# Patient Record
Sex: MALE | Race: WHITE | NOT HISPANIC OR LATINO
[De-identification: names, ages, dates, MRNs, and addresses within clinical notes are randomized per-mention and may not be internally consistent; named-entity substitution may affect disease eponyms.]

---

## 2017-01-26 ENCOUNTER — APPOINTMENT (OUTPATIENT)
Dept: INTERNAL MEDICINE | Facility: CLINIC | Age: 77
End: 2017-01-26

## 2017-03-01 ENCOUNTER — RX RENEWAL (OUTPATIENT)
Age: 77
End: 2017-03-01

## 2017-05-22 ENCOUNTER — RX RENEWAL (OUTPATIENT)
Age: 77
End: 2017-05-22

## 2017-07-17 ENCOUNTER — NON-APPOINTMENT (OUTPATIENT)
Age: 77
End: 2017-07-17

## 2017-07-17 ENCOUNTER — APPOINTMENT (OUTPATIENT)
Dept: INTERNAL MEDICINE | Facility: CLINIC | Age: 77
End: 2017-07-17

## 2017-07-17 VITALS
DIASTOLIC BLOOD PRESSURE: 70 MMHG | HEART RATE: 102 BPM | HEIGHT: 69 IN | SYSTOLIC BLOOD PRESSURE: 179 MMHG | BODY MASS INDEX: 29.92 KG/M2 | WEIGHT: 202 LBS

## 2017-07-17 DIAGNOSIS — Z87.09 PERSONAL HISTORY OF OTHER DISEASES OF THE RESPIRATORY SYSTEM: ICD-10-CM

## 2017-07-17 LAB
GLUCOSE BLDC GLUCOMTR-MCNC: 125
HBA1C MFR BLD HPLC: 6

## 2017-07-18 LAB
ALBUMIN SERPL ELPH-MCNC: 4.3 G/DL
ALP BLD-CCNC: 84 U/L
ALT SERPL-CCNC: 14 U/L
ANION GAP SERPL CALC-SCNC: 15 MMOL/L
APPEARANCE: CLEAR
AST SERPL-CCNC: 16 U/L
BASOPHILS # BLD AUTO: 0.02 K/UL
BASOPHILS NFR BLD AUTO: 0.3 %
BILIRUB SERPL-MCNC: 0.6 MG/DL
BILIRUBIN URINE: NEGATIVE
BLOOD URINE: NEGATIVE
BUN SERPL-MCNC: 23 MG/DL
CALCIUM SERPL-MCNC: 9.5 MG/DL
CHLORIDE SERPL-SCNC: 104 MMOL/L
CHOLEST SERPL-MCNC: 169 MG/DL
CHOLEST/HDLC SERPL: 3.1 RATIO
CO2 SERPL-SCNC: 23 MMOL/L
COLOR: YELLOW
CREAT SERPL-MCNC: 0.83 MG/DL
CREAT SPEC-SCNC: 105 MG/DL
DIGOXIN SERPL-MCNC: 0.7 NG/ML
EOSINOPHIL # BLD AUTO: 0.02 K/UL
EOSINOPHIL NFR BLD AUTO: 0.3 %
GLUCOSE QUALITATIVE U: NORMAL MG/DL
GLUCOSE SERPL-MCNC: 127 MG/DL
HCT VFR BLD CALC: 40.1 %
HDLC SERPL-MCNC: 54 MG/DL
HGB BLD-MCNC: 12.2 G/DL
IMM GRANULOCYTES NFR BLD AUTO: 0.2 %
KETONES URINE: NEGATIVE
LDLC SERPL CALC-MCNC: 103 MG/DL
LEUKOCYTE ESTERASE URINE: NEGATIVE
LYMPHOCYTES # BLD AUTO: 0.86 K/UL
LYMPHOCYTES NFR BLD AUTO: 13.7 %
MAN DIFF?: NORMAL
MCHC RBC-ENTMCNC: 26.3 PG
MCHC RBC-ENTMCNC: 30.4 GM/DL
MCV RBC AUTO: 86.6 FL
MICROALBUMIN 24H UR DL<=1MG/L-MCNC: 3.5 MG/DL
MICROALBUMIN/CREAT 24H UR-RTO: 33 MG/G
MONOCYTES # BLD AUTO: 0.37 K/UL
MONOCYTES NFR BLD AUTO: 5.9 %
NEUTROPHILS # BLD AUTO: 5 K/UL
NEUTROPHILS NFR BLD AUTO: 79.6 %
NITRITE URINE: NEGATIVE
PH URINE: 5
PLATELET # BLD AUTO: 153 K/UL
POTASSIUM SERPL-SCNC: 4.3 MMOL/L
PROT SERPL-MCNC: 7.1 G/DL
PROTEIN URINE: NEGATIVE MG/DL
RBC # BLD: 4.63 M/UL
RBC # FLD: 15.6 %
SAVE SPECIMEN: NORMAL
SODIUM SERPL-SCNC: 142 MMOL/L
SPECIFIC GRAVITY URINE: 1.03
TRIGL SERPL-MCNC: 58 MG/DL
UROBILINOGEN URINE: NORMAL MG/DL
WBC # FLD AUTO: 6.28 K/UL

## 2017-08-21 ENCOUNTER — RX RENEWAL (OUTPATIENT)
Age: 77
End: 2017-08-21

## 2017-11-07 ENCOUNTER — RX RENEWAL (OUTPATIENT)
Age: 77
End: 2017-11-07

## 2017-11-09 ENCOUNTER — NON-APPOINTMENT (OUTPATIENT)
Age: 77
End: 2017-11-09

## 2017-11-09 ENCOUNTER — APPOINTMENT (OUTPATIENT)
Dept: INTERNAL MEDICINE | Facility: CLINIC | Age: 77
End: 2017-11-09
Payer: MEDICARE

## 2017-11-09 VITALS
BODY MASS INDEX: 30.51 KG/M2 | DIASTOLIC BLOOD PRESSURE: 70 MMHG | HEIGHT: 69 IN | WEIGHT: 206 LBS | SYSTOLIC BLOOD PRESSURE: 136 MMHG

## 2017-11-09 VITALS — HEART RATE: 86 BPM

## 2017-11-09 LAB
DATE COLLECTED: NORMAL
GLUCOSE BLDC GLUCOMTR-MCNC: 118
HBA1C MFR BLD HPLC: 6.2
HEMOCCULT SP1 STL QL: NEGATIVE
QUALITY CONTROL: YES

## 2017-11-09 PROCEDURE — 81003 URINALYSIS AUTO W/O SCOPE: CPT | Mod: QW

## 2017-11-09 PROCEDURE — 82962 GLUCOSE BLOOD TEST: CPT

## 2017-11-09 PROCEDURE — 36415 COLL VENOUS BLD VENIPUNCTURE: CPT

## 2017-11-09 PROCEDURE — 93000 ELECTROCARDIOGRAM COMPLETE: CPT

## 2017-11-09 PROCEDURE — 83036 HEMOGLOBIN GLYCOSYLATED A1C: CPT | Mod: QW

## 2017-11-09 PROCEDURE — G0439: CPT

## 2017-11-13 LAB
25(OH)D3 SERPL-MCNC: 33.1 NG/ML
ALBUMIN SERPL ELPH-MCNC: 4.1 G/DL
ALP BLD-CCNC: 87 U/L
ALT SERPL-CCNC: 11 U/L
ANION GAP SERPL CALC-SCNC: 16 MMOL/L
AST SERPL-CCNC: 20 U/L
BASOPHILS # BLD AUTO: 0.02 K/UL
BASOPHILS NFR BLD AUTO: 0.3 %
BILIRUB SERPL-MCNC: 0.5 MG/DL
BILIRUB UR QL STRIP: NORMAL
BUN SERPL-MCNC: 12 MG/DL
CALCIUM SERPL-MCNC: 9.5 MG/DL
CHLORIDE SERPL-SCNC: 103 MMOL/L
CHOLEST SERPL-MCNC: 189 MG/DL
CHOLEST/HDLC SERPL: 4.2 RATIO
CLARITY UR: NORMAL
CO2 SERPL-SCNC: 27 MMOL/L
COLLECTION METHOD: NORMAL
CREAT SERPL-MCNC: 0.57 MG/DL
DIGOXIN SERPL-MCNC: 0.7 NG/ML
EOSINOPHIL # BLD AUTO: 0.03 K/UL
EOSINOPHIL NFR BLD AUTO: 0.5 %
GLUCOSE SERPL-MCNC: 122 MG/DL
GLUCOSE UR-MCNC: NORMAL
HCG UR QL: 0.2 EU/DL
HCT VFR BLD CALC: 41.9 %
HDLC SERPL-MCNC: 45 MG/DL
HGB BLD-MCNC: 12.8 G/DL
HGB UR QL STRIP.AUTO: NORMAL
IMM GRANULOCYTES NFR BLD AUTO: 0.3 %
KETONES UR-MCNC: NORMAL
LDLC SERPL CALC-MCNC: 127 MG/DL
LEUKOCYTE ESTERASE UR QL STRIP: NORMAL
LYMPHOCYTES # BLD AUTO: 0.76 K/UL
LYMPHOCYTES NFR BLD AUTO: 11.7 %
MAN DIFF?: NORMAL
MCHC RBC-ENTMCNC: 27.2 PG
MCHC RBC-ENTMCNC: 30.5 GM/DL
MCV RBC AUTO: 89 FL
MONOCYTES # BLD AUTO: 0.42 K/UL
MONOCYTES NFR BLD AUTO: 6.5 %
NEUTROPHILS # BLD AUTO: 5.24 K/UL
NEUTROPHILS NFR BLD AUTO: 80.7 %
NITRITE UR QL STRIP: NORMAL
PH UR STRIP: 6
PLATELET # BLD AUTO: 155 K/UL
POTASSIUM SERPL-SCNC: 4.3 MMOL/L
PROT SERPL-MCNC: 7.7 G/DL
PROT UR STRIP-MCNC: NORMAL
RBC # BLD: 4.71 M/UL
RBC # FLD: 14.8 %
SODIUM SERPL-SCNC: 146 MMOL/L
SP GR UR STRIP: 1.01
T4 SERPL-MCNC: 6.8 UG/DL
TRIGL SERPL-MCNC: 87 MG/DL
TSH SERPL-ACNC: 0.86 UIU/ML
URATE SERPL-MCNC: 5.9 MG/DL
WBC # FLD AUTO: 6.49 K/UL

## 2018-01-26 ENCOUNTER — RX RENEWAL (OUTPATIENT)
Age: 78
End: 2018-01-26

## 2018-04-18 ENCOUNTER — APPOINTMENT (OUTPATIENT)
Dept: INTERNAL MEDICINE | Facility: CLINIC | Age: 78
End: 2018-04-18
Payer: MEDICARE

## 2018-04-18 ENCOUNTER — LABORATORY RESULT (OUTPATIENT)
Age: 78
End: 2018-04-18

## 2018-04-18 VITALS
HEART RATE: 86 BPM | HEIGHT: 69 IN | BODY MASS INDEX: 31.4 KG/M2 | WEIGHT: 212 LBS | DIASTOLIC BLOOD PRESSURE: 72 MMHG | SYSTOLIC BLOOD PRESSURE: 166 MMHG

## 2018-04-18 LAB
GLUCOSE BLDC GLUCOMTR-MCNC: 109
HBA1C MFR BLD HPLC: 6.3

## 2018-04-18 PROCEDURE — 99214 OFFICE O/P EST MOD 30 MIN: CPT | Mod: 25

## 2018-04-18 PROCEDURE — 36415 COLL VENOUS BLD VENIPUNCTURE: CPT

## 2018-04-18 PROCEDURE — 83036 HEMOGLOBIN GLYCOSYLATED A1C: CPT | Mod: QW

## 2018-04-19 LAB
ALBUMIN SERPL ELPH-MCNC: 4 G/DL
ALP BLD-CCNC: 111 U/L
ALT SERPL-CCNC: 13 U/L
ANION GAP SERPL CALC-SCNC: 13 MMOL/L
AST SERPL-CCNC: 20 U/L
BASOPHILS # BLD AUTO: 0.01 K/UL
BASOPHILS NFR BLD AUTO: 0.2 %
BILIRUB SERPL-MCNC: 0.7 MG/DL
BUN SERPL-MCNC: 15 MG/DL
CALCIUM SERPL-MCNC: 9.1 MG/DL
CHLORIDE SERPL-SCNC: 103 MMOL/L
CHOLEST SERPL-MCNC: 159 MG/DL
CHOLEST/HDLC SERPL: 3.6 RATIO
CO2 SERPL-SCNC: 24 MMOL/L
CREAT SERPL-MCNC: 0.83 MG/DL
CREAT SPEC-SCNC: 49 MG/DL
DIGOXIN SERPL-MCNC: 0.6 NG/ML
EOSINOPHIL # BLD AUTO: 0.04 K/UL
EOSINOPHIL NFR BLD AUTO: 0.7 %
GLUCOSE SERPL-MCNC: 103 MG/DL
HCT VFR BLD CALC: 41.7 %
HDLC SERPL-MCNC: 44 MG/DL
HGB BLD-MCNC: 12.2 G/DL
IMM GRANULOCYTES NFR BLD AUTO: 0.2 %
LDLC SERPL CALC-MCNC: 102 MG/DL
LYMPHOCYTES # BLD AUTO: 0.95 K/UL
LYMPHOCYTES NFR BLD AUTO: 16.8 %
MAN DIFF?: NORMAL
MCHC RBC-ENTMCNC: 26.2 PG
MCHC RBC-ENTMCNC: 29.3 GM/DL
MCV RBC AUTO: 89.5 FL
MICROALBUMIN 24H UR DL<=1MG/L-MCNC: 2.7 MG/DL
MICROALBUMIN/CREAT 24H UR-RTO: 55 MG/G
MONOCYTES # BLD AUTO: 0.48 K/UL
MONOCYTES NFR BLD AUTO: 8.5 %
NEUTROPHILS # BLD AUTO: 4.17 K/UL
NEUTROPHILS NFR BLD AUTO: 73.6 %
PLATELET # BLD AUTO: 155 K/UL
POTASSIUM SERPL-SCNC: 4.4 MMOL/L
PROT SERPL-MCNC: 7.4 G/DL
RBC # BLD: 4.66 M/UL
RBC # FLD: 15.7 %
SODIUM SERPL-SCNC: 140 MMOL/L
TRIGL SERPL-MCNC: 64 MG/DL
WBC # FLD AUTO: 5.66 K/UL

## 2018-07-10 ENCOUNTER — RX RENEWAL (OUTPATIENT)
Age: 78
End: 2018-07-10

## 2018-07-16 ENCOUNTER — RX RENEWAL (OUTPATIENT)
Age: 78
End: 2018-07-16

## 2018-09-04 ENCOUNTER — APPOINTMENT (OUTPATIENT)
Dept: OTOLARYNGOLOGY | Facility: CLINIC | Age: 78
End: 2018-09-04

## 2018-09-12 ENCOUNTER — LABORATORY RESULT (OUTPATIENT)
Age: 78
End: 2018-09-12

## 2018-09-12 ENCOUNTER — APPOINTMENT (OUTPATIENT)
Dept: INTERNAL MEDICINE | Facility: CLINIC | Age: 78
End: 2018-09-12
Payer: MEDICARE

## 2018-09-12 VITALS — WEIGHT: 198 LBS | BODY MASS INDEX: 29.33 KG/M2 | HEIGHT: 69 IN

## 2018-09-12 VITALS — SYSTOLIC BLOOD PRESSURE: 128 MMHG | HEART RATE: 86 BPM | DIASTOLIC BLOOD PRESSURE: 70 MMHG

## 2018-09-12 LAB
GLUCOSE BLDC GLUCOMTR-MCNC: 113
HBA1C MFR BLD HPLC: 5.9

## 2018-09-12 PROCEDURE — 99214 OFFICE O/P EST MOD 30 MIN: CPT | Mod: 25

## 2018-09-12 PROCEDURE — 83036 HEMOGLOBIN GLYCOSYLATED A1C: CPT | Mod: QW

## 2018-09-12 PROCEDURE — 36415 COLL VENOUS BLD VENIPUNCTURE: CPT

## 2018-09-12 NOTE — HISTORY OF PRESENT ILLNESS
[de-identified] : 78 year old male here today for a routine follow up of his chronic medical problems. He states he Has been losing weight.  He is in his yard a lot. His appetite is good. \par \par A fib: on Dig, metoprolol\par \par DM: doesn’t check sugar\par \par Myesthesia Gravis: same, on CellCept\par Dr Rincon\par \par

## 2018-09-12 NOTE — ASSESSMENT
[FreeTextEntry1] : DM: a1c 5.9--very good\par continue diet \par \par htn: bp stable\par \par Myesthenia: continue fu with Neuro\par \par Routine labs, dig level\par \par refusing flu shot\par \par fu in 4months

## 2018-09-13 LAB
ALBUMIN SERPL ELPH-MCNC: 4.5 G/DL
ALP BLD-CCNC: 112 U/L
ALT SERPL-CCNC: 15 U/L
ANION GAP SERPL CALC-SCNC: 13 MMOL/L
AST SERPL-CCNC: 21 U/L
BASOPHILS # BLD AUTO: 0.01 K/UL
BASOPHILS NFR BLD AUTO: 0.2 %
BILIRUB SERPL-MCNC: 0.7 MG/DL
BUN SERPL-MCNC: 22 MG/DL
CALCIUM SERPL-MCNC: 9.8 MG/DL
CHLORIDE SERPL-SCNC: 102 MMOL/L
CHOLEST SERPL-MCNC: 166 MG/DL
CHOLEST/HDLC SERPL: 3.9 RATIO
CO2 SERPL-SCNC: 28 MMOL/L
CREAT SERPL-MCNC: 0.8 MG/DL
DIGOXIN SERPL-MCNC: 0.9 NG/ML
EOSINOPHIL # BLD AUTO: 0.02 K/UL
EOSINOPHIL NFR BLD AUTO: 0.3 %
HCT VFR BLD CALC: 42.3 %
HDLC SERPL-MCNC: 43 MG/DL
HGB BLD-MCNC: 12.8 G/DL
IMM GRANULOCYTES NFR BLD AUTO: 0.2 %
LDLC SERPL CALC-MCNC: 108 MG/DL
LYMPHOCYTES # BLD AUTO: 0.84 K/UL
LYMPHOCYTES NFR BLD AUTO: 13.8 %
MAN DIFF?: NORMAL
MCHC RBC-ENTMCNC: 27 PG
MCHC RBC-ENTMCNC: 30.3 GM/DL
MCV RBC AUTO: 89.2 FL
MONOCYTES # BLD AUTO: 0.39 K/UL
MONOCYTES NFR BLD AUTO: 6.4 %
NEUTROPHILS # BLD AUTO: 4.8 K/UL
NEUTROPHILS NFR BLD AUTO: 79.1 %
PLATELET # BLD AUTO: 153 K/UL
POTASSIUM SERPL-SCNC: 4.7 MMOL/L
PROT SERPL-MCNC: 7.2 G/DL
RBC # BLD: 4.74 M/UL
RBC # FLD: 15.4 %
SODIUM SERPL-SCNC: 143 MMOL/L
TRIGL SERPL-MCNC: 75 MG/DL
WBC # FLD AUTO: 6.07 K/UL

## 2018-10-15 ENCOUNTER — RX RENEWAL (OUTPATIENT)
Age: 78
End: 2018-10-15

## 2019-01-16 ENCOUNTER — NON-APPOINTMENT (OUTPATIENT)
Age: 79
End: 2019-01-16

## 2019-01-16 ENCOUNTER — APPOINTMENT (OUTPATIENT)
Dept: INTERNAL MEDICINE | Facility: CLINIC | Age: 79
End: 2019-01-16
Payer: MEDICARE

## 2019-01-16 VITALS
HEIGHT: 69 IN | HEART RATE: 80 BPM | BODY MASS INDEX: 29.47 KG/M2 | DIASTOLIC BLOOD PRESSURE: 77 MMHG | WEIGHT: 199 LBS | SYSTOLIC BLOOD PRESSURE: 171 MMHG

## 2019-01-16 LAB
BILIRUB UR QL STRIP: NORMAL
CLARITY UR: CLEAR
COLLECTION METHOD: NORMAL
GLUCOSE UR-MCNC: NORMAL
HCG UR QL: 0.2 EU/DL
HGB UR QL STRIP.AUTO: NORMAL
KETONES UR-MCNC: NORMAL
LEUKOCYTE ESTERASE UR QL STRIP: NORMAL
NITRITE UR QL STRIP: NORMAL
PH UR STRIP: 6.5
PROT UR STRIP-MCNC: NORMAL
SP GR UR STRIP: 1.01

## 2019-01-16 PROCEDURE — 81003 URINALYSIS AUTO W/O SCOPE: CPT | Mod: QW

## 2019-01-16 PROCEDURE — 93000 ELECTROCARDIOGRAM COMPLETE: CPT | Mod: 59

## 2019-01-16 PROCEDURE — 83036 HEMOGLOBIN GLYCOSYLATED A1C: CPT | Mod: QW

## 2019-01-16 PROCEDURE — 36415 COLL VENOUS BLD VENIPUNCTURE: CPT

## 2019-01-16 PROCEDURE — G0439: CPT | Mod: 25

## 2019-01-16 NOTE — HEALTH RISK ASSESSMENT
[Good] : ~his/her~  mood as  good [] : No [No falls in past year] : Patient reported no falls in the past year [0] : 2) Feeling down, depressed, or hopeless: Not at all (0) [[id number]] : 0 [Patient declined colonoscopy] : Patient declined colonoscopy [Discussed at today's visit] : Advance Directives Discussed at today's visit

## 2019-01-16 NOTE — ASSESSMENT
[FreeTextEntry1] : Refuses flu shots\par never had colonoscopy--does not want FIT DNA\par \par Disucssed chronic issues, EKG stable\par bp stable\par \par routine labs ordered \par \par

## 2019-01-16 NOTE — HISTORY OF PRESENT ILLNESS
[de-identified] : 79 year old male here today for his annual wellness exam.\par He feels well today. \par \par Myasthenia: sees Dr Rincon \par \par Has not ever had a colonoscopy, does not want flu shot or any other injections. \par

## 2019-01-16 NOTE — PHYSICAL EXAM

## 2019-01-18 ENCOUNTER — OTHER (OUTPATIENT)
Age: 79
End: 2019-01-18

## 2019-01-18 LAB
25(OH)D3 SERPL-MCNC: 34.8 NG/ML
ALBUMIN SERPL ELPH-MCNC: 4.5 G/DL
ALP BLD-CCNC: 127 U/L
ALT SERPL-CCNC: 27 U/L
ANION GAP SERPL CALC-SCNC: 14 MMOL/L
AST SERPL-CCNC: 22 U/L
BASOPHILS # BLD AUTO: 0.02 K/UL
BASOPHILS NFR BLD AUTO: 0.3 %
BILIRUB SERPL-MCNC: 0.7 MG/DL
BUN SERPL-MCNC: 17 MG/DL
CALCIUM SERPL-MCNC: 9.7 MG/DL
CHLORIDE SERPL-SCNC: 101 MMOL/L
CHOLEST SERPL-MCNC: 177 MG/DL
CHOLEST/HDLC SERPL: 3.4 RATIO
CO2 SERPL-SCNC: 25 MMOL/L
CREAT SERPL-MCNC: 0.89 MG/DL
DIGOXIN SERPL-MCNC: 0.8 NG/ML
EOSINOPHIL # BLD AUTO: 0 K/UL
EOSINOPHIL NFR BLD AUTO: 0 %
GLUCOSE BLDC GLUCOMTR-MCNC: 142
GLUCOSE SERPL-MCNC: 146 MG/DL
HBA1C MFR BLD HPLC: 6.2
HCT VFR BLD CALC: 43.3 %
HDLC SERPL-MCNC: 52 MG/DL
HGB BLD-MCNC: 13.1 G/DL
IMM GRANULOCYTES NFR BLD AUTO: 0.3 %
LDLC SERPL CALC-MCNC: 111 MG/DL
LYMPHOCYTES # BLD AUTO: 0.63 K/UL
LYMPHOCYTES NFR BLD AUTO: 9.1 %
MAN DIFF?: NORMAL
MCHC RBC-ENTMCNC: 26.6 PG
MCHC RBC-ENTMCNC: 30.3 GM/DL
MCV RBC AUTO: 87.8 FL
MONOCYTES # BLD AUTO: 0.41 K/UL
MONOCYTES NFR BLD AUTO: 5.9 %
NEUTROPHILS # BLD AUTO: 5.82 K/UL
NEUTROPHILS NFR BLD AUTO: 84.4 %
PLATELET # BLD AUTO: 141 K/UL
POTASSIUM SERPL-SCNC: 4.3 MMOL/L
PROT SERPL-MCNC: 7.4 G/DL
PSA SERPL-MCNC: 1.35 NG/ML
RBC # BLD: 4.93 M/UL
RBC # FLD: 15.1 %
SAVE SPECIMEN: NORMAL
SODIUM SERPL-SCNC: 140 MMOL/L
T4 SERPL-MCNC: 8 UG/DL
TRIGL SERPL-MCNC: 71 MG/DL
TSH SERPL-ACNC: 0.94 UIU/ML
URATE SERPL-MCNC: 5.5 MG/DL
WBC # FLD AUTO: 6.9 K/UL

## 2019-03-13 ENCOUNTER — APPOINTMENT (OUTPATIENT)
Dept: INTERNAL MEDICINE | Facility: CLINIC | Age: 79
End: 2019-03-13
Payer: MEDICARE

## 2019-03-13 VITALS — BODY MASS INDEX: 29.92 KG/M2 | WEIGHT: 202 LBS | HEIGHT: 69 IN

## 2019-03-13 PROCEDURE — 99214 OFFICE O/P EST MOD 30 MIN: CPT | Mod: 25

## 2019-03-13 PROCEDURE — 36415 COLL VENOUS BLD VENIPUNCTURE: CPT

## 2019-03-13 PROCEDURE — 83036 HEMOGLOBIN GLYCOSYLATED A1C: CPT | Mod: QW

## 2019-03-14 LAB
ALBUMIN SERPL ELPH-MCNC: 4.4 G/DL
ALP BLD-CCNC: 124 U/L
ALT SERPL-CCNC: 20 U/L
ANION GAP SERPL CALC-SCNC: 13 MMOL/L
AST SERPL-CCNC: 23 U/L
BASOPHILS # BLD AUTO: 0.02 K/UL
BASOPHILS NFR BLD AUTO: 0.4 %
BILIRUB SERPL-MCNC: 0.9 MG/DL
BUN SERPL-MCNC: 22 MG/DL
CALCIUM SERPL-MCNC: 9.4 MG/DL
CHLORIDE SERPL-SCNC: 103 MMOL/L
CHOLEST SERPL-MCNC: 167 MG/DL
CHOLEST/HDLC SERPL: 3.6 RATIO
CO2 SERPL-SCNC: 25 MMOL/L
CREAT SERPL-MCNC: 0.83 MG/DL
DIGOXIN SERPL-MCNC: 0.9 NG/ML
EOSINOPHIL # BLD AUTO: 0 K/UL
EOSINOPHIL NFR BLD AUTO: 0 %
GLUCOSE BLDC GLUCOMTR-MCNC: 136
GLUCOSE SERPL-MCNC: 139 MG/DL
HBA1C MFR BLD HPLC: 6
HCT VFR BLD CALC: 42.8 %
HDLC SERPL-MCNC: 47 MG/DL
HGB BLD-MCNC: 12.7 G/DL
IMM GRANULOCYTES NFR BLD AUTO: 0.2 %
LDLC SERPL CALC-MCNC: 109 MG/DL
LYMPHOCYTES # BLD AUTO: 0.63 K/UL
LYMPHOCYTES NFR BLD AUTO: 11.2 %
MAN DIFF?: NORMAL
MCHC RBC-ENTMCNC: 26.7 PG
MCHC RBC-ENTMCNC: 29.7 GM/DL
MCV RBC AUTO: 90.1 FL
MONOCYTES # BLD AUTO: 0.26 K/UL
MONOCYTES NFR BLD AUTO: 4.6 %
NEUTROPHILS # BLD AUTO: 4.72 K/UL
NEUTROPHILS NFR BLD AUTO: 83.6 %
PLATELET # BLD AUTO: 147 K/UL
POTASSIUM SERPL-SCNC: 4.6 MMOL/L
PROT SERPL-MCNC: 7.2 G/DL
RBC # BLD: 4.75 M/UL
RBC # FLD: 14.4 %
SAVE SPECIMEN: NORMAL
SODIUM SERPL-SCNC: 141 MMOL/L
TRIGL SERPL-MCNC: 57 MG/DL
WBC # FLD AUTO: 5.64 K/UL

## 2019-03-14 NOTE — ASSESSMENT
[FreeTextEntry1] : MG: fu with Neuro\par \par Dm: a1c improved, continue watching diet \par \par Afib: continue meds, dig level\par \par routine labs\par \par fu in 3 months

## 2019-03-14 NOTE — HISTORY OF PRESENT ILLNESS
[de-identified] : 79 year old male here today for a routine follow up of his chronic medical problems. \par \par MG: seeing Dr Rincon\par 7.5mg of prednisone\par CellCept \par \par DM: on Metformin\par \par Afib: on dig\par no CP, no SOB \par No palpitations\par \par Overall feeling well

## 2019-06-17 ENCOUNTER — RX RENEWAL (OUTPATIENT)
Age: 79
End: 2019-06-17

## 2019-09-11 ENCOUNTER — APPOINTMENT (OUTPATIENT)
Dept: INTERNAL MEDICINE | Facility: CLINIC | Age: 79
End: 2019-09-11
Payer: MEDICARE

## 2019-09-11 VITALS — HEIGHT: 69 IN | BODY MASS INDEX: 29.92 KG/M2 | WEIGHT: 202 LBS

## 2019-09-11 VITALS — HEART RATE: 70 BPM | DIASTOLIC BLOOD PRESSURE: 78 MMHG | SYSTOLIC BLOOD PRESSURE: 142 MMHG

## 2019-09-11 LAB
GLUCOSE BLDC GLUCOMTR-MCNC: 144
HBA1C MFR BLD HPLC: 6.3

## 2019-09-11 PROCEDURE — 99214 OFFICE O/P EST MOD 30 MIN: CPT | Mod: 25

## 2019-09-11 PROCEDURE — 83036 HEMOGLOBIN GLYCOSYLATED A1C: CPT | Mod: QW

## 2019-09-11 PROCEDURE — 36415 COLL VENOUS BLD VENIPUNCTURE: CPT

## 2019-09-11 PROCEDURE — 82962 GLUCOSE BLOOD TEST: CPT

## 2019-09-11 NOTE — ASSESSMENT
[FreeTextEntry1] : myastenia: continue FU with Neuro \par \par DM: Discussed need for monitoring diet and watching carbohydrates and sugars. \par Limit breads, pasta, rice. Keep a food journal. \par \par \par A fib: dig level\par recommend fu with caridology for baseline echo, pt refusing\par \par refusing all injections as well

## 2019-09-11 NOTE — HISTORY OF PRESENT ILLNESS
[de-identified] : Mr. ALVARO TRAORE is a 79 year old male here today for a follow up of their chronic medical conditions.\par \par Myasthenia: saw Neurology recently \par he was seeing double\par now on prednisone 10 mg \par \par DM: Eats a lot of carbs\par on metformin \par \par Afib: on DIgoxin \par \par \par

## 2019-09-12 LAB
ALBUMIN SERPL ELPH-MCNC: 4.4 G/DL
ALP BLD-CCNC: 94 U/L
ALT SERPL-CCNC: 15 U/L
ANION GAP SERPL CALC-SCNC: 12 MMOL/L
AST SERPL-CCNC: 17 U/L
BASOPHILS # BLD AUTO: 0.04 K/UL
BASOPHILS NFR BLD AUTO: 0.6 %
BILIRUB SERPL-MCNC: 0.8 MG/DL
BUN SERPL-MCNC: 24 MG/DL
CALCIUM SERPL-MCNC: 9.3 MG/DL
CHLORIDE SERPL-SCNC: 103 MMOL/L
CO2 SERPL-SCNC: 27 MMOL/L
CREAT SERPL-MCNC: 0.83 MG/DL
EOSINOPHIL # BLD AUTO: 0.01 K/UL
EOSINOPHIL NFR BLD AUTO: 0.1 %
GLUCOSE SERPL-MCNC: 143 MG/DL
HCT VFR BLD CALC: 42 %
HGB BLD-MCNC: 12.4 G/DL
IMM GRANULOCYTES NFR BLD AUTO: 0.7 %
LYMPHOCYTES # BLD AUTO: 0.69 K/UL
LYMPHOCYTES NFR BLD AUTO: 10.3 %
MAN DIFF?: NORMAL
MCHC RBC-ENTMCNC: 26.6 PG
MCHC RBC-ENTMCNC: 29.5 GM/DL
MCV RBC AUTO: 90.1 FL
MONOCYTES # BLD AUTO: 0.38 K/UL
MONOCYTES NFR BLD AUTO: 5.7 %
NEUTROPHILS # BLD AUTO: 5.53 K/UL
NEUTROPHILS NFR BLD AUTO: 82.6 %
PLATELET # BLD AUTO: 145 K/UL
POTASSIUM SERPL-SCNC: 4.7 MMOL/L
PROT SERPL-MCNC: 6.7 G/DL
RBC # BLD: 4.66 M/UL
RBC # FLD: 14.9 %
SAVE SPECIMEN: NORMAL
SODIUM SERPL-SCNC: 142 MMOL/L
WBC # FLD AUTO: 6.7 K/UL

## 2019-09-20 ENCOUNTER — APPOINTMENT (OUTPATIENT)
Dept: INTERNAL MEDICINE | Facility: CLINIC | Age: 79
End: 2019-09-20
Payer: MEDICARE

## 2019-09-20 PROCEDURE — 36415 COLL VENOUS BLD VENIPUNCTURE: CPT

## 2019-09-23 LAB
DIGOXIN SERPL-MCNC: 1.1 NG/ML
SAVE SPECIMEN: NORMAL

## 2019-10-20 ENCOUNTER — EMERGENCY (EMERGENCY)
Facility: HOSPITAL | Age: 79
LOS: 1 days | Discharge: HOME | End: 2019-10-20
Attending: EMERGENCY MEDICINE | Admitting: EMERGENCY MEDICINE
Payer: MEDICARE

## 2019-10-20 VITALS
TEMPERATURE: 98 F | RESPIRATION RATE: 98 BRPM | HEART RATE: 72 BPM | DIASTOLIC BLOOD PRESSURE: 55 MMHG | SYSTOLIC BLOOD PRESSURE: 153 MMHG

## 2019-10-20 VITALS — OXYGEN SATURATION: 99 %

## 2019-10-20 PROCEDURE — 99283 EMERGENCY DEPT VISIT LOW MDM: CPT | Mod: GC

## 2019-10-20 NOTE — ED PROVIDER NOTE - PATIENT PORTAL LINK FT
You can access the FollowMyHealth Patient Portal offered by [facility] by registering at the following website: [email or web address]. By joining [employer]’s FollowMyHealth portal, you will also be able to view your health information using other applications (apps) compatible with our system.

## 2019-10-20 NOTE — ED PROVIDER NOTE - ATTENDING CONTRIBUTION TO CARE
Jaziel: 80 yo male with a h/o Myasthenia Gravis, Afib not on AC, HTN, HLD, presents to the ED for epistaxis now resolved. PT endorses a h/o epistaxis in the past but not recently. This morning while eating breakfast he began to spontaneously bleed. No associated chest pain, SOB, dizziness or weakness. Bleeding now resolved. Pt has outpatient f/u with ENT. Exam: GENERAL: well appearing, NAD, HEENT: MMM, pink conjunctiva, CARDIO: +S1/S2, no murmurs, rubs or gallops, LUNGS: CTA B/L, no wheezing, rales or rhonchi, NEURO: AxOx3,SKIN: no rashes or lesions, well perfused A/P- 80 yo male s/p episode of epistaxis now resolved. pt observed, no evidence of rebleeding. will d.c home to f/u with ENT. Pt given return precautions.

## 2019-10-20 NOTE — ED PROVIDER NOTE - OBJECTIVE STATEMENT
78 yo M hx Myasthenia Gravis, Afib not on AC, HTN, HLD, presenting with nosebleed while sitting down eating breakfast this morning. History of similar episodes in past but patient states that bleeding was much more pronounced today. Patient packed nose before leaving home. Denies current blood running down back of throat. No trauma.

## 2019-10-20 NOTE — ED ADULT TRIAGE NOTE - CHIEF COMPLAINT QUOTE
pt family states pt ate breakfast and then developed bloody nose// family packed 1 nastril.  slight bleeding noted  at time of triage  / pt   not on  blood thinners/ no asa pt has hx of bloody noses /no c/o of cp or sob or headache

## 2019-10-20 NOTE — ED PROVIDER NOTE - NSFOLLOWUPCLINICS_GEN_ALL_ED_FT
[facility] - ENT  Otolaryngology (ENT)  [address] [place] [address]  Phone: [phone]  Fax:   Follow Up Time:

## 2019-10-20 NOTE — ED PROVIDER NOTE - PHYSICAL EXAMINATION
VITALS: reviewed  GEN: NAD  HEAD/EYES: NCAT, EOMI, anicteric sclerae  ENT: large blood clot removed from L nostril, no active bleeding, mucus membranes moist, oropharynx WNL  RESP: unlabored breathing  CV: distal pulses intact and symmetric bilaterally  MSK: extremities atraumatic and nontender, no edema, no asymmetry.   SKIN: warm, dry, no rash, no bruising, no cyanosis. color appropriate for ethnicity  NEURO: alert, mentating appropriately, no facial asymmetry.   PSYCH: Affect appropriate

## 2019-11-25 ENCOUNTER — APPOINTMENT (OUTPATIENT)
Dept: OTOLARYNGOLOGY | Facility: CLINIC | Age: 79
End: 2019-11-25
Payer: MEDICARE

## 2019-11-25 VITALS
HEIGHT: 69 IN | SYSTOLIC BLOOD PRESSURE: 173 MMHG | HEART RATE: 92 BPM | DIASTOLIC BLOOD PRESSURE: 80 MMHG | WEIGHT: 202 LBS | BODY MASS INDEX: 29.92 KG/M2

## 2019-11-25 DIAGNOSIS — H90.3 SENSORINEURAL HEARING LOSS, BILATERAL: ICD-10-CM

## 2019-11-25 DIAGNOSIS — H61.23 IMPACTED CERUMEN, BILATERAL: ICD-10-CM

## 2019-11-25 PROCEDURE — 69210 REMOVE IMPACTED EAR WAX UNI: CPT

## 2019-11-25 PROCEDURE — 31238 NSL/SINS NDSC SRG NSL HEMRRG: CPT | Mod: RT

## 2019-11-25 PROCEDURE — 99204 OFFICE O/P NEW MOD 45 MIN: CPT | Mod: 25

## 2019-11-25 NOTE — ASSESSMENT
[FreeTextEntry1] : Patient has hearing aids some diminished hearing has some cerumen impaction bilaterally curetted out also been having some recurrent epistaxis predominantly from his left nasal cavity on examination today examination shows a source of bleeding on the right side which was endoscopically cauterized with silver nitrate he did recommend he followup with us on an annual basis.

## 2019-11-25 NOTE — CONSULT LETTER
[Consult Letter:] : I had the pleasure of evaluating your patient, [unfilled]. [Dear  ___] : Dear  [unfilled], [Sincerely,] : Sincerely, [Consult Closing:] : Thank you very much for allowing me to participate in the care of this patient.  If you have any questions, please do not hesitate to contact me. [Please see my note below.] : Please see my note below. [FreeTextEntry3] : Kit Bonner MD\par [facility]\par Otolaryngology and Facial Plastics\par [employer], [employer]\par

## 2019-11-25 NOTE — HISTORY OF PRESENT ILLNESS
[de-identified] : patient wears hearing aids bilatearlly and admits that he feels recent ear clogging like he has some wax. He does not havea ny ringing in the ears or dizziness. He is getting benefit from the hearing aids he uses. He does have some random nosebleeds on occasion from the left nostril but when it bleeds heavily it comes from both sides. He has had 4 episodes since october. He last noseblee dwas a few days ago

## 2019-11-26 ENCOUNTER — EMERGENCY (EMERGENCY)
Facility: HOSPITAL | Age: 79
LOS: 1 days | Discharge: HOME | End: 2019-11-26
Attending: EMERGENCY MEDICINE | Admitting: EMERGENCY MEDICINE
Payer: MEDICARE

## 2019-11-26 VITALS
DIASTOLIC BLOOD PRESSURE: 61 MMHG | SYSTOLIC BLOOD PRESSURE: 151 MMHG | TEMPERATURE: 98 F | HEART RATE: 62 BPM | RESPIRATION RATE: 17 BRPM | OXYGEN SATURATION: 99 %

## 2019-11-26 VITALS
TEMPERATURE: 98 F | SYSTOLIC BLOOD PRESSURE: 180 MMHG | DIASTOLIC BLOOD PRESSURE: 77 MMHG | OXYGEN SATURATION: 96 % | RESPIRATION RATE: 16 BRPM | HEART RATE: 92 BPM

## 2019-11-26 LAB
HCT VFR BLD CALC: 35.8 % — LOW (ref 39–50)
HGB BLD-MCNC: 10.7 G/DL — LOW (ref 13–17)
MCHC RBC-ENTMCNC: 27.4 PG — SIGNIFICANT CHANGE UP (ref 27–34)
MCHC RBC-ENTMCNC: 29.9 % — LOW (ref 32–36)
MCV RBC AUTO: 91.6 FL — SIGNIFICANT CHANGE UP (ref 80–100)
NRBC # FLD: 0 K/UL — SIGNIFICANT CHANGE UP (ref 0–0)
PLATELET # BLD AUTO: 145 K/UL — LOW (ref 150–400)
PMV BLD: 10.9 FL — SIGNIFICANT CHANGE UP (ref 7–13)
RBC # BLD: 3.91 M/UL — LOW (ref 4.2–5.8)
RBC # FLD: 14.4 % — SIGNIFICANT CHANGE UP (ref 10.3–14.5)
WBC # BLD: 10.91 K/UL — HIGH (ref 3.8–10.5)
WBC # FLD AUTO: 10.91 K/UL — HIGH (ref 3.8–10.5)

## 2019-11-26 PROCEDURE — 99283 EMERGENCY DEPT VISIT LOW MDM: CPT | Mod: GC

## 2019-11-26 NOTE — ED PROVIDER NOTE - NSFOLLOWUPINSTRUCTIONS_ED_ALL_ED_FT
1) You were seen in the ER for nose bleed. The patient has been informed of all concerning signs and symptoms to return to Emergency Department, the necessity to follow up with PMD/Clinic/follow up provided within 2-3 days was explained, and the patient reports understanding of above with capacity and insight. You can look at the discharge papers for some examples of specific signs and symptoms to look out for.  2) Please follow up with ENT

## 2019-11-26 NOTE — ED PROVIDER NOTE - NS ED ROS FT
Constitutional: no fevers, no chills.  Eyes: no visual changes.  Ears: no ear drainage, no ear pain.  Nose: no nasal congestion. +epistaxis  Mouth/Throat: no sore throat.  Cardiovascular: no chest pain.  Respiratory: no shortness of breath, no wheezing, no cough  Gastrointestinal: no nausea, no vomiting, no diarrhea, no abdominal pain.  MSK: no flank pain, no back pain.  Genitourinary: no dysuria, no hematuria.  Skin: no rashes.  Neuro: no headache,   Psychiatric: no known mental health issues.

## 2019-11-26 NOTE — ED PROVIDER NOTE - PHYSICAL EXAMINATION
GEN: Well appearing, well nourished, in no apparent distress.  HEAD: NCAT  HEENT: PERRL, Airway patent, +clotted blood in R nares, non-erythematous pharynx without blood , no exudates  LUNG: CTAB, no adventitious sounds, no retractions, no nasal flaring  CV: RRR, no murmurs,   Abd: soft, NTND, no rebound or guarding, BS+ in all quadrants, no CVAT  MSK: WWP, Pulses 2+ in extremities, No edema   Skin: Warm and dry, no evidence of rash  Psych: normal mood and affect

## 2019-11-26 NOTE — ED PROVIDER NOTE - ATTENDING CONTRIBUTION TO CARE
pt with history of 4 different episodes of epistaxis over the last few months. Started again today.  Went to an ENT and was cauterized "a little".  No bleeding on the way home but started again last night and did not stop from the R nares.  Some mild lightheadedness.  No trauma    Gen: Well appearing in NAD  Head: NC/AT  ENT:  Bleeding from R nares  Neck: trachea midline  Resp:  No distress  Ext: no deformities  Neuro:  A&O appears non focal  Skin:  Warm and dry as visualized  Psych:  Normal affect and mood     Pt with epistaxis.  Will attempt control with afrin and surgicel packing with pressure.  If fails will use rhinorocket.  Will check a CBC to ensure no significant anemia from bleeding

## 2019-11-26 NOTE — ED PROVIDER NOTE - PROGRESS NOTE DETAILS
Dr Jolley: Pt R nares started to drip blood again after surgicel, wiped. bleeding did not continue, we will continue to watch. Dr Jolley: hgb wnl and no other episodes of epistaxis.

## 2019-11-26 NOTE — ED ADULT TRIAGE NOTE - CHIEF COMPLAINT QUOTE
Ambulatory from home status post R nare cauterization at his ENT today at approx 3pm. Patient has had nose bleeds 10/20/19, 10/27, 10/31 and 11/2 and now today. Patient denies being on blood thinners despite PMH Afib, on digoxin/metroprolol for rate control. PMH of myesthesia gravis never intubated. Bleeding uncontrolled at this time, started at 7pm. Clots and bright red blood at present. Patient denies having abdominal pain at this time. Ambulatory from home status post R nare cauterization at his ENT today at approx 3pm. Patient has had nose bleeds 10/20/19, 10/27, 10/31 and 11/2 and now today. Patient denies being on blood thinners despite PMH Afib, on digoxin/metroprolol for rate control. PMH of myesthesia gravis never intubated. Bleeding uncontrolled at this time, started at 7pm. Clots and bright red blood at present. Patient denies having abdominal pain at this time but reports dizziness.

## 2019-11-26 NOTE — ED PROVIDER NOTE - OBJECTIVE STATEMENT
78 yo M hx Myasthenia Gravis, Afib not on AC, HTN, HLD, presenting with nosebleed that started last night, b/l nostrils. Went to ENT last night and R nostril was cauterized but then bleeding returned this AM. +lightheadedness but Patient denies chest pain, SOB, ha., syncope.

## 2019-11-26 NOTE — ED PROVIDER NOTE - CLINICAL SUMMARY MEDICAL DECISION MAKING FREE TEXT BOX
78 yo M hx Myasthenia Gravis, Afib not on AC, HTN, HLD, presenting with nosebleed that started last night, b/l nostrils. Went to ENT last night and R nostril was cauterized but then bleeding returned this AM. +lightheadedness. check cbc, surgicel to R nares which has stopped bleed. +/- afrin soaked rhino rocket

## 2019-11-26 NOTE — ED PROVIDER NOTE - PATIENT PORTAL LINK FT
You can access the FollowMyHealth Patient Portal offered by [facility] by registering at the following website: [email or web address]. By joining [employer]’s FollowMyHealth portal, you will also be able to view your health information using other applications (apps) compatible with our system.

## 2019-11-26 NOTE — ED ADULT NURSE NOTE - CHIEF COMPLAINT QUOTE
Ambulatory from home status post R nare cauterization at his ENT today at approx 3pm. Patient has had nose bleeds 10/20/19, 10/27, 10/31 and 11/2 and now today. Patient denies being on blood thinners despite PMH Afib, on digoxin/metroprolol for rate control. PMH of myesthesia gravis never intubated. Bleeding uncontrolled at this time, started at 7pm. Clots and bright red blood at present. Patient denies having abdominal pain at this time but reports dizziness.

## 2019-11-26 NOTE — ED ADULT NURSE REASSESSMENT NOTE - NS ED NURSE REASSESS COMMENT FT1
Pt. in room 18 appears comfortable in stretcher and in no apparent distress. Pt. denies any lightheadedness or weakness. No bleeding noted at this time. VS as noted. Awaiting further orders. Will continue to monitor.

## 2019-11-26 NOTE — ED ADULT NURSE NOTE - OBJECTIVE STATEMENT
Pt. is an 79 y.o. male, a&ox4, ambulatory w/ cane. Pt. presents to room 18 with nose bleed. Pt. had right nare cauterized at 3 p.m. at ENT and states that his nose bleed began at 6 p.m. and he has not been able to control it. No bleeding noted at present. Pt. denies any SOB, CP, weakness, or HA. Pt. has a PMHx of Myasthenia Gravis, DM, Arthritis, and AFib. Pt. on Digoxin and Prednisone at home. Pt. in no acute distress at this moment. Awaiting further orders. Will continue to monitor.

## 2019-12-13 ENCOUNTER — APPOINTMENT (OUTPATIENT)
Dept: INTERNAL MEDICINE | Facility: CLINIC | Age: 79
End: 2019-12-13

## 2020-03-13 ENCOUNTER — RX RENEWAL (OUTPATIENT)
Age: 80
End: 2020-03-13

## 2020-06-08 ENCOUNTER — RX RENEWAL (OUTPATIENT)
Age: 80
End: 2020-06-08

## 2020-11-18 ENCOUNTER — RX RENEWAL (OUTPATIENT)
Age: 80
End: 2020-11-18

## 2021-01-06 PROBLEM — Z78.9 OTHER SPECIFIED HEALTH STATUS: Chronic | Status: ACTIVE | Noted: 2019-11-26

## 2021-01-08 ENCOUNTER — NON-APPOINTMENT (OUTPATIENT)
Age: 81
End: 2021-01-08

## 2021-01-08 ENCOUNTER — LABORATORY RESULT (OUTPATIENT)
Age: 81
End: 2021-01-08

## 2021-01-08 ENCOUNTER — APPOINTMENT (OUTPATIENT)
Dept: INTERNAL MEDICINE | Facility: CLINIC | Age: 81
End: 2021-01-08
Payer: MEDICARE

## 2021-01-08 VITALS — SYSTOLIC BLOOD PRESSURE: 170 MMHG | DIASTOLIC BLOOD PRESSURE: 90 MMHG

## 2021-01-08 VITALS — WEIGHT: 200 LBS | BODY MASS INDEX: 29.62 KG/M2 | HEIGHT: 69 IN

## 2021-01-08 DIAGNOSIS — Z23 ENCOUNTER FOR IMMUNIZATION: ICD-10-CM

## 2021-01-08 PROCEDURE — 99072 ADDL SUPL MATRL&STAF TM PHE: CPT

## 2021-01-08 PROCEDURE — 93000 ELECTROCARDIOGRAM COMPLETE: CPT

## 2021-01-08 PROCEDURE — 36415 COLL VENOUS BLD VENIPUNCTURE: CPT

## 2021-01-08 PROCEDURE — G0439: CPT

## 2021-01-08 RX ORDER — DIGOXIN 125 UG/1
125 TABLET ORAL
Qty: 90 | Refills: 2 | Status: DISCONTINUED | COMMUNITY
Start: 2019-06-17 | End: 2021-01-08

## 2021-01-08 NOTE — HISTORY OF PRESENT ILLNESS
[de-identified] : This is annual Preventative examination for this 80 year year old White male.  The patient has been generally feeling well with no new complaints besides bloody noses very often. A complete evaluation of their current medication was reviewed with them including OTC medication .\par \par Chronic medical problems for which they are being followed by a physician are:\par \par \par    Myasethenia Gravis --sees neuro \par \par Exercises regularly:\par \par A complete Review of Systems is below but it is noteworthy to mention that this patient denies Chest Pain, Dyspnea on Exertion, Palpitations, urinary problems, rectal bleeding or Gastrointestinal problems at this time.\par \par

## 2021-01-08 NOTE — ASSESSMENT
[FreeTextEntry1] : This is a 80 year -old  M who was examined today for an annual preventative physical.  A complete history and physical examination were performed.  The patient seems to follow a healthy lifestyle in that he  follows a good diet and exercises regularly.  \par \par Physical examination was entirely within normal limits , including a normal blood pressure and pulse.  \par \par Cognitive Issues  _x__No   ___Yes\par Fall Risk                ___No   _x__Yes\par \par The following recommendations were made:\par Exercise regularly.\par Maintain a healthy diet.\par _____________________________________________________\par \par \par DOES NOT WANT TO START BP MEDS\par \par FU IN 3 WEEKS\par \par DOES NOT WANT COLONOSCOPY\par

## 2021-01-08 NOTE — HEALTH RISK ASSESSMENT
[Good] : ~his/her~  mood as  good [No falls in past year] : Patient reported no falls in the past year [0] : 2) Feeling down, depressed, or hopeless: Not at all (0) [Reviewed no changes] : Reviewed no changes [[id number]] : 0 [AdvancecareDate] : 01/21

## 2021-01-13 ENCOUNTER — NON-APPOINTMENT (OUTPATIENT)
Age: 81
End: 2021-01-13

## 2021-01-13 LAB
25(OH)D3 SERPL-MCNC: 23.5 NG/ML
ALBUMIN SERPL ELPH-MCNC: 4.3 G/DL
ALP BLD-CCNC: 164 U/L
ALT SERPL-CCNC: 9 U/L
ANION GAP SERPL CALC-SCNC: 18 MMOL/L
APPEARANCE: CLEAR
AST SERPL-CCNC: 14 U/L
BACTERIA: NEGATIVE
BILIRUB SERPL-MCNC: 0.6 MG/DL
BILIRUBIN URINE: NEGATIVE
BLOOD URINE: NEGATIVE
BUN SERPL-MCNC: 18 MG/DL
CALCIUM SERPL-MCNC: 9.5 MG/DL
CHLORIDE SERPL-SCNC: 101 MMOL/L
CHOLEST SERPL-MCNC: 168 MG/DL
CO2 SERPL-SCNC: 23 MMOL/L
COLOR: NORMAL
CREAT SERPL-MCNC: 0.95 MG/DL
DIGOXIN SERPL-MCNC: 0.6 NG/ML
ESTIMATED AVERAGE GLUCOSE: 148 MG/DL
GLUCOSE QUALITATIVE U: NEGATIVE
GLUCOSE SERPL-MCNC: 158 MG/DL
HBA1C MFR BLD HPLC: 6.8 %
HDLC SERPL-MCNC: 52 MG/DL
HYALINE CASTS: 0 /LPF
KETONES URINE: NEGATIVE
LDLC SERPL CALC-MCNC: 105 MG/DL
LEUKOCYTE ESTERASE URINE: NEGATIVE
MICROSCOPIC-UA: NORMAL
NITRITE URINE: NEGATIVE
NONHDLC SERPL-MCNC: 116 MG/DL
PH URINE: 6.5
POTASSIUM SERPL-SCNC: 4.8 MMOL/L
PROT SERPL-MCNC: 7 G/DL
PROTEIN URINE: NORMAL
PSA SERPL-MCNC: 1.72 NG/ML
RED BLOOD CELLS URINE: 0 /HPF
SAVE SPECIMEN: NORMAL
SODIUM SERPL-SCNC: 143 MMOL/L
SPECIFIC GRAVITY URINE: 1.01
SQUAMOUS EPITHELIAL CELLS: 0 /HPF
T4 SERPL-MCNC: 7.4 UG/DL
TRIGL SERPL-MCNC: 55 MG/DL
TSH SERPL-ACNC: 0.82 UIU/ML
URATE SERPL-MCNC: 6.2 MG/DL
UROBILINOGEN URINE: NORMAL
WHITE BLOOD CELLS URINE: 0 /HPF

## 2021-01-29 ENCOUNTER — APPOINTMENT (OUTPATIENT)
Dept: INTERNAL MEDICINE | Facility: CLINIC | Age: 81
End: 2021-01-29

## 2021-02-13 ENCOUNTER — RX RENEWAL (OUTPATIENT)
Age: 81
End: 2021-02-13

## 2021-02-16 ENCOUNTER — NON-APPOINTMENT (OUTPATIENT)
Age: 81
End: 2021-02-16

## 2021-02-17 LAB
BASOPHILS # BLD AUTO: 0.03 K/UL
BASOPHILS NFR BLD AUTO: 0.4 %
EOSINOPHIL # BLD AUTO: 0.01 K/UL
EOSINOPHIL NFR BLD AUTO: 0.1 %
HCT VFR BLD CALC: 37.7 %
HGB BLD-MCNC: 10.5 G/DL
IMM GRANULOCYTES NFR BLD AUTO: 0.4 %
LYMPHOCYTES # BLD AUTO: 0.8 K/UL
LYMPHOCYTES NFR BLD AUTO: 11.4 %
MAN DIFF?: NORMAL
MCHC RBC-ENTMCNC: 23.1 PG
MCHC RBC-ENTMCNC: 27.9 GM/DL
MCV RBC AUTO: 82.9 FL
MONOCYTES # BLD AUTO: 0.5 K/UL
MONOCYTES NFR BLD AUTO: 7.1 %
NEUTROPHILS # BLD AUTO: 5.63 K/UL
NEUTROPHILS NFR BLD AUTO: 80.6 %
PLATELET # BLD AUTO: 164 K/UL
RBC # BLD: 4.55 M/UL
RBC # FLD: 16.3 %
WBC # FLD AUTO: 7 K/UL

## 2021-05-26 ENCOUNTER — APPOINTMENT (OUTPATIENT)
Dept: INTERNAL MEDICINE | Facility: CLINIC | Age: 81
End: 2021-05-26
Payer: MEDICARE

## 2021-05-26 ENCOUNTER — LABORATORY RESULT (OUTPATIENT)
Age: 81
End: 2021-05-26

## 2021-05-26 VITALS
HEIGHT: 69 IN | TEMPERATURE: 98.5 F | SYSTOLIC BLOOD PRESSURE: 167 MMHG | BODY MASS INDEX: 30.36 KG/M2 | WEIGHT: 205 LBS | DIASTOLIC BLOOD PRESSURE: 70 MMHG

## 2021-05-26 DIAGNOSIS — Z51.81 ENCOUNTER FOR THERAPEUTIC DRUG LVL MONITORING: ICD-10-CM

## 2021-05-26 PROCEDURE — 99214 OFFICE O/P EST MOD 30 MIN: CPT | Mod: 25

## 2021-05-26 PROCEDURE — 36415 COLL VENOUS BLD VENIPUNCTURE: CPT

## 2021-05-26 PROCEDURE — 99072 ADDL SUPL MATRL&STAF TM PHE: CPT

## 2021-05-26 NOTE — ASSESSMENT
[FreeTextEntry1] : Will check labs and dig level \par \par told of the importance of seeing cardio\par it has been many years and now with a loud murmur,although asymptomatic, should be seen \par \par if nose bleeds occur, ENT fu \par \par fu in 3 months

## 2021-05-26 NOTE — PHYSICAL EXAM
[Normal Rate] : normal rate  [Normal] : soft, non-tender, non-distended, no masses palpated, no HSM and normal bowel sounds [de-identified] : 3/6 systolic murmur,

## 2021-05-26 NOTE — HISTORY OF PRESENT ILLNESS
[de-identified] : Mr. ALVARO TRAORE is a 81 year old male here today for a follow up of their chronic medical conditions.\par \par Myasthenia: 7.5 mg , same dose\par no issues \par \par DM: on Metformin \par \par Afib: on digoxin and metoprolol \par \par Epistaxis: has them somewhat often \par doesn’t want to see ENT

## 2021-05-27 ENCOUNTER — NON-APPOINTMENT (OUTPATIENT)
Age: 81
End: 2021-05-27

## 2021-05-27 LAB
25(OH)D3 SERPL-MCNC: 25.2 NG/ML
ALBUMIN SERPL ELPH-MCNC: 4.2 G/DL
ALP BLD-CCNC: 142 U/L
ALT SERPL-CCNC: 12 U/L
ANION GAP SERPL CALC-SCNC: 12 MMOL/L
AST SERPL-CCNC: 17 U/L
BASOPHILS # BLD AUTO: 0.03 K/UL
BASOPHILS NFR BLD AUTO: 0.6 %
BILIRUB SERPL-MCNC: 1.2 MG/DL
BUN SERPL-MCNC: 24 MG/DL
CALCIUM SERPL-MCNC: 9 MG/DL
CHLORIDE SERPL-SCNC: 103 MMOL/L
CHOLEST SERPL-MCNC: 146 MG/DL
CO2 SERPL-SCNC: 25 MMOL/L
CREAT SERPL-MCNC: 0.9 MG/DL
DIGOXIN SERPL-MCNC: 0.9 NG/ML
EOSINOPHIL # BLD AUTO: 0.01 K/UL
EOSINOPHIL NFR BLD AUTO: 0.2 %
ESTIMATED AVERAGE GLUCOSE: 128 MG/DL
GLUCOSE SERPL-MCNC: 122 MG/DL
HBA1C MFR BLD HPLC: 6.1 %
HCT VFR BLD CALC: 35.1 %
HDLC SERPL-MCNC: 40 MG/DL
HGB BLD-MCNC: 9.4 G/DL
IMM GRANULOCYTES NFR BLD AUTO: 0.2 %
LDLC SERPL CALC-MCNC: 97 MG/DL
LYMPHOCYTES # BLD AUTO: 0.58 K/UL
LYMPHOCYTES NFR BLD AUTO: 11.6 %
MAN DIFF?: NORMAL
MCHC RBC-ENTMCNC: 21.6 PG
MCHC RBC-ENTMCNC: 26.8 GM/DL
MCV RBC AUTO: 80.5 FL
MONOCYTES # BLD AUTO: 0.37 K/UL
MONOCYTES NFR BLD AUTO: 7.4 %
NEUTROPHILS # BLD AUTO: 4 K/UL
NEUTROPHILS NFR BLD AUTO: 80 %
NONHDLC SERPL-MCNC: 106 MG/DL
PLATELET # BLD AUTO: 171 K/UL
POTASSIUM SERPL-SCNC: 5 MMOL/L
PROT SERPL-MCNC: 6.7 G/DL
RBC # BLD: 4.36 M/UL
RBC # FLD: 18 %
SAVE SPECIMEN: NORMAL
SODIUM SERPL-SCNC: 139 MMOL/L
TRIGL SERPL-MCNC: 46 MG/DL
WBC # FLD AUTO: 5 K/UL

## 2021-05-28 RX ORDER — FERROUS SULFATE 137(45) MG
142 (45 FE) TABLET, EXTENDED RELEASE ORAL
Qty: 30 | Refills: 3 | Status: ACTIVE | COMMUNITY
Start: 2021-05-28 | End: 1900-01-01

## 2021-06-18 ENCOUNTER — LABORATORY RESULT (OUTPATIENT)
Age: 81
End: 2021-06-18

## 2021-06-23 ENCOUNTER — NON-APPOINTMENT (OUTPATIENT)
Age: 81
End: 2021-06-23

## 2021-06-23 LAB
ALBUMIN SERPL ELPH-MCNC: 4.2 G/DL
ALP BLD-CCNC: 137 U/L
ALT SERPL-CCNC: 13 U/L
ANION GAP SERPL CALC-SCNC: 13 MMOL/L
AST SERPL-CCNC: 18 U/L
BASOPHILS # BLD AUTO: 0.03 K/UL
BASOPHILS NFR BLD AUTO: 0.6 %
BILIRUB SERPL-MCNC: 1.1 MG/DL
BUN SERPL-MCNC: 27 MG/DL
CALCIUM SERPL-MCNC: 9.4 MG/DL
CHLORIDE SERPL-SCNC: 103 MMOL/L
CO2 SERPL-SCNC: 25 MMOL/L
CREAT SERPL-MCNC: 0.94 MG/DL
EOSINOPHIL # BLD AUTO: 0.01 K/UL
EOSINOPHIL NFR BLD AUTO: 0.2 %
FERRITIN SERPL-MCNC: 17 NG/ML
FERRITIN SERPL-MCNC: 18 NG/ML
GGT SERPL-CCNC: 227 U/L
GLUCOSE SERPL-MCNC: 119 MG/DL
HCT VFR BLD CALC: 36.4 %
HGB BLD-MCNC: 10 G/DL
IMM GRANULOCYTES NFR BLD AUTO: 0.6 %
IRON SATN MFR SERPL: 7 %
IRON SATN MFR SERPL: 7 %
IRON SERPL-MCNC: 32 UG/DL
IRON SERPL-MCNC: 33 UG/DL
LYMPHOCYTES # BLD AUTO: 0.61 K/UL
LYMPHOCYTES NFR BLD AUTO: 11.7 %
MAN DIFF?: NORMAL
MCHC RBC-ENTMCNC: 22 PG
MCHC RBC-ENTMCNC: 27.5 GM/DL
MCV RBC AUTO: 80.2 FL
MONOCYTES # BLD AUTO: 0.45 K/UL
MONOCYTES NFR BLD AUTO: 8.6 %
NEUTROPHILS # BLD AUTO: 4.08 K/UL
NEUTROPHILS NFR BLD AUTO: 78.3 %
PLATELET # BLD AUTO: 154 K/UL
POTASSIUM SERPL-SCNC: 4.6 MMOL/L
PROT SERPL-MCNC: 6.8 G/DL
RBC # BLD: 4.54 M/UL
RBC # FLD: 19.6 %
SODIUM SERPL-SCNC: 141 MMOL/L
TIBC SERPL-MCNC: 441 UG/DL
TIBC SERPL-MCNC: 448 UG/DL
UIBC SERPL-MCNC: 409 UG/DL
UIBC SERPL-MCNC: 416 UG/DL
WBC # FLD AUTO: 5.21 K/UL

## 2021-06-30 ENCOUNTER — APPOINTMENT (OUTPATIENT)
Dept: INTERNAL MEDICINE | Facility: CLINIC | Age: 81
End: 2021-06-30

## 2021-07-08 ENCOUNTER — APPOINTMENT (OUTPATIENT)
Dept: CARDIOLOGY | Facility: CLINIC | Age: 81
End: 2021-07-08

## 2021-09-29 ENCOUNTER — APPOINTMENT (OUTPATIENT)
Dept: INTERNAL MEDICINE | Facility: CLINIC | Age: 81
End: 2021-09-29

## 2021-12-13 ENCOUNTER — RX RENEWAL (OUTPATIENT)
Age: 81
End: 2021-12-13

## 2022-02-11 ENCOUNTER — RX RENEWAL (OUTPATIENT)
Age: 82
End: 2022-02-11

## 2022-06-01 ENCOUNTER — APPOINTMENT (OUTPATIENT)
Dept: INTERNAL MEDICINE | Facility: CLINIC | Age: 82
End: 2022-06-01
Payer: MEDICARE

## 2022-06-01 VITALS
HEART RATE: 64 BPM | SYSTOLIC BLOOD PRESSURE: 164 MMHG | DIASTOLIC BLOOD PRESSURE: 70 MMHG | WEIGHT: 195 LBS | HEIGHT: 69 IN | BODY MASS INDEX: 28.88 KG/M2

## 2022-06-01 PROCEDURE — 99214 OFFICE O/P EST MOD 30 MIN: CPT | Mod: 25

## 2022-06-01 PROCEDURE — 36415 COLL VENOUS BLD VENIPUNCTURE: CPT

## 2022-06-02 NOTE — ASSESSMENT
[FreeTextEntry1] : fu with cardio , didn’t last year\par \par will check a1c\par \par Ordered appropriate labs based on diagnosis and prevention .\par \par 30 minutes were spent in the care and coordination of this patient including reviewing old notes/labs/consults\par \par fu in 3 months \par \par \par

## 2022-06-02 NOTE — HISTORY OF PRESENT ILLNESS
[de-identified] : Mr. ALVARO TRAORE is a 82 year old male here today for a follow up of their chronic medical conditions.\par \par MG: off prednisone\par \par Nose bleeds, really bad, seeing anew ENT, sometimes twice a week \par \par DM:  on metformin, doesn’t check sugar\par \par A fib: not on blood thinner \par

## 2022-06-02 NOTE — PHYSICAL EXAM
[Normal Rate] : normal rate  [Normal] : affect was normal and insight and judgment were intact [de-identified] : afib, 3/6 systolic murmur

## 2022-06-03 LAB
ALBUMIN SERPL ELPH-MCNC: 4.2 G/DL
ALP BLD-CCNC: 201 U/L
ALT SERPL-CCNC: 11 U/L
ANION GAP SERPL CALC-SCNC: 14 MMOL/L
AST SERPL-CCNC: 20 U/L
BASOPHILS # BLD AUTO: 0.04 K/UL
BASOPHILS NFR BLD AUTO: 0.8 %
BILIRUB SERPL-MCNC: 1.3 MG/DL
BUN SERPL-MCNC: 26 MG/DL
CALCIUM SERPL-MCNC: 9.6 MG/DL
CHLORIDE SERPL-SCNC: 101 MMOL/L
CHOLEST SERPL-MCNC: 148 MG/DL
CO2 SERPL-SCNC: 25 MMOL/L
CREAT SERPL-MCNC: 0.87 MG/DL
EGFR: 86 ML/MIN/1.73M2
EOSINOPHIL # BLD AUTO: 0.07 K/UL
EOSINOPHIL NFR BLD AUTO: 1.3 %
ESTIMATED AVERAGE GLUCOSE: 117 MG/DL
GLUCOSE SERPL-MCNC: 91 MG/DL
HBA1C MFR BLD HPLC: 5.7 %
HCT VFR BLD CALC: 38.7 %
HDLC SERPL-MCNC: 37 MG/DL
HGB BLD-MCNC: 11.3 G/DL
IMM GRANULOCYTES NFR BLD AUTO: 0.2 %
LDLC SERPL CALC-MCNC: 98 MG/DL
LYMPHOCYTES # BLD AUTO: 0.92 K/UL
LYMPHOCYTES NFR BLD AUTO: 17.6 %
MAN DIFF?: NORMAL
MCHC RBC-ENTMCNC: 26.6 PG
MCHC RBC-ENTMCNC: 29.2 GM/DL
MCV RBC AUTO: 91.1 FL
MONOCYTES # BLD AUTO: 0.71 K/UL
MONOCYTES NFR BLD AUTO: 13.6 %
NEUTROPHILS # BLD AUTO: 3.48 K/UL
NEUTROPHILS NFR BLD AUTO: 66.5 %
NONHDLC SERPL-MCNC: 110 MG/DL
PLATELET # BLD AUTO: 167 K/UL
POTASSIUM SERPL-SCNC: 4.3 MMOL/L
PROT SERPL-MCNC: 7.1 G/DL
RBC # BLD: 4.25 M/UL
RBC # FLD: 15.2 %
SODIUM SERPL-SCNC: 140 MMOL/L
TRIGL SERPL-MCNC: 58 MG/DL
WBC # FLD AUTO: 5.23 K/UL

## 2022-06-07 ENCOUNTER — NON-APPOINTMENT (OUTPATIENT)
Age: 82
End: 2022-06-07

## 2022-06-07 LAB — GGT SERPL-CCNC: 261 U/L

## 2022-06-10 ENCOUNTER — RX RENEWAL (OUTPATIENT)
Age: 82
End: 2022-06-10

## 2022-07-27 ENCOUNTER — NON-APPOINTMENT (OUTPATIENT)
Age: 82
End: 2022-07-27

## 2022-07-27 ENCOUNTER — APPOINTMENT (OUTPATIENT)
Dept: CARDIOLOGY | Facility: CLINIC | Age: 82
End: 2022-07-27

## 2022-07-27 VITALS
SYSTOLIC BLOOD PRESSURE: 190 MMHG | OXYGEN SATURATION: 91 % | BODY MASS INDEX: 28.06 KG/M2 | WEIGHT: 190 LBS | DIASTOLIC BLOOD PRESSURE: 80 MMHG | HEART RATE: 75 BPM

## 2022-07-27 VITALS — SYSTOLIC BLOOD PRESSURE: 181 MMHG | DIASTOLIC BLOOD PRESSURE: 90 MMHG

## 2022-07-27 DIAGNOSIS — R04.0 EPISTAXIS: ICD-10-CM

## 2022-07-27 DIAGNOSIS — R01.1 CARDIAC MURMUR, UNSPECIFIED: ICD-10-CM

## 2022-07-27 PROCEDURE — 93000 ELECTROCARDIOGRAM COMPLETE: CPT

## 2022-07-27 PROCEDURE — 99205 OFFICE O/P NEW HI 60 MIN: CPT | Mod: 25

## 2022-07-27 NOTE — HISTORY OF PRESENT ILLNESS
[FreeTextEntry1] : Dear Destini,\analisa Thank you for referring him for cardiovascular evaluation.  He is an 82-year-old with a history of myasthenia gravis and longstanding atrial fibrillation as well as diabetes mellitus and former smoker who is here for cardiovascular evaluation and management.\par He has been diagnosed with atrial fibrillation since at least 2014.  No anticoagulation has been used because of recurrent nosebleeds.  He denies any lightheadedness, dizziness or palpitations and appears unaware of his atrial arrhythmia.\par His diabetes has been well controlled and his most recent hemoglobin A1c was 5.7.\par He is under the care of Dr. Leopoldo Rincon for myasthenia gravis and was recently placed on a higher dose of prednisone because of diplopia.\par He has recurrent severe nosebleeds requiring ENT intervention.\par He denies any exertional chest pain or shortness of breath but his leg arthritis limits his walking ability.\par He is a former smoker and has no history of myocardial infarction or stroke.\par He denies any orthopnea, PND but does have chronic leg edema.\par His last echocardiogram was 6 years ago.  There were elevated pulmonary pressures normal systolic function and severe left atrial enlargement.

## 2022-07-27 NOTE — PHYSICAL EXAM
[Well Developed] : well developed [Well Nourished] : well nourished [Normal Conjunctiva] : normal conjunctiva [Clear Lung Fields] : clear lung fields [Good Air Entry] : good air entry [Soft] : abdomen soft [Moves all extremities] : moves all extremities [Alert and Oriented] : alert and oriented [Normal memory] : normal memory [de-identified] : 3/6 systolic ejection murmur at the right upper sternal border with radiation to his carotid artery.  There is a 3/6 harsh systolic murmur at the left apex [de-identified] : He walks with a cane [de-identified] : Bilateral pitting edema right greater than left to his knee

## 2022-07-27 NOTE — REVIEW OF SYSTEMS
[Weight Loss (___ Lbs)] : no recent weight loss [Seeing Double (Diplopia)] : diplopia [SOB] : no shortness of breath [Dyspnea on exertion] : dyspnea during exertion [Chest Discomfort] : no chest discomfort [Lower Ext Edema] : lower extremity edema [Palpitations] : no palpitations [Orthopnea] : no orthopnea [Negative] : Gastrointestinal

## 2022-07-27 NOTE — DISCUSSION/SUMMARY
[FreeTextEntry1] : He is an 82-year-old with hypertension, diabetes mellitus, chronic atrial fibrillation, evidence of volume overload and myasthenia gravis.\par Hypertension/volume overload: His blood pressure control is poor.  I encouraged him to continue his current dose of metoprolol and have added Entresto 24/26 twice daily.  Labs and follow-up in 2 weeks.  Echocardiography to define the extent of his valvular abnormalities, left atrial size and left ventricular systolic function.\par Atrial fibrillation: His heart rate appears well controlled and he has significant ectopy.  I have suggested discontinuing his digoxin and will rate control him with beta-blockade.\par We started a discussion regarding a watchman procedure which would reduce his stroke risk without the need for chronic oral anticoagulation.  He would need to tolerate aspirin and Plavix for 45 days.  This would need to be done in conversation with his ENT.\par Written instructions were given.\par I will see him back in 2 weeks [EKG obtained to assist in diagnosis and management of assessed problem(s)] : EKG obtained to assist in diagnosis and management of assessed problem(s)

## 2022-08-09 LAB
ALBUMIN SERPL ELPH-MCNC: 4.5 G/DL
ALP BLD-CCNC: 187 U/L
ALT SERPL-CCNC: 12 U/L
ANION GAP SERPL CALC-SCNC: 11 MMOL/L
AST SERPL-CCNC: 15 U/L
BILIRUB SERPL-MCNC: 1 MG/DL
BUN SERPL-MCNC: 27 MG/DL
CALCIUM SERPL-MCNC: 9.3 MG/DL
CHLORIDE SERPL-SCNC: 105 MMOL/L
CO2 SERPL-SCNC: 25 MMOL/L
CREAT SERPL-MCNC: 0.85 MG/DL
EGFR: 86 ML/MIN/1.73M2
GLUCOSE SERPL-MCNC: 112 MG/DL
NT-PROBNP SERPL-MCNC: 1364 PG/ML
POTASSIUM SERPL-SCNC: 4.8 MMOL/L
PROT SERPL-MCNC: 7 G/DL
SODIUM SERPL-SCNC: 141 MMOL/L

## 2022-08-11 ENCOUNTER — APPOINTMENT (OUTPATIENT)
Dept: CARDIOLOGY | Facility: CLINIC | Age: 82
End: 2022-08-11

## 2022-09-22 ENCOUNTER — APPOINTMENT (OUTPATIENT)
Dept: INTERNAL MEDICINE | Facility: CLINIC | Age: 82
End: 2022-09-22

## 2022-09-28 ENCOUNTER — NON-APPOINTMENT (OUTPATIENT)
Age: 82
End: 2022-09-28

## 2022-09-28 ENCOUNTER — APPOINTMENT (OUTPATIENT)
Dept: CARDIOLOGY | Facility: CLINIC | Age: 82
End: 2022-09-28

## 2022-09-28 VITALS
DIASTOLIC BLOOD PRESSURE: 71 MMHG | BODY MASS INDEX: 27.11 KG/M2 | SYSTOLIC BLOOD PRESSURE: 146 MMHG | WEIGHT: 183 LBS | OXYGEN SATURATION: 98 % | HEIGHT: 69 IN | HEART RATE: 75 BPM

## 2022-09-28 DIAGNOSIS — E87.70 FLUID OVERLOAD, UNSPECIFIED: ICD-10-CM

## 2022-09-28 PROCEDURE — 93000 ELECTROCARDIOGRAM COMPLETE: CPT

## 2022-09-28 PROCEDURE — 93306 TTE W/DOPPLER COMPLETE: CPT

## 2022-09-28 PROCEDURE — 99214 OFFICE O/P EST MOD 30 MIN: CPT | Mod: 25

## 2022-09-28 NOTE — PHYSICAL EXAM
[Well Developed] : well developed [Well Nourished] : well nourished [Normal Conjunctiva] : normal conjunctiva [Clear Lung Fields] : clear lung fields [Good Air Entry] : good air entry [Soft] : abdomen soft [Moves all extremities] : moves all extremities [Alert and Oriented] : alert and oriented [Normal memory] : normal memory [de-identified] : 3/6 systolic ejection murmur at the right upper sternal border with radiation to his carotid artery.  There is a 3/6 harsh systolic murmur at the left apex [de-identified] : He walks with a cane [de-identified] : Bilateral pitting edema right greater than left to his knee

## 2022-09-28 NOTE — DISCUSSION/SUMMARY
[FreeTextEntry1] : He is an 82-year-old with hypertension, diabetes mellitus, chronic atrial fibrillation, evidence of volume overload and myasthenia gravis.\par ECho showed normal EF with moderate to severe AI, Moderate MS.\par Hypertension/volume overload: His blood pressure control is much better. I encouraged him to continue his current dose of metoprolol and Entresto 24/26 twice daily. \par Atrial fibrillation: His heart rate appears well controlled with less ectopy.\par  \par I will see him back in 3 months [EKG obtained to assist in diagnosis and management of assessed problem(s)] : EKG obtained to assist in diagnosis and management of assessed problem(s)

## 2023-01-31 ENCOUNTER — APPOINTMENT (OUTPATIENT)
Dept: CARDIOLOGY | Facility: CLINIC | Age: 83
End: 2023-01-31

## 2023-02-06 ENCOUNTER — RX RENEWAL (OUTPATIENT)
Age: 83
End: 2023-02-06

## 2023-03-03 ENCOUNTER — RX RENEWAL (OUTPATIENT)
Age: 83
End: 2023-03-03

## 2023-04-27 ENCOUNTER — APPOINTMENT (OUTPATIENT)
Dept: CARDIOLOGY | Facility: CLINIC | Age: 83
End: 2023-04-27
Payer: MEDICARE

## 2023-04-27 ENCOUNTER — NON-APPOINTMENT (OUTPATIENT)
Age: 83
End: 2023-04-27

## 2023-04-27 VITALS
HEART RATE: 83 BPM | SYSTOLIC BLOOD PRESSURE: 152 MMHG | DIASTOLIC BLOOD PRESSURE: 60 MMHG | BODY MASS INDEX: 27.91 KG/M2 | OXYGEN SATURATION: 96 % | WEIGHT: 189 LBS

## 2023-04-27 PROCEDURE — 93000 ELECTROCARDIOGRAM COMPLETE: CPT

## 2023-04-27 PROCEDURE — 99214 OFFICE O/P EST MOD 30 MIN: CPT | Mod: 25

## 2023-05-15 LAB
ALBUMIN SERPL ELPH-MCNC: 4.6 G/DL
ALP BLD-CCNC: 131 U/L
ALT SERPL-CCNC: 14 U/L
ANION GAP SERPL CALC-SCNC: 13 MMOL/L
AST SERPL-CCNC: 16 U/L
BASOPHILS # BLD AUTO: 0.02 K/UL
BASOPHILS NFR BLD AUTO: 0.3 %
BILIRUB SERPL-MCNC: 0.8 MG/DL
BUN SERPL-MCNC: 22 MG/DL
CALCIUM SERPL-MCNC: 9.7 MG/DL
CHLORIDE SERPL-SCNC: 102 MMOL/L
CO2 SERPL-SCNC: 25 MMOL/L
CREAT SERPL-MCNC: 0.88 MG/DL
EGFR: 85 ML/MIN/1.73M2
EOSINOPHIL # BLD AUTO: 0.01 K/UL
EOSINOPHIL NFR BLD AUTO: 0.2 %
GLUCOSE SERPL-MCNC: 101 MG/DL
HCT VFR BLD CALC: 39.2 %
HGB BLD-MCNC: 11.8 G/DL
IMM GRANULOCYTES NFR BLD AUTO: 0.5 %
LDLC SERPL DIRECT ASSAY-MCNC: 116 MG/DL
LYMPHOCYTES # BLD AUTO: 0.69 K/UL
LYMPHOCYTES NFR BLD AUTO: 12.1 %
MAN DIFF?: NORMAL
MCHC RBC-ENTMCNC: 28.4 PG
MCHC RBC-ENTMCNC: 30.1 GM/DL
MCV RBC AUTO: 94.2 FL
MONOCYTES # BLD AUTO: 0.33 K/UL
MONOCYTES NFR BLD AUTO: 5.8 %
NEUTROPHILS # BLD AUTO: 4.64 K/UL
NEUTROPHILS NFR BLD AUTO: 81.1 %
NT-PROBNP SERPL-MCNC: 2514 PG/ML
PLATELET # BLD AUTO: 153 K/UL
POTASSIUM SERPL-SCNC: 4.5 MMOL/L
PROT SERPL-MCNC: 7.1 G/DL
RBC # BLD: 4.16 M/UL
RBC # FLD: 14.8 %
SODIUM SERPL-SCNC: 140 MMOL/L
WBC # FLD AUTO: 5.72 K/UL

## 2023-05-22 ENCOUNTER — APPOINTMENT (OUTPATIENT)
Dept: INTERNAL MEDICINE | Facility: CLINIC | Age: 83
End: 2023-05-22
Payer: MEDICARE

## 2023-05-22 VITALS
BODY MASS INDEX: 28.44 KG/M2 | DIASTOLIC BLOOD PRESSURE: 70 MMHG | WEIGHT: 192 LBS | HEIGHT: 69 IN | HEART RATE: 89 BPM | SYSTOLIC BLOOD PRESSURE: 173 MMHG

## 2023-05-22 PROCEDURE — 99214 OFFICE O/P EST MOD 30 MIN: CPT | Mod: 25

## 2023-05-22 PROCEDURE — 36415 COLL VENOUS BLD VENIPUNCTURE: CPT

## 2023-05-22 RX ORDER — PREDNISONE 5 MG/1
5 TABLET ORAL
Refills: 0 | Status: ACTIVE | COMMUNITY
Start: 2023-05-22

## 2023-05-23 NOTE — ASSESSMENT
[FreeTextEntry1] : Ordered appropriate labs based on diagnosis and prevention .\par \par I spent a total of 30 minutes with this patient including face to face and non face to face time.\par \par fu with Dr Lisker\par \par BP somewhat stable \par \par fu in 3 mo\par \par continue fu with Neuro

## 2023-05-23 NOTE — PHYSICAL EXAM
[Normal Rate] : normal rate  [Normal] : affect was normal and insight and judgment were intact [de-identified] : 3/6 systolic murmur

## 2023-05-23 NOTE — HISTORY OF PRESENT ILLNESS
[de-identified] : Mr. ALVARO TRAORE is a 83 year old male here today for a follow up of their chronic medical conditions.\par \par Eyesight is poor, he's been seeing , sees neuro \par on prednisone\par \par HTN: meds were increased in the fall \par takes them routinely \par no complaints\par \par no CP, no SOB\par doing ok otherwise \par \par He is [place] \par \par

## 2023-05-24 ENCOUNTER — NON-APPOINTMENT (OUTPATIENT)
Age: 83
End: 2023-05-24

## 2023-05-24 ENCOUNTER — APPOINTMENT (OUTPATIENT)
Dept: CARDIOLOGY | Facility: CLINIC | Age: 83
End: 2023-05-24
Payer: MEDICARE

## 2023-05-24 VITALS
DIASTOLIC BLOOD PRESSURE: 60 MMHG | HEIGHT: 69 IN | SYSTOLIC BLOOD PRESSURE: 148 MMHG | OXYGEN SATURATION: 96 % | BODY MASS INDEX: 28.44 KG/M2 | WEIGHT: 192 LBS | HEART RATE: 71 BPM

## 2023-05-24 PROCEDURE — 93000 ELECTROCARDIOGRAM COMPLETE: CPT

## 2023-05-24 PROCEDURE — 99214 OFFICE O/P EST MOD 30 MIN: CPT | Mod: 25

## 2023-05-24 NOTE — HISTORY OF PRESENT ILLNESS
[FreeTextEntry1] : He has been feeling well. No dyspnea or orthopnea.\par Leg edema is unchanged.\par No orthopnea.\par Taking medications as directed.\par \par Prior:\par Dear Destini,\par Thank you for referring him for cardiovascular evaluation.  He is an 82-year-old with a history of myasthenia gravis and longstanding atrial fibrillation as well as diabetes mellitus and former smoker who is here for cardiovascular evaluation and management.\par He has been diagnosed with atrial fibrillation since at least 2014.  No anticoagulation has been used because of recurrent nosebleeds.  He denies any lightheadedness, dizziness or palpitations and appears unaware of his atrial arrhythmia.\par His diabetes has been well controlled and his most recent hemoglobin A1c was 5.7.\par He is under the care of Dr. Leopoldo Rincon for myasthenia gravis and was recently placed on a higher dose of prednisone because of diplopia.\par He has recurrent severe nosebleeds requiring ENT intervention.\par He denies any exertional chest pain or shortness of breath but his leg arthritis limits his walking ability.\par He is a former smoker and has no history of myocardial infarction or stroke.\par He denies any orthopnea, PND but does have chronic leg edema.\par His last echocardiogram was 6 years ago.  There were elevated pulmonary pressures normal systolic function and severe left atrial enlargement.

## 2023-05-24 NOTE — DISCUSSION/SUMMARY
[FreeTextEntry1] : He is an 83-year-old with hypertension, diabetes mellitus, chronic atrial fibrillation, evidence of volume overload and myasthenia gravis.\par ECho showed normal EF with moderate to severe AI, Moderate MS.\par Hypertension/volume overload: His blood pressure control is sub-optimal. WIll uptitrate Entresto to the 97/103 mg twice daily. Labs in 2 weeks.\par Atrial fibrillation: ECG with moderate ventricular response. His heart rate appears well controlled with less ectopy.\par BP followup with Claudia in 1 month.  [EKG obtained to assist in diagnosis and management of assessed problem(s)] : EKG obtained to assist in diagnosis and management of assessed problem(s)

## 2023-05-24 NOTE — PHYSICAL EXAM
[Well Developed] : well developed [Well Nourished] : well nourished [Normal Conjunctiva] : normal conjunctiva [Clear Lung Fields] : clear lung fields [Good Air Entry] : good air entry [Soft] : abdomen soft [Moves all extremities] : moves all extremities [Alert and Oriented] : alert and oriented [Normal memory] : normal memory [de-identified] : 3/6 systolic ejection murmur at the right upper sternal border with radiation to his carotid artery.  There is a 3/6 harsh systolic murmur at the left apex [de-identified] : He walks with a cane [de-identified] : Bilateral pitting edema right greater than left to his knee

## 2023-05-25 LAB
ALBUMIN SERPL ELPH-MCNC: 4.5 G/DL
ALP BLD-CCNC: 132 U/L
ALT SERPL-CCNC: 11 U/L
ANION GAP SERPL CALC-SCNC: 11 MMOL/L
AST SERPL-CCNC: 15 U/L
BILIRUB SERPL-MCNC: 1.1 MG/DL
BUN SERPL-MCNC: 22 MG/DL
CALCIUM SERPL-MCNC: 9.3 MG/DL
CHLORIDE SERPL-SCNC: 104 MMOL/L
CHOLEST SERPL-MCNC: 169 MG/DL
CO2 SERPL-SCNC: 26 MMOL/L
CREAT SERPL-MCNC: 0.87 MG/DL
EGFR: 85 ML/MIN/1.73M2
ESTIMATED AVERAGE GLUCOSE: 111 MG/DL
GLUCOSE SERPL-MCNC: 112 MG/DL
HBA1C MFR BLD HPLC: 5.5 %
HDLC SERPL-MCNC: 52 MG/DL
LDLC SERPL CALC-MCNC: 108 MG/DL
NONHDLC SERPL-MCNC: 117 MG/DL
POTASSIUM SERPL-SCNC: 4.6 MMOL/L
PROT SERPL-MCNC: 6.7 G/DL
SODIUM SERPL-SCNC: 141 MMOL/L
TRIGL SERPL-MCNC: 42 MG/DL

## 2023-06-07 LAB
ANION GAP SERPL CALC-SCNC: 15 MMOL/L
BUN SERPL-MCNC: 27 MG/DL
CALCIUM SERPL-MCNC: 9.5 MG/DL
CHLORIDE SERPL-SCNC: 103 MMOL/L
CO2 SERPL-SCNC: 20 MMOL/L
CREAT SERPL-MCNC: 0.9 MG/DL
EGFR: 84 ML/MIN/1.73M2
GLUCOSE SERPL-MCNC: 126 MG/DL
POTASSIUM SERPL-SCNC: 4.9 MMOL/L
SODIUM SERPL-SCNC: 138 MMOL/L

## 2023-06-22 ENCOUNTER — APPOINTMENT (OUTPATIENT)
Dept: CARDIOLOGY | Facility: CLINIC | Age: 83
End: 2023-06-22

## 2023-10-12 ENCOUNTER — APPOINTMENT (OUTPATIENT)
Dept: INTERNAL MEDICINE | Facility: CLINIC | Age: 83
End: 2023-10-12
Payer: MEDICARE

## 2023-10-12 VITALS
HEIGHT: 69 IN | OXYGEN SATURATION: 97 % | DIASTOLIC BLOOD PRESSURE: 70 MMHG | WEIGHT: 184 LBS | SYSTOLIC BLOOD PRESSURE: 167 MMHG | HEART RATE: 77 BPM | BODY MASS INDEX: 27.25 KG/M2

## 2023-10-12 PROCEDURE — 99214 OFFICE O/P EST MOD 30 MIN: CPT | Mod: 25

## 2023-10-12 PROCEDURE — 36415 COLL VENOUS BLD VENIPUNCTURE: CPT

## 2023-10-13 LAB
ALBUMIN SERPL ELPH-MCNC: 4.4 G/DL
ALP BLD-CCNC: 109 U/L
ALT SERPL-CCNC: 13 U/L
ANION GAP SERPL CALC-SCNC: 10 MMOL/L
AST SERPL-CCNC: 15 U/L
BASOPHILS # BLD AUTO: 0.02 K/UL
BASOPHILS NFR BLD AUTO: 0.4 %
BILIRUB SERPL-MCNC: 0.8 MG/DL
BUN SERPL-MCNC: 23 MG/DL
CALCIUM SERPL-MCNC: 9.3 MG/DL
CHLORIDE SERPL-SCNC: 108 MMOL/L
CHOLEST SERPL-MCNC: 166 MG/DL
CO2 SERPL-SCNC: 22 MMOL/L
CREAT SERPL-MCNC: 0.75 MG/DL
EGFR: 89 ML/MIN/1.73M2
EOSINOPHIL # BLD AUTO: 0.01 K/UL
EOSINOPHIL NFR BLD AUTO: 0.2 %
ESTIMATED AVERAGE GLUCOSE: 114 MG/DL
GLUCOSE SERPL-MCNC: 119 MG/DL
HBA1C MFR BLD HPLC: 5.6 %
HCT VFR BLD CALC: 36.3 %
HDLC SERPL-MCNC: 50 MG/DL
HGB BLD-MCNC: 11 G/DL
IMM GRANULOCYTES NFR BLD AUTO: 0.6 %
LDLC SERPL CALC-MCNC: 106 MG/DL
LYMPHOCYTES # BLD AUTO: 0.68 K/UL
LYMPHOCYTES NFR BLD AUTO: 13.3 %
MAN DIFF?: NORMAL
MCHC RBC-ENTMCNC: 28.5 PG
MCHC RBC-ENTMCNC: 30.3 GM/DL
MCV RBC AUTO: 94 FL
MONOCYTES # BLD AUTO: 0.35 K/UL
MONOCYTES NFR BLD AUTO: 6.8 %
NEUTROPHILS # BLD AUTO: 4.02 K/UL
NEUTROPHILS NFR BLD AUTO: 78.7 %
NONHDLC SERPL-MCNC: 115 MG/DL
PLATELET # BLD AUTO: 142 K/UL
POTASSIUM SERPL-SCNC: 4.5 MMOL/L
PROT SERPL-MCNC: 6.7 G/DL
RBC # BLD: 3.86 M/UL
RBC # FLD: 14.6 %
SODIUM SERPL-SCNC: 141 MMOL/L
TRIGL SERPL-MCNC: 43 MG/DL
WBC # FLD AUTO: 5.11 K/UL

## 2023-10-16 ENCOUNTER — EMERGENCY (EMERGENCY)
Facility: HOSPITAL | Age: 83
LOS: 1 days | Discharge: HOME | End: 2023-10-16
Attending: STUDENT IN AN ORGANIZED HEALTH CARE EDUCATION/TRAINING PROGRAM | Admitting: STUDENT IN AN ORGANIZED HEALTH CARE EDUCATION/TRAINING PROGRAM
Payer: MEDICARE

## 2023-10-16 VITALS
OXYGEN SATURATION: 97 % | DIASTOLIC BLOOD PRESSURE: 65 MMHG | SYSTOLIC BLOOD PRESSURE: 168 MMHG | HEART RATE: 77 BPM | RESPIRATION RATE: 18 BRPM | TEMPERATURE: 98 F

## 2023-10-16 LAB
APPEARANCE UR: ABNORMAL
APPEARANCE UR: ABNORMAL
BACTERIA # UR AUTO: NEGATIVE /HPF — SIGNIFICANT CHANGE UP
BACTERIA # UR AUTO: NEGATIVE /HPF — SIGNIFICANT CHANGE UP
BASOPHILS # BLD AUTO: 0.02 K/UL — SIGNIFICANT CHANGE UP (ref 0–0.2)
BASOPHILS # BLD AUTO: 0.02 K/UL — SIGNIFICANT CHANGE UP (ref 0–0.2)
BASOPHILS NFR BLD AUTO: 0.2 % — SIGNIFICANT CHANGE UP (ref 0–2)
BASOPHILS NFR BLD AUTO: 0.2 % — SIGNIFICANT CHANGE UP (ref 0–2)
BILIRUB UR-MCNC: NEGATIVE — SIGNIFICANT CHANGE UP
BILIRUB UR-MCNC: NEGATIVE — SIGNIFICANT CHANGE UP
CAST: 0 /LPF — SIGNIFICANT CHANGE UP (ref 0–4)
CAST: 0 /LPF — SIGNIFICANT CHANGE UP (ref 0–4)
COLOR SPEC: ABNORMAL
COLOR SPEC: ABNORMAL
DIFF PNL FLD: ABNORMAL
DIFF PNL FLD: ABNORMAL
EOSINOPHIL # BLD AUTO: 0.01 K/UL — SIGNIFICANT CHANGE UP (ref 0–0.5)
EOSINOPHIL # BLD AUTO: 0.01 K/UL — SIGNIFICANT CHANGE UP (ref 0–0.5)
EOSINOPHIL NFR BLD AUTO: 0.1 % — SIGNIFICANT CHANGE UP (ref 0–6)
EOSINOPHIL NFR BLD AUTO: 0.1 % — SIGNIFICANT CHANGE UP (ref 0–6)
GLUCOSE UR QL: NEGATIVE MG/DL — SIGNIFICANT CHANGE UP
GLUCOSE UR QL: NEGATIVE MG/DL — SIGNIFICANT CHANGE UP
HCT VFR BLD CALC: 36.7 % — LOW (ref 39–50)
HCT VFR BLD CALC: 36.7 % — LOW (ref 39–50)
HGB BLD-MCNC: 11.8 G/DL — LOW (ref 13–17)
HGB BLD-MCNC: 11.8 G/DL — LOW (ref 13–17)
IANC: 9.17 K/UL — HIGH (ref 1.8–7.4)
IANC: 9.17 K/UL — HIGH (ref 1.8–7.4)
IMM GRANULOCYTES NFR BLD AUTO: 0.6 % — SIGNIFICANT CHANGE UP (ref 0–0.9)
IMM GRANULOCYTES NFR BLD AUTO: 0.6 % — SIGNIFICANT CHANGE UP (ref 0–0.9)
KETONES UR-MCNC: NEGATIVE MG/DL — SIGNIFICANT CHANGE UP
KETONES UR-MCNC: NEGATIVE MG/DL — SIGNIFICANT CHANGE UP
LEUKOCYTE ESTERASE UR-ACNC: ABNORMAL
LEUKOCYTE ESTERASE UR-ACNC: ABNORMAL
LYMPHOCYTES # BLD AUTO: 0.52 K/UL — LOW (ref 1–3.3)
LYMPHOCYTES # BLD AUTO: 0.52 K/UL — LOW (ref 1–3.3)
LYMPHOCYTES # BLD AUTO: 4.9 % — LOW (ref 13–44)
LYMPHOCYTES # BLD AUTO: 4.9 % — LOW (ref 13–44)
MCHC RBC-ENTMCNC: 28.9 PG — SIGNIFICANT CHANGE UP (ref 27–34)
MCHC RBC-ENTMCNC: 28.9 PG — SIGNIFICANT CHANGE UP (ref 27–34)
MCHC RBC-ENTMCNC: 32.2 GM/DL — SIGNIFICANT CHANGE UP (ref 32–36)
MCHC RBC-ENTMCNC: 32.2 GM/DL — SIGNIFICANT CHANGE UP (ref 32–36)
MCV RBC AUTO: 89.7 FL — SIGNIFICANT CHANGE UP (ref 80–100)
MCV RBC AUTO: 89.7 FL — SIGNIFICANT CHANGE UP (ref 80–100)
MONOCYTES # BLD AUTO: 0.88 K/UL — SIGNIFICANT CHANGE UP (ref 0–0.9)
MONOCYTES # BLD AUTO: 0.88 K/UL — SIGNIFICANT CHANGE UP (ref 0–0.9)
MONOCYTES NFR BLD AUTO: 8.3 % — SIGNIFICANT CHANGE UP (ref 2–14)
MONOCYTES NFR BLD AUTO: 8.3 % — SIGNIFICANT CHANGE UP (ref 2–14)
NEUTROPHILS # BLD AUTO: 9.17 K/UL — HIGH (ref 1.8–7.4)
NEUTROPHILS # BLD AUTO: 9.17 K/UL — HIGH (ref 1.8–7.4)
NEUTROPHILS NFR BLD AUTO: 85.9 % — HIGH (ref 43–77)
NEUTROPHILS NFR BLD AUTO: 85.9 % — HIGH (ref 43–77)
NITRITE UR-MCNC: NEGATIVE — SIGNIFICANT CHANGE UP
NITRITE UR-MCNC: NEGATIVE — SIGNIFICANT CHANGE UP
NRBC # BLD: 0 /100 WBCS — SIGNIFICANT CHANGE UP (ref 0–0)
NRBC # BLD: 0 /100 WBCS — SIGNIFICANT CHANGE UP (ref 0–0)
NRBC # FLD: 0 K/UL — SIGNIFICANT CHANGE UP (ref 0–0)
NRBC # FLD: 0 K/UL — SIGNIFICANT CHANGE UP (ref 0–0)
PH UR: 5.5 — SIGNIFICANT CHANGE UP (ref 5–8)
PH UR: 5.5 — SIGNIFICANT CHANGE UP (ref 5–8)
PLATELET # BLD AUTO: 149 K/UL — LOW (ref 150–400)
PLATELET # BLD AUTO: 149 K/UL — LOW (ref 150–400)
PROT UR-MCNC: 100 MG/DL
PROT UR-MCNC: 100 MG/DL
RBC # BLD: 4.09 M/UL — LOW (ref 4.2–5.8)
RBC # BLD: 4.09 M/UL — LOW (ref 4.2–5.8)
RBC # FLD: 14.6 % — HIGH (ref 10.3–14.5)
RBC # FLD: 14.6 % — HIGH (ref 10.3–14.5)
RBC CASTS # UR COMP ASSIST: 817 /HPF — HIGH (ref 0–4)
RBC CASTS # UR COMP ASSIST: 817 /HPF — HIGH (ref 0–4)
SP GR SPEC: 1.01 — SIGNIFICANT CHANGE UP (ref 1–1.03)
SP GR SPEC: 1.01 — SIGNIFICANT CHANGE UP (ref 1–1.03)
SQUAMOUS # UR AUTO: 0 /HPF — SIGNIFICANT CHANGE UP (ref 0–5)
SQUAMOUS # UR AUTO: 0 /HPF — SIGNIFICANT CHANGE UP (ref 0–5)
UROBILINOGEN FLD QL: 0.2 MG/DL — SIGNIFICANT CHANGE UP (ref 0.2–1)
UROBILINOGEN FLD QL: 0.2 MG/DL — SIGNIFICANT CHANGE UP (ref 0.2–1)
WBC # BLD: 10.66 K/UL — HIGH (ref 3.8–10.5)
WBC # BLD: 10.66 K/UL — HIGH (ref 3.8–10.5)
WBC # FLD AUTO: 10.66 K/UL — HIGH (ref 3.8–10.5)
WBC # FLD AUTO: 10.66 K/UL — HIGH (ref 3.8–10.5)
WBC UR QL: 5 /HPF — SIGNIFICANT CHANGE UP (ref 0–5)
WBC UR QL: 5 /HPF — SIGNIFICANT CHANGE UP (ref 0–5)

## 2023-10-16 PROCEDURE — 99284 EMERGENCY DEPT VISIT MOD MDM: CPT

## 2023-10-16 NOTE — ED ADULT NURSE NOTE - OBJECTIVE STATEMENT
84 y/o male, ambulatory with cane, presents to ED from home for urinary retention. Pt states he has not voided since 10am this morning, while in the waiting room, attempted to urinate and noticed some blood. Skin intact, no signs of redness or injury noted to the urethral site. Scrotum area is enlarged and slightly red. Past medical history of myasthenias gravis and HTN. 16F butt catheter inserted, sterile technique used, pt tolerated procedure well, no signs of injury or trauma noted. Orange color urine draining into butt bag. 20GIV placed to left forearm, labs collected and sent off. Respirations are even and unlabored, no signs of respiratory distress.

## 2023-10-16 NOTE — ED ADULT TRIAGE NOTE - CHIEF COMPLAINT QUOTE
pt c/o urinary retention and abd pain since 1000 and rectal bleeding x a few days.  Hx:  myestinia gravis, HTN, DM2

## 2023-10-17 VITALS
OXYGEN SATURATION: 97 % | DIASTOLIC BLOOD PRESSURE: 60 MMHG | HEART RATE: 75 BPM | TEMPERATURE: 98 F | SYSTOLIC BLOOD PRESSURE: 137 MMHG | RESPIRATION RATE: 16 BRPM

## 2023-10-17 LAB
ALBUMIN SERPL ELPH-MCNC: 4.1 G/DL — SIGNIFICANT CHANGE UP (ref 3.3–5)
ALBUMIN SERPL ELPH-MCNC: 4.1 G/DL — SIGNIFICANT CHANGE UP (ref 3.3–5)
ALP SERPL-CCNC: 126 U/L — HIGH (ref 40–120)
ALP SERPL-CCNC: 126 U/L — HIGH (ref 40–120)
ALT FLD-CCNC: 12 U/L — SIGNIFICANT CHANGE UP (ref 4–41)
ALT FLD-CCNC: 12 U/L — SIGNIFICANT CHANGE UP (ref 4–41)
ANION GAP SERPL CALC-SCNC: 14 MMOL/L — SIGNIFICANT CHANGE UP (ref 7–14)
ANION GAP SERPL CALC-SCNC: 14 MMOL/L — SIGNIFICANT CHANGE UP (ref 7–14)
AST SERPL-CCNC: 19 U/L — SIGNIFICANT CHANGE UP (ref 4–40)
AST SERPL-CCNC: 19 U/L — SIGNIFICANT CHANGE UP (ref 4–40)
BILIRUB SERPL-MCNC: 0.9 MG/DL — SIGNIFICANT CHANGE UP (ref 0.2–1.2)
BILIRUB SERPL-MCNC: 0.9 MG/DL — SIGNIFICANT CHANGE UP (ref 0.2–1.2)
BUN SERPL-MCNC: 26 MG/DL — HIGH (ref 7–23)
BUN SERPL-MCNC: 26 MG/DL — HIGH (ref 7–23)
CALCIUM SERPL-MCNC: 9 MG/DL — SIGNIFICANT CHANGE UP (ref 8.4–10.5)
CALCIUM SERPL-MCNC: 9 MG/DL — SIGNIFICANT CHANGE UP (ref 8.4–10.5)
CHLORIDE SERPL-SCNC: 102 MMOL/L — SIGNIFICANT CHANGE UP (ref 98–107)
CHLORIDE SERPL-SCNC: 102 MMOL/L — SIGNIFICANT CHANGE UP (ref 98–107)
CO2 SERPL-SCNC: 21 MMOL/L — LOW (ref 22–31)
CO2 SERPL-SCNC: 21 MMOL/L — LOW (ref 22–31)
CREAT SERPL-MCNC: 1.07 MG/DL — SIGNIFICANT CHANGE UP (ref 0.5–1.3)
CREAT SERPL-MCNC: 1.07 MG/DL — SIGNIFICANT CHANGE UP (ref 0.5–1.3)
EGFR: 68 ML/MIN/1.73M2 — SIGNIFICANT CHANGE UP
EGFR: 68 ML/MIN/1.73M2 — SIGNIFICANT CHANGE UP
GLUCOSE SERPL-MCNC: 113 MG/DL — HIGH (ref 70–99)
GLUCOSE SERPL-MCNC: 113 MG/DL — HIGH (ref 70–99)
POTASSIUM SERPL-MCNC: 4.8 MMOL/L — SIGNIFICANT CHANGE UP (ref 3.5–5.3)
POTASSIUM SERPL-MCNC: 4.8 MMOL/L — SIGNIFICANT CHANGE UP (ref 3.5–5.3)
POTASSIUM SERPL-SCNC: 4.8 MMOL/L — SIGNIFICANT CHANGE UP (ref 3.5–5.3)
POTASSIUM SERPL-SCNC: 4.8 MMOL/L — SIGNIFICANT CHANGE UP (ref 3.5–5.3)
PROT SERPL-MCNC: 7.2 G/DL — SIGNIFICANT CHANGE UP (ref 6–8.3)
PROT SERPL-MCNC: 7.2 G/DL — SIGNIFICANT CHANGE UP (ref 6–8.3)
SODIUM SERPL-SCNC: 137 MMOL/L — SIGNIFICANT CHANGE UP (ref 135–145)
SODIUM SERPL-SCNC: 137 MMOL/L — SIGNIFICANT CHANGE UP (ref 135–145)

## 2023-10-17 NOTE — ED PROVIDER NOTE - NSFOLLOWUPINSTRUCTIONS_ED_ALL_ED_FT
Today you were seen in the ED for difficulty urinating     It was found that you have No signs of medical emergency warranting further evaluation in the emergency department.    A copy of your results attached this document below.    Please follow up with Your primary care provider in regards to today's event.    Please return to the ED if you have any of the following:   Any blockage in your urine catheter, it stops draining, you develop fever, chest pain SOB.       What is urinary retention? Urinary retention is a condition that develops when your bladder does not empty completely when you urinate.  Male Reproductive System    What causes urinary retention?    An enlarged prostate    Blockages, such as a stone, growth, or narrowing of your urethra    A weak bladder muscle    Nerve damage from diabetes, stroke, or spinal cord injury    Bladder diverticula, which are pockets of urine that form in your bladder and do not empty    Certain medicines, such as narcotics, antihistamines, or antidepressants  What are the signs and symptoms of urinary retention?    Frequent urination, or the urge to urinate right after you finish    An urge to urinate, but your urine does not come out or dribbles out slowly and weakly    Frequent urine leaks that happen during the day or while you sleep    Pain or pressure when you urinate    Pain or stiffness in your abdomen, lower back, hips, or upper thighs    Blood in your urine  How is urinary retention diagnosed? Your healthcare provider will ask about your health history and the medicines you take. Your provider will press or tap on your lower abdomen. You may also need any of the following tests:    A digital rectal exam is when healthcare providers carefully feel the size of your prostate.    A post void residual test will show how much urine is left in your bladder after you urinate. You will be asked to urinate and then healthcare providers will use a small ultrasound machine to check how much urine is left in your bladder.    Blood or urine tests may show infection or prostate specific antigen (PSA) levels. PSA may be elevated in prostate cancer.    An ultrasound uses sound waves to show pictures on a monitor. An ultrasound may be done to show bladder stones, infection, or other problems.    A CT scan, or CAT scan, is a type of x-ray that is taken of your prostate, kidneys, and bladder. The pictures may show what is causing your urinary retention. You may be given a dye before the pictures are taken to help healthcare providers see the pictures better. Tell the healthcare provider if you have ever had an allergic reaction to contrast dye.  How is urinary retention treated?    A Elliott catheter is a tube put into your bladder to drain urine into a bag. Keep the bag below your waist. This will prevent urine from flowing back into your bladder and causing an infection or other problems. Also, keep the tube free of kinks so the urine will drain properly. Do not pull on the catheter. This can cause pain and bleeding, and may cause the catheter to come out.    Medicines can help decrease the size of your prostate, fight infection, and help you urinate more easily.    Surgery may be needed to treat the condition that is causing your urinary retention.  When should I contact my healthcare provider?    You have a fever.    You have pain when you urinate.    You have blood in your urine.    You have problems with your catheter.    You have questions or concerns about your condition or care.  When should I seek immediate care or call 911?    You have severe abdominal pain.    You are breathing faster than usual.    Your heartbeat is faster than usual.    Your face, hands, feet, or ankles are swollen.  CARE AGREEMENT:    You have the right to help plan your care. Learn about your health condition and how it may be treated. Discuss treatment options with your healthcare providers to decide what care you want to receive. You always have the right to refuse treatment.

## 2023-10-17 NOTE — ED ADULT NURSE REASSESSMENT NOTE - NS ED NURSE REASSESS COMMENT FT1
Break RN: Pt is A&Ox4, resting in stretcher with no complaints at this time. Respirations even and unlabored, chest rise equal b/l. About 1100cc of dark red urine in butt drainage bag. Pt reports slight relief in urinary retention symptoms. VS as noted in flow sheets. Pt denies chest pain, SOB, fever, cough, chills, abdominal pain, N/V/D, h/a, dizziness, numbness/tingling or any urinary symptoms at this time. No acute distress noted. Safety maintained throughout. Will continue to monitor.

## 2023-10-17 NOTE — ED PROVIDER NOTE - PHYSICAL EXAMINATION
GENERAL: Awake, alert, NAD  LUNGS: non labored breathing   ABDOMEN: Soft, , non tender, non distended, no rebound, no guarding  BACK: No midline spinal tenderness, no CVA tenderness   enlarged non tender testicles   EXT: No edema, no calf tenderness no deformities.  NEURO: A&Ox3. Moving all extremities.  SKIN: Warm and dry. No rash.  PSYCH: Normal affect.

## 2023-10-17 NOTE — ED PROVIDER NOTE - PATIENT PORTAL LINK FT
You can access the FollowMyHealth Patient Portal offered by [facility] by registering at the following website: [email or web address]. By joining [employer]’s FollowMyHealth portal, you will also be able to view your health information using other applications (apps) compatible with our system.

## 2023-10-17 NOTE — ED PROVIDER NOTE - NSFOLLOWUPCLINICS_GEN_ALL_ED_FT
[facility] Urology  Urology  [address] [facility], 3rd & 4th [place] [address]  Phone: [phone]  Fax:   Follow Up Time: 1-3 Days

## 2023-10-17 NOTE — ED PROVIDER NOTE - CLINICAL SUMMARY MEDICAL DECISION MAKING FREE TEXT BOX
given hx and pe concern for BPH causing urinary retention, blossom 2/2 Urinary retention vs uti will prob dc w/ leg bag uro fu

## 2023-10-17 NOTE — ED PROVIDER NOTE - OBJECTIVE STATEMENT
83M HX Myasthenia gravis HTN cc acute onset of urinary retention could not urinate since 10AM, pt states he has never had this happen to him before.   PT is otherwise alert and orientated

## 2023-10-18 ENCOUNTER — APPOINTMENT (OUTPATIENT)
Dept: CARDIOLOGY | Facility: CLINIC | Age: 83
End: 2023-10-18

## 2023-10-18 LAB
CULTURE RESULTS: SIGNIFICANT CHANGE UP
CULTURE RESULTS: SIGNIFICANT CHANGE UP
SPECIMEN SOURCE: SIGNIFICANT CHANGE UP
SPECIMEN SOURCE: SIGNIFICANT CHANGE UP

## 2023-10-20 ENCOUNTER — APPOINTMENT (OUTPATIENT)
Dept: UROLOGY | Facility: CLINIC | Age: 83
End: 2023-10-20
Payer: MEDICARE

## 2023-10-20 VITALS
DIASTOLIC BLOOD PRESSURE: 63 MMHG | TEMPERATURE: 98.2 F | OXYGEN SATURATION: 97 % | HEART RATE: 79 BPM | SYSTOLIC BLOOD PRESSURE: 162 MMHG

## 2023-10-20 PROCEDURE — 99205 OFFICE O/P NEW HI 60 MIN: CPT

## 2023-10-30 ENCOUNTER — APPOINTMENT (OUTPATIENT)
Dept: UROLOGY | Facility: CLINIC | Age: 83
End: 2023-10-30
Payer: MEDICARE

## 2023-10-30 VITALS
TEMPERATURE: 98.8 F | BODY MASS INDEX: 27.25 KG/M2 | HEIGHT: 69 IN | OXYGEN SATURATION: 98 % | DIASTOLIC BLOOD PRESSURE: 56 MMHG | HEART RATE: 78 BPM | WEIGHT: 184 LBS | SYSTOLIC BLOOD PRESSURE: 151 MMHG

## 2023-10-30 PROCEDURE — 99215 OFFICE O/P EST HI 40 MIN: CPT

## 2023-11-06 ENCOUNTER — NON-APPOINTMENT (OUTPATIENT)
Age: 83
End: 2023-11-06

## 2023-11-08 ENCOUNTER — NON-APPOINTMENT (OUTPATIENT)
Age: 83
End: 2023-11-08

## 2023-11-08 ENCOUNTER — APPOINTMENT (OUTPATIENT)
Dept: CARDIOLOGY | Facility: CLINIC | Age: 83
End: 2023-11-08
Payer: MEDICARE

## 2023-11-08 VITALS
DIASTOLIC BLOOD PRESSURE: 52 MMHG | WEIGHT: 180 LBS | SYSTOLIC BLOOD PRESSURE: 140 MMHG | OXYGEN SATURATION: 99 % | HEART RATE: 73 BPM | BODY MASS INDEX: 26.58 KG/M2

## 2023-11-08 DIAGNOSIS — I10 ESSENTIAL (PRIMARY) HYPERTENSION: ICD-10-CM

## 2023-11-08 PROCEDURE — 99214 OFFICE O/P EST MOD 30 MIN: CPT

## 2023-11-10 ENCOUNTER — APPOINTMENT (OUTPATIENT)
Dept: UROLOGY | Facility: CLINIC | Age: 83
End: 2023-11-10
Payer: MEDICARE

## 2023-11-10 VITALS
OXYGEN SATURATION: 95 % | SYSTOLIC BLOOD PRESSURE: 157 MMHG | TEMPERATURE: 98.1 F | DIASTOLIC BLOOD PRESSURE: 66 MMHG | HEART RATE: 87 BPM

## 2023-11-10 DIAGNOSIS — Z87.898 PERSONAL HISTORY OF OTHER SPECIFIED CONDITIONS: ICD-10-CM

## 2023-11-10 PROCEDURE — 51798 US URINE CAPACITY MEASURE: CPT

## 2023-11-10 PROCEDURE — 99214 OFFICE O/P EST MOD 30 MIN: CPT

## 2023-11-14 DIAGNOSIS — N39.0 URINARY TRACT INFECTION, SITE NOT SPECIFIED: ICD-10-CM

## 2023-11-14 LAB — BACTERIA UR CULT: ABNORMAL

## 2023-12-21 ENCOUNTER — APPOINTMENT (OUTPATIENT)
Dept: UROLOGY | Facility: CLINIC | Age: 83
End: 2023-12-21
Payer: MEDICARE

## 2023-12-21 VITALS
HEART RATE: 79 BPM | OXYGEN SATURATION: 94 % | SYSTOLIC BLOOD PRESSURE: 154 MMHG | RESPIRATION RATE: 14 BRPM | DIASTOLIC BLOOD PRESSURE: 75 MMHG | TEMPERATURE: 98.1 F

## 2023-12-21 PROCEDURE — 81003 URINALYSIS AUTO W/O SCOPE: CPT | Mod: QW

## 2023-12-21 PROCEDURE — 51798 US URINE CAPACITY MEASURE: CPT

## 2023-12-21 PROCEDURE — 99213 OFFICE O/P EST LOW 20 MIN: CPT

## 2023-12-21 NOTE — HISTORY OF PRESENT ILLNESS
[FreeTextEntry1] : 83M presents for follow up evaluation   h/o urinary retention previously with butt   Currently on tamsulosin 0.8 mg nightly  Last visit 11/10: PVR 200cc  Ucx (11/10): Kleb -> Bactrim  Recommended CIC twice daily and further diagnostic testing--patient and family deferred treatment   DTF: 4-5 NTF: 2-3 PPD: 1, for security   12/21/23 Patient feels well with no subjective voiding complaints.  Denies adverse effects with tamsulosin use.

## 2023-12-21 NOTE — PHYSICAL EXAM
[General Appearance - Well Developed] : well developed [General Appearance - Well Nourished] : well nourished [] : no respiratory distress [Abdomen Soft] : soft [Abdomen Tenderness] : non-tender [de-identified] : PVR: 230 cc

## 2023-12-21 NOTE — ASSESSMENT
[FreeTextEntry1] :  Mr. Santos presents for follow up of urinary retention presumed to be due to BPH. Recent history of retention but has had long-standing complaints of urinary frequency, small volume voids. Currently on tamsulosin 0.8 mg nightly PVR today is 230 cc, showing persistent incomplete emptying UA: small blood  Reviewed incomplete bladder emptying is likely exacerbating urinary complaints and places patient at risk for future retention episodes and urinary tract infections. My recommendation is to proceed with twice daily CIC and further diagnostic testing with urodynamics, cystoscopy and uroflow to better characterize detrusor underactivity versus bladder outlet obstruction. Patient and family will consider these options but declined any additional treatment at this time. They will contact me when they are ready to proceed.  Recommendations -continue Tamsulosin 0.8 mg qhs -RTC 3 mo

## 2024-01-01 ENCOUNTER — INPATIENT (INPATIENT)
Facility: HOSPITAL | Age: 84
LOS: 19 days | DRG: 812 | End: 2024-11-03
Attending: SURGERY | Admitting: STUDENT IN AN ORGANIZED HEALTH CARE EDUCATION/TRAINING PROGRAM
Payer: MEDICARE

## 2024-01-01 VITALS
TEMPERATURE: 97 F | WEIGHT: 169.98 LBS | SYSTOLIC BLOOD PRESSURE: 142 MMHG | OXYGEN SATURATION: 100 % | DIASTOLIC BLOOD PRESSURE: 50 MMHG | HEART RATE: 60 BPM | RESPIRATION RATE: 17 BRPM | HEIGHT: 60 IN

## 2024-01-01 VITALS
HEART RATE: 60 BPM | RESPIRATION RATE: 18 BRPM | TEMPERATURE: 98 F | OXYGEN SATURATION: 98 % | DIASTOLIC BLOOD PRESSURE: 54 MMHG | SYSTOLIC BLOOD PRESSURE: 108 MMHG

## 2024-01-01 DIAGNOSIS — E11.9 TYPE 2 DIABETES MELLITUS WITHOUT COMPLICATIONS: ICD-10-CM

## 2024-01-01 DIAGNOSIS — A41.9 SEPSIS, UNSPECIFIED ORGANISM: ICD-10-CM

## 2024-01-01 DIAGNOSIS — R33.9 RETENTION OF URINE, UNSPECIFIED: ICD-10-CM

## 2024-01-01 DIAGNOSIS — H91.90 UNSPECIFIED HEARING LOSS, UNSPECIFIED EAR: ICD-10-CM

## 2024-01-01 DIAGNOSIS — D63.8 ANEMIA IN OTHER CHRONIC DISEASES CLASSIFIED ELSEWHERE: ICD-10-CM

## 2024-01-01 DIAGNOSIS — Z29.9 ENCOUNTER FOR PROPHYLACTIC MEASURES, UNSPECIFIED: ICD-10-CM

## 2024-01-01 DIAGNOSIS — D64.9 ANEMIA, UNSPECIFIED: ICD-10-CM

## 2024-01-01 DIAGNOSIS — Z95.0 PRESENCE OF CARDIAC PACEMAKER: Chronic | ICD-10-CM

## 2024-01-01 DIAGNOSIS — I48.91 UNSPECIFIED ATRIAL FIBRILLATION: ICD-10-CM

## 2024-01-01 DIAGNOSIS — G70.00 MYASTHENIA GRAVIS WITHOUT (ACUTE) EXACERBATION: ICD-10-CM

## 2024-01-01 DIAGNOSIS — Z92.89 PERSONAL HISTORY OF OTHER MEDICAL TREATMENT: Chronic | ICD-10-CM

## 2024-01-01 DIAGNOSIS — I35.0 NONRHEUMATIC AORTIC (VALVE) STENOSIS: ICD-10-CM

## 2024-01-01 DIAGNOSIS — C64.9 MALIGNANT NEOPLASM OF UNSPECIFIED KIDNEY, EXCEPT RENAL PELVIS: ICD-10-CM

## 2024-01-01 DIAGNOSIS — I63.9 CEREBRAL INFARCTION, UNSPECIFIED: ICD-10-CM

## 2024-01-01 DIAGNOSIS — K63.1 PERFORATION OF INTESTINE (NONTRAUMATIC): ICD-10-CM

## 2024-01-01 DIAGNOSIS — I10 ESSENTIAL (PRIMARY) HYPERTENSION: ICD-10-CM

## 2024-01-01 DIAGNOSIS — I50.30 UNSPECIFIED DIASTOLIC (CONGESTIVE) HEART FAILURE: ICD-10-CM

## 2024-01-01 LAB
-  AMOXICILLIN/CLAVULANIC ACID: SIGNIFICANT CHANGE UP
-  AMPICILLIN/SULBACTAM: SIGNIFICANT CHANGE UP
-  AMPICILLIN: SIGNIFICANT CHANGE UP
-  AZTREONAM: SIGNIFICANT CHANGE UP
-  CEFAZOLIN: SIGNIFICANT CHANGE UP
-  CEFEPIME: SIGNIFICANT CHANGE UP
-  CEFOXITIN: SIGNIFICANT CHANGE UP
-  CEFTRIAXONE: SIGNIFICANT CHANGE UP
-  CIPROFLOXACIN: SIGNIFICANT CHANGE UP
-  ERTAPENEM: SIGNIFICANT CHANGE UP
-  GENTAMICIN: SIGNIFICANT CHANGE UP
-  IMIPENEM: SIGNIFICANT CHANGE UP
-  LEVOFLOXACIN: SIGNIFICANT CHANGE UP
-  MEROPENEM: SIGNIFICANT CHANGE UP
-  PIPERACILLIN/TAZOBACTAM: SIGNIFICANT CHANGE UP
-  TOBRAMYCIN: SIGNIFICANT CHANGE UP
-  TRIMETHOPRIM/SULFAMETHOXAZOLE: SIGNIFICANT CHANGE UP
A1C WITH ESTIMATED AVERAGE GLUCOSE RESULT: 5.2 % — SIGNIFICANT CHANGE UP (ref 4–5.6)
ACANTHOCYTES BLD QL SMEAR: SLIGHT — SIGNIFICANT CHANGE UP
ALBUMIN SERPL ELPH-MCNC: 1.8 G/DL — LOW (ref 3.3–5)
ALBUMIN SERPL ELPH-MCNC: 1.8 G/DL — LOW (ref 3.3–5)
ALBUMIN SERPL ELPH-MCNC: 1.9 G/DL — LOW (ref 3.3–5)
ALBUMIN SERPL ELPH-MCNC: 2 G/DL — LOW (ref 3.3–5)
ALBUMIN SERPL ELPH-MCNC: 2.1 G/DL — LOW (ref 3.3–5)
ALBUMIN SERPL ELPH-MCNC: 2.2 G/DL — LOW (ref 3.3–5)
ALBUMIN SERPL ELPH-MCNC: 2.3 G/DL — LOW (ref 3.3–5)
ALBUMIN SERPL ELPH-MCNC: 2.3 G/DL — LOW (ref 3.3–5)
ALBUMIN SERPL ELPH-MCNC: 2.6 G/DL — LOW (ref 3.3–5)
ALBUMIN SERPL ELPH-MCNC: 2.7 G/DL — LOW (ref 3.3–5)
ALP SERPL-CCNC: 104 U/L — SIGNIFICANT CHANGE UP (ref 40–120)
ALP SERPL-CCNC: 105 U/L — SIGNIFICANT CHANGE UP (ref 40–120)
ALP SERPL-CCNC: 105 U/L — SIGNIFICANT CHANGE UP (ref 40–120)
ALP SERPL-CCNC: 106 U/L — SIGNIFICANT CHANGE UP (ref 40–120)
ALP SERPL-CCNC: 107 U/L — SIGNIFICANT CHANGE UP (ref 40–120)
ALP SERPL-CCNC: 109 U/L — SIGNIFICANT CHANGE UP (ref 40–120)
ALP SERPL-CCNC: 115 U/L — SIGNIFICANT CHANGE UP (ref 40–120)
ALP SERPL-CCNC: 118 U/L — SIGNIFICANT CHANGE UP (ref 40–120)
ALP SERPL-CCNC: 122 U/L — HIGH (ref 40–120)
ALP SERPL-CCNC: 147 U/L — HIGH (ref 40–120)
ALP SERPL-CCNC: 79 U/L — SIGNIFICANT CHANGE UP (ref 40–120)
ALP SERPL-CCNC: 80 U/L — SIGNIFICANT CHANGE UP (ref 40–120)
ALP SERPL-CCNC: 82 U/L — SIGNIFICANT CHANGE UP (ref 40–120)
ALP SERPL-CCNC: 89 U/L — SIGNIFICANT CHANGE UP (ref 40–120)
ALP SERPL-CCNC: 89 U/L — SIGNIFICANT CHANGE UP (ref 40–120)
ALP SERPL-CCNC: 90 U/L — SIGNIFICANT CHANGE UP (ref 40–120)
ALP SERPL-CCNC: 90 U/L — SIGNIFICANT CHANGE UP (ref 40–120)
ALP SERPL-CCNC: 91 U/L — SIGNIFICANT CHANGE UP (ref 40–120)
ALP SERPL-CCNC: 99 U/L — SIGNIFICANT CHANGE UP (ref 40–120)
ALP SERPL-CCNC: 99 U/L — SIGNIFICANT CHANGE UP (ref 40–120)
ALT FLD-CCNC: 10 U/L — SIGNIFICANT CHANGE UP (ref 10–45)
ALT FLD-CCNC: 10 U/L — SIGNIFICANT CHANGE UP (ref 10–45)
ALT FLD-CCNC: 11 U/L — SIGNIFICANT CHANGE UP (ref 10–45)
ALT FLD-CCNC: 12 U/L — SIGNIFICANT CHANGE UP (ref 10–45)
ALT FLD-CCNC: 13 U/L — SIGNIFICANT CHANGE UP (ref 10–45)
ALT FLD-CCNC: 14 U/L — SIGNIFICANT CHANGE UP (ref 10–45)
ALT FLD-CCNC: 15 U/L — SIGNIFICANT CHANGE UP (ref 10–45)
ALT FLD-CCNC: 8 U/L — LOW (ref 10–45)
ALT FLD-CCNC: 9 U/L — LOW (ref 10–45)
ALT FLD-CCNC: <5 U/L — LOW (ref 10–45)
ANION GAP SERPL CALC-SCNC: 10 MMOL/L — SIGNIFICANT CHANGE UP (ref 5–17)
ANION GAP SERPL CALC-SCNC: 11 MMOL/L — SIGNIFICANT CHANGE UP (ref 5–17)
ANION GAP SERPL CALC-SCNC: 12 MMOL/L — SIGNIFICANT CHANGE UP (ref 5–17)
ANION GAP SERPL CALC-SCNC: 14 MMOL/L — SIGNIFICANT CHANGE UP (ref 5–17)
ANION GAP SERPL CALC-SCNC: 7 MMOL/L — SIGNIFICANT CHANGE UP (ref 5–17)
ANION GAP SERPL CALC-SCNC: 8 MMOL/L — SIGNIFICANT CHANGE UP (ref 5–17)
ANION GAP SERPL CALC-SCNC: 9 MMOL/L — SIGNIFICANT CHANGE UP (ref 5–17)
ANISOCYTOSIS BLD QL: SLIGHT — SIGNIFICANT CHANGE UP
APPEARANCE UR: ABNORMAL
APTT BLD: 32.1 SEC — SIGNIFICANT CHANGE UP (ref 24.5–35.6)
APTT BLD: 32.3 SEC — SIGNIFICANT CHANGE UP (ref 24.5–35.6)
APTT BLD: 32.8 SEC — SIGNIFICANT CHANGE UP (ref 24.5–35.6)
APTT BLD: 33.4 SEC — SIGNIFICANT CHANGE UP (ref 24.5–35.6)
APTT BLD: 33.8 SEC — SIGNIFICANT CHANGE UP (ref 24.5–35.6)
APTT BLD: 33.9 SEC — SIGNIFICANT CHANGE UP (ref 24.5–35.6)
APTT BLD: 34.2 SEC — SIGNIFICANT CHANGE UP (ref 24.5–35.6)
APTT BLD: 34.2 SEC — SIGNIFICANT CHANGE UP (ref 24.5–35.6)
APTT BLD: 34.5 SEC — SIGNIFICANT CHANGE UP (ref 24.5–35.6)
APTT BLD: 34.6 SEC — SIGNIFICANT CHANGE UP (ref 24.5–35.6)
APTT BLD: 34.8 SEC — SIGNIFICANT CHANGE UP (ref 24.5–35.6)
APTT BLD: 34.9 SEC — SIGNIFICANT CHANGE UP (ref 24.5–35.6)
APTT BLD: 35.3 SEC — SIGNIFICANT CHANGE UP (ref 24.5–35.6)
APTT BLD: 35.4 SEC — SIGNIFICANT CHANGE UP (ref 24.5–35.6)
APTT BLD: 36.3 SEC — HIGH (ref 24.5–35.6)
APTT BLD: 37.3 SEC — HIGH (ref 24.5–35.6)
AST SERPL-CCNC: 14 U/L — SIGNIFICANT CHANGE UP (ref 10–40)
AST SERPL-CCNC: 15 U/L — SIGNIFICANT CHANGE UP (ref 10–40)
AST SERPL-CCNC: 16 U/L — SIGNIFICANT CHANGE UP (ref 10–40)
AST SERPL-CCNC: 17 U/L — SIGNIFICANT CHANGE UP (ref 10–40)
AST SERPL-CCNC: 18 U/L — SIGNIFICANT CHANGE UP (ref 10–40)
AST SERPL-CCNC: 18 U/L — SIGNIFICANT CHANGE UP (ref 10–40)
AST SERPL-CCNC: 19 U/L — SIGNIFICANT CHANGE UP (ref 10–40)
AST SERPL-CCNC: 19 U/L — SIGNIFICANT CHANGE UP (ref 10–40)
AST SERPL-CCNC: 20 U/L — SIGNIFICANT CHANGE UP (ref 10–40)
AST SERPL-CCNC: 21 U/L — SIGNIFICANT CHANGE UP (ref 10–40)
AST SERPL-CCNC: 22 U/L — SIGNIFICANT CHANGE UP (ref 10–40)
AST SERPL-CCNC: 22 U/L — SIGNIFICANT CHANGE UP (ref 10–40)
AST SERPL-CCNC: 30 U/L — SIGNIFICANT CHANGE UP (ref 10–40)
BASOPHILS # BLD AUTO: 0 K/UL — SIGNIFICANT CHANGE UP (ref 0–0.2)
BASOPHILS # BLD AUTO: 0.01 K/UL — SIGNIFICANT CHANGE UP (ref 0–0.2)
BASOPHILS # BLD AUTO: 0.02 K/UL — SIGNIFICANT CHANGE UP (ref 0–0.2)
BASOPHILS # BLD AUTO: 0.08 K/UL — SIGNIFICANT CHANGE UP (ref 0–0.2)
BASOPHILS NFR BLD AUTO: 0 % — SIGNIFICANT CHANGE UP (ref 0–2)
BASOPHILS NFR BLD AUTO: 0.1 % — SIGNIFICANT CHANGE UP (ref 0–2)
BASOPHILS NFR BLD AUTO: 0.2 % — SIGNIFICANT CHANGE UP (ref 0–2)
BASOPHILS NFR BLD AUTO: 1 % — SIGNIFICANT CHANGE UP (ref 0–2)
BILIRUB SERPL-MCNC: 0.6 MG/DL — SIGNIFICANT CHANGE UP (ref 0.2–1.2)
BILIRUB SERPL-MCNC: 0.6 MG/DL — SIGNIFICANT CHANGE UP (ref 0.2–1.2)
BILIRUB SERPL-MCNC: 0.7 MG/DL — SIGNIFICANT CHANGE UP (ref 0.2–1.2)
BILIRUB SERPL-MCNC: 0.7 MG/DL — SIGNIFICANT CHANGE UP (ref 0.2–1.2)
BILIRUB SERPL-MCNC: 0.8 MG/DL — SIGNIFICANT CHANGE UP (ref 0.2–1.2)
BILIRUB SERPL-MCNC: 0.9 MG/DL — SIGNIFICANT CHANGE UP (ref 0.2–1.2)
BILIRUB SERPL-MCNC: 1 MG/DL — SIGNIFICANT CHANGE UP (ref 0.2–1.2)
BILIRUB SERPL-MCNC: 1 MG/DL — SIGNIFICANT CHANGE UP (ref 0.2–1.2)
BILIRUB SERPL-MCNC: 1.1 MG/DL — SIGNIFICANT CHANGE UP (ref 0.2–1.2)
BILIRUB SERPL-MCNC: 1.1 MG/DL — SIGNIFICANT CHANGE UP (ref 0.2–1.2)
BILIRUB SERPL-MCNC: 1.2 MG/DL — SIGNIFICANT CHANGE UP (ref 0.2–1.2)
BILIRUB SERPL-MCNC: 1.4 MG/DL — HIGH (ref 0.2–1.2)
BILIRUB UR-MCNC: NEGATIVE — SIGNIFICANT CHANGE UP
BLD GP AB SCN SERPL QL: NEGATIVE — SIGNIFICANT CHANGE UP
BUN SERPL-MCNC: 18 MG/DL — SIGNIFICANT CHANGE UP (ref 7–23)
BUN SERPL-MCNC: 22 MG/DL — SIGNIFICANT CHANGE UP (ref 7–23)
BUN SERPL-MCNC: 24 MG/DL — HIGH (ref 7–23)
BUN SERPL-MCNC: 25 MG/DL — HIGH (ref 7–23)
BUN SERPL-MCNC: 26 MG/DL — HIGH (ref 7–23)
BUN SERPL-MCNC: 27 MG/DL — HIGH (ref 7–23)
BUN SERPL-MCNC: 28 MG/DL — HIGH (ref 7–23)
BUN SERPL-MCNC: 29 MG/DL — HIGH (ref 7–23)
BUN SERPL-MCNC: 30 MG/DL — HIGH (ref 7–23)
BUN SERPL-MCNC: 31 MG/DL — HIGH (ref 7–23)
BUN SERPL-MCNC: 33 MG/DL — HIGH (ref 7–23)
BUN SERPL-MCNC: 33 MG/DL — HIGH (ref 7–23)
BUN SERPL-MCNC: 34 MG/DL — HIGH (ref 7–23)
BUN SERPL-MCNC: 36 MG/DL — HIGH (ref 7–23)
BUN SERPL-MCNC: 39 MG/DL — HIGH (ref 7–23)
BUN SERPL-MCNC: 40 MG/DL — HIGH (ref 7–23)
BURR CELLS BLD QL SMEAR: PRESENT — SIGNIFICANT CHANGE UP
BURR CELLS BLD QL SMEAR: SIGNIFICANT CHANGE UP
CALCIUM SERPL-MCNC: 7.2 MG/DL — LOW (ref 8.4–10.5)
CALCIUM SERPL-MCNC: 7.2 MG/DL — LOW (ref 8.4–10.5)
CALCIUM SERPL-MCNC: 7.3 MG/DL — LOW (ref 8.4–10.5)
CALCIUM SERPL-MCNC: 7.3 MG/DL — LOW (ref 8.4–10.5)
CALCIUM SERPL-MCNC: 7.4 MG/DL — LOW (ref 8.4–10.5)
CALCIUM SERPL-MCNC: 7.4 MG/DL — LOW (ref 8.4–10.5)
CALCIUM SERPL-MCNC: 7.5 MG/DL — LOW (ref 8.4–10.5)
CALCIUM SERPL-MCNC: 7.6 MG/DL — LOW (ref 8.4–10.5)
CALCIUM SERPL-MCNC: 7.7 MG/DL — LOW (ref 8.4–10.5)
CALCIUM SERPL-MCNC: 7.8 MG/DL — LOW (ref 8.4–10.5)
CALCIUM SERPL-MCNC: 7.9 MG/DL — LOW (ref 8.4–10.5)
CALCIUM SERPL-MCNC: 7.9 MG/DL — LOW (ref 8.4–10.5)
CALCIUM SERPL-MCNC: 8 MG/DL — LOW (ref 8.4–10.5)
CALCIUM SERPL-MCNC: 8.2 MG/DL — LOW (ref 8.4–10.5)
CALCIUM SERPL-MCNC: 8.2 MG/DL — LOW (ref 8.4–10.5)
CHLORIDE SERPL-SCNC: 100 MMOL/L — SIGNIFICANT CHANGE UP (ref 96–108)
CHLORIDE SERPL-SCNC: 102 MMOL/L — SIGNIFICANT CHANGE UP (ref 96–108)
CHLORIDE SERPL-SCNC: 103 MMOL/L — SIGNIFICANT CHANGE UP (ref 96–108)
CHLORIDE SERPL-SCNC: 104 MMOL/L — SIGNIFICANT CHANGE UP (ref 96–108)
CHLORIDE SERPL-SCNC: 105 MMOL/L — SIGNIFICANT CHANGE UP (ref 96–108)
CHLORIDE SERPL-SCNC: 106 MMOL/L — SIGNIFICANT CHANGE UP (ref 96–108)
CHLORIDE SERPL-SCNC: 107 MMOL/L — SIGNIFICANT CHANGE UP (ref 96–108)
CO2 SERPL-SCNC: 18 MMOL/L — LOW (ref 22–31)
CO2 SERPL-SCNC: 19 MMOL/L — LOW (ref 22–31)
CO2 SERPL-SCNC: 20 MMOL/L — LOW (ref 22–31)
CO2 SERPL-SCNC: 20 MMOL/L — LOW (ref 22–31)
CO2 SERPL-SCNC: 21 MMOL/L — LOW (ref 22–31)
CO2 SERPL-SCNC: 22 MMOL/L — SIGNIFICANT CHANGE UP (ref 22–31)
CO2 SERPL-SCNC: 23 MMOL/L — SIGNIFICANT CHANGE UP (ref 22–31)
CO2 SERPL-SCNC: 24 MMOL/L — SIGNIFICANT CHANGE UP (ref 22–31)
CO2 SERPL-SCNC: 25 MMOL/L — SIGNIFICANT CHANGE UP (ref 22–31)
CO2 SERPL-SCNC: 26 MMOL/L — SIGNIFICANT CHANGE UP (ref 22–31)
CO2 SERPL-SCNC: 26 MMOL/L — SIGNIFICANT CHANGE UP (ref 22–31)
CO2 SERPL-SCNC: 27 MMOL/L — SIGNIFICANT CHANGE UP (ref 22–31)
CO2 SERPL-SCNC: 28 MMOL/L — SIGNIFICANT CHANGE UP (ref 22–31)
CO2 SERPL-SCNC: 28 MMOL/L — SIGNIFICANT CHANGE UP (ref 22–31)
COLOR SPEC: SIGNIFICANT CHANGE UP
CREAT ?TM UR-MCNC: 86 MG/DL — SIGNIFICANT CHANGE UP
CREAT SERPL-MCNC: 0.4 MG/DL — LOW (ref 0.5–1.3)
CREAT SERPL-MCNC: 0.41 MG/DL — LOW (ref 0.5–1.3)
CREAT SERPL-MCNC: 0.49 MG/DL — LOW (ref 0.5–1.3)
CREAT SERPL-MCNC: 0.5 MG/DL — SIGNIFICANT CHANGE UP (ref 0.5–1.3)
CREAT SERPL-MCNC: 0.53 MG/DL — SIGNIFICANT CHANGE UP (ref 0.5–1.3)
CREAT SERPL-MCNC: 0.54 MG/DL — SIGNIFICANT CHANGE UP (ref 0.5–1.3)
CREAT SERPL-MCNC: 0.55 MG/DL — SIGNIFICANT CHANGE UP (ref 0.5–1.3)
CREAT SERPL-MCNC: 0.57 MG/DL — SIGNIFICANT CHANGE UP (ref 0.5–1.3)
CREAT SERPL-MCNC: 0.59 MG/DL — SIGNIFICANT CHANGE UP (ref 0.5–1.3)
CREAT SERPL-MCNC: 0.59 MG/DL — SIGNIFICANT CHANGE UP (ref 0.5–1.3)
CREAT SERPL-MCNC: 0.6 MG/DL — SIGNIFICANT CHANGE UP (ref 0.5–1.3)
CREAT SERPL-MCNC: 0.61 MG/DL — SIGNIFICANT CHANGE UP (ref 0.5–1.3)
CREAT SERPL-MCNC: 0.62 MG/DL — SIGNIFICANT CHANGE UP (ref 0.5–1.3)
CREAT SERPL-MCNC: 0.65 MG/DL — SIGNIFICANT CHANGE UP (ref 0.5–1.3)
CREAT SERPL-MCNC: 0.65 MG/DL — SIGNIFICANT CHANGE UP (ref 0.5–1.3)
CREAT SERPL-MCNC: 0.66 MG/DL — SIGNIFICANT CHANGE UP (ref 0.5–1.3)
CREAT SERPL-MCNC: 0.73 MG/DL — SIGNIFICANT CHANGE UP (ref 0.5–1.3)
CREAT SERPL-MCNC: 0.73 MG/DL — SIGNIFICANT CHANGE UP (ref 0.5–1.3)
CREAT SERPL-MCNC: 0.75 MG/DL — SIGNIFICANT CHANGE UP (ref 0.5–1.3)
CREAT SERPL-MCNC: 0.82 MG/DL — SIGNIFICANT CHANGE UP (ref 0.5–1.3)
CREAT SERPL-MCNC: 0.87 MG/DL — SIGNIFICANT CHANGE UP (ref 0.5–1.3)
CREAT SERPL-MCNC: 0.9 MG/DL — SIGNIFICANT CHANGE UP (ref 0.5–1.3)
CREAT SERPL-MCNC: 0.93 MG/DL — SIGNIFICANT CHANGE UP (ref 0.5–1.3)
CREAT SERPL-MCNC: 0.95 MG/DL — SIGNIFICANT CHANGE UP (ref 0.5–1.3)
CREAT SERPL-MCNC: 0.98 MG/DL — SIGNIFICANT CHANGE UP (ref 0.5–1.3)
CREAT SERPL-MCNC: 1 MG/DL — SIGNIFICANT CHANGE UP (ref 0.5–1.3)
CREAT SERPL-MCNC: 1.06 MG/DL — SIGNIFICANT CHANGE UP (ref 0.5–1.3)
CREAT SERPL-MCNC: 1.08 MG/DL — SIGNIFICANT CHANGE UP (ref 0.5–1.3)
CULTURE RESULTS: ABNORMAL
CULTURE RESULTS: SIGNIFICANT CHANGE UP
CULTURE RESULTS: SIGNIFICANT CHANGE UP
DACRYOCYTES BLD QL SMEAR: SLIGHT — SIGNIFICANT CHANGE UP
DIFF PNL FLD: NEGATIVE — SIGNIFICANT CHANGE UP
EGFR: 100 ML/MIN/1.73M2 — SIGNIFICANT CHANGE UP
EGFR: 101 ML/MIN/1.73M2 — SIGNIFICANT CHANGE UP
EGFR: 106 ML/MIN/1.73M2 — SIGNIFICANT CHANGE UP
EGFR: 107 ML/MIN/1.73M2 — SIGNIFICANT CHANGE UP
EGFR: 67 ML/MIN/1.73M2 — SIGNIFICANT CHANGE UP
EGFR: 69 ML/MIN/1.73M2 — SIGNIFICANT CHANGE UP
EGFR: 74 ML/MIN/1.73M2 — SIGNIFICANT CHANGE UP
EGFR: 76 ML/MIN/1.73M2 — SIGNIFICANT CHANGE UP
EGFR: 78 ML/MIN/1.73M2 — SIGNIFICANT CHANGE UP
EGFR: 80 ML/MIN/1.73M2 — SIGNIFICANT CHANGE UP
EGFR: 84 ML/MIN/1.73M2 — SIGNIFICANT CHANGE UP
EGFR: 85 ML/MIN/1.73M2 — SIGNIFICANT CHANGE UP
EGFR: 86 ML/MIN/1.73M2 — SIGNIFICANT CHANGE UP
EGFR: 88 ML/MIN/1.73M2 — SIGNIFICANT CHANGE UP
EGFR: 89 ML/MIN/1.73M2 — SIGNIFICANT CHANGE UP
EGFR: 89 ML/MIN/1.73M2 — SIGNIFICANT CHANGE UP
EGFR: 92 ML/MIN/1.73M2 — SIGNIFICANT CHANGE UP
EGFR: 94 ML/MIN/1.73M2 — SIGNIFICANT CHANGE UP
EGFR: 95 ML/MIN/1.73M2 — SIGNIFICANT CHANGE UP
EGFR: 96 ML/MIN/1.73M2 — SIGNIFICANT CHANGE UP
EGFR: 97 ML/MIN/1.73M2 — SIGNIFICANT CHANGE UP
EGFR: 98 ML/MIN/1.73M2 — SIGNIFICANT CHANGE UP
EGFR: 98 ML/MIN/1.73M2 — SIGNIFICANT CHANGE UP
EOSINOPHIL # BLD AUTO: 0 K/UL — SIGNIFICANT CHANGE UP (ref 0–0.5)
EOSINOPHIL # BLD AUTO: 0.01 K/UL — SIGNIFICANT CHANGE UP (ref 0–0.5)
EOSINOPHIL NFR BLD AUTO: 0 % — SIGNIFICANT CHANGE UP (ref 0–6)
EOSINOPHIL NFR BLD AUTO: 0.1 % — SIGNIFICANT CHANGE UP (ref 0–6)
ESTIMATED AVERAGE GLUCOSE: 103 MG/DL — SIGNIFICANT CHANGE UP (ref 68–114)
GAS PNL BLDA: SIGNIFICANT CHANGE UP
GAS PNL BLDV: SIGNIFICANT CHANGE UP
GLUCOSE BLDC GLUCOMTR-MCNC: 104 MG/DL — HIGH (ref 70–99)
GLUCOSE BLDC GLUCOMTR-MCNC: 112 MG/DL — HIGH (ref 70–99)
GLUCOSE BLDC GLUCOMTR-MCNC: 125 MG/DL — HIGH (ref 70–99)
GLUCOSE BLDC GLUCOMTR-MCNC: 134 MG/DL — HIGH (ref 70–99)
GLUCOSE BLDC GLUCOMTR-MCNC: 134 MG/DL — HIGH (ref 70–99)
GLUCOSE BLDC GLUCOMTR-MCNC: 137 MG/DL — HIGH (ref 70–99)
GLUCOSE BLDC GLUCOMTR-MCNC: 138 MG/DL — HIGH (ref 70–99)
GLUCOSE BLDC GLUCOMTR-MCNC: 139 MG/DL — HIGH (ref 70–99)
GLUCOSE BLDC GLUCOMTR-MCNC: 141 MG/DL — HIGH (ref 70–99)
GLUCOSE BLDC GLUCOMTR-MCNC: 142 MG/DL — HIGH (ref 70–99)
GLUCOSE BLDC GLUCOMTR-MCNC: 144 MG/DL — HIGH (ref 70–99)
GLUCOSE BLDC GLUCOMTR-MCNC: 144 MG/DL — HIGH (ref 70–99)
GLUCOSE BLDC GLUCOMTR-MCNC: 145 MG/DL — HIGH (ref 70–99)
GLUCOSE BLDC GLUCOMTR-MCNC: 154 MG/DL — HIGH (ref 70–99)
GLUCOSE BLDC GLUCOMTR-MCNC: 154 MG/DL — HIGH (ref 70–99)
GLUCOSE BLDC GLUCOMTR-MCNC: 155 MG/DL — HIGH (ref 70–99)
GLUCOSE BLDC GLUCOMTR-MCNC: 162 MG/DL — HIGH (ref 70–99)
GLUCOSE BLDC GLUCOMTR-MCNC: 163 MG/DL — HIGH (ref 70–99)
GLUCOSE BLDC GLUCOMTR-MCNC: 169 MG/DL — HIGH (ref 70–99)
GLUCOSE BLDC GLUCOMTR-MCNC: 171 MG/DL — HIGH (ref 70–99)
GLUCOSE BLDC GLUCOMTR-MCNC: 172 MG/DL — HIGH (ref 70–99)
GLUCOSE BLDC GLUCOMTR-MCNC: 178 MG/DL — HIGH (ref 70–99)
GLUCOSE BLDC GLUCOMTR-MCNC: 186 MG/DL — HIGH (ref 70–99)
GLUCOSE BLDC GLUCOMTR-MCNC: 189 MG/DL — HIGH (ref 70–99)
GLUCOSE BLDC GLUCOMTR-MCNC: 193 MG/DL — HIGH (ref 70–99)
GLUCOSE BLDC GLUCOMTR-MCNC: 195 MG/DL — HIGH (ref 70–99)
GLUCOSE BLDC GLUCOMTR-MCNC: 198 MG/DL — HIGH (ref 70–99)
GLUCOSE BLDC GLUCOMTR-MCNC: 200 MG/DL — HIGH (ref 70–99)
GLUCOSE BLDC GLUCOMTR-MCNC: 205 MG/DL — HIGH (ref 70–99)
GLUCOSE BLDC GLUCOMTR-MCNC: 206 MG/DL — HIGH (ref 70–99)
GLUCOSE BLDC GLUCOMTR-MCNC: 218 MG/DL — HIGH (ref 70–99)
GLUCOSE BLDC GLUCOMTR-MCNC: 58 MG/DL — LOW (ref 70–99)
GLUCOSE BLDC GLUCOMTR-MCNC: 61 MG/DL — LOW (ref 70–99)
GLUCOSE BLDC GLUCOMTR-MCNC: 63 MG/DL — LOW (ref 70–99)
GLUCOSE BLDC GLUCOMTR-MCNC: 66 MG/DL — LOW (ref 70–99)
GLUCOSE BLDC GLUCOMTR-MCNC: 75 MG/DL — SIGNIFICANT CHANGE UP (ref 70–99)
GLUCOSE BLDC GLUCOMTR-MCNC: 79 MG/DL — SIGNIFICANT CHANGE UP (ref 70–99)
GLUCOSE BLDC GLUCOMTR-MCNC: 79 MG/DL — SIGNIFICANT CHANGE UP (ref 70–99)
GLUCOSE BLDC GLUCOMTR-MCNC: 85 MG/DL — SIGNIFICANT CHANGE UP (ref 70–99)
GLUCOSE BLDC GLUCOMTR-MCNC: 88 MG/DL — SIGNIFICANT CHANGE UP (ref 70–99)
GLUCOSE BLDC GLUCOMTR-MCNC: 89 MG/DL — SIGNIFICANT CHANGE UP (ref 70–99)
GLUCOSE BLDC GLUCOMTR-MCNC: 90 MG/DL — SIGNIFICANT CHANGE UP (ref 70–99)
GLUCOSE BLDC GLUCOMTR-MCNC: 93 MG/DL — SIGNIFICANT CHANGE UP (ref 70–99)
GLUCOSE BLDC GLUCOMTR-MCNC: 94 MG/DL — SIGNIFICANT CHANGE UP (ref 70–99)
GLUCOSE BLDC GLUCOMTR-MCNC: 98 MG/DL — SIGNIFICANT CHANGE UP (ref 70–99)
GLUCOSE SERPL-MCNC: 100 MG/DL — HIGH (ref 70–99)
GLUCOSE SERPL-MCNC: 103 MG/DL — HIGH (ref 70–99)
GLUCOSE SERPL-MCNC: 106 MG/DL — HIGH (ref 70–99)
GLUCOSE SERPL-MCNC: 107 MG/DL — HIGH (ref 70–99)
GLUCOSE SERPL-MCNC: 108 MG/DL — HIGH (ref 70–99)
GLUCOSE SERPL-MCNC: 108 MG/DL — HIGH (ref 70–99)
GLUCOSE SERPL-MCNC: 111 MG/DL — HIGH (ref 70–99)
GLUCOSE SERPL-MCNC: 116 MG/DL — HIGH (ref 70–99)
GLUCOSE SERPL-MCNC: 118 MG/DL — HIGH (ref 70–99)
GLUCOSE SERPL-MCNC: 126 MG/DL — HIGH (ref 70–99)
GLUCOSE SERPL-MCNC: 127 MG/DL — HIGH (ref 70–99)
GLUCOSE SERPL-MCNC: 132 MG/DL — HIGH (ref 70–99)
GLUCOSE SERPL-MCNC: 132 MG/DL — HIGH (ref 70–99)
GLUCOSE SERPL-MCNC: 133 MG/DL — HIGH (ref 70–99)
GLUCOSE SERPL-MCNC: 133 MG/DL — HIGH (ref 70–99)
GLUCOSE SERPL-MCNC: 134 MG/DL — HIGH (ref 70–99)
GLUCOSE SERPL-MCNC: 138 MG/DL — HIGH (ref 70–99)
GLUCOSE SERPL-MCNC: 147 MG/DL — HIGH (ref 70–99)
GLUCOSE SERPL-MCNC: 168 MG/DL — HIGH (ref 70–99)
GLUCOSE SERPL-MCNC: 181 MG/DL — HIGH (ref 70–99)
GLUCOSE SERPL-MCNC: 57 MG/DL — LOW (ref 70–99)
GLUCOSE SERPL-MCNC: 62 MG/DL — LOW (ref 70–99)
GLUCOSE SERPL-MCNC: 73 MG/DL — SIGNIFICANT CHANGE UP (ref 70–99)
GLUCOSE SERPL-MCNC: 82 MG/DL — SIGNIFICANT CHANGE UP (ref 70–99)
GLUCOSE SERPL-MCNC: 84 MG/DL — SIGNIFICANT CHANGE UP (ref 70–99)
GLUCOSE SERPL-MCNC: 85 MG/DL — SIGNIFICANT CHANGE UP (ref 70–99)
GLUCOSE SERPL-MCNC: 92 MG/DL — SIGNIFICANT CHANGE UP (ref 70–99)
GLUCOSE SERPL-MCNC: 93 MG/DL — SIGNIFICANT CHANGE UP (ref 70–99)
GLUCOSE SERPL-MCNC: 94 MG/DL — SIGNIFICANT CHANGE UP (ref 70–99)
GLUCOSE SERPL-MCNC: 97 MG/DL — SIGNIFICANT CHANGE UP (ref 70–99)
GLUCOSE UR QL: 100 MG/DL
GRAM STN FLD: ABNORMAL
HCT VFR BLD CALC: 19.9 % — CRITICAL LOW (ref 39–50)
HCT VFR BLD CALC: 21.4 % — LOW (ref 39–50)
HCT VFR BLD CALC: 22.7 % — LOW (ref 39–50)
HCT VFR BLD CALC: 23.4 % — LOW (ref 39–50)
HCT VFR BLD CALC: 23.4 % — LOW (ref 39–50)
HCT VFR BLD CALC: 23.5 % — LOW (ref 39–50)
HCT VFR BLD CALC: 23.6 % — LOW (ref 39–50)
HCT VFR BLD CALC: 23.7 % — LOW (ref 39–50)
HCT VFR BLD CALC: 24.1 % — LOW (ref 39–50)
HCT VFR BLD CALC: 24.1 % — LOW (ref 39–50)
HCT VFR BLD CALC: 24.7 % — LOW (ref 39–50)
HCT VFR BLD CALC: 24.8 % — LOW (ref 39–50)
HCT VFR BLD CALC: 24.9 % — LOW (ref 39–50)
HCT VFR BLD CALC: 25.1 % — LOW (ref 39–50)
HCT VFR BLD CALC: 25.3 % — LOW (ref 39–50)
HCT VFR BLD CALC: 25.6 % — LOW (ref 39–50)
HCT VFR BLD CALC: 26.2 % — LOW (ref 39–50)
HCT VFR BLD CALC: 26.6 % — LOW (ref 39–50)
HCT VFR BLD CALC: 26.8 % — LOW (ref 39–50)
HCT VFR BLD CALC: 26.9 % — LOW (ref 39–50)
HCT VFR BLD CALC: 26.9 % — LOW (ref 39–50)
HCT VFR BLD CALC: 27.9 % — LOW (ref 39–50)
HCT VFR BLD CALC: 29 % — LOW (ref 39–50)
HCT VFR BLD CALC: 30 % — LOW (ref 39–50)
HCT VFR BLD CALC: 30.6 % — LOW (ref 39–50)
HCT VFR BLD CALC: 31 % — LOW (ref 39–50)
HCT VFR BLD CALC: 31.3 % — LOW (ref 39–50)
HCT VFR BLD CALC: 32 % — LOW (ref 39–50)
HCT VFR BLD CALC: 32.2 % — LOW (ref 39–50)
HCT VFR BLD CALC: 32.4 % — LOW (ref 39–50)
HCT VFR BLD CALC: 32.7 % — LOW (ref 39–50)
HCT VFR BLD CALC: 33.6 % — LOW (ref 39–50)
HCT VFR BLD CALC: 34 % — LOW (ref 39–50)
HCT VFR BLD CALC: 34.9 % — LOW (ref 39–50)
HGB BLD-MCNC: 10.1 G/DL — LOW (ref 13–17)
HGB BLD-MCNC: 10.2 G/DL — LOW (ref 13–17)
HGB BLD-MCNC: 10.2 G/DL — LOW (ref 13–17)
HGB BLD-MCNC: 11 G/DL — LOW (ref 13–17)
HGB BLD-MCNC: 5.8 G/DL — CRITICAL LOW (ref 13–17)
HGB BLD-MCNC: 6.2 G/DL — CRITICAL LOW (ref 13–17)
HGB BLD-MCNC: 6.8 G/DL — CRITICAL LOW (ref 13–17)
HGB BLD-MCNC: 6.9 G/DL — CRITICAL LOW (ref 13–17)
HGB BLD-MCNC: 7 G/DL — CRITICAL LOW (ref 13–17)
HGB BLD-MCNC: 7.1 G/DL — LOW (ref 13–17)
HGB BLD-MCNC: 7.3 G/DL — LOW (ref 13–17)
HGB BLD-MCNC: 7.3 G/DL — LOW (ref 13–17)
HGB BLD-MCNC: 7.5 G/DL — LOW (ref 13–17)
HGB BLD-MCNC: 7.6 G/DL — LOW (ref 13–17)
HGB BLD-MCNC: 7.6 G/DL — LOW (ref 13–17)
HGB BLD-MCNC: 7.7 G/DL — LOW (ref 13–17)
HGB BLD-MCNC: 8 G/DL — LOW (ref 13–17)
HGB BLD-MCNC: 8.1 G/DL — LOW (ref 13–17)
HGB BLD-MCNC: 8.1 G/DL — LOW (ref 13–17)
HGB BLD-MCNC: 8.2 G/DL — LOW (ref 13–17)
HGB BLD-MCNC: 8.4 G/DL — LOW (ref 13–17)
HGB BLD-MCNC: 8.9 G/DL — LOW (ref 13–17)
HGB BLD-MCNC: 9.3 G/DL — LOW (ref 13–17)
HGB BLD-MCNC: 9.4 G/DL — LOW (ref 13–17)
HGB BLD-MCNC: 9.5 G/DL — LOW (ref 13–17)
HGB BLD-MCNC: 9.7 G/DL — LOW (ref 13–17)
HGB BLD-MCNC: 9.8 G/DL — LOW (ref 13–17)
HGB BLD-MCNC: 9.9 G/DL — LOW (ref 13–17)
HGB BLD-MCNC: 9.9 G/DL — LOW (ref 13–17)
IMM GRANULOCYTES NFR BLD AUTO: 0.5 % — SIGNIFICANT CHANGE UP (ref 0–0.9)
IMM GRANULOCYTES NFR BLD AUTO: 0.6 % — SIGNIFICANT CHANGE UP (ref 0–0.9)
IMM GRANULOCYTES NFR BLD AUTO: 0.7 % — SIGNIFICANT CHANGE UP (ref 0–0.9)
INR BLD: 1.51 RATIO — HIGH (ref 0.85–1.16)
INR BLD: 1.6 RATIO — HIGH (ref 0.85–1.16)
INR BLD: 1.61 RATIO — HIGH (ref 0.85–1.16)
INR BLD: 1.61 RATIO — HIGH (ref 0.85–1.16)
INR BLD: 1.63 RATIO — HIGH (ref 0.85–1.16)
INR BLD: 1.64 RATIO — HIGH (ref 0.85–1.16)
INR BLD: 1.66 RATIO — HIGH (ref 0.85–1.16)
INR BLD: 1.8 RATIO — HIGH (ref 0.85–1.16)
INR BLD: 1.85 RATIO — HIGH (ref 0.85–1.16)
INR BLD: 1.89 RATIO — HIGH (ref 0.85–1.16)
INR BLD: 1.94 RATIO — HIGH (ref 0.85–1.16)
INR BLD: 2.01 RATIO — HIGH (ref 0.85–1.16)
INR BLD: 2.2 RATIO — HIGH (ref 0.85–1.16)
INR BLD: 2.24 RATIO — HIGH (ref 0.85–1.16)
INR BLD: 2.28 RATIO — HIGH (ref 0.85–1.16)
INR BLD: 2.53 RATIO — HIGH (ref 0.85–1.16)
KETONES UR-MCNC: NEGATIVE MG/DL — SIGNIFICANT CHANGE UP
LEUKOCYTE ESTERASE UR-ACNC: NEGATIVE — SIGNIFICANT CHANGE UP
LYMPHOCYTES # BLD AUTO: 0.16 K/UL — LOW (ref 1–3.3)
LYMPHOCYTES # BLD AUTO: 0.16 K/UL — LOW (ref 1–3.3)
LYMPHOCYTES # BLD AUTO: 0.22 K/UL — LOW (ref 1–3.3)
LYMPHOCYTES # BLD AUTO: 0.24 K/UL — LOW (ref 1–3.3)
LYMPHOCYTES # BLD AUTO: 0.28 K/UL — LOW (ref 1–3.3)
LYMPHOCYTES # BLD AUTO: 0.53 K/UL — LOW (ref 1–3.3)
LYMPHOCYTES # BLD AUTO: 0.69 K/UL — LOW (ref 1–3.3)
LYMPHOCYTES # BLD AUTO: 2.6 % — LOW (ref 13–44)
LYMPHOCYTES # BLD AUTO: 2.6 % — LOW (ref 13–44)
LYMPHOCYTES # BLD AUTO: 3.5 % — LOW (ref 13–44)
LYMPHOCYTES # BLD AUTO: 3.5 % — LOW (ref 13–44)
LYMPHOCYTES # BLD AUTO: 4.5 % — LOW (ref 13–44)
LYMPHOCYTES # BLD AUTO: 6.1 % — LOW (ref 13–44)
LYMPHOCYTES # BLD AUTO: 9.6 % — LOW (ref 13–44)
MACROCYTES BLD QL: SLIGHT — SIGNIFICANT CHANGE UP
MAGNESIUM SERPL-MCNC: 1.7 MG/DL — SIGNIFICANT CHANGE UP (ref 1.6–2.6)
MAGNESIUM SERPL-MCNC: 1.7 MG/DL — SIGNIFICANT CHANGE UP (ref 1.6–2.6)
MAGNESIUM SERPL-MCNC: 1.8 MG/DL — SIGNIFICANT CHANGE UP (ref 1.6–2.6)
MAGNESIUM SERPL-MCNC: 1.8 MG/DL — SIGNIFICANT CHANGE UP (ref 1.6–2.6)
MAGNESIUM SERPL-MCNC: 2 MG/DL — SIGNIFICANT CHANGE UP (ref 1.6–2.6)
MAGNESIUM SERPL-MCNC: 2.1 MG/DL — SIGNIFICANT CHANGE UP (ref 1.6–2.6)
MAGNESIUM SERPL-MCNC: 2.2 MG/DL — SIGNIFICANT CHANGE UP (ref 1.6–2.6)
MAGNESIUM SERPL-MCNC: 2.3 MG/DL — SIGNIFICANT CHANGE UP (ref 1.6–2.6)
MAGNESIUM SERPL-MCNC: 2.3 MG/DL — SIGNIFICANT CHANGE UP (ref 1.6–2.6)
MAGNESIUM SERPL-MCNC: 3.4 MG/DL — HIGH (ref 1.6–2.6)
MANUAL SMEAR VERIFICATION: SIGNIFICANT CHANGE UP
MCHC RBC-ENTMCNC: 26.5 PG — LOW (ref 27–34)
MCHC RBC-ENTMCNC: 26.5 PG — LOW (ref 27–34)
MCHC RBC-ENTMCNC: 26.6 PG — LOW (ref 27–34)
MCHC RBC-ENTMCNC: 26.7 PG — LOW (ref 27–34)
MCHC RBC-ENTMCNC: 26.9 PG — LOW (ref 27–34)
MCHC RBC-ENTMCNC: 27.2 PG — SIGNIFICANT CHANGE UP (ref 27–34)
MCHC RBC-ENTMCNC: 27.3 PG — SIGNIFICANT CHANGE UP (ref 27–34)
MCHC RBC-ENTMCNC: 27.3 PG — SIGNIFICANT CHANGE UP (ref 27–34)
MCHC RBC-ENTMCNC: 27.4 PG — SIGNIFICANT CHANGE UP (ref 27–34)
MCHC RBC-ENTMCNC: 27.5 PG — SIGNIFICANT CHANGE UP (ref 27–34)
MCHC RBC-ENTMCNC: 27.5 PG — SIGNIFICANT CHANGE UP (ref 27–34)
MCHC RBC-ENTMCNC: 27.6 PG — SIGNIFICANT CHANGE UP (ref 27–34)
MCHC RBC-ENTMCNC: 27.7 PG — SIGNIFICANT CHANGE UP (ref 27–34)
MCHC RBC-ENTMCNC: 27.8 PG — SIGNIFICANT CHANGE UP (ref 27–34)
MCHC RBC-ENTMCNC: 27.8 PG — SIGNIFICANT CHANGE UP (ref 27–34)
MCHC RBC-ENTMCNC: 28 PG — SIGNIFICANT CHANGE UP (ref 27–34)
MCHC RBC-ENTMCNC: 28.1 PG — SIGNIFICANT CHANGE UP (ref 27–34)
MCHC RBC-ENTMCNC: 28.2 PG — SIGNIFICANT CHANGE UP (ref 27–34)
MCHC RBC-ENTMCNC: 28.2 PG — SIGNIFICANT CHANGE UP (ref 27–34)
MCHC RBC-ENTMCNC: 28.3 PG — SIGNIFICANT CHANGE UP (ref 27–34)
MCHC RBC-ENTMCNC: 28.7 G/DL — LOW (ref 32–36)
MCHC RBC-ENTMCNC: 28.9 GM/DL — LOW (ref 32–36)
MCHC RBC-ENTMCNC: 29 G/DL — LOW (ref 32–36)
MCHC RBC-ENTMCNC: 29 GM/DL — LOW (ref 32–36)
MCHC RBC-ENTMCNC: 29.1 GM/DL — LOW (ref 32–36)
MCHC RBC-ENTMCNC: 29.4 GM/DL — LOW (ref 32–36)
MCHC RBC-ENTMCNC: 29.9 G/DL — LOW (ref 32–36)
MCHC RBC-ENTMCNC: 30 GM/DL — LOW (ref 32–36)
MCHC RBC-ENTMCNC: 30 GM/DL — LOW (ref 32–36)
MCHC RBC-ENTMCNC: 30.1 G/DL — LOW (ref 32–36)
MCHC RBC-ENTMCNC: 30.1 GM/DL — LOW (ref 32–36)
MCHC RBC-ENTMCNC: 30.3 G/DL — LOW (ref 32–36)
MCHC RBC-ENTMCNC: 30.3 GM/DL — LOW (ref 32–36)
MCHC RBC-ENTMCNC: 30.4 GM/DL — LOW (ref 32–36)
MCHC RBC-ENTMCNC: 30.4 GM/DL — LOW (ref 32–36)
MCHC RBC-ENTMCNC: 30.5 G/DL — LOW (ref 32–36)
MCHC RBC-ENTMCNC: 30.5 GM/DL — LOW (ref 32–36)
MCHC RBC-ENTMCNC: 30.6 GM/DL — LOW (ref 32–36)
MCHC RBC-ENTMCNC: 30.7 GM/DL — LOW (ref 32–36)
MCHC RBC-ENTMCNC: 30.7 GM/DL — LOW (ref 32–36)
MCHC RBC-ENTMCNC: 30.8 G/DL — LOW (ref 32–36)
MCHC RBC-ENTMCNC: 31 GM/DL — LOW (ref 32–36)
MCHC RBC-ENTMCNC: 31 GM/DL — LOW (ref 32–36)
MCHC RBC-ENTMCNC: 31.4 GM/DL — LOW (ref 32–36)
MCHC RBC-ENTMCNC: 31.5 GM/DL — LOW (ref 32–36)
MCV RBC AUTO: 88.6 FL — SIGNIFICANT CHANGE UP (ref 80–100)
MCV RBC AUTO: 88.8 FL — SIGNIFICANT CHANGE UP (ref 80–100)
MCV RBC AUTO: 88.9 FL — SIGNIFICANT CHANGE UP (ref 80–100)
MCV RBC AUTO: 89.1 FL — SIGNIFICANT CHANGE UP (ref 80–100)
MCV RBC AUTO: 89.3 FL — SIGNIFICANT CHANGE UP (ref 80–100)
MCV RBC AUTO: 89.7 FL — SIGNIFICANT CHANGE UP (ref 80–100)
MCV RBC AUTO: 89.8 FL — SIGNIFICANT CHANGE UP (ref 80–100)
MCV RBC AUTO: 89.9 FL — SIGNIFICANT CHANGE UP (ref 80–100)
MCV RBC AUTO: 90.4 FL — SIGNIFICANT CHANGE UP (ref 80–100)
MCV RBC AUTO: 90.5 FL — SIGNIFICANT CHANGE UP (ref 80–100)
MCV RBC AUTO: 90.6 FL — SIGNIFICANT CHANGE UP (ref 80–100)
MCV RBC AUTO: 90.8 FL — SIGNIFICANT CHANGE UP (ref 80–100)
MCV RBC AUTO: 90.8 FL — SIGNIFICANT CHANGE UP (ref 80–100)
MCV RBC AUTO: 90.9 FL — SIGNIFICANT CHANGE UP (ref 80–100)
MCV RBC AUTO: 91.1 FL — SIGNIFICANT CHANGE UP (ref 80–100)
MCV RBC AUTO: 91.2 FL — SIGNIFICANT CHANGE UP (ref 80–100)
MCV RBC AUTO: 91.4 FL — SIGNIFICANT CHANGE UP (ref 80–100)
MCV RBC AUTO: 91.6 FL — SIGNIFICANT CHANGE UP (ref 80–100)
MCV RBC AUTO: 91.7 FL — SIGNIFICANT CHANGE UP (ref 80–100)
MCV RBC AUTO: 91.8 FL — SIGNIFICANT CHANGE UP (ref 80–100)
MCV RBC AUTO: 91.8 FL — SIGNIFICANT CHANGE UP (ref 80–100)
MCV RBC AUTO: 91.9 FL — SIGNIFICANT CHANGE UP (ref 80–100)
MCV RBC AUTO: 92.1 FL — SIGNIFICANT CHANGE UP (ref 80–100)
MCV RBC AUTO: 92.9 FL — SIGNIFICANT CHANGE UP (ref 80–100)
MCV RBC AUTO: 95.3 FL — SIGNIFICANT CHANGE UP (ref 80–100)
MCV RBC AUTO: 97.6 FL — SIGNIFICANT CHANGE UP (ref 80–100)
METAMYELOCYTES # FLD: 0.9 % — HIGH (ref 0–0)
METAMYELOCYTES # FLD: 3.6 % — HIGH (ref 0–0)
METHOD TYPE: SIGNIFICANT CHANGE UP
MICROCYTES BLD QL: SLIGHT — SIGNIFICANT CHANGE UP
MONOCYTES # BLD AUTO: 0.03 K/UL — SIGNIFICANT CHANGE UP (ref 0–0.9)
MONOCYTES # BLD AUTO: 0.04 K/UL — SIGNIFICANT CHANGE UP (ref 0–0.9)
MONOCYTES # BLD AUTO: 0.08 K/UL — SIGNIFICANT CHANGE UP (ref 0–0.9)
MONOCYTES # BLD AUTO: 0.25 K/UL — SIGNIFICANT CHANGE UP (ref 0–0.9)
MONOCYTES # BLD AUTO: 0.28 K/UL — SIGNIFICANT CHANGE UP (ref 0–0.9)
MONOCYTES # BLD AUTO: 0.36 K/UL — SIGNIFICANT CHANGE UP (ref 0–0.9)
MONOCYTES # BLD AUTO: 0.4 K/UL — SIGNIFICANT CHANGE UP (ref 0–0.9)
MONOCYTES NFR BLD AUTO: 0.9 % — LOW (ref 2–14)
MONOCYTES NFR BLD AUTO: 2.9 % — SIGNIFICANT CHANGE UP (ref 2–14)
MONOCYTES NFR BLD AUTO: 3.5 % — SIGNIFICANT CHANGE UP (ref 2–14)
MONOCYTES NFR BLD AUTO: 4.4 % — SIGNIFICANT CHANGE UP (ref 2–14)
MONOCYTES NFR BLD AUTO: 5 % — SIGNIFICANT CHANGE UP (ref 2–14)
MYELOCYTES NFR BLD: 0.9 % — HIGH (ref 0–0)
NEUTROPHILS # BLD AUTO: 3.21 K/UL — SIGNIFICANT CHANGE UP (ref 1.8–7.4)
NEUTROPHILS # BLD AUTO: 4.39 K/UL — SIGNIFICANT CHANGE UP (ref 1.8–7.4)
NEUTROPHILS # BLD AUTO: 6.05 K/UL — SIGNIFICANT CHANGE UP (ref 1.8–7.4)
NEUTROPHILS # BLD AUTO: 7.3 K/UL — SIGNIFICANT CHANGE UP (ref 1.8–7.4)
NEUTROPHILS # BLD AUTO: 7.98 K/UL — HIGH (ref 1.8–7.4)
NEUTROPHILS # BLD AUTO: 8 K/UL — HIGH (ref 1.8–7.4)
NEUTROPHILS # BLD AUTO: 8.45 K/UL — HIGH (ref 1.8–7.4)
NEUTROPHILS NFR BLD AUTO: 84.5 % — HIGH (ref 43–77)
NEUTROPHILS NFR BLD AUTO: 85.6 % — HIGH (ref 43–77)
NEUTROPHILS NFR BLD AUTO: 86 % — HIGH (ref 43–77)
NEUTROPHILS NFR BLD AUTO: 91.4 % — HIGH (ref 43–77)
NEUTROPHILS NFR BLD AUTO: 93 % — HIGH (ref 43–77)
NEUTROPHILS NFR BLD AUTO: 93 % — HIGH (ref 43–77)
NEUTROPHILS NFR BLD AUTO: 93.8 % — HIGH (ref 43–77)
NEUTS BAND # BLD: 2.6 % — SIGNIFICANT CHANGE UP (ref 0–8)
NEUTS BAND # BLD: 5.2 % — SIGNIFICANT CHANGE UP (ref 0–8)
NEUTS BAND # BLD: 5.4 % — SIGNIFICANT CHANGE UP (ref 0–8)
NITRITE UR-MCNC: NEGATIVE — SIGNIFICANT CHANGE UP
NRBC # BLD: 0 /100 WBCS — SIGNIFICANT CHANGE UP (ref 0–0)
NT-PROBNP SERPL-SCNC: 7000 PG/ML — HIGH (ref 0–300)
ORGANISM # SPEC MICROSCOPIC CNT: ABNORMAL
ORGANISM # SPEC MICROSCOPIC CNT: ABNORMAL
OSMOLALITY UR: 285 MOS/KG — LOW (ref 300–900)
OVALOCYTES BLD QL SMEAR: SLIGHT — SIGNIFICANT CHANGE UP
PH UR: 5.5 — SIGNIFICANT CHANGE UP (ref 5–8)
PHOSPHATE SERPL-MCNC: 1.9 MG/DL — LOW (ref 2.5–4.5)
PHOSPHATE SERPL-MCNC: 2.1 MG/DL — LOW (ref 2.5–4.5)
PHOSPHATE SERPL-MCNC: 2.2 MG/DL — LOW (ref 2.5–4.5)
PHOSPHATE SERPL-MCNC: 2.3 MG/DL — LOW (ref 2.5–4.5)
PHOSPHATE SERPL-MCNC: 2.4 MG/DL — LOW (ref 2.5–4.5)
PHOSPHATE SERPL-MCNC: 2.5 MG/DL — SIGNIFICANT CHANGE UP (ref 2.5–4.5)
PHOSPHATE SERPL-MCNC: 2.6 MG/DL — SIGNIFICANT CHANGE UP (ref 2.5–4.5)
PHOSPHATE SERPL-MCNC: 2.7 MG/DL — SIGNIFICANT CHANGE UP (ref 2.5–4.5)
PHOSPHATE SERPL-MCNC: 2.8 MG/DL — SIGNIFICANT CHANGE UP (ref 2.5–4.5)
PHOSPHATE SERPL-MCNC: 2.8 MG/DL — SIGNIFICANT CHANGE UP (ref 2.5–4.5)
PHOSPHATE SERPL-MCNC: 3.2 MG/DL — SIGNIFICANT CHANGE UP (ref 2.5–4.5)
PHOSPHATE SERPL-MCNC: 3.5 MG/DL — SIGNIFICANT CHANGE UP (ref 2.5–4.5)
PHOSPHATE SERPL-MCNC: 3.7 MG/DL — SIGNIFICANT CHANGE UP (ref 2.5–4.5)
PHOSPHATE SERPL-MCNC: 3.9 MG/DL — SIGNIFICANT CHANGE UP (ref 2.5–4.5)
PHOSPHATE SERPL-MCNC: 3.9 MG/DL — SIGNIFICANT CHANGE UP (ref 2.5–4.5)
PHOSPHATE SERPL-MCNC: 4.2 MG/DL — SIGNIFICANT CHANGE UP (ref 2.5–4.5)
PHOSPHATE SERPL-MCNC: 4.3 MG/DL — SIGNIFICANT CHANGE UP (ref 2.5–4.5)
PHOSPHATE SERPL-MCNC: 4.5 MG/DL — SIGNIFICANT CHANGE UP (ref 2.5–4.5)
PHOSPHATE SERPL-MCNC: 4.7 MG/DL — HIGH (ref 2.5–4.5)
PHOSPHATE SERPL-MCNC: 4.7 MG/DL — HIGH (ref 2.5–4.5)
PHOSPHATE SERPL-MCNC: 4.9 MG/DL — HIGH (ref 2.5–4.5)
PHOSPHATE SERPL-MCNC: 4.9 MG/DL — HIGH (ref 2.5–4.5)
PLAT MORPH BLD: NORMAL — SIGNIFICANT CHANGE UP
PLATELET # BLD AUTO: 101 K/UL — LOW (ref 150–400)
PLATELET # BLD AUTO: 104 K/UL — LOW (ref 150–400)
PLATELET # BLD AUTO: 104 K/UL — LOW (ref 150–400)
PLATELET # BLD AUTO: 105 K/UL — LOW (ref 150–400)
PLATELET # BLD AUTO: 105 K/UL — LOW (ref 150–400)
PLATELET # BLD AUTO: 107 K/UL — LOW (ref 150–400)
PLATELET # BLD AUTO: 109 K/UL — LOW (ref 150–400)
PLATELET # BLD AUTO: 113 K/UL — LOW (ref 150–400)
PLATELET # BLD AUTO: 114 K/UL — LOW (ref 150–400)
PLATELET # BLD AUTO: 116 K/UL — LOW (ref 150–400)
PLATELET # BLD AUTO: 118 K/UL — LOW (ref 150–400)
PLATELET # BLD AUTO: 118 K/UL — LOW (ref 150–400)
PLATELET # BLD AUTO: 120 K/UL — LOW (ref 150–400)
PLATELET # BLD AUTO: 121 K/UL — LOW (ref 150–400)
PLATELET # BLD AUTO: 131 K/UL — LOW (ref 150–400)
PLATELET # BLD AUTO: 132 K/UL — LOW (ref 150–400)
PLATELET # BLD AUTO: 138 K/UL — LOW (ref 150–400)
PLATELET # BLD AUTO: 150 K/UL — SIGNIFICANT CHANGE UP (ref 150–400)
PLATELET # BLD AUTO: 168 K/UL — SIGNIFICANT CHANGE UP (ref 150–400)
PLATELET # BLD AUTO: 173 K/UL — SIGNIFICANT CHANGE UP (ref 150–400)
PLATELET # BLD AUTO: 193 K/UL — SIGNIFICANT CHANGE UP (ref 150–400)
PLATELET # BLD AUTO: 194 K/UL — SIGNIFICANT CHANGE UP (ref 150–400)
PLATELET # BLD AUTO: 196 K/UL — SIGNIFICANT CHANGE UP (ref 150–400)
PLATELET # BLD AUTO: 205 K/UL — SIGNIFICANT CHANGE UP (ref 150–400)
PLATELET # BLD AUTO: 208 K/UL — SIGNIFICANT CHANGE UP (ref 150–400)
PLATELET # BLD AUTO: 215 K/UL — SIGNIFICANT CHANGE UP (ref 150–400)
PLATELET # BLD AUTO: 217 K/UL — SIGNIFICANT CHANGE UP (ref 150–400)
PLATELET # BLD AUTO: 218 K/UL — SIGNIFICANT CHANGE UP (ref 150–400)
PLATELET # BLD AUTO: 223 K/UL — SIGNIFICANT CHANGE UP (ref 150–400)
PLATELET # BLD AUTO: 268 K/UL — SIGNIFICANT CHANGE UP (ref 150–400)
PLATELET # BLD AUTO: 88 K/UL — LOW (ref 150–400)
PLATELET # BLD AUTO: 97 K/UL — LOW (ref 150–400)
POIKILOCYTOSIS BLD QL AUTO: SLIGHT — SIGNIFICANT CHANGE UP
POLYCHROMASIA BLD QL SMEAR: SIGNIFICANT CHANGE UP
POLYCHROMASIA BLD QL SMEAR: SLIGHT — SIGNIFICANT CHANGE UP
POLYCHROMASIA BLD QL SMEAR: SLIGHT — SIGNIFICANT CHANGE UP
POTASSIUM SERPL-MCNC: 2.8 MMOL/L — CRITICAL LOW (ref 3.5–5.3)
POTASSIUM SERPL-MCNC: 2.9 MMOL/L — CRITICAL LOW (ref 3.5–5.3)
POTASSIUM SERPL-MCNC: 3 MMOL/L — LOW (ref 3.5–5.3)
POTASSIUM SERPL-MCNC: 3.3 MMOL/L — LOW (ref 3.5–5.3)
POTASSIUM SERPL-MCNC: 3.4 MMOL/L — LOW (ref 3.5–5.3)
POTASSIUM SERPL-MCNC: 3.5 MMOL/L — SIGNIFICANT CHANGE UP (ref 3.5–5.3)
POTASSIUM SERPL-MCNC: 3.6 MMOL/L — SIGNIFICANT CHANGE UP (ref 3.5–5.3)
POTASSIUM SERPL-MCNC: 3.7 MMOL/L — SIGNIFICANT CHANGE UP (ref 3.5–5.3)
POTASSIUM SERPL-MCNC: 3.9 MMOL/L — SIGNIFICANT CHANGE UP (ref 3.5–5.3)
POTASSIUM SERPL-MCNC: 4 MMOL/L — SIGNIFICANT CHANGE UP (ref 3.5–5.3)
POTASSIUM SERPL-MCNC: 4.1 MMOL/L — SIGNIFICANT CHANGE UP (ref 3.5–5.3)
POTASSIUM SERPL-MCNC: 4.2 MMOL/L — SIGNIFICANT CHANGE UP (ref 3.5–5.3)
POTASSIUM SERPL-MCNC: 4.3 MMOL/L — SIGNIFICANT CHANGE UP (ref 3.5–5.3)
POTASSIUM SERPL-MCNC: 4.3 MMOL/L — SIGNIFICANT CHANGE UP (ref 3.5–5.3)
POTASSIUM SERPL-MCNC: 4.4 MMOL/L — SIGNIFICANT CHANGE UP (ref 3.5–5.3)
POTASSIUM SERPL-MCNC: 4.5 MMOL/L — SIGNIFICANT CHANGE UP (ref 3.5–5.3)
POTASSIUM SERPL-MCNC: 4.8 MMOL/L — SIGNIFICANT CHANGE UP (ref 3.5–5.3)
POTASSIUM SERPL-SCNC: 2.8 MMOL/L — CRITICAL LOW (ref 3.5–5.3)
POTASSIUM SERPL-SCNC: 2.9 MMOL/L — CRITICAL LOW (ref 3.5–5.3)
POTASSIUM SERPL-SCNC: 3 MMOL/L — LOW (ref 3.5–5.3)
POTASSIUM SERPL-SCNC: 3.3 MMOL/L — LOW (ref 3.5–5.3)
POTASSIUM SERPL-SCNC: 3.4 MMOL/L — LOW (ref 3.5–5.3)
POTASSIUM SERPL-SCNC: 3.5 MMOL/L — SIGNIFICANT CHANGE UP (ref 3.5–5.3)
POTASSIUM SERPL-SCNC: 3.6 MMOL/L — SIGNIFICANT CHANGE UP (ref 3.5–5.3)
POTASSIUM SERPL-SCNC: 3.7 MMOL/L — SIGNIFICANT CHANGE UP (ref 3.5–5.3)
POTASSIUM SERPL-SCNC: 3.9 MMOL/L — SIGNIFICANT CHANGE UP (ref 3.5–5.3)
POTASSIUM SERPL-SCNC: 4 MMOL/L — SIGNIFICANT CHANGE UP (ref 3.5–5.3)
POTASSIUM SERPL-SCNC: 4.1 MMOL/L — SIGNIFICANT CHANGE UP (ref 3.5–5.3)
POTASSIUM SERPL-SCNC: 4.2 MMOL/L — SIGNIFICANT CHANGE UP (ref 3.5–5.3)
POTASSIUM SERPL-SCNC: 4.3 MMOL/L — SIGNIFICANT CHANGE UP (ref 3.5–5.3)
POTASSIUM SERPL-SCNC: 4.3 MMOL/L — SIGNIFICANT CHANGE UP (ref 3.5–5.3)
POTASSIUM SERPL-SCNC: 4.4 MMOL/L — SIGNIFICANT CHANGE UP (ref 3.5–5.3)
POTASSIUM SERPL-SCNC: 4.5 MMOL/L — SIGNIFICANT CHANGE UP (ref 3.5–5.3)
POTASSIUM SERPL-SCNC: 4.8 MMOL/L — SIGNIFICANT CHANGE UP (ref 3.5–5.3)
POTASSIUM UR-SCNC: 56 MMOL/L — SIGNIFICANT CHANGE UP
PROT ?TM UR-MCNC: 184 MG/DL — HIGH (ref 0–12)
PROT SERPL-MCNC: 4.1 G/DL — LOW (ref 6–8.3)
PROT SERPL-MCNC: 4.3 G/DL — LOW (ref 6–8.3)
PROT SERPL-MCNC: 4.5 G/DL — LOW (ref 6–8.3)
PROT SERPL-MCNC: 4.6 G/DL — LOW (ref 6–8.3)
PROT SERPL-MCNC: 4.6 G/DL — LOW (ref 6–8.3)
PROT SERPL-MCNC: 4.7 G/DL — LOW (ref 6–8.3)
PROT SERPL-MCNC: 4.7 G/DL — LOW (ref 6–8.3)
PROT SERPL-MCNC: 4.8 G/DL — LOW (ref 6–8.3)
PROT SERPL-MCNC: 4.9 G/DL — LOW (ref 6–8.3)
PROT SERPL-MCNC: 5 G/DL — LOW (ref 6–8.3)
PROT SERPL-MCNC: 5 G/DL — LOW (ref 6–8.3)
PROT SERPL-MCNC: 5.1 G/DL — LOW (ref 6–8.3)
PROT SERPL-MCNC: 5.2 G/DL — LOW (ref 6–8.3)
PROT SERPL-MCNC: 5.2 G/DL — LOW (ref 6–8.3)
PROT SERPL-MCNC: 5.3 G/DL — LOW (ref 6–8.3)
PROT SERPL-MCNC: 5.5 G/DL — LOW (ref 6–8.3)
PROT SERPL-MCNC: 5.6 G/DL — LOW (ref 6–8.3)
PROT SERPL-MCNC: 5.7 G/DL — LOW (ref 6–8.3)
PROT UR-MCNC: 100 MG/DL
PROT/CREAT UR-RTO: 2.1 RATIO — HIGH (ref 0–0.2)
PROTHROM AB SERPL-ACNC: 17.2 SEC — HIGH (ref 9.9–13.4)
PROTHROM AB SERPL-ACNC: 18.2 SEC — HIGH (ref 9.9–13.4)
PROTHROM AB SERPL-ACNC: 18.4 SEC — HIGH (ref 9.9–13.4)
PROTHROM AB SERPL-ACNC: 18.4 SEC — HIGH (ref 9.9–13.4)
PROTHROM AB SERPL-ACNC: 18.5 SEC — HIGH (ref 9.9–13.4)
PROTHROM AB SERPL-ACNC: 18.8 SEC — HIGH (ref 9.9–13.4)
PROTHROM AB SERPL-ACNC: 19 SEC — HIGH (ref 9.9–13.4)
PROTHROM AB SERPL-ACNC: 20.4 SEC — HIGH (ref 9.9–13.4)
PROTHROM AB SERPL-ACNC: 21.1 SEC — HIGH (ref 9.9–13.4)
PROTHROM AB SERPL-ACNC: 21.6 SEC — HIGH (ref 9.9–13.4)
PROTHROM AB SERPL-ACNC: 22 SEC — HIGH (ref 9.9–13.4)
PROTHROM AB SERPL-ACNC: 22.9 SEC — HIGH (ref 9.9–13.4)
PROTHROM AB SERPL-ACNC: 25.1 SEC — HIGH (ref 9.9–13.4)
PROTHROM AB SERPL-ACNC: 25.3 SEC — HIGH (ref 9.9–13.4)
PROTHROM AB SERPL-ACNC: 25.8 SEC — HIGH (ref 9.9–13.4)
PROTHROM AB SERPL-ACNC: 28.6 SEC — HIGH (ref 9.9–13.4)
RAPIDTEG MAXIMUM AMPLITUDE: 64.7 MM — SIGNIFICANT CHANGE UP (ref 52–70)
RBC # BLD: 2.17 M/UL — LOW (ref 4.2–5.8)
RBC # BLD: 2.33 M/UL — LOW (ref 4.2–5.8)
RBC # BLD: 2.49 M/UL — LOW (ref 4.2–5.8)
RBC # BLD: 2.52 M/UL — LOW (ref 4.2–5.8)
RBC # BLD: 2.53 M/UL — LOW (ref 4.2–5.8)
RBC # BLD: 2.53 M/UL — LOW (ref 4.2–5.8)
RBC # BLD: 2.58 M/UL — LOW (ref 4.2–5.8)
RBC # BLD: 2.6 M/UL — LOW (ref 4.2–5.8)
RBC # BLD: 2.61 M/UL — LOW (ref 4.2–5.8)
RBC # BLD: 2.64 M/UL — LOW (ref 4.2–5.8)
RBC # BLD: 2.65 M/UL — LOW (ref 4.2–5.8)
RBC # BLD: 2.71 M/UL — LOW (ref 4.2–5.8)
RBC # BLD: 2.74 M/UL — LOW (ref 4.2–5.8)
RBC # BLD: 2.79 M/UL — LOW (ref 4.2–5.8)
RBC # BLD: 2.79 M/UL — LOW (ref 4.2–5.8)
RBC # BLD: 2.8 M/UL — LOW (ref 4.2–5.8)
RBC # BLD: 2.86 M/UL — LOW (ref 4.2–5.8)
RBC # BLD: 2.91 M/UL — LOW (ref 4.2–5.8)
RBC # BLD: 2.95 M/UL — LOW (ref 4.2–5.8)
RBC # BLD: 2.96 M/UL — LOW (ref 4.2–5.8)
RBC # BLD: 2.96 M/UL — LOW (ref 4.2–5.8)
RBC # BLD: 3.03 M/UL — LOW (ref 4.2–5.8)
RBC # BLD: 3.23 M/UL — LOW (ref 4.2–5.8)
RBC # BLD: 3.31 M/UL — LOW (ref 4.2–5.8)
RBC # BLD: 3.36 M/UL — LOW (ref 4.2–5.8)
RBC # BLD: 3.49 M/UL — LOW (ref 4.2–5.8)
RBC # BLD: 3.49 M/UL — LOW (ref 4.2–5.8)
RBC # BLD: 3.57 M/UL — LOW (ref 4.2–5.8)
RBC # BLD: 3.58 M/UL — LOW (ref 4.2–5.8)
RBC # BLD: 3.58 M/UL — LOW (ref 4.2–5.8)
RBC # BLD: 3.59 M/UL — LOW (ref 4.2–5.8)
RBC # BLD: 3.7 M/UL — LOW (ref 4.2–5.8)
RBC # BLD: 3.7 M/UL — LOW (ref 4.2–5.8)
RBC # BLD: 3.91 M/UL — LOW (ref 4.2–5.8)
RBC # FLD: 14.6 % — HIGH (ref 10.3–14.5)
RBC # FLD: 15.1 % — HIGH (ref 10.3–14.5)
RBC # FLD: 15.1 % — HIGH (ref 10.3–14.5)
RBC # FLD: 15.2 % — HIGH (ref 10.3–14.5)
RBC # FLD: 15.3 % — HIGH (ref 10.3–14.5)
RBC # FLD: 15.4 % — HIGH (ref 10.3–14.5)
RBC # FLD: 15.4 % — HIGH (ref 10.3–14.5)
RBC # FLD: 15.5 % — HIGH (ref 10.3–14.5)
RBC # FLD: 15.6 % — HIGH (ref 10.3–14.5)
RBC # FLD: 15.7 % — HIGH (ref 10.3–14.5)
RBC # FLD: 15.9 % — HIGH (ref 10.3–14.5)
RBC # FLD: 16.1 % — HIGH (ref 10.3–14.5)
RBC # FLD: 16.3 % — HIGH (ref 10.3–14.5)
RBC # FLD: 16.8 % — HIGH (ref 10.3–14.5)
RBC # FLD: 16.9 % — HIGH (ref 10.3–14.5)
RBC # FLD: 17 % — HIGH (ref 10.3–14.5)
RBC # FLD: 17.1 % — HIGH (ref 10.3–14.5)
RBC # FLD: 17.1 % — HIGH (ref 10.3–14.5)
RBC # FLD: 17.2 % — HIGH (ref 10.3–14.5)
RBC # FLD: 17.2 % — HIGH (ref 10.3–14.5)
RBC # FLD: 17.3 % — HIGH (ref 10.3–14.5)
RBC # FLD: 17.3 % — HIGH (ref 10.3–14.5)
RBC # FLD: 17.5 % — HIGH (ref 10.3–14.5)
RBC # FLD: 17.7 % — HIGH (ref 10.3–14.5)
RBC # FLD: 17.8 % — HIGH (ref 10.3–14.5)
RBC # FLD: 18 % — HIGH (ref 10.3–14.5)
RBC # FLD: 18.4 % — HIGH (ref 10.3–14.5)
RBC # FLD: 18.5 % — HIGH (ref 10.3–14.5)
RBC BLD AUTO: ABNORMAL
RBC BLD AUTO: SIGNIFICANT CHANGE UP
RETICS #: 52.3 K/UL — SIGNIFICANT CHANGE UP (ref 25–125)
RETICS/RBC NFR: 2.4 % — SIGNIFICANT CHANGE UP (ref 0.5–2.5)
RH IG SCN BLD-IMP: POSITIVE — SIGNIFICANT CHANGE UP
SMUDGE CELLS # BLD: PRESENT — SIGNIFICANT CHANGE UP
SODIUM SERPL-SCNC: 136 MMOL/L — SIGNIFICANT CHANGE UP (ref 135–145)
SODIUM SERPL-SCNC: 137 MMOL/L — SIGNIFICANT CHANGE UP (ref 135–145)
SODIUM SERPL-SCNC: 138 MMOL/L — SIGNIFICANT CHANGE UP (ref 135–145)
SODIUM SERPL-SCNC: 139 MMOL/L — SIGNIFICANT CHANGE UP (ref 135–145)
SODIUM SERPL-SCNC: 140 MMOL/L — SIGNIFICANT CHANGE UP (ref 135–145)
SODIUM SERPL-SCNC: 141 MMOL/L — SIGNIFICANT CHANGE UP (ref 135–145)
SODIUM SERPL-SCNC: 141 MMOL/L — SIGNIFICANT CHANGE UP (ref 135–145)
SODIUM SERPL-SCNC: 142 MMOL/L — SIGNIFICANT CHANGE UP (ref 135–145)
SODIUM UR-SCNC: 15 MMOL/L — SIGNIFICANT CHANGE UP
SP GR SPEC: >1.03 — HIGH (ref 1–1.03)
SPECIMEN SOURCE: SIGNIFICANT CHANGE UP
SURGICAL PATHOLOGY STUDY: SIGNIFICANT CHANGE UP
TEG FUNCTIONAL FIBRINOGEN: 22.2 MM — SIGNIFICANT CHANGE UP (ref 15–32)
TEG LY30 (LYSIS): 0 % — SIGNIFICANT CHANGE UP (ref 0–2.6)
TEG REACTION TIME: 6.5 MIN — SIGNIFICANT CHANGE UP (ref 4.6–9.1)
UROBILINOGEN FLD QL: 1 MG/DL — SIGNIFICANT CHANGE UP (ref 0.2–1)
UUN UR-MCNC: 132 MG/DL — SIGNIFICANT CHANGE UP
WBC # BLD: 10.55 K/UL — HIGH (ref 3.8–10.5)
WBC # BLD: 10.64 K/UL — HIGH (ref 3.8–10.5)
WBC # BLD: 11.06 K/UL — HIGH (ref 3.8–10.5)
WBC # BLD: 12.11 K/UL — HIGH (ref 3.8–10.5)
WBC # BLD: 12.99 K/UL — HIGH (ref 3.8–10.5)
WBC # BLD: 3.48 K/UL — LOW (ref 3.8–10.5)
WBC # BLD: 3.53 K/UL — LOW (ref 3.8–10.5)
WBC # BLD: 4.15 K/UL — SIGNIFICANT CHANGE UP (ref 3.8–10.5)
WBC # BLD: 4.59 K/UL — SIGNIFICANT CHANGE UP (ref 3.8–10.5)
WBC # BLD: 4.85 K/UL — SIGNIFICANT CHANGE UP (ref 3.8–10.5)
WBC # BLD: 5.3 K/UL — SIGNIFICANT CHANGE UP (ref 3.8–10.5)
WBC # BLD: 5.72 K/UL — SIGNIFICANT CHANGE UP (ref 3.8–10.5)
WBC # BLD: 5.74 K/UL — SIGNIFICANT CHANGE UP (ref 3.8–10.5)
WBC # BLD: 5.76 K/UL — SIGNIFICANT CHANGE UP (ref 3.8–10.5)
WBC # BLD: 6.05 K/UL — SIGNIFICANT CHANGE UP (ref 3.8–10.5)
WBC # BLD: 6.12 K/UL — SIGNIFICANT CHANGE UP (ref 3.8–10.5)
WBC # BLD: 6.25 K/UL — SIGNIFICANT CHANGE UP (ref 3.8–10.5)
WBC # BLD: 6.97 K/UL — SIGNIFICANT CHANGE UP (ref 3.8–10.5)
WBC # BLD: 6.97 K/UL — SIGNIFICANT CHANGE UP (ref 3.8–10.5)
WBC # BLD: 7.17 K/UL — SIGNIFICANT CHANGE UP (ref 3.8–10.5)
WBC # BLD: 7.61 K/UL — SIGNIFICANT CHANGE UP (ref 3.8–10.5)
WBC # BLD: 7.64 K/UL — SIGNIFICANT CHANGE UP (ref 3.8–10.5)
WBC # BLD: 7.66 K/UL — SIGNIFICANT CHANGE UP (ref 3.8–10.5)
WBC # BLD: 7.69 K/UL — SIGNIFICANT CHANGE UP (ref 3.8–10.5)
WBC # BLD: 7.71 K/UL — SIGNIFICANT CHANGE UP (ref 3.8–10.5)
WBC # BLD: 7.86 K/UL — SIGNIFICANT CHANGE UP (ref 3.8–10.5)
WBC # BLD: 7.99 K/UL — SIGNIFICANT CHANGE UP (ref 3.8–10.5)
WBC # BLD: 8.53 K/UL — SIGNIFICANT CHANGE UP (ref 3.8–10.5)
WBC # BLD: 8.75 K/UL — SIGNIFICANT CHANGE UP (ref 3.8–10.5)
WBC # BLD: 8.82 K/UL — SIGNIFICANT CHANGE UP (ref 3.8–10.5)
WBC # BLD: 9.09 K/UL — SIGNIFICANT CHANGE UP (ref 3.8–10.5)
WBC # BLD: 9.44 K/UL — SIGNIFICANT CHANGE UP (ref 3.8–10.5)
WBC # BLD: 9.48 K/UL — SIGNIFICANT CHANGE UP (ref 3.8–10.5)
WBC # BLD: 9.97 K/UL — SIGNIFICANT CHANGE UP (ref 3.8–10.5)
WBC # FLD AUTO: 10.55 K/UL — HIGH (ref 3.8–10.5)
WBC # FLD AUTO: 10.64 K/UL — HIGH (ref 3.8–10.5)
WBC # FLD AUTO: 11.06 K/UL — HIGH (ref 3.8–10.5)
WBC # FLD AUTO: 12.11 K/UL — HIGH (ref 3.8–10.5)
WBC # FLD AUTO: 12.99 K/UL — HIGH (ref 3.8–10.5)
WBC # FLD AUTO: 3.48 K/UL — LOW (ref 3.8–10.5)
WBC # FLD AUTO: 3.53 K/UL — LOW (ref 3.8–10.5)
WBC # FLD AUTO: 4.15 K/UL — SIGNIFICANT CHANGE UP (ref 3.8–10.5)
WBC # FLD AUTO: 4.59 K/UL — SIGNIFICANT CHANGE UP (ref 3.8–10.5)
WBC # FLD AUTO: 4.85 K/UL — SIGNIFICANT CHANGE UP (ref 3.8–10.5)
WBC # FLD AUTO: 5.3 K/UL — SIGNIFICANT CHANGE UP (ref 3.8–10.5)
WBC # FLD AUTO: 5.72 K/UL — SIGNIFICANT CHANGE UP (ref 3.8–10.5)
WBC # FLD AUTO: 5.74 K/UL — SIGNIFICANT CHANGE UP (ref 3.8–10.5)
WBC # FLD AUTO: 5.76 K/UL — SIGNIFICANT CHANGE UP (ref 3.8–10.5)
WBC # FLD AUTO: 6.05 K/UL — SIGNIFICANT CHANGE UP (ref 3.8–10.5)
WBC # FLD AUTO: 6.12 K/UL — SIGNIFICANT CHANGE UP (ref 3.8–10.5)
WBC # FLD AUTO: 6.25 K/UL — SIGNIFICANT CHANGE UP (ref 3.8–10.5)
WBC # FLD AUTO: 6.97 K/UL — SIGNIFICANT CHANGE UP (ref 3.8–10.5)
WBC # FLD AUTO: 6.97 K/UL — SIGNIFICANT CHANGE UP (ref 3.8–10.5)
WBC # FLD AUTO: 7.17 K/UL — SIGNIFICANT CHANGE UP (ref 3.8–10.5)
WBC # FLD AUTO: 7.61 K/UL — SIGNIFICANT CHANGE UP (ref 3.8–10.5)
WBC # FLD AUTO: 7.64 K/UL — SIGNIFICANT CHANGE UP (ref 3.8–10.5)
WBC # FLD AUTO: 7.66 K/UL — SIGNIFICANT CHANGE UP (ref 3.8–10.5)
WBC # FLD AUTO: 7.69 K/UL — SIGNIFICANT CHANGE UP (ref 3.8–10.5)
WBC # FLD AUTO: 7.71 K/UL — SIGNIFICANT CHANGE UP (ref 3.8–10.5)
WBC # FLD AUTO: 7.86 K/UL — SIGNIFICANT CHANGE UP (ref 3.8–10.5)
WBC # FLD AUTO: 7.99 K/UL — SIGNIFICANT CHANGE UP (ref 3.8–10.5)
WBC # FLD AUTO: 8.53 K/UL — SIGNIFICANT CHANGE UP (ref 3.8–10.5)
WBC # FLD AUTO: 8.75 K/UL — SIGNIFICANT CHANGE UP (ref 3.8–10.5)
WBC # FLD AUTO: 8.82 K/UL — SIGNIFICANT CHANGE UP (ref 3.8–10.5)
WBC # FLD AUTO: 9.09 K/UL — SIGNIFICANT CHANGE UP (ref 3.8–10.5)
WBC # FLD AUTO: 9.44 K/UL — SIGNIFICANT CHANGE UP (ref 3.8–10.5)
WBC # FLD AUTO: 9.48 K/UL — SIGNIFICANT CHANGE UP (ref 3.8–10.5)
WBC # FLD AUTO: 9.97 K/UL — SIGNIFICANT CHANGE UP (ref 3.8–10.5)

## 2024-01-01 PROCEDURE — P9045: CPT

## 2024-01-01 PROCEDURE — 71045 X-RAY EXAM CHEST 1 VIEW: CPT | Mod: 26

## 2024-01-01 PROCEDURE — 88307 TISSUE EXAM BY PATHOLOGIST: CPT

## 2024-01-01 PROCEDURE — 80048 BASIC METABOLIC PNL TOTAL CA: CPT

## 2024-01-01 PROCEDURE — 88307 TISSUE EXAM BY PATHOLOGIST: CPT | Mod: 26

## 2024-01-01 PROCEDURE — 99232 SBSQ HOSP IP/OBS MODERATE 35: CPT

## 2024-01-01 PROCEDURE — 93010 ELECTROCARDIOGRAM REPORT: CPT

## 2024-01-01 PROCEDURE — 97166 OT EVAL MOD COMPLEX 45 MIN: CPT

## 2024-01-01 PROCEDURE — 84132 ASSAY OF SERUM POTASSIUM: CPT

## 2024-01-01 PROCEDURE — 86901 BLOOD TYPING SEROLOGIC RH(D): CPT

## 2024-01-01 PROCEDURE — 85025 COMPLETE CBC W/AUTO DIFF WBC: CPT

## 2024-01-01 PROCEDURE — 99291 CRITICAL CARE FIRST HOUR: CPT

## 2024-01-01 PROCEDURE — 99232 SBSQ HOSP IP/OBS MODERATE 35: CPT | Mod: 24

## 2024-01-01 PROCEDURE — 82962 GLUCOSE BLOOD TEST: CPT

## 2024-01-01 PROCEDURE — 85018 HEMOGLOBIN: CPT

## 2024-01-01 PROCEDURE — 80053 COMPREHEN METABOLIC PANEL: CPT

## 2024-01-01 PROCEDURE — 97162 PT EVAL MOD COMPLEX 30 MIN: CPT

## 2024-01-01 PROCEDURE — 83605 ASSAY OF LACTIC ACID: CPT

## 2024-01-01 PROCEDURE — 99222 1ST HOSP IP/OBS MODERATE 55: CPT | Mod: GC

## 2024-01-01 PROCEDURE — 81001 URINALYSIS AUTO W/SCOPE: CPT

## 2024-01-01 PROCEDURE — 97110 THERAPEUTIC EXERCISES: CPT

## 2024-01-01 PROCEDURE — 74177 CT ABD & PELVIS W/CONTRAST: CPT | Mod: 26

## 2024-01-01 PROCEDURE — 84300 ASSAY OF URINE SODIUM: CPT

## 2024-01-01 PROCEDURE — 84156 ASSAY OF PROTEIN URINE: CPT

## 2024-01-01 PROCEDURE — 84540 ASSAY OF URINE/UREA-N: CPT

## 2024-01-01 PROCEDURE — 82330 ASSAY OF CALCIUM: CPT

## 2024-01-01 PROCEDURE — 83880 ASSAY OF NATRIURETIC PEPTIDE: CPT

## 2024-01-01 PROCEDURE — 85730 THROMBOPLASTIN TIME PARTIAL: CPT

## 2024-01-01 PROCEDURE — 83935 ASSAY OF URINE OSMOLALITY: CPT

## 2024-01-01 PROCEDURE — 87077 CULTURE AEROBIC IDENTIFY: CPT

## 2024-01-01 PROCEDURE — 94640 AIRWAY INHALATION TREATMENT: CPT

## 2024-01-01 PROCEDURE — 93005 ELECTROCARDIOGRAM TRACING: CPT

## 2024-01-01 PROCEDURE — 36430 TRANSFUSION BLD/BLD COMPNT: CPT

## 2024-01-01 PROCEDURE — 36010 PLACE CATHETER IN VEIN: CPT | Mod: RT

## 2024-01-01 PROCEDURE — 82435 ASSAY OF BLOOD CHLORIDE: CPT

## 2024-01-01 PROCEDURE — 36415 COLL VENOUS BLD VENIPUNCTURE: CPT

## 2024-01-01 PROCEDURE — 84100 ASSAY OF PHOSPHORUS: CPT

## 2024-01-01 PROCEDURE — 99233 SBSQ HOSP IP/OBS HIGH 50: CPT

## 2024-01-01 PROCEDURE — 86923 COMPATIBILITY TEST ELECTRIC: CPT

## 2024-01-01 PROCEDURE — 94002 VENT MGMT INPAT INIT DAY: CPT

## 2024-01-01 PROCEDURE — 82570 ASSAY OF URINE CREATININE: CPT

## 2024-01-01 PROCEDURE — 74230 X-RAY XM SWLNG FUNCJ C+: CPT | Mod: 26

## 2024-01-01 PROCEDURE — 83735 ASSAY OF MAGNESIUM: CPT

## 2024-01-01 PROCEDURE — C1889: CPT

## 2024-01-01 PROCEDURE — 71045 X-RAY EXAM CHEST 1 VIEW: CPT

## 2024-01-01 PROCEDURE — 92610 EVALUATE SWALLOWING FUNCTION: CPT

## 2024-01-01 PROCEDURE — 52000 CYSTOURETHROSCOPY: CPT

## 2024-01-01 PROCEDURE — 99232 SBSQ HOSP IP/OBS MODERATE 35: CPT | Mod: GC

## 2024-01-01 PROCEDURE — 84133 ASSAY OF URINE POTASSIUM: CPT

## 2024-01-01 PROCEDURE — 85014 HEMATOCRIT: CPT

## 2024-01-01 PROCEDURE — P9016: CPT

## 2024-01-01 PROCEDURE — 96374 THER/PROPH/DIAG INJ IV PUSH: CPT

## 2024-01-01 PROCEDURE — 85610 PROTHROMBIN TIME: CPT

## 2024-01-01 PROCEDURE — 92526 ORAL FUNCTION THERAPY: CPT

## 2024-01-01 PROCEDURE — 44143 PARTIAL REMOVAL OF COLON: CPT

## 2024-01-01 PROCEDURE — 99222 1ST HOSP IP/OBS MODERATE 55: CPT | Mod: 24

## 2024-01-01 PROCEDURE — 85396 CLOTTING ASSAY WHOLE BLOOD: CPT

## 2024-01-01 PROCEDURE — C1769: CPT

## 2024-01-01 PROCEDURE — 76937 US GUIDE VASCULAR ACCESS: CPT | Mod: 26,59

## 2024-01-01 PROCEDURE — 87040 BLOOD CULTURE FOR BACTERIA: CPT

## 2024-01-01 PROCEDURE — 99285 EMERGENCY DEPT VISIT HI MDM: CPT

## 2024-01-01 PROCEDURE — 97112 NEUROMUSCULAR REEDUCATION: CPT

## 2024-01-01 PROCEDURE — 74230 X-RAY XM SWLNG FUNCJ C+: CPT

## 2024-01-01 PROCEDURE — 92612 ENDOSCOPY SWALLOW (FEES) VID: CPT

## 2024-01-01 PROCEDURE — 99223 1ST HOSP IP/OBS HIGH 75: CPT | Mod: 57

## 2024-01-01 PROCEDURE — 87186 SC STD MICRODIL/AGAR DIL: CPT

## 2024-01-01 PROCEDURE — 87070 CULTURE OTHR SPECIMN AEROBIC: CPT

## 2024-01-01 PROCEDURE — 99223 1ST HOSP IP/OBS HIGH 75: CPT | Mod: GC

## 2024-01-01 PROCEDURE — 92611 MOTION FLUOROSCOPY/SWALLOW: CPT

## 2024-01-01 PROCEDURE — 85027 COMPLETE CBC AUTOMATED: CPT

## 2024-01-01 PROCEDURE — 82947 ASSAY GLUCOSE BLOOD QUANT: CPT

## 2024-01-01 PROCEDURE — 85045 AUTOMATED RETICULOCYTE COUNT: CPT

## 2024-01-01 PROCEDURE — 74177 CT ABD & PELVIS W/CONTRAST: CPT | Mod: MC

## 2024-01-01 PROCEDURE — 82803 BLOOD GASES ANY COMBINATION: CPT

## 2024-01-01 PROCEDURE — 87205 SMEAR GRAM STAIN: CPT

## 2024-01-01 PROCEDURE — 71045 X-RAY EXAM CHEST 1 VIEW: CPT | Mod: 26,77

## 2024-01-01 PROCEDURE — 86850 RBC ANTIBODY SCREEN: CPT

## 2024-01-01 PROCEDURE — 86900 BLOOD TYPING SEROLOGIC ABO: CPT

## 2024-01-01 PROCEDURE — 84295 ASSAY OF SERUM SODIUM: CPT

## 2024-01-01 PROCEDURE — 97530 THERAPEUTIC ACTIVITIES: CPT

## 2024-01-01 PROCEDURE — 83036 HEMOGLOBIN GLYCOSYLATED A1C: CPT

## 2024-01-01 DEVICE — KIT A-LINE 1LUM 20G X 12CM SAFE KIT: Type: IMPLANTABLE DEVICE | Status: FUNCTIONAL

## 2024-01-01 DEVICE — STAPLER COVIDIEN TA 90 BLUE RELOAD: Type: IMPLANTABLE DEVICE | Status: FUNCTIONAL

## 2024-01-01 DEVICE — STAPLER COVIDIEN ENDO GIA 80-3.8MM BLUE RELOAD: Type: IMPLANTABLE DEVICE | Status: FUNCTIONAL

## 2024-01-01 DEVICE — STAPLER COVIDIEN TRI-STAPLE 45MM TAN RELOAD: Type: IMPLANTABLE DEVICE | Status: FUNCTIONAL

## 2024-01-01 DEVICE — STAPLER COVIDIEN TA 30 RED: Type: IMPLANTABLE DEVICE | Status: FUNCTIONAL

## 2024-01-01 DEVICE — STAPLER COVIDIEN TA 90 BLUE: Type: IMPLANTABLE DEVICE | Status: FUNCTIONAL

## 2024-01-01 DEVICE — SURGICEL 2 X 14": Type: IMPLANTABLE DEVICE | Status: FUNCTIONAL

## 2024-01-01 DEVICE — SURGICEL FIBRILLAR 2 X 4": Type: IMPLANTABLE DEVICE | Status: FUNCTIONAL

## 2024-01-01 DEVICE — STAPLER COVIDIEN ENDO GIA 80-3.8MM BLUE: Type: IMPLANTABLE DEVICE | Status: FUNCTIONAL

## 2024-01-01 DEVICE — STAPLER COVIDIEN TA 30 RED RELOAD: Type: IMPLANTABLE DEVICE | Status: FUNCTIONAL

## 2024-01-01 RX ORDER — FUROSEMIDE 40 MG
20 TABLET ORAL ONCE
Refills: 0 | Status: COMPLETED | OUTPATIENT
Start: 2024-01-01 | End: 2024-01-01

## 2024-01-01 RX ORDER — B-COMPLEX WITH VITAMIN C
1 VIAL (ML) INJECTION DAILY
Refills: 0 | Status: DISCONTINUED | OUTPATIENT
Start: 2024-01-01 | End: 2024-01-01

## 2024-01-01 RX ORDER — HYDROCORTISONE 10 MG/1
12.5 TABLET ORAL EVERY 12 HOURS
Refills: 0 | Status: DISCONTINUED | OUTPATIENT
Start: 2024-01-01 | End: 2024-01-01

## 2024-01-01 RX ORDER — MYCOPHENOLATE MOFETIL 500 MG/1
500 TABLET, FILM COATED ORAL DAILY
Refills: 0 | Status: DISCONTINUED | OUTPATIENT
Start: 2024-01-01 | End: 2024-01-01

## 2024-01-01 RX ORDER — IPRATROPIUM BROMIDE AND ALBUTEROL SULFATE .5; 2.5 MG/3ML; MG/3ML
3 SOLUTION RESPIRATORY (INHALATION) EVERY 6 HOURS
Refills: 0 | Status: DISCONTINUED | OUTPATIENT
Start: 2024-01-01 | End: 2024-01-01

## 2024-01-01 RX ORDER — INSULIN LISPRO 100/ML
VIAL (ML) SUBCUTANEOUS EVERY 6 HOURS
Refills: 0 | Status: DISCONTINUED | OUTPATIENT
Start: 2024-01-01 | End: 2024-01-01

## 2024-01-01 RX ORDER — SODIUM PHOSPHATE, MONOBASIC, MONOHYDRATE AND SODIUM PHOSPHATE, DIBASIC ANHYDROUS 276; 142 MG/ML; MG/ML
30 INJECTION, SOLUTION INTRAVENOUS ONCE
Refills: 0 | Status: COMPLETED | OUTPATIENT
Start: 2024-01-01 | End: 2024-01-01

## 2024-01-01 RX ORDER — POTASSIUM CHLORIDE 10 MEQ
40 TABLET, EXTENDED RELEASE ORAL ONCE
Refills: 0 | Status: COMPLETED | OUTPATIENT
Start: 2024-01-01 | End: 2024-01-01

## 2024-01-01 RX ORDER — MAGNESIUM SULFATE IN 0.9% NACL 2 G/50 ML
2 INTRAVENOUS SOLUTION, PIGGYBACK (ML) INTRAVENOUS ONCE
Refills: 0 | Status: COMPLETED | OUTPATIENT
Start: 2024-01-01 | End: 2024-01-01

## 2024-01-01 RX ORDER — FINASTERIDE 5 MG/1
5 TABLET, FILM COATED ORAL DAILY
Refills: 0 | Status: DISCONTINUED | OUTPATIENT
Start: 2024-01-01 | End: 2024-01-01

## 2024-01-01 RX ORDER — GLUCAGON INJECTION, SOLUTION 1 MG/.2ML
1 INJECTION, SOLUTION SUBCUTANEOUS ONCE
Refills: 0 | Status: DISCONTINUED | OUTPATIENT
Start: 2024-01-01 | End: 2024-01-01

## 2024-01-01 RX ORDER — TAMSULOSIN HCL 0.4 MG
0.8 CAPSULE ORAL AT BEDTIME
Refills: 0 | Status: DISCONTINUED | OUTPATIENT
Start: 2024-01-01 | End: 2024-01-01

## 2024-01-01 RX ORDER — CHOLECALCIFEROL (VITAMIN D3) 625 MCG
2000 CAPSULE ORAL DAILY
Refills: 0 | Status: DISCONTINUED | OUTPATIENT
Start: 2024-01-01 | End: 2024-01-01

## 2024-01-01 RX ORDER — DIBASIC SODIUM PHOSPHATE, MONOBASIC POTASSIUM PHOSPHATE AND MONOBASIC SODIUM PHOSPHATE 852; 155; 130 MG/1; MG/1; MG/1
2 TABLET ORAL ONCE
Refills: 0 | Status: COMPLETED | OUTPATIENT
Start: 2024-01-01 | End: 2024-01-01

## 2024-01-01 RX ORDER — PIPERACILLIN AND TAZOBACTAM .5; 4 G/20ML; G/20ML
3.38 INJECTION, POWDER, LYOPHILIZED, FOR SOLUTION INTRAVENOUS ONCE
Refills: 0 | Status: COMPLETED | OUTPATIENT
Start: 2024-01-01 | End: 2024-01-01

## 2024-01-01 RX ORDER — INSULIN LISPRO 100/ML
VIAL (ML) SUBCUTANEOUS AT BEDTIME
Refills: 0 | Status: DISCONTINUED | OUTPATIENT
Start: 2024-01-01 | End: 2024-01-01

## 2024-01-01 RX ORDER — ACETAMINOPHEN 500 MG
1000 TABLET ORAL EVERY 6 HOURS
Refills: 0 | Status: COMPLETED | OUTPATIENT
Start: 2024-01-01 | End: 2024-01-01

## 2024-01-01 RX ORDER — ENOXAPARIN SODIUM 40MG/0.4ML
40 SYRINGE (ML) SUBCUTANEOUS EVERY 24 HOURS
Refills: 0 | Status: DISCONTINUED | OUTPATIENT
Start: 2024-01-01 | End: 2024-01-01

## 2024-01-01 RX ORDER — SODIUM CHLORIDE 9 MG/ML
4 INJECTION, SOLUTION INTRAMUSCULAR; INTRAVENOUS; SUBCUTANEOUS EVERY 6 HOURS
Refills: 0 | Status: DISCONTINUED | OUTPATIENT
Start: 2024-01-01 | End: 2024-01-01

## 2024-01-01 RX ORDER — FUROSEMIDE 40 MG
20 TABLET ORAL EVERY 8 HOURS
Refills: 0 | Status: COMPLETED | OUTPATIENT
Start: 2024-01-01 | End: 2024-01-01

## 2024-01-01 RX ORDER — SODIUM CHLORIDE 9 MG/ML
4 INJECTION, SOLUTION INTRAMUSCULAR; INTRAVENOUS; SUBCUTANEOUS EVERY 6 HOURS
Refills: 0 | Status: COMPLETED | OUTPATIENT
Start: 2024-01-01 | End: 2024-01-01

## 2024-01-01 RX ORDER — ASCORBIC ACID 500 MG
500 TABLET ORAL DAILY
Refills: 0 | Status: DISCONTINUED | OUTPATIENT
Start: 2024-01-01 | End: 2024-01-01

## 2024-01-01 RX ORDER — ACETAMINOPHEN 500 MG
1000 TABLET ORAL EVERY 6 HOURS
Refills: 0 | Status: DISCONTINUED | OUTPATIENT
Start: 2024-01-01 | End: 2024-01-01

## 2024-01-01 RX ORDER — FUROSEMIDE 40 MG
40 TABLET ORAL ONCE
Refills: 0 | Status: COMPLETED | OUTPATIENT
Start: 2024-01-01 | End: 2024-01-01

## 2024-01-01 RX ORDER — CHLORHEXIDINE GLUCONATE 40 MG/ML
1 SOLUTION TOPICAL
Refills: 0 | Status: DISCONTINUED | OUTPATIENT
Start: 2024-01-01 | End: 2024-01-01

## 2024-01-01 RX ORDER — IPRATROPIUM BROMIDE AND ALBUTEROL SULFATE .5; 2.5 MG/3ML; MG/3ML
3 SOLUTION RESPIRATORY (INHALATION) EVERY 12 HOURS
Refills: 0 | Status: DISCONTINUED | OUTPATIENT
Start: 2024-01-01 | End: 2024-01-01

## 2024-01-01 RX ORDER — IPRATROPIUM BROMIDE AND ALBUTEROL SULFATE .5; 2.5 MG/3ML; MG/3ML
3 SOLUTION RESPIRATORY (INHALATION) ONCE
Refills: 0 | Status: DISCONTINUED | OUTPATIENT
Start: 2024-01-01 | End: 2024-01-01

## 2024-01-01 RX ORDER — SODIUM PHOSPHATE, MONOBASIC, MONOHYDRATE AND SODIUM PHOSPHATE, DIBASIC ANHYDROUS 276; 142 MG/ML; MG/ML
15 INJECTION, SOLUTION INTRAVENOUS ONCE
Refills: 0 | Status: COMPLETED | OUTPATIENT
Start: 2024-01-01 | End: 2024-01-01

## 2024-01-01 RX ORDER — PIPERACILLIN AND TAZOBACTAM .5; 4 G/20ML; G/20ML
3.38 INJECTION, POWDER, LYOPHILIZED, FOR SOLUTION INTRAVENOUS EVERY 8 HOURS
Refills: 0 | Status: CANCELLED | OUTPATIENT
Start: 2024-01-01 | End: 2024-01-01

## 2024-01-01 RX ORDER — HYDROMORPHONE HCL/0.9% NACL/PF 6 MG/30 ML
0.5 PATIENT CONTROLLED ANALGESIA SYRINGE INTRAVENOUS
Refills: 0 | Status: DISCONTINUED | OUTPATIENT
Start: 2024-01-01 | End: 2024-01-01

## 2024-01-01 RX ORDER — PANTOPRAZOLE SODIUM 40 MG/1
40 TABLET, DELAYED RELEASE ORAL
Refills: 0 | Status: DISCONTINUED | OUTPATIENT
Start: 2024-01-01 | End: 2024-01-01

## 2024-01-01 RX ORDER — POTASSIUM CHLORIDE 10 MEQ
10 TABLET, EXTENDED RELEASE ORAL
Refills: 0 | Status: COMPLETED | OUTPATIENT
Start: 2024-01-01 | End: 2024-01-01

## 2024-01-01 RX ORDER — CYANOCOBALAMIN (VITAMIN B-12) 1000MCG/15
1000 LIQUID (ML) ORAL DAILY
Refills: 0 | Status: DISCONTINUED | OUTPATIENT
Start: 2024-01-01 | End: 2024-01-01

## 2024-01-01 RX ORDER — SODIUM CHLORIDE 9 MG/ML
1000 INJECTION, SOLUTION INTRAMUSCULAR; INTRAVENOUS; SUBCUTANEOUS
Refills: 0 | Status: DISCONTINUED | OUTPATIENT
Start: 2024-01-01 | End: 2024-01-01

## 2024-01-01 RX ORDER — HYDROCORTISONE 10 MG/1
25 TABLET ORAL EVERY 12 HOURS
Refills: 0 | Status: DISCONTINUED | OUTPATIENT
Start: 2024-01-01 | End: 2024-01-01

## 2024-01-01 RX ORDER — ACETAMINOPHEN 500 MG
1000 TABLET ORAL ONCE
Refills: 0 | Status: COMPLETED | OUTPATIENT
Start: 2024-01-01 | End: 2024-01-01

## 2024-01-01 RX ORDER — ACETAMINOPHEN 500 MG
650 TABLET ORAL EVERY 6 HOURS
Refills: 0 | Status: DISCONTINUED | OUTPATIENT
Start: 2024-01-01 | End: 2024-01-01

## 2024-01-01 RX ORDER — HYDROCORTISONE 10 MG/1
100 TABLET ORAL ONCE
Refills: 0 | Status: COMPLETED | OUTPATIENT
Start: 2024-01-01 | End: 2024-01-01

## 2024-01-01 RX ORDER — APIXABAN 5 MG/1
5 TABLET, FILM COATED ORAL
Refills: 0 | Status: DISCONTINUED | OUTPATIENT
Start: 2024-01-01 | End: 2024-01-01

## 2024-01-01 RX ORDER — OXYCODONE HYDROCHLORIDE 30 MG/1
2.5 TABLET ORAL ONCE
Refills: 0 | Status: DISCONTINUED | OUTPATIENT
Start: 2024-01-01 | End: 2024-01-01

## 2024-01-01 RX ORDER — IPRATROPIUM BROMIDE AND ALBUTEROL SULFATE .5; 2.5 MG/3ML; MG/3ML
3 SOLUTION RESPIRATORY (INHALATION) EVERY 6 HOURS
Refills: 0 | Status: COMPLETED | OUTPATIENT
Start: 2024-01-01 | End: 2024-01-01

## 2024-01-01 RX ORDER — DEXMEDETOMIDINE HYDROCHLORIDE 400 UG/100ML
0.5 INJECTION, SOLUTION INTRAVENOUS
Qty: 200 | Refills: 0 | Status: DISCONTINUED | OUTPATIENT
Start: 2024-01-01 | End: 2024-01-01

## 2024-01-01 RX ORDER — DAPAGLIFLOZIN 10 MG/1
10 TABLET, FILM COATED ORAL DAILY
Refills: 0 | Status: DISCONTINUED | OUTPATIENT
Start: 2024-01-01 | End: 2024-01-01

## 2024-01-01 RX ORDER — ASPIRIN/MAG CARB/ALUMINUM AMIN 325 MG
81 TABLET ORAL DAILY
Refills: 0 | Status: DISCONTINUED | OUTPATIENT
Start: 2024-01-01 | End: 2024-01-01

## 2024-01-01 RX ORDER — OXYCODONE HYDROCHLORIDE 30 MG/1
2.5 TABLET ORAL
Refills: 0 | Status: DISCONTINUED | OUTPATIENT
Start: 2024-01-01 | End: 2024-01-01

## 2024-01-01 RX ORDER — DIBASIC SODIUM PHOSPHATE, MONOBASIC POTASSIUM PHOSPHATE AND MONOBASIC SODIUM PHOSPHATE 852; 155; 130 MG/1; MG/1; MG/1
1 TABLET ORAL ONCE
Refills: 0 | Status: COMPLETED | OUTPATIENT
Start: 2024-01-01 | End: 2024-01-01

## 2024-01-01 RX ORDER — FERROUS SULFATE 325(65) MG
325 TABLET ORAL DAILY
Refills: 0 | Status: DISCONTINUED | OUTPATIENT
Start: 2024-01-01 | End: 2024-01-01

## 2024-01-01 RX ORDER — HYDROCORTISONE 10 MG/1
25 TABLET ORAL ONCE
Refills: 0 | Status: COMPLETED | OUTPATIENT
Start: 2024-01-01 | End: 2024-01-01

## 2024-01-01 RX ORDER — CALCIUM CARBONATE 400(1000)
1 TABLET,CHEWABLE ORAL ONCE
Refills: 0 | Status: COMPLETED | OUTPATIENT
Start: 2024-01-01 | End: 2024-01-01

## 2024-01-01 RX ORDER — POTASSIUM CHLORIDE 10 MEQ
20 TABLET, EXTENDED RELEASE ORAL ONCE
Refills: 0 | Status: COMPLETED | OUTPATIENT
Start: 2024-01-01 | End: 2024-01-01

## 2024-01-01 RX ORDER — SODIUM CHLORIDE 9 MG/ML
4 INJECTION, SOLUTION INTRAMUSCULAR; INTRAVENOUS; SUBCUTANEOUS ONCE
Refills: 0 | Status: DISCONTINUED | OUTPATIENT
Start: 2024-01-01 | End: 2024-01-01

## 2024-01-01 RX ORDER — HYDROCORTISONE 10 MG/1
50 TABLET ORAL EVERY 6 HOURS
Refills: 0 | Status: DISCONTINUED | OUTPATIENT
Start: 2024-01-01 | End: 2024-01-01

## 2024-01-01 RX ORDER — SODIUM CHLORIDE 9 MG/ML
4 INJECTION, SOLUTION INTRAMUSCULAR; INTRAVENOUS; SUBCUTANEOUS EVERY 12 HOURS
Refills: 0 | Status: DISCONTINUED | OUTPATIENT
Start: 2024-01-01 | End: 2024-01-01

## 2024-01-01 RX ORDER — HYDROMORPHONE HCL/0.9% NACL/PF 6 MG/30 ML
0.25 PATIENT CONTROLLED ANALGESIA SYRINGE INTRAVENOUS ONCE
Refills: 0 | Status: DISCONTINUED | OUTPATIENT
Start: 2024-01-01 | End: 2024-01-01

## 2024-01-01 RX ORDER — CHLORHEXIDINE GLUCONATE 40 MG/ML
1 SOLUTION TOPICAL DAILY
Refills: 0 | Status: DISCONTINUED | OUTPATIENT
Start: 2024-01-01 | End: 2024-01-01

## 2024-01-01 RX ORDER — FUROSEMIDE 40 MG
20 TABLET ORAL EVERY 12 HOURS
Refills: 0 | Status: DISCONTINUED | OUTPATIENT
Start: 2024-01-01 | End: 2024-01-01

## 2024-01-01 RX ORDER — FOLIC ACID 1 MG/1
1 TABLET ORAL DAILY
Refills: 0 | Status: DISCONTINUED | OUTPATIENT
Start: 2024-01-01 | End: 2024-01-01

## 2024-01-01 RX ORDER — CALCIUM GLUCONATE 98 MG/ML
2 INJECTION, SOLUTION INTRAVENOUS ONCE
Refills: 0 | Status: COMPLETED | OUTPATIENT
Start: 2024-01-01 | End: 2024-01-01

## 2024-01-01 RX ORDER — PANTOPRAZOLE SODIUM 40 MG/1
40 TABLET, DELAYED RELEASE ORAL EVERY 24 HOURS
Refills: 0 | Status: DISCONTINUED | OUTPATIENT
Start: 2024-01-01 | End: 2024-01-01

## 2024-01-01 RX ORDER — ACETAMINOPHEN 500 MG
2 TABLET ORAL
Refills: 0 | DISCHARGE

## 2024-01-01 RX ORDER — NOREPINEPHRINE BITARTRATE 1 MG/ML
0.05 INJECTION, SOLUTION, CONCENTRATE INTRAVENOUS
Qty: 8 | Refills: 0 | Status: DISCONTINUED | OUTPATIENT
Start: 2024-01-01 | End: 2024-01-01

## 2024-01-01 RX ORDER — CHLORHEXIDINE GLUCONATE 40 MG/ML
15 SOLUTION TOPICAL EVERY 12 HOURS
Refills: 0 | Status: DISCONTINUED | OUTPATIENT
Start: 2024-01-01 | End: 2024-01-01

## 2024-01-01 RX ORDER — PIPERACILLIN AND TAZOBACTAM .5; 4 G/20ML; G/20ML
3.38 INJECTION, POWDER, LYOPHILIZED, FOR SOLUTION INTRAVENOUS EVERY 8 HOURS
Refills: 0 | Status: COMPLETED | OUTPATIENT
Start: 2024-01-01 | End: 2024-01-01

## 2024-01-01 RX ORDER — POTASSIUM CHLORIDE 10 MEQ
40 TABLET, EXTENDED RELEASE ORAL EVERY 4 HOURS
Refills: 0 | Status: COMPLETED | OUTPATIENT
Start: 2024-01-01 | End: 2024-01-01

## 2024-01-01 RX ORDER — POTASSIUM CHLORIDE 10 MEQ
40 TABLET, EXTENDED RELEASE ORAL ONCE
Refills: 0 | Status: DISCONTINUED | OUTPATIENT
Start: 2024-01-01 | End: 2024-01-01

## 2024-01-01 RX ORDER — INSULIN LISPRO 100/ML
VIAL (ML) SUBCUTANEOUS
Refills: 0 | Status: DISCONTINUED | OUTPATIENT
Start: 2024-01-01 | End: 2024-01-01

## 2024-01-01 RX ORDER — PHENYLEPHRINE HCL IN 0.9% NACL 20MG/250ML
0.1 PLASTIC BAG, INJECTION (ML) INTRAVENOUS
Qty: 40 | Refills: 0 | Status: DISCONTINUED | OUTPATIENT
Start: 2024-01-01 | End: 2024-01-01

## 2024-01-01 RX ORDER — THIAMINE HCL 100 MG
100 TABLET ORAL DAILY
Refills: 0 | Status: DISCONTINUED | OUTPATIENT
Start: 2024-01-01 | End: 2024-01-01

## 2024-01-01 RX ORDER — TAMSULOSIN HCL 0.4 MG
0.4 CAPSULE ORAL AT BEDTIME
Refills: 0 | Status: DISCONTINUED | OUTPATIENT
Start: 2024-01-01 | End: 2024-01-01

## 2024-01-01 RX ORDER — POLYETHYLENE GLYCOL 3350 17 G/17G
17 POWDER, FOR SOLUTION ORAL
Refills: 0 | Status: DISCONTINUED | OUTPATIENT
Start: 2024-01-01 | End: 2024-01-01

## 2024-01-01 RX ORDER — B-COMPLEX WITH VITAMIN C
1 VIAL (ML) INJECTION
Refills: 0 | DISCHARGE

## 2024-01-01 RX ORDER — HALOPERIDOL DECANOATE 50 MG/ML
5 INJECTION INTRAMUSCULAR ONCE
Refills: 0 | Status: DISCONTINUED | OUTPATIENT
Start: 2024-01-01 | End: 2024-01-01

## 2024-01-01 RX ORDER — ASPIRIN/MAG CARB/ALUMINUM AMIN 325 MG
1 TABLET ORAL
Refills: 0 | DISCHARGE

## 2024-01-01 RX ORDER — ACETYLCYSTEINE
4 POWDER (GRAM) MISCELLANEOUS ONCE
Refills: 0 | Status: COMPLETED | OUTPATIENT
Start: 2024-01-01 | End: 2024-01-01

## 2024-01-01 RX ORDER — MYCOPHENOLATE MOFETIL 500 MG/1
1 TABLET, FILM COATED ORAL
Refills: 0 | DISCHARGE

## 2024-01-01 RX ORDER — POTASSIUM CHLORIDE 10 MEQ
40 TABLET, EXTENDED RELEASE ORAL EVERY 4 HOURS
Refills: 0 | Status: DISCONTINUED | OUTPATIENT
Start: 2024-01-01 | End: 2024-01-01

## 2024-01-01 RX ORDER — PANTOPRAZOLE SODIUM 40 MG/1
80 TABLET, DELAYED RELEASE ORAL ONCE
Refills: 0 | Status: COMPLETED | OUTPATIENT
Start: 2024-01-01 | End: 2024-01-01

## 2024-01-01 RX ORDER — OXYCODONE HYDROCHLORIDE 30 MG/1
2.5 TABLET ORAL EVERY 4 HOURS
Refills: 0 | Status: DISCONTINUED | OUTPATIENT
Start: 2024-01-01 | End: 2024-01-01

## 2024-01-01 RX ORDER — FLUTICASONE PROPIONATE 50 UG/1
1 SPRAY, METERED NASAL
Refills: 0 | Status: DISCONTINUED | OUTPATIENT
Start: 2024-01-01 | End: 2024-01-01

## 2024-01-01 RX ORDER — CHOLECALCIFEROL (VITAMIN D3) 625 MCG
2 CAPSULE ORAL
Refills: 0 | DISCHARGE

## 2024-01-01 RX ORDER — PIPERACILLIN AND TAZOBACTAM .5; 4 G/20ML; G/20ML
3.38 INJECTION, POWDER, LYOPHILIZED, FOR SOLUTION INTRAVENOUS EVERY 8 HOURS
Refills: 0 | Status: DISCONTINUED | OUTPATIENT
Start: 2024-01-01 | End: 2024-01-01

## 2024-01-01 RX ORDER — ALBUMIN HUMAN 50 G/1000ML
250 SOLUTION INTRAVENOUS ONCE
Refills: 0 | Status: COMPLETED | OUTPATIENT
Start: 2024-01-01 | End: 2024-01-01

## 2024-01-01 RX ORDER — VANCOMYCIN HYDROCHLORIDE 50 MG/ML
1000 KIT ORAL ONCE
Refills: 0 | Status: COMPLETED | OUTPATIENT
Start: 2024-01-01 | End: 2024-01-01

## 2024-01-01 RX ADMIN — FINASTERIDE 5 MILLIGRAM(S): 5 TABLET, FILM COATED ORAL at 11:32

## 2024-01-01 RX ADMIN — MYCOPHENOLATE MOFETIL 500 MILLIGRAM(S): 500 TABLET, FILM COATED ORAL at 11:51

## 2024-01-01 RX ADMIN — Medication 650 MILLIGRAM(S): at 15:05

## 2024-01-01 RX ADMIN — FLUTICASONE PROPIONATE 1 SPRAY(S): 50 SPRAY, METERED NASAL at 06:20

## 2024-01-01 RX ADMIN — Medication 100 MILLIEQUIVALENT(S): at 14:46

## 2024-01-01 RX ADMIN — PIPERACILLIN AND TAZOBACTAM 25 GRAM(S): .5; 4 INJECTION, POWDER, LYOPHILIZED, FOR SOLUTION INTRAVENOUS at 05:19

## 2024-01-01 RX ADMIN — Medication 1 TABLET(S): at 11:43

## 2024-01-01 RX ADMIN — FLUTICASONE PROPIONATE 1 SPRAY(S): 50 SPRAY, METERED NASAL at 05:15

## 2024-01-01 RX ADMIN — IPRATROPIUM BROMIDE AND ALBUTEROL SULFATE 3 MILLILITER(S): .5; 2.5 SOLUTION RESPIRATORY (INHALATION) at 05:27

## 2024-01-01 RX ADMIN — Medication 1 TABLET(S): at 13:01

## 2024-01-01 RX ADMIN — Medication 325 MILLIGRAM(S): at 18:17

## 2024-01-01 RX ADMIN — PIPERACILLIN AND TAZOBACTAM 25 GRAM(S): .5; 4 INJECTION, POWDER, LYOPHILIZED, FOR SOLUTION INTRAVENOUS at 21:59

## 2024-01-01 RX ADMIN — Medication 40 MILLIGRAM(S): at 21:51

## 2024-01-01 RX ADMIN — Medication 20 MILLIGRAM(S): at 17:35

## 2024-01-01 RX ADMIN — FOLIC ACID 1 MILLIGRAM(S): 1 TABLET ORAL at 13:19

## 2024-01-01 RX ADMIN — Medication 1 TABLET(S): at 12:39

## 2024-01-01 RX ADMIN — Medication 50 MILLILITER(S): at 22:15

## 2024-01-01 RX ADMIN — MYCOPHENOLATE MOFETIL 500 MILLIGRAM(S): 500 TABLET, FILM COATED ORAL at 12:39

## 2024-01-01 RX ADMIN — PANTOPRAZOLE SODIUM 40 MILLIGRAM(S): 40 TABLET, DELAYED RELEASE ORAL at 07:40

## 2024-01-01 RX ADMIN — FOLIC ACID 1 MILLIGRAM(S): 1 TABLET ORAL at 12:38

## 2024-01-01 RX ADMIN — SODIUM CHLORIDE 4 MILLILITER(S): 9 INJECTION, SOLUTION INTRAMUSCULAR; INTRAVENOUS; SUBCUTANEOUS at 17:55

## 2024-01-01 RX ADMIN — Medication 81 MILLIGRAM(S): at 13:37

## 2024-01-01 RX ADMIN — Medication 40 MILLILITER(S): at 02:19

## 2024-01-01 RX ADMIN — FOLIC ACID 1 MILLIGRAM(S): 1 TABLET ORAL at 11:32

## 2024-01-01 RX ADMIN — DIBASIC SODIUM PHOSPHATE, MONOBASIC POTASSIUM PHOSPHATE AND MONOBASIC SODIUM PHOSPHATE 1 TABLET(S): 852; 155; 130 TABLET ORAL at 23:47

## 2024-01-01 RX ADMIN — PANTOPRAZOLE SODIUM 40 MILLIGRAM(S): 40 TABLET, DELAYED RELEASE ORAL at 06:15

## 2024-01-01 RX ADMIN — OXYCODONE HYDROCHLORIDE 2.5 MILLIGRAM(S): 30 TABLET ORAL at 13:28

## 2024-01-01 RX ADMIN — SODIUM CHLORIDE 4 MILLILITER(S): 9 INJECTION, SOLUTION INTRAMUSCULAR; INTRAVENOUS; SUBCUTANEOUS at 17:20

## 2024-01-01 RX ADMIN — IPRATROPIUM BROMIDE AND ALBUTEROL SULFATE 3 MILLILITER(S): .5; 2.5 SOLUTION RESPIRATORY (INHALATION) at 11:05

## 2024-01-01 RX ADMIN — Medication 0.5 MILLIGRAM(S): at 11:04

## 2024-01-01 RX ADMIN — Medication 81 MILLIGRAM(S): at 12:04

## 2024-01-01 RX ADMIN — HYDROCORTISONE 12.5 MILLIGRAM(S): 10 TABLET ORAL at 00:07

## 2024-01-01 RX ADMIN — HYDROCORTISONE 12.5 MILLIGRAM(S): 10 TABLET ORAL at 14:34

## 2024-01-01 RX ADMIN — FINASTERIDE 5 MILLIGRAM(S): 5 TABLET, FILM COATED ORAL at 17:58

## 2024-01-01 RX ADMIN — Medication 100 MILLIEQUIVALENT(S): at 17:55

## 2024-01-01 RX ADMIN — FLUTICASONE PROPIONATE 1 SPRAY(S): 50 SPRAY, METERED NASAL at 06:50

## 2024-01-01 RX ADMIN — CHLORHEXIDINE GLUCONATE 1 APPLICATION(S): 40 SOLUTION TOPICAL at 06:34

## 2024-01-01 RX ADMIN — Medication 5 MILLIGRAM(S): at 05:50

## 2024-01-01 RX ADMIN — PIPERACILLIN AND TAZOBACTAM 25 GRAM(S): .5; 4 INJECTION, POWDER, LYOPHILIZED, FOR SOLUTION INTRAVENOUS at 06:04

## 2024-01-01 RX ADMIN — Medication 1: at 16:54

## 2024-01-01 RX ADMIN — Medication 500 MILLIGRAM(S): at 18:18

## 2024-01-01 RX ADMIN — Medication 20 MILLIGRAM(S): at 14:57

## 2024-01-01 RX ADMIN — Medication 20 MILLIGRAM(S): at 01:24

## 2024-01-01 RX ADMIN — Medication 40 MILLIGRAM(S): at 22:36

## 2024-01-01 RX ADMIN — HYDROCORTISONE 50 MILLIGRAM(S): 10 TABLET ORAL at 13:06

## 2024-01-01 RX ADMIN — OXYCODONE HYDROCHLORIDE 2.5 MILLIGRAM(S): 30 TABLET ORAL at 10:00

## 2024-01-01 RX ADMIN — Medication 20 MILLIGRAM(S): at 18:18

## 2024-01-01 RX ADMIN — Medication 0.8 MILLIGRAM(S): at 21:19

## 2024-01-01 RX ADMIN — Medication 81 MILLIGRAM(S): at 11:43

## 2024-01-01 RX ADMIN — HYDROCORTISONE 12.5 MILLIGRAM(S): 10 TABLET ORAL at 00:18

## 2024-01-01 RX ADMIN — IPRATROPIUM BROMIDE AND ALBUTEROL SULFATE 3 MILLILITER(S): .5; 2.5 SOLUTION RESPIRATORY (INHALATION) at 11:31

## 2024-01-01 RX ADMIN — APIXABAN 5 MILLIGRAM(S): 5 TABLET, FILM COATED ORAL at 05:50

## 2024-01-01 RX ADMIN — HYDROCORTISONE 50 MILLIGRAM(S): 10 TABLET ORAL at 00:07

## 2024-01-01 RX ADMIN — Medication 100 MILLIGRAM(S): at 11:50

## 2024-01-01 RX ADMIN — Medication 650 MILLIGRAM(S): at 22:23

## 2024-01-01 RX ADMIN — PIPERACILLIN AND TAZOBACTAM 25 GRAM(S): .5; 4 INJECTION, POWDER, LYOPHILIZED, FOR SOLUTION INTRAVENOUS at 13:53

## 2024-01-01 RX ADMIN — SODIUM CHLORIDE 4 MILLILITER(S): 9 INJECTION, SOLUTION INTRAMUSCULAR; INTRAVENOUS; SUBCUTANEOUS at 23:28

## 2024-01-01 RX ADMIN — MYCOPHENOLATE MOFETIL 500 MILLIGRAM(S): 500 TABLET, FILM COATED ORAL at 11:33

## 2024-01-01 RX ADMIN — Medication 5 MILLIGRAM(S): at 05:08

## 2024-01-01 RX ADMIN — SODIUM CHLORIDE 4 MILLILITER(S): 9 INJECTION, SOLUTION INTRAMUSCULAR; INTRAVENOUS; SUBCUTANEOUS at 17:42

## 2024-01-01 RX ADMIN — Medication 2000 UNIT(S): at 11:44

## 2024-01-01 RX ADMIN — APIXABAN 5 MILLIGRAM(S): 5 TABLET, FILM COATED ORAL at 17:51

## 2024-01-01 RX ADMIN — APIXABAN 5 MILLIGRAM(S): 5 TABLET, FILM COATED ORAL at 06:13

## 2024-01-01 RX ADMIN — FINASTERIDE 5 MILLIGRAM(S): 5 TABLET, FILM COATED ORAL at 12:39

## 2024-01-01 RX ADMIN — APIXABAN 5 MILLIGRAM(S): 5 TABLET, FILM COATED ORAL at 06:05

## 2024-01-01 RX ADMIN — PANTOPRAZOLE SODIUM 40 MILLIGRAM(S): 40 TABLET, DELAYED RELEASE ORAL at 05:19

## 2024-01-01 RX ADMIN — Medication 2000 UNIT(S): at 11:33

## 2024-01-01 RX ADMIN — PIPERACILLIN AND TAZOBACTAM 25 GRAM(S): .5; 4 INJECTION, POWDER, LYOPHILIZED, FOR SOLUTION INTRAVENOUS at 05:32

## 2024-01-01 RX ADMIN — FOLIC ACID 1 MILLIGRAM(S): 1 TABLET ORAL at 11:51

## 2024-01-01 RX ADMIN — Medication 20 MILLIGRAM(S): at 17:58

## 2024-01-01 RX ADMIN — APIXABAN 5 MILLIGRAM(S): 5 TABLET, FILM COATED ORAL at 17:07

## 2024-01-01 RX ADMIN — Medication 0.25 MILLIGRAM(S): at 13:18

## 2024-01-01 RX ADMIN — PANTOPRAZOLE SODIUM 40 MILLIGRAM(S): 40 TABLET, DELAYED RELEASE ORAL at 17:08

## 2024-01-01 RX ADMIN — PIPERACILLIN AND TAZOBACTAM 25 GRAM(S): .5; 4 INJECTION, POWDER, LYOPHILIZED, FOR SOLUTION INTRAVENOUS at 01:30

## 2024-01-01 RX ADMIN — HYDROCORTISONE 12.5 MILLIGRAM(S): 10 TABLET ORAL at 12:39

## 2024-01-01 RX ADMIN — Medication 400 MILLIGRAM(S): at 05:27

## 2024-01-01 RX ADMIN — Medication 20 MILLIEQUIVALENT(S): at 06:20

## 2024-01-01 RX ADMIN — DIBASIC SODIUM PHOSPHATE, MONOBASIC POTASSIUM PHOSPHATE AND MONOBASIC SODIUM PHOSPHATE 2 PACKET(S): 852; 155; 130 TABLET ORAL at 11:35

## 2024-01-01 RX ADMIN — Medication 650 MILLIGRAM(S): at 23:23

## 2024-01-01 RX ADMIN — PIPERACILLIN AND TAZOBACTAM 25 GRAM(S): .5; 4 INJECTION, POWDER, LYOPHILIZED, FOR SOLUTION INTRAVENOUS at 17:30

## 2024-01-01 RX ADMIN — Medication 2000 UNIT(S): at 13:37

## 2024-01-01 RX ADMIN — HYDROCORTISONE 25 MILLIGRAM(S): 10 TABLET ORAL at 01:28

## 2024-01-01 RX ADMIN — Medication 1 TABLET(S): at 21:24

## 2024-01-01 RX ADMIN — Medication 1 TABLET(S): at 13:38

## 2024-01-01 RX ADMIN — Medication 1000 MILLIGRAM(S): at 18:54

## 2024-01-01 RX ADMIN — Medication 40 MILLIGRAM(S): at 21:27

## 2024-01-01 RX ADMIN — Medication 1000 MILLIGRAM(S): at 23:57

## 2024-01-01 RX ADMIN — POLYETHYLENE GLYCOL 3350 17 GRAM(S): 17 POWDER, FOR SOLUTION ORAL at 05:59

## 2024-01-01 RX ADMIN — IPRATROPIUM BROMIDE AND ALBUTEROL SULFATE 3 MILLILITER(S): .5; 2.5 SOLUTION RESPIRATORY (INHALATION) at 23:16

## 2024-01-01 RX ADMIN — FINASTERIDE 5 MILLIGRAM(S): 5 TABLET, FILM COATED ORAL at 12:08

## 2024-01-01 RX ADMIN — PIPERACILLIN AND TAZOBACTAM 25 GRAM(S): .5; 4 INJECTION, POWDER, LYOPHILIZED, FOR SOLUTION INTRAVENOUS at 18:24

## 2024-01-01 RX ADMIN — Medication 100 MILLIGRAM(S): at 11:33

## 2024-01-01 RX ADMIN — Medication 25 GRAM(S): at 14:57

## 2024-01-01 RX ADMIN — Medication 20 MILLIGRAM(S): at 05:14

## 2024-01-01 RX ADMIN — Medication 5 MILLIGRAM(S): at 06:05

## 2024-01-01 RX ADMIN — FOLIC ACID 1 MILLIGRAM(S): 1 TABLET ORAL at 16:05

## 2024-01-01 RX ADMIN — Medication 0.8 MILLIGRAM(S): at 22:46

## 2024-01-01 RX ADMIN — Medication 500 MILLIGRAM(S): at 13:38

## 2024-01-01 RX ADMIN — Medication 0.8 MILLIGRAM(S): at 22:22

## 2024-01-01 RX ADMIN — OXYCODONE HYDROCHLORIDE 2.5 MILLIGRAM(S): 30 TABLET ORAL at 06:19

## 2024-01-01 RX ADMIN — Medication 100 MILLIEQUIVALENT(S): at 04:26

## 2024-01-01 RX ADMIN — SODIUM PHOSPHATE, MONOBASIC, MONOHYDRATE AND SODIUM PHOSPHATE, DIBASIC ANHYDROUS 127.5 MILLIMOLE(S): 276; 142 INJECTION, SOLUTION INTRAVENOUS at 15:41

## 2024-01-01 RX ADMIN — CHLORHEXIDINE GLUCONATE 1 APPLICATION(S): 40 SOLUTION TOPICAL at 06:22

## 2024-01-01 RX ADMIN — PIPERACILLIN AND TAZOBACTAM 25 GRAM(S): .5; 4 INJECTION, POWDER, LYOPHILIZED, FOR SOLUTION INTRAVENOUS at 13:08

## 2024-01-01 RX ADMIN — FOLIC ACID 1 MILLIGRAM(S): 1 TABLET ORAL at 13:38

## 2024-01-01 RX ADMIN — PANTOPRAZOLE SODIUM 40 MILLIGRAM(S): 40 TABLET, DELAYED RELEASE ORAL at 06:52

## 2024-01-01 RX ADMIN — PANTOPRAZOLE SODIUM 40 MILLIGRAM(S): 40 TABLET, DELAYED RELEASE ORAL at 06:18

## 2024-01-01 RX ADMIN — SODIUM CHLORIDE 4 MILLILITER(S): 9 INJECTION, SOLUTION INTRAMUSCULAR; INTRAVENOUS; SUBCUTANEOUS at 03:42

## 2024-01-01 RX ADMIN — Medication 40 MILLIEQUIVALENT(S): at 11:59

## 2024-01-01 RX ADMIN — Medication 5 MILLIGRAM(S): at 05:36

## 2024-01-01 RX ADMIN — HYDROCORTISONE 12.5 MILLIGRAM(S): 10 TABLET ORAL at 13:20

## 2024-01-01 RX ADMIN — FINASTERIDE 5 MILLIGRAM(S): 5 TABLET, FILM COATED ORAL at 13:19

## 2024-01-01 RX ADMIN — Medication 500 MILLIGRAM(S): at 13:39

## 2024-01-01 RX ADMIN — Medication 40 MILLIGRAM(S): at 21:19

## 2024-01-01 RX ADMIN — Medication 400 MILLIGRAM(S): at 14:33

## 2024-01-01 RX ADMIN — SODIUM CHLORIDE 4 MILLILITER(S): 9 INJECTION, SOLUTION INTRAMUSCULAR; INTRAVENOUS; SUBCUTANEOUS at 23:16

## 2024-01-01 RX ADMIN — PANTOPRAZOLE SODIUM 80 MILLIGRAM(S): 40 TABLET, DELAYED RELEASE ORAL at 23:01

## 2024-01-01 RX ADMIN — Medication 400 MILLIGRAM(S): at 18:16

## 2024-01-01 RX ADMIN — PIPERACILLIN AND TAZOBACTAM 25 GRAM(S): .5; 4 INJECTION, POWDER, LYOPHILIZED, FOR SOLUTION INTRAVENOUS at 15:23

## 2024-01-01 RX ADMIN — IPRATROPIUM BROMIDE AND ALBUTEROL SULFATE 3 MILLILITER(S): .5; 2.5 SOLUTION RESPIRATORY (INHALATION) at 00:11

## 2024-01-01 RX ADMIN — SODIUM CHLORIDE 4 MILLILITER(S): 9 INJECTION, SOLUTION INTRAMUSCULAR; INTRAVENOUS; SUBCUTANEOUS at 00:11

## 2024-01-01 RX ADMIN — Medication 500 MILLIGRAM(S): at 11:51

## 2024-01-01 RX ADMIN — SODIUM CHLORIDE 4 MILLILITER(S): 9 INJECTION, SOLUTION INTRAMUSCULAR; INTRAVENOUS; SUBCUTANEOUS at 18:57

## 2024-01-01 RX ADMIN — Medication 20 MILLIGRAM(S): at 13:06

## 2024-01-01 RX ADMIN — Medication 100 MILLIEQUIVALENT(S): at 08:36

## 2024-01-01 RX ADMIN — SODIUM CHLORIDE 4 MILLILITER(S): 9 INJECTION, SOLUTION INTRAMUSCULAR; INTRAVENOUS; SUBCUTANEOUS at 11:35

## 2024-01-01 RX ADMIN — SODIUM CHLORIDE 4 MILLILITER(S): 9 INJECTION, SOLUTION INTRAMUSCULAR; INTRAVENOUS; SUBCUTANEOUS at 11:05

## 2024-01-01 RX ADMIN — Medication 1000 MILLIGRAM(S): at 11:51

## 2024-01-01 RX ADMIN — Medication 100 MILLILITER(S): at 14:58

## 2024-01-01 RX ADMIN — IPRATROPIUM BROMIDE AND ALBUTEROL SULFATE 3 MILLILITER(S): .5; 2.5 SOLUTION RESPIRATORY (INHALATION) at 11:02

## 2024-01-01 RX ADMIN — Medication 5 MILLIGRAM(S): at 05:06

## 2024-01-01 RX ADMIN — IPRATROPIUM BROMIDE AND ALBUTEROL SULFATE 3 MILLILITER(S): .5; 2.5 SOLUTION RESPIRATORY (INHALATION) at 00:15

## 2024-01-01 RX ADMIN — PIPERACILLIN AND TAZOBACTAM 25 GRAM(S): .5; 4 INJECTION, POWDER, LYOPHILIZED, FOR SOLUTION INTRAVENOUS at 13:34

## 2024-01-01 RX ADMIN — Medication 81 MILLIGRAM(S): at 12:03

## 2024-01-01 RX ADMIN — MYCOPHENOLATE MOFETIL 41.67 MILLIGRAM(S): 500 TABLET, FILM COATED ORAL at 21:55

## 2024-01-01 RX ADMIN — MYCOPHENOLATE MOFETIL 500 MILLIGRAM(S): 500 TABLET, FILM COATED ORAL at 11:34

## 2024-01-01 RX ADMIN — APIXABAN 5 MILLIGRAM(S): 5 TABLET, FILM COATED ORAL at 17:40

## 2024-01-01 RX ADMIN — Medication 20 MILLIGRAM(S): at 12:08

## 2024-01-01 RX ADMIN — MYCOPHENOLATE MOFETIL 500 MILLIGRAM(S): 500 TABLET, FILM COATED ORAL at 14:35

## 2024-01-01 RX ADMIN — Medication 325 MILLIGRAM(S): at 11:33

## 2024-01-01 RX ADMIN — Medication 100 MILLIEQUIVALENT(S): at 01:08

## 2024-01-01 RX ADMIN — IPRATROPIUM BROMIDE AND ALBUTEROL SULFATE 3 MILLILITER(S): .5; 2.5 SOLUTION RESPIRATORY (INHALATION) at 17:20

## 2024-01-01 RX ADMIN — SODIUM CHLORIDE 4 MILLILITER(S): 9 INJECTION, SOLUTION INTRAMUSCULAR; INTRAVENOUS; SUBCUTANEOUS at 06:22

## 2024-01-01 RX ADMIN — Medication 0.8 MILLIGRAM(S): at 21:17

## 2024-01-01 RX ADMIN — Medication 4 MILLILITER(S): at 03:42

## 2024-01-01 RX ADMIN — OXYCODONE HYDROCHLORIDE 2.5 MILLIGRAM(S): 30 TABLET ORAL at 10:31

## 2024-01-01 RX ADMIN — FINASTERIDE 5 MILLIGRAM(S): 5 TABLET, FILM COATED ORAL at 13:38

## 2024-01-01 RX ADMIN — Medication 500 MILLIGRAM(S): at 11:43

## 2024-01-01 RX ADMIN — Medication 5 MILLIGRAM(S): at 05:15

## 2024-01-01 RX ADMIN — Medication 100 MILLIEQUIVALENT(S): at 06:21

## 2024-01-01 RX ADMIN — Medication 20 MILLIGRAM(S): at 06:13

## 2024-01-01 RX ADMIN — POLYETHYLENE GLYCOL 3350 17 GRAM(S): 17 POWDER, FOR SOLUTION ORAL at 17:08

## 2024-01-01 RX ADMIN — Medication 650 MILLIGRAM(S): at 05:52

## 2024-01-01 RX ADMIN — Medication 1000 MILLIGRAM(S): at 13:28

## 2024-01-01 RX ADMIN — PANTOPRAZOLE SODIUM 40 MILLIGRAM(S): 40 TABLET, DELAYED RELEASE ORAL at 05:34

## 2024-01-01 RX ADMIN — PANTOPRAZOLE SODIUM 40 MILLIGRAM(S): 40 TABLET, DELAYED RELEASE ORAL at 06:05

## 2024-01-01 RX ADMIN — IPRATROPIUM BROMIDE AND ALBUTEROL SULFATE 3 MILLILITER(S): .5; 2.5 SOLUTION RESPIRATORY (INHALATION) at 17:40

## 2024-01-01 RX ADMIN — Medication 20 MILLIGRAM(S): at 21:50

## 2024-01-01 RX ADMIN — Medication 1: at 17:01

## 2024-01-01 RX ADMIN — Medication 40 MILLIGRAM(S): at 22:04

## 2024-01-01 RX ADMIN — Medication 0.8 MILLIGRAM(S): at 21:25

## 2024-01-01 RX ADMIN — FINASTERIDE 5 MILLIGRAM(S): 5 TABLET, FILM COATED ORAL at 12:07

## 2024-01-01 RX ADMIN — Medication 20 MILLIGRAM(S): at 19:28

## 2024-01-01 RX ADMIN — Medication 650 MILLIGRAM(S): at 09:41

## 2024-01-01 RX ADMIN — FOLIC ACID 1 MILLIGRAM(S): 1 TABLET ORAL at 16:54

## 2024-01-01 RX ADMIN — PIPERACILLIN AND TAZOBACTAM 25 GRAM(S): .5; 4 INJECTION, POWDER, LYOPHILIZED, FOR SOLUTION INTRAVENOUS at 14:53

## 2024-01-01 RX ADMIN — IPRATROPIUM BROMIDE AND ALBUTEROL SULFATE 3 MILLILITER(S): .5; 2.5 SOLUTION RESPIRATORY (INHALATION) at 05:54

## 2024-01-01 RX ADMIN — CHLORHEXIDINE GLUCONATE 1 APPLICATION(S): 40 SOLUTION TOPICAL at 05:15

## 2024-01-01 RX ADMIN — PIPERACILLIN AND TAZOBACTAM 200 GRAM(S): .5; 4 INJECTION, POWDER, LYOPHILIZED, FOR SOLUTION INTRAVENOUS at 06:24

## 2024-01-01 RX ADMIN — Medication 25 GRAM(S): at 11:34

## 2024-01-01 RX ADMIN — Medication 250 MILLILITER(S): at 20:26

## 2024-01-01 RX ADMIN — Medication 100 MILLILITER(S): at 06:24

## 2024-01-01 RX ADMIN — Medication 1 TABLET(S): at 11:33

## 2024-01-01 RX ADMIN — Medication 0.25 MILLIGRAM(S): at 13:01

## 2024-01-01 RX ADMIN — Medication 2000 UNIT(S): at 12:04

## 2024-01-01 RX ADMIN — Medication 40 MILLIEQUIVALENT(S): at 22:45

## 2024-01-01 RX ADMIN — Medication 1000 MICROGRAM(S): at 11:33

## 2024-01-01 RX ADMIN — DEXMEDETOMIDINE HYDROCHLORIDE 9.64 MICROGRAM(S)/KG/HR: 400 INJECTION, SOLUTION INTRAVENOUS at 06:25

## 2024-01-01 RX ADMIN — Medication 0.25 MILLIGRAM(S): at 05:19

## 2024-01-01 RX ADMIN — IPRATROPIUM BROMIDE AND ALBUTEROL SULFATE 3 MILLILITER(S): .5; 2.5 SOLUTION RESPIRATORY (INHALATION) at 03:42

## 2024-01-01 RX ADMIN — PIPERACILLIN AND TAZOBACTAM 25 GRAM(S): .5; 4 INJECTION, POWDER, LYOPHILIZED, FOR SOLUTION INTRAVENOUS at 08:55

## 2024-01-01 RX ADMIN — APIXABAN 5 MILLIGRAM(S): 5 TABLET, FILM COATED ORAL at 05:14

## 2024-01-01 RX ADMIN — CALCIUM GLUCONATE 200 GRAM(S): 98 INJECTION, SOLUTION INTRAVENOUS at 21:50

## 2024-01-01 RX ADMIN — Medication 1000 MILLILITER(S): at 13:30

## 2024-01-01 RX ADMIN — PANTOPRAZOLE SODIUM 40 MILLIGRAM(S): 40 TABLET, DELAYED RELEASE ORAL at 06:02

## 2024-01-01 RX ADMIN — Medication 400 MILLIGRAM(S): at 18:24

## 2024-01-01 RX ADMIN — Medication 5 MILLIGRAM(S): at 05:09

## 2024-01-01 RX ADMIN — Medication 2.89 MICROGRAM(S)/KG/MIN: at 06:25

## 2024-01-01 RX ADMIN — Medication 40 MILLIEQUIVALENT(S): at 18:34

## 2024-01-01 RX ADMIN — FOLIC ACID 1 MILLIGRAM(S): 1 TABLET ORAL at 12:03

## 2024-01-01 RX ADMIN — IPRATROPIUM BROMIDE AND ALBUTEROL SULFATE 3 MILLILITER(S): .5; 2.5 SOLUTION RESPIRATORY (INHALATION) at 23:04

## 2024-01-01 RX ADMIN — IPRATROPIUM BROMIDE AND ALBUTEROL SULFATE 3 MILLILITER(S): .5; 2.5 SOLUTION RESPIRATORY (INHALATION) at 00:26

## 2024-01-01 RX ADMIN — CHLORHEXIDINE GLUCONATE 1 APPLICATION(S): 40 SOLUTION TOPICAL at 06:52

## 2024-01-01 RX ADMIN — PANTOPRAZOLE SODIUM 40 MILLIGRAM(S): 40 TABLET, DELAYED RELEASE ORAL at 05:09

## 2024-01-01 RX ADMIN — VANCOMYCIN HYDROCHLORIDE 250 MILLIGRAM(S): KIT at 12:00

## 2024-01-01 RX ADMIN — IPRATROPIUM BROMIDE AND ALBUTEROL SULFATE 3 MILLILITER(S): .5; 2.5 SOLUTION RESPIRATORY (INHALATION) at 17:15

## 2024-01-01 RX ADMIN — Medication 40 MILLIGRAM(S): at 13:19

## 2024-01-01 RX ADMIN — FINASTERIDE 5 MILLIGRAM(S): 5 TABLET, FILM COATED ORAL at 13:01

## 2024-01-01 RX ADMIN — Medication 40 MILLIEQUIVALENT(S): at 17:58

## 2024-01-01 RX ADMIN — Medication 400 MILLIGRAM(S): at 04:34

## 2024-01-01 RX ADMIN — Medication 325 MILLIGRAM(S): at 11:43

## 2024-01-01 RX ADMIN — Medication 20 MILLIGRAM(S): at 11:52

## 2024-01-01 RX ADMIN — APIXABAN 5 MILLIGRAM(S): 5 TABLET, FILM COATED ORAL at 21:14

## 2024-01-01 RX ADMIN — SODIUM PHOSPHATE, MONOBASIC, MONOHYDRATE AND SODIUM PHOSPHATE, DIBASIC ANHYDROUS 85 MILLIMOLE(S): 276; 142 INJECTION, SOLUTION INTRAVENOUS at 16:06

## 2024-01-01 RX ADMIN — SODIUM CHLORIDE 4 MILLILITER(S): 9 INJECTION, SOLUTION INTRAMUSCULAR; INTRAVENOUS; SUBCUTANEOUS at 11:13

## 2024-01-01 RX ADMIN — Medication 0.8 MILLIGRAM(S): at 21:28

## 2024-01-01 RX ADMIN — OXYCODONE HYDROCHLORIDE 2.5 MILLIGRAM(S): 30 TABLET ORAL at 10:39

## 2024-01-01 RX ADMIN — APIXABAN 5 MILLIGRAM(S): 5 TABLET, FILM COATED ORAL at 05:37

## 2024-01-01 RX ADMIN — Medication 81 MILLIGRAM(S): at 11:51

## 2024-01-01 RX ADMIN — Medication 400 MILLIGRAM(S): at 05:18

## 2024-01-01 RX ADMIN — Medication 5 MILLIGRAM(S): at 05:20

## 2024-01-01 RX ADMIN — Medication 1000 MILLIGRAM(S): at 00:37

## 2024-01-01 RX ADMIN — Medication 100 MILLIEQUIVALENT(S): at 10:31

## 2024-01-01 RX ADMIN — CHLORHEXIDINE GLUCONATE 1 APPLICATION(S): 40 SOLUTION TOPICAL at 12:44

## 2024-01-01 RX ADMIN — SODIUM PHOSPHATE, MONOBASIC, MONOHYDRATE AND SODIUM PHOSPHATE, DIBASIC ANHYDROUS 255 MILLIMOLE(S): 276; 142 INJECTION, SOLUTION INTRAVENOUS at 05:30

## 2024-01-01 RX ADMIN — Medication 500 MILLIGRAM(S): at 11:33

## 2024-01-01 RX ADMIN — IPRATROPIUM BROMIDE AND ALBUTEROL SULFATE 3 MILLILITER(S): .5; 2.5 SOLUTION RESPIRATORY (INHALATION) at 05:47

## 2024-01-01 RX ADMIN — APIXABAN 5 MILLIGRAM(S): 5 TABLET, FILM COATED ORAL at 05:39

## 2024-01-01 RX ADMIN — Medication 100 MILLIGRAM(S): at 11:44

## 2024-01-01 RX ADMIN — PIPERACILLIN AND TAZOBACTAM 25 GRAM(S): .5; 4 INJECTION, POWDER, LYOPHILIZED, FOR SOLUTION INTRAVENOUS at 05:09

## 2024-01-01 RX ADMIN — Medication 650 MILLIGRAM(S): at 05:19

## 2024-01-01 RX ADMIN — Medication 1000 MICROGRAM(S): at 11:50

## 2024-01-01 RX ADMIN — Medication 325 MILLIGRAM(S): at 13:39

## 2024-01-01 RX ADMIN — Medication 0.8 MILLIGRAM(S): at 22:36

## 2024-01-01 RX ADMIN — OXYCODONE HYDROCHLORIDE 2.5 MILLIGRAM(S): 30 TABLET ORAL at 05:18

## 2024-01-01 RX ADMIN — Medication 40 MILLIEQUIVALENT(S): at 05:09

## 2024-01-01 RX ADMIN — Medication 1000 MICROGRAM(S): at 12:03

## 2024-01-01 RX ADMIN — HYDROCORTISONE 50 MILLIGRAM(S): 10 TABLET ORAL at 17:32

## 2024-01-01 RX ADMIN — HYDROCORTISONE 50 MILLIGRAM(S): 10 TABLET ORAL at 12:44

## 2024-01-01 RX ADMIN — PANTOPRAZOLE SODIUM 40 MILLIGRAM(S): 40 TABLET, DELAYED RELEASE ORAL at 06:51

## 2024-01-01 RX ADMIN — PIPERACILLIN AND TAZOBACTAM 25 GRAM(S): .5; 4 INJECTION, POWDER, LYOPHILIZED, FOR SOLUTION INTRAVENOUS at 18:12

## 2024-01-01 RX ADMIN — Medication 650 MILLIGRAM(S): at 18:09

## 2024-01-01 RX ADMIN — APIXABAN 5 MILLIGRAM(S): 5 TABLET, FILM COATED ORAL at 14:34

## 2024-01-01 RX ADMIN — PANTOPRAZOLE SODIUM 40 MILLIGRAM(S): 40 TABLET, DELAYED RELEASE ORAL at 05:01

## 2024-01-01 RX ADMIN — PANTOPRAZOLE SODIUM 40 MILLIGRAM(S): 40 TABLET, DELAYED RELEASE ORAL at 05:18

## 2024-01-01 RX ADMIN — FINASTERIDE 5 MILLIGRAM(S): 5 TABLET, FILM COATED ORAL at 11:43

## 2024-01-01 RX ADMIN — IPRATROPIUM BROMIDE AND ALBUTEROL SULFATE 3 MILLILITER(S): .5; 2.5 SOLUTION RESPIRATORY (INHALATION) at 17:13

## 2024-01-01 RX ADMIN — Medication 0.5 MILLIGRAM(S): at 23:52

## 2024-01-01 RX ADMIN — Medication 0.5 MILLIGRAM(S): at 04:42

## 2024-01-01 RX ADMIN — FINASTERIDE 5 MILLIGRAM(S): 5 TABLET, FILM COATED ORAL at 11:51

## 2024-01-01 RX ADMIN — Medication 40 MILLIGRAM(S): at 12:38

## 2024-01-01 RX ADMIN — FOLIC ACID 1 MILLIGRAM(S): 1 TABLET ORAL at 11:43

## 2024-01-01 RX ADMIN — Medication 0.5 MILLIGRAM(S): at 13:45

## 2024-01-01 RX ADMIN — Medication 1000 MILLILITER(S): at 07:42

## 2024-01-01 RX ADMIN — Medication 20 MILLIGRAM(S): at 17:51

## 2024-01-01 RX ADMIN — SODIUM CHLORIDE 4 MILLILITER(S): 9 INJECTION, SOLUTION INTRAMUSCULAR; INTRAVENOUS; SUBCUTANEOUS at 05:55

## 2024-01-01 RX ADMIN — PIPERACILLIN AND TAZOBACTAM 25 GRAM(S): .5; 4 INJECTION, POWDER, LYOPHILIZED, FOR SOLUTION INTRAVENOUS at 13:02

## 2024-01-01 RX ADMIN — Medication 40 MILLIEQUIVALENT(S): at 12:00

## 2024-01-01 RX ADMIN — IPRATROPIUM BROMIDE AND ALBUTEROL SULFATE 3 MILLILITER(S): .5; 2.5 SOLUTION RESPIRATORY (INHALATION) at 05:01

## 2024-01-01 RX ADMIN — PANTOPRAZOLE SODIUM 40 MILLIGRAM(S): 40 TABLET, DELAYED RELEASE ORAL at 17:32

## 2024-01-01 RX ADMIN — Medication 100 MILLIEQUIVALENT(S): at 20:33

## 2024-01-01 RX ADMIN — Medication 100 MILLIEQUIVALENT(S): at 12:43

## 2024-01-01 RX ADMIN — SODIUM CHLORIDE 4 MILLILITER(S): 9 INJECTION, SOLUTION INTRAMUSCULAR; INTRAVENOUS; SUBCUTANEOUS at 11:02

## 2024-01-01 RX ADMIN — HYDROCORTISONE 50 MILLIGRAM(S): 10 TABLET ORAL at 06:53

## 2024-01-01 RX ADMIN — HYDROCORTISONE 12.5 MILLIGRAM(S): 10 TABLET ORAL at 01:01

## 2024-01-01 RX ADMIN — Medication 0.5 MILLIGRAM(S): at 03:38

## 2024-01-01 RX ADMIN — Medication 500 MILLIGRAM(S): at 11:34

## 2024-01-01 RX ADMIN — Medication 1 TABLET(S): at 12:04

## 2024-01-01 RX ADMIN — Medication 40 MILLIGRAM(S): at 23:48

## 2024-01-01 RX ADMIN — MYCOPHENOLATE MOFETIL 500 MILLIGRAM(S): 500 TABLET, FILM COATED ORAL at 11:44

## 2024-01-01 RX ADMIN — FLUTICASONE PROPIONATE 1 SPRAY(S): 50 SPRAY, METERED NASAL at 17:07

## 2024-01-01 RX ADMIN — Medication 100 MILLIEQUIVALENT(S): at 01:27

## 2024-01-01 RX ADMIN — Medication 650 MILLIGRAM(S): at 17:57

## 2024-01-01 RX ADMIN — FLUTICASONE PROPIONATE 1 SPRAY(S): 50 SPRAY, METERED NASAL at 18:34

## 2024-01-01 RX ADMIN — Medication 100 MILLIEQUIVALENT(S): at 07:38

## 2024-01-01 RX ADMIN — PIPERACILLIN AND TAZOBACTAM 25 GRAM(S): .5; 4 INJECTION, POWDER, LYOPHILIZED, FOR SOLUTION INTRAVENOUS at 22:22

## 2024-01-01 RX ADMIN — Medication 0.4 MILLIGRAM(S): at 21:43

## 2024-01-01 RX ADMIN — CALCIUM GLUCONATE 200 GRAM(S): 98 INJECTION, SOLUTION INTRAVENOUS at 06:34

## 2024-01-01 RX ADMIN — MYCOPHENOLATE MOFETIL 500 MILLIGRAM(S): 500 TABLET, FILM COATED ORAL at 13:01

## 2024-01-01 RX ADMIN — SODIUM PHOSPHATE, MONOBASIC, MONOHYDRATE AND SODIUM PHOSPHATE, DIBASIC ANHYDROUS 63.75 MILLIMOLE(S): 276; 142 INJECTION, SOLUTION INTRAVENOUS at 16:56

## 2024-01-01 RX ADMIN — IPRATROPIUM BROMIDE AND ALBUTEROL SULFATE 3 MILLILITER(S): .5; 2.5 SOLUTION RESPIRATORY (INHALATION) at 17:30

## 2024-01-01 RX ADMIN — IPRATROPIUM BROMIDE AND ALBUTEROL SULFATE 3 MILLILITER(S): .5; 2.5 SOLUTION RESPIRATORY (INHALATION) at 11:14

## 2024-01-01 RX ADMIN — FLUTICASONE PROPIONATE 1 SPRAY(S): 50 SPRAY, METERED NASAL at 05:39

## 2024-01-01 RX ADMIN — APIXABAN 5 MILLIGRAM(S): 5 TABLET, FILM COATED ORAL at 05:09

## 2024-01-01 RX ADMIN — Medication 100 MILLIEQUIVALENT(S): at 13:54

## 2024-01-01 RX ADMIN — IPRATROPIUM BROMIDE AND ALBUTEROL SULFATE 3 MILLILITER(S): .5; 2.5 SOLUTION RESPIRATORY (INHALATION) at 11:18

## 2024-01-01 RX ADMIN — SODIUM CHLORIDE 4 MILLILITER(S): 9 INJECTION, SOLUTION INTRAMUSCULAR; INTRAVENOUS; SUBCUTANEOUS at 06:14

## 2024-01-01 RX ADMIN — HYDROCORTISONE 25 MILLIGRAM(S): 10 TABLET ORAL at 13:20

## 2024-01-01 RX ADMIN — Medication 20 MILLIGRAM(S): at 11:32

## 2024-01-01 RX ADMIN — PANTOPRAZOLE SODIUM 40 MILLIGRAM(S): 40 TABLET, DELAYED RELEASE ORAL at 06:13

## 2024-01-01 RX ADMIN — Medication 40 MILLIGRAM(S): at 21:25

## 2024-01-01 RX ADMIN — MYCOPHENOLATE MOFETIL 500 MILLIGRAM(S): 500 TABLET, FILM COATED ORAL at 13:37

## 2024-01-01 RX ADMIN — CHLORHEXIDINE GLUCONATE 1 APPLICATION(S): 40 SOLUTION TOPICAL at 05:49

## 2024-01-01 RX ADMIN — Medication 5 MILLIGRAM(S): at 06:02

## 2024-01-01 RX ADMIN — HYDROCORTISONE 50 MILLIGRAM(S): 10 TABLET ORAL at 05:58

## 2024-01-01 RX ADMIN — PANTOPRAZOLE SODIUM 40 MILLIGRAM(S): 40 TABLET, DELAYED RELEASE ORAL at 05:37

## 2024-01-01 RX ADMIN — Medication 0.5 MILLIGRAM(S): at 07:30

## 2024-01-01 RX ADMIN — SODIUM CHLORIDE 4 MILLILITER(S): 9 INJECTION, SOLUTION INTRAMUSCULAR; INTRAVENOUS; SUBCUTANEOUS at 11:18

## 2024-01-01 RX ADMIN — Medication 5 MILLIGRAM(S): at 09:50

## 2024-01-01 RX ADMIN — Medication 100 MILLIEQUIVALENT(S): at 22:09

## 2024-01-01 RX ADMIN — Medication 40 MILLIGRAM(S): at 12:58

## 2024-01-01 RX ADMIN — Medication 400 MILLIGRAM(S): at 18:11

## 2024-01-01 RX ADMIN — HYDROCORTISONE 12.5 MILLIGRAM(S): 10 TABLET ORAL at 14:41

## 2024-01-01 RX ADMIN — SODIUM CHLORIDE 4 MILLILITER(S): 9 INJECTION, SOLUTION INTRAMUSCULAR; INTRAVENOUS; SUBCUTANEOUS at 00:25

## 2024-01-01 RX ADMIN — Medication 100 MILLIGRAM(S): at 12:07

## 2024-01-01 RX ADMIN — SODIUM CHLORIDE 4 MILLILITER(S): 9 INJECTION, SOLUTION INTRAMUSCULAR; INTRAVENOUS; SUBCUTANEOUS at 17:28

## 2024-01-01 RX ADMIN — Medication 400 MILLIGRAM(S): at 12:58

## 2024-01-01 RX ADMIN — HYDROCORTISONE 50 MILLIGRAM(S): 10 TABLET ORAL at 06:39

## 2024-01-01 RX ADMIN — Medication 81 MILLIGRAM(S): at 11:32

## 2024-01-01 RX ADMIN — PIPERACILLIN AND TAZOBACTAM 25 GRAM(S): .5; 4 INJECTION, POWDER, LYOPHILIZED, FOR SOLUTION INTRAVENOUS at 10:54

## 2024-01-01 RX ADMIN — SODIUM CHLORIDE 4 MILLILITER(S): 9 INJECTION, SOLUTION INTRAMUSCULAR; INTRAVENOUS; SUBCUTANEOUS at 06:16

## 2024-01-01 RX ADMIN — HYDROCORTISONE 100 MILLIGRAM(S): 10 TABLET ORAL at 14:58

## 2024-01-01 RX ADMIN — Medication 5 MILLIGRAM(S): at 06:13

## 2024-01-01 RX ADMIN — OXYCODONE HYDROCHLORIDE 2.5 MILLIGRAM(S): 30 TABLET ORAL at 04:12

## 2024-01-01 RX ADMIN — PIPERACILLIN AND TAZOBACTAM 25 GRAM(S): .5; 4 INJECTION, POWDER, LYOPHILIZED, FOR SOLUTION INTRAVENOUS at 11:59

## 2024-01-01 RX ADMIN — Medication 20 MILLIGRAM(S): at 17:03

## 2024-01-01 RX ADMIN — SODIUM CHLORIDE 100 MILLILITER(S): 9 INJECTION, SOLUTION INTRAMUSCULAR; INTRAVENOUS; SUBCUTANEOUS at 18:40

## 2024-01-01 RX ADMIN — IPRATROPIUM BROMIDE AND ALBUTEROL SULFATE 3 MILLILITER(S): .5; 2.5 SOLUTION RESPIRATORY (INHALATION) at 11:35

## 2024-01-01 RX ADMIN — APIXABAN 5 MILLIGRAM(S): 5 TABLET, FILM COATED ORAL at 17:25

## 2024-01-01 RX ADMIN — FINASTERIDE 5 MILLIGRAM(S): 5 TABLET, FILM COATED ORAL at 11:33

## 2024-01-01 RX ADMIN — FOLIC ACID 1 MILLIGRAM(S): 1 TABLET ORAL at 13:12

## 2024-01-01 RX ADMIN — Medication 650 MILLIGRAM(S): at 14:05

## 2024-01-01 RX ADMIN — HYDROCORTISONE 50 MILLIGRAM(S): 10 TABLET ORAL at 23:45

## 2024-01-01 RX ADMIN — PIPERACILLIN AND TAZOBACTAM 25 GRAM(S): .5; 4 INJECTION, POWDER, LYOPHILIZED, FOR SOLUTION INTRAVENOUS at 12:58

## 2024-01-01 RX ADMIN — PIPERACILLIN AND TAZOBACTAM 25 GRAM(S): .5; 4 INJECTION, POWDER, LYOPHILIZED, FOR SOLUTION INTRAVENOUS at 14:33

## 2024-01-01 RX ADMIN — Medication 5 MILLIGRAM(S): at 05:39

## 2024-01-01 RX ADMIN — Medication 40 MILLIGRAM(S): at 21:43

## 2024-01-01 RX ADMIN — PANTOPRAZOLE SODIUM 40 MILLIGRAM(S): 40 TABLET, DELAYED RELEASE ORAL at 06:34

## 2024-01-01 RX ADMIN — FLUTICASONE PROPIONATE 1 SPRAY(S): 50 SPRAY, METERED NASAL at 18:18

## 2024-01-01 RX ADMIN — PIPERACILLIN AND TAZOBACTAM 25 GRAM(S): .5; 4 INJECTION, POWDER, LYOPHILIZED, FOR SOLUTION INTRAVENOUS at 10:09

## 2024-01-01 RX ADMIN — OXYCODONE HYDROCHLORIDE 2.5 MILLIGRAM(S): 30 TABLET ORAL at 20:18

## 2024-01-01 RX ADMIN — SODIUM CHLORIDE 4 MILLILITER(S): 9 INJECTION, SOLUTION INTRAMUSCULAR; INTRAVENOUS; SUBCUTANEOUS at 11:11

## 2024-01-01 RX ADMIN — Medication 400 MILLIGRAM(S): at 00:07

## 2024-01-01 RX ADMIN — Medication 40 MILLIGRAM(S): at 22:00

## 2024-01-01 RX ADMIN — MYCOPHENOLATE MOFETIL 500 MILLIGRAM(S): 500 TABLET, FILM COATED ORAL at 12:04

## 2024-01-01 RX ADMIN — IPRATROPIUM BROMIDE AND ALBUTEROL SULFATE 3 MILLILITER(S): .5; 2.5 SOLUTION RESPIRATORY (INHALATION) at 23:03

## 2024-01-01 RX ADMIN — IPRATROPIUM BROMIDE AND ALBUTEROL SULFATE 3 MILLILITER(S): .5; 2.5 SOLUTION RESPIRATORY (INHALATION) at 17:55

## 2024-01-01 RX ADMIN — Medication 1000 MICROGRAM(S): at 13:38

## 2024-01-01 RX ADMIN — POLYETHYLENE GLYCOL 3350 17 GRAM(S): 17 POWDER, FOR SOLUTION ORAL at 05:06

## 2024-01-01 RX ADMIN — OXYCODONE HYDROCHLORIDE 2.5 MILLIGRAM(S): 30 TABLET ORAL at 11:01

## 2024-01-01 RX ADMIN — Medication 400 MILLIGRAM(S): at 11:10

## 2024-01-01 RX ADMIN — HYDROCORTISONE 50 MILLIGRAM(S): 10 TABLET ORAL at 05:18

## 2024-01-01 RX ADMIN — IPRATROPIUM BROMIDE AND ALBUTEROL SULFATE 3 MILLILITER(S): .5; 2.5 SOLUTION RESPIRATORY (INHALATION) at 17:28

## 2024-01-01 RX ADMIN — Medication 1: at 17:32

## 2024-01-01 RX ADMIN — CHLORHEXIDINE GLUCONATE 1 APPLICATION(S): 40 SOLUTION TOPICAL at 06:50

## 2024-01-01 RX ADMIN — OXYCODONE HYDROCHLORIDE 2.5 MILLIGRAM(S): 30 TABLET ORAL at 19:23

## 2024-01-01 RX ADMIN — FOLIC ACID 1 MILLIGRAM(S): 1 TABLET ORAL at 13:07

## 2024-01-01 RX ADMIN — Medication 650 MILLIGRAM(S): at 06:19

## 2024-01-01 RX ADMIN — SODIUM PHOSPHATE, MONOBASIC, MONOHYDRATE AND SODIUM PHOSPHATE, DIBASIC ANHYDROUS 63.75 MILLIMOLE(S): 276; 142 INJECTION, SOLUTION INTRAVENOUS at 15:23

## 2024-01-01 RX ADMIN — IPRATROPIUM BROMIDE AND ALBUTEROL SULFATE 3 MILLILITER(S): .5; 2.5 SOLUTION RESPIRATORY (INHALATION) at 06:16

## 2024-01-01 RX ADMIN — PIPERACILLIN AND TAZOBACTAM 25 GRAM(S): .5; 4 INJECTION, POWDER, LYOPHILIZED, FOR SOLUTION INTRAVENOUS at 22:04

## 2024-01-01 RX ADMIN — CHLORHEXIDINE GLUCONATE 1 APPLICATION(S): 40 SOLUTION TOPICAL at 05:32

## 2024-01-01 RX ADMIN — IPRATROPIUM BROMIDE AND ALBUTEROL SULFATE 3 MILLILITER(S): .5; 2.5 SOLUTION RESPIRATORY (INHALATION) at 06:22

## 2024-01-01 RX ADMIN — Medication 25 GRAM(S): at 03:45

## 2024-01-01 RX ADMIN — IPRATROPIUM BROMIDE AND ALBUTEROL SULFATE 3 MILLILITER(S): .5; 2.5 SOLUTION RESPIRATORY (INHALATION) at 05:18

## 2024-01-01 RX ADMIN — Medication 650 MILLIGRAM(S): at 19:00

## 2024-01-01 RX ADMIN — APIXABAN 5 MILLIGRAM(S): 5 TABLET, FILM COATED ORAL at 17:34

## 2024-01-01 RX ADMIN — Medication 650 MILLIGRAM(S): at 06:33

## 2024-01-01 RX ADMIN — Medication 100 MILLIGRAM(S): at 13:39

## 2024-01-01 RX ADMIN — Medication 500 MILLIGRAM(S): at 15:24

## 2024-01-01 RX ADMIN — IPRATROPIUM BROMIDE AND ALBUTEROL SULFATE 3 MILLILITER(S): .5; 2.5 SOLUTION RESPIRATORY (INHALATION) at 06:13

## 2024-01-01 RX ADMIN — PIPERACILLIN AND TAZOBACTAM 25 GRAM(S): .5; 4 INJECTION, POWDER, LYOPHILIZED, FOR SOLUTION INTRAVENOUS at 02:19

## 2024-01-01 RX ADMIN — Medication 0.5 MILLIGRAM(S): at 03:04

## 2024-01-01 RX ADMIN — FOLIC ACID 1 MILLIGRAM(S): 1 TABLET ORAL at 12:08

## 2024-01-01 RX ADMIN — Medication 50 MILLILITER(S): at 06:08

## 2024-01-01 RX ADMIN — Medication 650 MILLIGRAM(S): at 09:43

## 2024-01-01 RX ADMIN — APIXABAN 5 MILLIGRAM(S): 5 TABLET, FILM COATED ORAL at 17:58

## 2024-01-01 RX ADMIN — APIXABAN 5 MILLIGRAM(S): 5 TABLET, FILM COATED ORAL at 05:20

## 2024-01-01 RX ADMIN — Medication 40 MILLIGRAM(S): at 12:00

## 2024-01-01 RX ADMIN — MYCOPHENOLATE MOFETIL 500 MILLIGRAM(S): 500 TABLET, FILM COATED ORAL at 12:03

## 2024-01-01 RX ADMIN — Medication 1000 MICROGRAM(S): at 11:34

## 2024-01-01 RX ADMIN — Medication 100 MILLIEQUIVALENT(S): at 23:47

## 2024-01-01 RX ADMIN — IPRATROPIUM BROMIDE AND ALBUTEROL SULFATE 3 MILLILITER(S): .5; 2.5 SOLUTION RESPIRATORY (INHALATION) at 11:12

## 2024-01-01 RX ADMIN — Medication 0.5 MILLIGRAM(S): at 06:38

## 2024-01-01 RX ADMIN — Medication 100 MILLIEQUIVALENT(S): at 16:07

## 2024-01-01 RX ADMIN — CHLORHEXIDINE GLUCONATE 1 APPLICATION(S): 40 SOLUTION TOPICAL at 05:27

## 2024-01-01 RX ADMIN — OXYCODONE HYDROCHLORIDE 2.5 MILLIGRAM(S): 30 TABLET ORAL at 19:45

## 2024-01-01 RX ADMIN — Medication 0.5 MILLIGRAM(S): at 06:02

## 2024-01-01 RX ADMIN — APIXABAN 5 MILLIGRAM(S): 5 TABLET, FILM COATED ORAL at 13:21

## 2024-01-01 RX ADMIN — Medication 650 MILLIGRAM(S): at 06:03

## 2024-01-01 RX ADMIN — Medication 650 MILLIGRAM(S): at 09:13

## 2024-01-01 RX ADMIN — PANTOPRAZOLE SODIUM 40 MILLIGRAM(S): 40 TABLET, DELAYED RELEASE ORAL at 05:39

## 2024-01-01 RX ADMIN — Medication 400 MILLIGRAM(S): at 11:59

## 2024-01-01 RX ADMIN — Medication 1000 MICROGRAM(S): at 11:44

## 2024-01-01 RX ADMIN — PIPERACILLIN AND TAZOBACTAM 25 GRAM(S): .5; 4 INJECTION, POWDER, LYOPHILIZED, FOR SOLUTION INTRAVENOUS at 21:50

## 2024-01-01 RX ADMIN — CHLORHEXIDINE GLUCONATE 1 APPLICATION(S): 40 SOLUTION TOPICAL at 05:19

## 2024-01-01 RX ADMIN — Medication 20 MILLIGRAM(S): at 09:40

## 2024-01-01 RX ADMIN — Medication 1: at 17:09

## 2024-01-01 RX ADMIN — Medication 400 MILLIGRAM(S): at 00:18

## 2024-01-01 RX ADMIN — Medication 0.5 MILLIGRAM(S): at 06:53

## 2024-01-01 RX ADMIN — Medication 100 MILLIGRAM(S): at 11:34

## 2024-01-01 RX ADMIN — Medication 650 MILLIGRAM(S): at 06:52

## 2024-01-01 RX ADMIN — Medication 0.8 MILLIGRAM(S): at 21:13

## 2024-01-01 RX ADMIN — APIXABAN 5 MILLIGRAM(S): 5 TABLET, FILM COATED ORAL at 05:18

## 2024-01-01 RX ADMIN — IPRATROPIUM BROMIDE AND ALBUTEROL SULFATE 3 MILLILITER(S): .5; 2.5 SOLUTION RESPIRATORY (INHALATION) at 18:57

## 2024-01-01 RX ADMIN — SODIUM CHLORIDE 4 MILLILITER(S): 9 INJECTION, SOLUTION INTRAMUSCULAR; INTRAVENOUS; SUBCUTANEOUS at 05:46

## 2024-01-01 RX ADMIN — SODIUM PHOSPHATE, MONOBASIC, MONOHYDRATE AND SODIUM PHOSPHATE, DIBASIC ANHYDROUS 255 MILLIMOLE(S): 276; 142 INJECTION, SOLUTION INTRAVENOUS at 09:50

## 2024-01-01 RX ADMIN — APIXABAN 5 MILLIGRAM(S): 5 TABLET, FILM COATED ORAL at 18:19

## 2024-01-01 RX ADMIN — PIPERACILLIN AND TAZOBACTAM 25 GRAM(S): .5; 4 INJECTION, POWDER, LYOPHILIZED, FOR SOLUTION INTRAVENOUS at 05:57

## 2024-01-01 RX ADMIN — FLUTICASONE PROPIONATE 1 SPRAY(S): 50 SPRAY, METERED NASAL at 17:51

## 2024-01-01 RX ADMIN — Medication 100 MILLIEQUIVALENT(S): at 19:29

## 2024-01-01 RX ADMIN — IPRATROPIUM BROMIDE AND ALBUTEROL SULFATE 3 MILLILITER(S): .5; 2.5 SOLUTION RESPIRATORY (INHALATION) at 05:37

## 2024-01-01 RX ADMIN — Medication 400 MILLIGRAM(S): at 23:18

## 2024-01-01 RX ADMIN — Medication 1 TABLET(S): at 13:34

## 2024-01-01 RX ADMIN — Medication 1000 MICROGRAM(S): at 12:08

## 2024-01-01 RX ADMIN — Medication 1 TABLET(S): at 12:08

## 2024-01-01 RX ADMIN — SODIUM CHLORIDE 4 MILLILITER(S): 9 INJECTION, SOLUTION INTRAMUSCULAR; INTRAVENOUS; SUBCUTANEOUS at 00:15

## 2024-01-01 RX ADMIN — Medication 100 MILLIEQUIVALENT(S): at 10:54

## 2024-01-01 RX ADMIN — PIPERACILLIN AND TAZOBACTAM 25 GRAM(S): .5; 4 INJECTION, POWDER, LYOPHILIZED, FOR SOLUTION INTRAVENOUS at 21:51

## 2024-01-01 RX ADMIN — Medication 100 MILLIGRAM(S): at 12:03

## 2024-01-01 RX ADMIN — Medication 2000 UNIT(S): at 11:32

## 2024-01-01 RX ADMIN — Medication 5 MILLIGRAM(S): at 05:28

## 2024-01-01 RX ADMIN — PIPERACILLIN AND TAZOBACTAM 25 GRAM(S): .5; 4 INJECTION, POWDER, LYOPHILIZED, FOR SOLUTION INTRAVENOUS at 05:48

## 2024-01-01 RX ADMIN — Medication 20 MILLIGRAM(S): at 05:39

## 2024-01-01 RX ADMIN — Medication 0.5 MILLIGRAM(S): at 00:27

## 2024-01-01 RX ADMIN — Medication 400 MILLIGRAM(S): at 18:42

## 2024-01-01 RX ADMIN — Medication 40 MILLIGRAM(S): at 23:06

## 2024-01-01 RX ADMIN — MYCOPHENOLATE MOFETIL 500 MILLIGRAM(S): 500 TABLET, FILM COATED ORAL at 12:00

## 2024-01-01 RX ADMIN — PIPERACILLIN AND TAZOBACTAM 25 GRAM(S): .5; 4 INJECTION, POWDER, LYOPHILIZED, FOR SOLUTION INTRAVENOUS at 18:16

## 2024-01-01 RX ADMIN — FLUTICASONE PROPIONATE 1 SPRAY(S): 50 SPRAY, METERED NASAL at 17:57

## 2024-01-01 RX ADMIN — PIPERACILLIN AND TAZOBACTAM 25 GRAM(S): .5; 4 INJECTION, POWDER, LYOPHILIZED, FOR SOLUTION INTRAVENOUS at 22:46

## 2024-01-01 RX ADMIN — FLUTICASONE PROPIONATE 1 SPRAY(S): 50 SPRAY, METERED NASAL at 17:34

## 2024-01-01 RX ADMIN — Medication 500 MILLIGRAM(S): at 11:32

## 2024-01-01 RX ADMIN — PIPERACILLIN AND TAZOBACTAM 25 GRAM(S): .5; 4 INJECTION, POWDER, LYOPHILIZED, FOR SOLUTION INTRAVENOUS at 22:41

## 2024-01-01 RX ADMIN — Medication 100 MILLIEQUIVALENT(S): at 12:42

## 2024-01-01 RX ADMIN — Medication 1000 MILLIGRAM(S): at 05:57

## 2024-01-01 RX ADMIN — HYDROCORTISONE 50 MILLIGRAM(S): 10 TABLET ORAL at 18:24

## 2024-01-01 RX ADMIN — IPRATROPIUM BROMIDE AND ALBUTEROL SULFATE 3 MILLILITER(S): .5; 2.5 SOLUTION RESPIRATORY (INHALATION) at 23:29

## 2024-01-01 RX ADMIN — SODIUM CHLORIDE 4 MILLILITER(S): 9 INJECTION, SOLUTION INTRAMUSCULAR; INTRAVENOUS; SUBCUTANEOUS at 17:16

## 2024-01-01 RX ADMIN — Medication 2000 UNIT(S): at 11:51

## 2024-01-01 RX ADMIN — Medication 1 TABLET(S): at 11:51

## 2024-01-01 RX ADMIN — Medication 40 MILLIGRAM(S): at 04:13

## 2024-01-01 RX ADMIN — Medication 100 MILLIEQUIVALENT(S): at 03:12

## 2024-01-01 RX ADMIN — OXYCODONE HYDROCHLORIDE 2.5 MILLIGRAM(S): 30 TABLET ORAL at 08:59

## 2024-01-01 RX ADMIN — ALBUMIN HUMAN 125 MILLILITER(S): 50 SOLUTION INTRAVENOUS at 16:09

## 2024-01-01 RX ADMIN — Medication 100 MILLILITER(S): at 16:13

## 2024-01-01 RX ADMIN — FLUTICASONE PROPIONATE 1 SPRAY(S): 50 SPRAY, METERED NASAL at 05:22

## 2024-01-01 RX ADMIN — FOLIC ACID 1 MILLIGRAM(S): 1 TABLET ORAL at 11:34

## 2024-01-01 RX ADMIN — Medication 0.5 MILLIGRAM(S): at 13:18

## 2024-01-01 RX ADMIN — FLUTICASONE PROPIONATE 1 SPRAY(S): 50 SPRAY, METERED NASAL at 05:19

## 2024-01-01 RX ADMIN — IPRATROPIUM BROMIDE AND ALBUTEROL SULFATE 3 MILLILITER(S): .5; 2.5 SOLUTION RESPIRATORY (INHALATION) at 11:13

## 2024-01-01 RX ADMIN — MYCOPHENOLATE MOFETIL 500 MILLIGRAM(S): 500 TABLET, FILM COATED ORAL at 13:19

## 2024-01-01 RX ADMIN — Medication 1000 MILLIGRAM(S): at 05:00

## 2024-01-01 RX ADMIN — Medication 500 MILLIGRAM(S): at 12:04

## 2024-01-01 RX ADMIN — Medication 650 MILLIGRAM(S): at 17:09

## 2024-01-01 RX ADMIN — OXYCODONE HYDROCHLORIDE 2.5 MILLIGRAM(S): 30 TABLET ORAL at 20:23

## 2024-01-01 RX ADMIN — HYDROCORTISONE 50 MILLIGRAM(S): 10 TABLET ORAL at 17:25

## 2024-01-01 RX ADMIN — PIPERACILLIN AND TAZOBACTAM 25 GRAM(S): .5; 4 INJECTION, POWDER, LYOPHILIZED, FOR SOLUTION INTRAVENOUS at 02:25

## 2024-01-01 RX ADMIN — OXYCODONE HYDROCHLORIDE 2.5 MILLIGRAM(S): 30 TABLET ORAL at 03:12

## 2024-01-01 RX ADMIN — Medication 100 MILLILITER(S): at 15:29

## 2024-01-01 RX ADMIN — PANTOPRAZOLE SODIUM 40 MILLIGRAM(S): 40 TABLET, DELAYED RELEASE ORAL at 05:15

## 2024-01-01 RX ADMIN — Medication 40 MILLIGRAM(S): at 22:23

## 2024-01-01 RX ADMIN — Medication 1: at 17:20

## 2024-01-01 RX ADMIN — IPRATROPIUM BROMIDE AND ALBUTEROL SULFATE 3 MILLILITER(S): .5; 2.5 SOLUTION RESPIRATORY (INHALATION) at 17:03

## 2024-01-01 RX ADMIN — Medication 40 MILLILITER(S): at 06:03

## 2024-01-01 RX ADMIN — OXYCODONE HYDROCHLORIDE 2.5 MILLIGRAM(S): 30 TABLET ORAL at 19:48

## 2024-01-01 RX ADMIN — MYCOPHENOLATE MOFETIL 41.67 MILLIGRAM(S): 500 TABLET, FILM COATED ORAL at 13:08

## 2024-01-01 RX ADMIN — HYDROCORTISONE 50 MILLIGRAM(S): 10 TABLET ORAL at 23:48

## 2024-01-01 RX ADMIN — Medication 1000 MILLILITER(S): at 15:29

## 2024-01-01 RX ADMIN — SODIUM PHOSPHATE, MONOBASIC, MONOHYDRATE AND SODIUM PHOSPHATE, DIBASIC ANHYDROUS 85 MILLIMOLE(S): 276; 142 INJECTION, SOLUTION INTRAVENOUS at 13:20

## 2024-01-01 RX ADMIN — Medication 5 MILLIGRAM(S): at 05:18

## 2024-01-01 RX ADMIN — Medication 0.5 MILLIGRAM(S): at 11:34

## 2024-01-01 RX ADMIN — PIPERACILLIN AND TAZOBACTAM 200 GRAM(S): .5; 4 INJECTION, POWDER, LYOPHILIZED, FOR SOLUTION INTRAVENOUS at 17:31

## 2024-01-01 RX ADMIN — Medication 100 MILLILITER(S): at 23:52

## 2024-01-01 RX ADMIN — IPRATROPIUM BROMIDE AND ALBUTEROL SULFATE 3 MILLILITER(S): .5; 2.5 SOLUTION RESPIRATORY (INHALATION) at 17:42

## 2024-01-01 RX ADMIN — Medication 1000 MILLIGRAM(S): at 05:49

## 2024-01-01 RX ADMIN — CHLORHEXIDINE GLUCONATE 1 APPLICATION(S): 40 SOLUTION TOPICAL at 06:14

## 2024-01-01 RX ADMIN — APIXABAN 5 MILLIGRAM(S): 5 TABLET, FILM COATED ORAL at 17:03

## 2024-01-01 RX ADMIN — APIXABAN 5 MILLIGRAM(S): 5 TABLET, FILM COATED ORAL at 18:18

## 2024-01-01 RX ADMIN — IPRATROPIUM BROMIDE AND ALBUTEROL SULFATE 3 MILLILITER(S): .5; 2.5 SOLUTION RESPIRATORY (INHALATION) at 05:06

## 2024-01-01 RX ADMIN — CHLORHEXIDINE GLUCONATE 1 APPLICATION(S): 40 SOLUTION TOPICAL at 05:40

## 2024-01-01 RX ADMIN — Medication 1 TABLET(S): at 11:34

## 2024-01-01 RX ADMIN — Medication 20 MILLIGRAM(S): at 05:18

## 2024-01-01 RX ADMIN — Medication 1000 MILLIGRAM(S): at 00:48

## 2024-01-01 RX ADMIN — FLUTICASONE PROPIONATE 1 SPRAY(S): 50 SPRAY, METERED NASAL at 06:13

## 2024-01-01 RX ADMIN — Medication 1: at 13:01

## 2024-01-01 RX ADMIN — OXYCODONE HYDROCHLORIDE 2.5 MILLIGRAM(S): 30 TABLET ORAL at 20:45

## 2024-01-01 RX ADMIN — FOLIC ACID 1 MILLIGRAM(S): 1 TABLET ORAL at 12:58

## 2024-01-01 RX ADMIN — PIPERACILLIN AND TAZOBACTAM 25 GRAM(S): .5; 4 INJECTION, POWDER, LYOPHILIZED, FOR SOLUTION INTRAVENOUS at 01:01

## 2024-01-01 RX ADMIN — Medication 0.4 MILLIGRAM(S): at 22:00

## 2024-01-01 RX ADMIN — Medication 0.8 MILLIGRAM(S): at 21:58

## 2024-01-01 RX ADMIN — IPRATROPIUM BROMIDE AND ALBUTEROL SULFATE 3 MILLILITER(S): .5; 2.5 SOLUTION RESPIRATORY (INHALATION) at 05:08

## 2024-01-01 RX ADMIN — Medication 0.25 MILLIGRAM(S): at 05:49

## 2024-01-01 RX ADMIN — HYDROCORTISONE 50 MILLIGRAM(S): 10 TABLET ORAL at 12:58

## 2024-01-01 RX ADMIN — DIBASIC SODIUM PHOSPHATE, MONOBASIC POTASSIUM PHOSPHATE AND MONOBASIC SODIUM PHOSPHATE 2 TABLET(S): 852; 155; 130 TABLET ORAL at 06:20

## 2024-01-01 RX ADMIN — IPRATROPIUM BROMIDE AND ALBUTEROL SULFATE 3 MILLILITER(S): .5; 2.5 SOLUTION RESPIRATORY (INHALATION) at 23:28

## 2024-01-10 ENCOUNTER — APPOINTMENT (OUTPATIENT)
Dept: INTERNAL MEDICINE | Facility: CLINIC | Age: 84
End: 2024-01-10

## 2024-01-26 ENCOUNTER — RX RENEWAL (OUTPATIENT)
Age: 84
End: 2024-01-26

## 2024-01-26 RX ORDER — SACUBITRIL AND VALSARTAN 97; 103 MG/1; MG/1
97-103 TABLET, FILM COATED ORAL
Qty: 180 | Refills: 3 | Status: ACTIVE | COMMUNITY
Start: 2024-01-26 | End: 1900-01-01

## 2024-02-01 ENCOUNTER — RX RENEWAL (OUTPATIENT)
Age: 84
End: 2024-02-01

## 2024-03-21 ENCOUNTER — APPOINTMENT (OUTPATIENT)
Dept: UROLOGY | Facility: CLINIC | Age: 84
End: 2024-03-21

## 2024-03-27 ENCOUNTER — LABORATORY RESULT (OUTPATIENT)
Age: 84
End: 2024-03-27

## 2024-03-27 ENCOUNTER — APPOINTMENT (OUTPATIENT)
Dept: CARDIOLOGY | Facility: CLINIC | Age: 84
End: 2024-03-27

## 2024-03-27 ENCOUNTER — APPOINTMENT (OUTPATIENT)
Dept: INTERNAL MEDICINE | Facility: CLINIC | Age: 84
End: 2024-03-27
Payer: MEDICARE

## 2024-03-27 ENCOUNTER — NON-APPOINTMENT (OUTPATIENT)
Age: 84
End: 2024-03-27

## 2024-03-27 VITALS
WEIGHT: 183 LBS | HEIGHT: 69 IN | BODY MASS INDEX: 27.11 KG/M2 | TEMPERATURE: 97.7 F | DIASTOLIC BLOOD PRESSURE: 70 MMHG | SYSTOLIC BLOOD PRESSURE: 164 MMHG | HEART RATE: 74 BPM

## 2024-03-27 DIAGNOSIS — Z00.00 ENCOUNTER FOR GENERAL ADULT MEDICAL EXAMINATION W/OUT ABNORMAL FINDINGS: ICD-10-CM

## 2024-03-27 DIAGNOSIS — G70.00 MYASTHENIA GRAVIS W/OUT (ACUTE) EXACERBATION: ICD-10-CM

## 2024-03-27 DIAGNOSIS — E11.9 TYPE 2 DIABETES MELLITUS W/OUT COMPLICATIONS: ICD-10-CM

## 2024-03-27 DIAGNOSIS — I48.91 UNSPECIFIED ATRIAL FIBRILLATION: ICD-10-CM

## 2024-03-27 PROCEDURE — 36415 COLL VENOUS BLD VENIPUNCTURE: CPT

## 2024-03-27 PROCEDURE — G0439: CPT

## 2024-03-27 PROCEDURE — 93000 ELECTROCARDIOGRAM COMPLETE: CPT

## 2024-03-27 RX ORDER — SULFAMETHOXAZOLE AND TRIMETHOPRIM 800; 160 MG/1; MG/1
800-160 TABLET ORAL
Qty: 14 | Refills: 0 | Status: DISCONTINUED | COMMUNITY
Start: 2023-11-14 | End: 2024-03-27

## 2024-03-27 RX ORDER — SACUBITRIL AND VALSARTAN 97; 103 MG/1; MG/1
97-103 TABLET, FILM COATED ORAL
Qty: 180 | Refills: 2 | Status: DISCONTINUED | COMMUNITY
Start: 2022-07-27 | End: 2024-03-27

## 2024-03-27 NOTE — ASSESSMENT
[FreeTextEntry1] : This is a 84 year -old  M who was examined today for an annual preventative physical.  A complete history and physical examination were performed.  The patient seems to follow a healthy lifestyle in that he  follows a good diet and exercises regularly.    The following recommendations were made: Exercise regularly. Maintain a healthy diet. _____________________________________________________  fu in 3 mo

## 2024-03-27 NOTE — HEALTH RISK ASSESSMENT
[Good] : ~his/her~  mood as  good [No falls in past year] : Patient reported no falls in the past year [0] : 2) Feeling down, depressed, or hopeless: Not at all (0) [PHQ-2 Negative - No further assessment needed] : PHQ-2 Negative - No further assessment needed [Never] : Never [[id number]] : 0

## 2024-03-27 NOTE — HISTORY OF PRESENT ILLNESS
[de-identified] : This is annual Preventative examination for this 84 year  old male.  The patient has been generally feeling well with no new complaints . A complete evaluation of their current medication was reviewed with them including OTC medication .  Chronic medical problems for which they are being followed by a physician are: mg diabetes entresto        Exercises regularly:  A complete Review of Systems is below but it is noteworthy to mention that this patient denies Chest Pain, Dyspnea on Exertion, Palpitations, urinary problems, rectal bleeding or Gastrointestinal problems at this time.

## 2024-03-27 NOTE — PHYSICAL EXAM
[No Acute Distress] : no acute distress [Well Nourished] : well nourished [Well Developed] : well developed [Well-Appearing] : well-appearing [Normal Sclera/Conjunctiva] : normal sclera/conjunctiva [EOMI] : extraocular movements intact [PERRL] : pupils equal round and reactive to light [Normal Outer Ear/Nose] : the outer ears and nose were normal in appearance [Normal Oropharynx] : the oropharynx was normal [No JVD] : no jugular venous distention [No Lymphadenopathy] : no lymphadenopathy [Supple] : supple [Thyroid Normal, No Nodules] : the thyroid was normal and there were no nodules present [No Respiratory Distress] : no respiratory distress  [No Accessory Muscle Use] : no accessory muscle use [Clear to Auscultation] : lungs were clear to auscultation bilaterally [Normal Rate] : normal rate  [Normal S1, S2] : normal S1 and S2 [No Carotid Bruits] : no carotid bruits [No Abdominal Bruit] : a ~M bruit was not heard ~T in the abdomen [No Varicosities] : no varicosities [Pedal Pulses Present] : the pedal pulses are present [No Edema] : there was no peripheral edema [No Palpable Aorta] : no palpable aorta [No Extremity Clubbing/Cyanosis] : no extremity clubbing/cyanosis [Soft] : abdomen soft [Non Tender] : non-tender [No Masses] : no abdominal mass palpated [Non-distended] : non-distended [No HSM] : no HSM [Normal Bowel Sounds] : normal bowel sounds [Normal Posterior Cervical Nodes] : no posterior cervical lymphadenopathy [Normal Anterior Cervical Nodes] : no anterior cervical lymphadenopathy [No Spinal Tenderness] : no spinal tenderness [No CVA Tenderness] : no CVA  tenderness [No Joint Swelling] : no joint swelling [Grossly Normal Strength/Tone] : grossly normal strength/tone [No Rash] : no rash [Coordination Grossly Intact] : coordination grossly intact [No Focal Deficits] : no focal deficits [Normal Gait] : normal gait [Deep Tendon Reflexes (DTR)] : deep tendon reflexes were 2+ and symmetric [Normal Affect] : the affect was normal [Normal Insight/Judgement] : insight and judgment were intact [de-identified] : afib, 3/6 sysstolic murmur

## 2024-04-01 ENCOUNTER — RX RENEWAL (OUTPATIENT)
Age: 84
End: 2024-04-01

## 2024-04-01 ENCOUNTER — NON-APPOINTMENT (OUTPATIENT)
Age: 84
End: 2024-04-01

## 2024-04-01 LAB
25(OH)D3 SERPL-MCNC: 17.8 NG/ML
ALBUMIN SERPL ELPH-MCNC: 4.4 G/DL
ALP BLD-CCNC: 142 U/L
ALT SERPL-CCNC: 10 U/L
ANION GAP SERPL CALC-SCNC: 11 MMOL/L
APPEARANCE: CLEAR
AST SERPL-CCNC: 16 U/L
BACTERIA: NEGATIVE /HPF
BASOPHILS # BLD AUTO: 0.02 K/UL
BASOPHILS NFR BLD AUTO: 0.5 %
BILIRUB SERPL-MCNC: 0.9 MG/DL
BILIRUBIN URINE: NEGATIVE
BLOOD URINE: NEGATIVE
BUN SERPL-MCNC: 29 MG/DL
CALCIUM SERPL-MCNC: 9.4 MG/DL
CAST: 0 /LPF
CHLORIDE SERPL-SCNC: 104 MMOL/L
CHOLEST SERPL-MCNC: 154 MG/DL
CO2 SERPL-SCNC: 25 MMOL/L
COLOR: YELLOW
CREAT SERPL-MCNC: 1 MG/DL
EGFR: 74 ML/MIN/1.73M2
EOSINOPHIL # BLD AUTO: 0.02 K/UL
EOSINOPHIL NFR BLD AUTO: 0.5 %
EPITHELIAL CELLS: 0 /HPF
ESTIMATED AVERAGE GLUCOSE: 114 MG/DL
GLUCOSE QUALITATIVE U: NEGATIVE MG/DL
GLUCOSE SERPL-MCNC: 129 MG/DL
HBA1C MFR BLD HPLC: 5.6 %
HCT VFR BLD CALC: 36 %
HDLC SERPL-MCNC: 49 MG/DL
HGB BLD-MCNC: 10.9 G/DL
IMM GRANULOCYTES NFR BLD AUTO: 0.3 %
KETONES URINE: ABNORMAL MG/DL
LDLC SERPL CALC-MCNC: 95 MG/DL
LEUKOCYTE ESTERASE URINE: NEGATIVE
LYMPHOCYTES # BLD AUTO: 0.48 K/UL
LYMPHOCYTES NFR BLD AUTO: 12 %
MAN DIFF?: NORMAL
MCHC RBC-ENTMCNC: 27.7 PG
MCHC RBC-ENTMCNC: 30.3 GM/DL
MCV RBC AUTO: 91.4 FL
MICROSCOPIC-UA: NORMAL
MONOCYTES # BLD AUTO: 0.25 K/UL
MONOCYTES NFR BLD AUTO: 6.3 %
NEUTROPHILS # BLD AUTO: 3.22 K/UL
NEUTROPHILS NFR BLD AUTO: 80.4 %
NITRITE URINE: NEGATIVE
NONHDLC SERPL-MCNC: 105 MG/DL
PH URINE: 5.5
PLATELET # BLD AUTO: 130 K/UL
POTASSIUM SERPL-SCNC: 4.7 MMOL/L
PROT SERPL-MCNC: 7 G/DL
PROTEIN URINE: NORMAL MG/DL
PSA SERPL-MCNC: 1.76 NG/ML
RBC # BLD: 3.94 M/UL
RBC # FLD: 15.6 %
RED BLOOD CELLS URINE: 0 /HPF
SODIUM SERPL-SCNC: 140 MMOL/L
SPECIFIC GRAVITY URINE: 1.01
T4 SERPL-MCNC: 8.3 UG/DL
TRIGL SERPL-MCNC: 48 MG/DL
TSH SERPL-ACNC: 1.25 UIU/ML
URATE SERPL-MCNC: 7.2 MG/DL
UROBILINOGEN URINE: 1 MG/DL
WBC # FLD AUTO: 4 K/UL
WHITE BLOOD CELLS URINE: 0 /HPF

## 2024-04-24 ENCOUNTER — APPOINTMENT (OUTPATIENT)
Dept: UROLOGY | Facility: CLINIC | Age: 84
End: 2024-04-24
Payer: MEDICARE

## 2024-04-24 VITALS
SYSTOLIC BLOOD PRESSURE: 174 MMHG | DIASTOLIC BLOOD PRESSURE: 58 MMHG | OXYGEN SATURATION: 94 % | HEART RATE: 73 BPM | TEMPERATURE: 98.3 F | RESPIRATION RATE: 14 BRPM

## 2024-04-24 DIAGNOSIS — R33.9 RETENTION OF URINE, UNSPECIFIED: ICD-10-CM

## 2024-04-24 DIAGNOSIS — N40.1 BENIGN PROSTATIC HYPERPLASIA WITH LOWER URINARY TRACT SYMPMS: ICD-10-CM

## 2024-04-24 DIAGNOSIS — N13.8 BENIGN PROSTATIC HYPERPLASIA WITH LOWER URINARY TRACT SYMPMS: ICD-10-CM

## 2024-04-24 PROCEDURE — 51798 US URINE CAPACITY MEASURE: CPT

## 2024-04-24 PROCEDURE — 99214 OFFICE O/P EST MOD 30 MIN: CPT

## 2024-04-24 PROCEDURE — G2211 COMPLEX E/M VISIT ADD ON: CPT

## 2024-04-24 RX ORDER — TAMSULOSIN HYDROCHLORIDE 0.4 MG/1
0.4 CAPSULE ORAL
Qty: 180 | Refills: 3 | Status: ACTIVE | COMMUNITY
Start: 2023-10-20 | End: 1900-01-01

## 2024-04-24 NOTE — ASSESSMENT
[FreeTextEntry1] :  Mr. Santos presents for follow up of urinary retention presumed to be due to BPH. Denies previous history of retention but has had long-standing complaints of urinary frequency, small volume voids. Currently managed with tamsulosin,  cc  Recommendations -continue tamsulosin 0.8 mg qhs, prescription renewed -RTC 6 months

## 2024-04-24 NOTE — HISTORY OF PRESENT ILLNESS
[FreeTextEntry1] : 83M presents for follow up evaluation   h/o urinary retention previously with butt   Currently on tamsulosin 0.8 mg nightly  Last visit 11/10: PVR 200cc  Ucx (11/10): Kleb -> Bactrim  Recommended CIC twice daily and further diagnostic testing--patient and family deferred treatment   DTF: 4-5 NTF: 2-3 PPD: 1, for security   12/21/23 Patient feels well with no subjective voiding complaints.  Denies adverse effects with tamsulosin use.  4/24/24: Reports feeling well since last visit Taking tamsulosin without any adverse effects PVR 110cc

## 2024-04-24 NOTE — PHYSICAL EXAM
[General Appearance - Well Developed] : well developed [General Appearance - Well Nourished] : well nourished [] : no respiratory distress [Abdomen Soft] : soft [Abdomen Tenderness] : non-tender [de-identified] : Ambulates with cane

## 2024-05-01 ENCOUNTER — RX RENEWAL (OUTPATIENT)
Age: 84
End: 2024-05-01

## 2024-05-30 ENCOUNTER — APPOINTMENT (OUTPATIENT)
Dept: CARDIOLOGY | Facility: CLINIC | Age: 84
End: 2024-05-30

## 2024-06-27 ENCOUNTER — APPOINTMENT (OUTPATIENT)
Dept: INTERNAL MEDICINE | Facility: CLINIC | Age: 84
End: 2024-06-27

## 2024-08-07 ENCOUNTER — NON-APPOINTMENT (OUTPATIENT)
Age: 84
End: 2024-08-07

## 2024-08-08 ENCOUNTER — NON-APPOINTMENT (OUTPATIENT)
Age: 84
End: 2024-08-08

## 2024-08-18 ENCOUNTER — INPATIENT (INPATIENT)
Facility: HOSPITAL | Age: 84
LOS: 11 days | Discharge: NURSING, REHAB OR LONG-TERM CARE | End: 2024-08-30
Attending: STUDENT IN AN ORGANIZED HEALTH CARE EDUCATION/TRAINING PROGRAM | Admitting: STUDENT IN AN ORGANIZED HEALTH CARE EDUCATION/TRAINING PROGRAM
Payer: MEDICARE

## 2024-08-18 VITALS
SYSTOLIC BLOOD PRESSURE: 123 MMHG | TEMPERATURE: 98 F | DIASTOLIC BLOOD PRESSURE: 51 MMHG | RESPIRATION RATE: 18 BRPM | OXYGEN SATURATION: 98 % | HEART RATE: 47 BPM

## 2024-08-18 LAB
ADD ON TEST-SPECIMEN IN LAB: SIGNIFICANT CHANGE UP
ALBUMIN SERPL ELPH-MCNC: 3.3 G/DL — SIGNIFICANT CHANGE UP (ref 3.3–5)
ALP SERPL-CCNC: 157 U/L — HIGH (ref 40–120)
ALT FLD-CCNC: 15 U/L — SIGNIFICANT CHANGE UP (ref 4–41)
ANION GAP SERPL CALC-SCNC: 9 MMOL/L — SIGNIFICANT CHANGE UP (ref 7–14)
ANION GAP SERPL CALC-SCNC: 9 MMOL/L — SIGNIFICANT CHANGE UP (ref 7–14)
APTT BLD: 37.1 SEC — HIGH (ref 24.5–35.6)
AST SERPL-CCNC: 17 U/L — SIGNIFICANT CHANGE UP (ref 4–40)
BASOPHILS # BLD AUTO: 0.03 K/UL — SIGNIFICANT CHANGE UP (ref 0–0.2)
BASOPHILS NFR BLD AUTO: 0.5 % — SIGNIFICANT CHANGE UP (ref 0–2)
BILIRUB SERPL-MCNC: 0.6 MG/DL — SIGNIFICANT CHANGE UP (ref 0.2–1.2)
BLOOD GAS VENOUS COMPREHENSIVE RESULT: SIGNIFICANT CHANGE UP
BUN SERPL-MCNC: 29 MG/DL — HIGH (ref 7–23)
BUN SERPL-MCNC: 29 MG/DL — HIGH (ref 7–23)
CALCIUM SERPL-MCNC: 8.3 MG/DL — LOW (ref 8.4–10.5)
CALCIUM SERPL-MCNC: 8.5 MG/DL — SIGNIFICANT CHANGE UP (ref 8.4–10.5)
CHLORIDE SERPL-SCNC: 104 MMOL/L — SIGNIFICANT CHANGE UP (ref 98–107)
CHLORIDE SERPL-SCNC: 104 MMOL/L — SIGNIFICANT CHANGE UP (ref 98–107)
CK MB CFR SERPL CALC: 1.8 NG/ML — SIGNIFICANT CHANGE UP
CO2 SERPL-SCNC: 24 MMOL/L — SIGNIFICANT CHANGE UP (ref 22–31)
CO2 SERPL-SCNC: 25 MMOL/L — SIGNIFICANT CHANGE UP (ref 22–31)
CREAT SERPL-MCNC: 0.96 MG/DL — SIGNIFICANT CHANGE UP (ref 0.5–1.3)
CREAT SERPL-MCNC: 1.01 MG/DL — SIGNIFICANT CHANGE UP (ref 0.5–1.3)
EGFR: 73 ML/MIN/1.73M2 — SIGNIFICANT CHANGE UP
EGFR: 77 ML/MIN/1.73M2 — SIGNIFICANT CHANGE UP
EOSINOPHIL # BLD AUTO: 0.03 K/UL — SIGNIFICANT CHANGE UP (ref 0–0.5)
EOSINOPHIL NFR BLD AUTO: 0.5 % — SIGNIFICANT CHANGE UP (ref 0–6)
GLUCOSE SERPL-MCNC: 100 MG/DL — HIGH (ref 70–99)
GLUCOSE SERPL-MCNC: 120 MG/DL — HIGH (ref 70–99)
HCT VFR BLD CALC: 25.8 % — LOW (ref 39–50)
HGB BLD-MCNC: 8.1 G/DL — LOW (ref 13–17)
IANC: 5.3 K/UL — SIGNIFICANT CHANGE UP (ref 1.8–7.4)
IMM GRANULOCYTES NFR BLD AUTO: 0.5 % — SIGNIFICANT CHANGE UP (ref 0–0.9)
INR BLD: 1.63 RATIO — HIGH (ref 0.85–1.18)
LIDOCAIN IGE QN: 34 U/L — SIGNIFICANT CHANGE UP (ref 7–60)
LYMPHOCYTES # BLD AUTO: 0.71 K/UL — LOW (ref 1–3.3)
LYMPHOCYTES # BLD AUTO: 10.9 % — LOW (ref 13–44)
MAGNESIUM SERPL-MCNC: 2.4 MG/DL — SIGNIFICANT CHANGE UP (ref 1.6–2.6)
MCHC RBC-ENTMCNC: 28.9 PG — SIGNIFICANT CHANGE UP (ref 27–34)
MCHC RBC-ENTMCNC: 31.4 GM/DL — LOW (ref 32–36)
MCV RBC AUTO: 92.1 FL — SIGNIFICANT CHANGE UP (ref 80–100)
MONOCYTES # BLD AUTO: 0.41 K/UL — SIGNIFICANT CHANGE UP (ref 0–0.9)
MONOCYTES NFR BLD AUTO: 6.3 % — SIGNIFICANT CHANGE UP (ref 2–14)
NEUTROPHILS # BLD AUTO: 5.3 K/UL — SIGNIFICANT CHANGE UP (ref 1.8–7.4)
NEUTROPHILS NFR BLD AUTO: 81.3 % — HIGH (ref 43–77)
NRBC # BLD: 0 /100 WBCS — SIGNIFICANT CHANGE UP (ref 0–0)
NRBC # FLD: 0 K/UL — SIGNIFICANT CHANGE UP (ref 0–0)
NT-PROBNP SERPL-SCNC: 3229 PG/ML — HIGH
PLATELET # BLD AUTO: 185 K/UL — SIGNIFICANT CHANGE UP (ref 150–400)
POTASSIUM SERPL-MCNC: 4.5 MMOL/L — SIGNIFICANT CHANGE UP (ref 3.5–5.3)
POTASSIUM SERPL-MCNC: 5.4 MMOL/L — HIGH (ref 3.5–5.3)
POTASSIUM SERPL-SCNC: 4.5 MMOL/L — SIGNIFICANT CHANGE UP (ref 3.5–5.3)
POTASSIUM SERPL-SCNC: 5.4 MMOL/L — HIGH (ref 3.5–5.3)
PROT SERPL-MCNC: 5.8 G/DL — LOW (ref 6–8.3)
PROTHROM AB SERPL-ACNC: 18.2 SEC — HIGH (ref 9.5–13)
RBC # BLD: 2.8 M/UL — LOW (ref 4.2–5.8)
RBC # FLD: 15.6 % — HIGH (ref 10.3–14.5)
SODIUM SERPL-SCNC: 137 MMOL/L — SIGNIFICANT CHANGE UP (ref 135–145)
SODIUM SERPL-SCNC: 138 MMOL/L — SIGNIFICANT CHANGE UP (ref 135–145)
TROPONIN T, HIGH SENSITIVITY RESULT: 52 NG/L — HIGH
TROPONIN T, HIGH SENSITIVITY RESULT: 54 NG/L — CRITICAL HIGH
WBC # BLD: 6.51 K/UL — SIGNIFICANT CHANGE UP (ref 3.8–10.5)
WBC # FLD AUTO: 6.51 K/UL — SIGNIFICANT CHANGE UP (ref 3.8–10.5)

## 2024-08-18 PROCEDURE — 99285 EMERGENCY DEPT VISIT HI MDM: CPT

## 2024-08-18 PROCEDURE — 71045 X-RAY EXAM CHEST 1 VIEW: CPT | Mod: 26

## 2024-08-18 RX ORDER — INSULIN REGULAR, HUMAN 100/ML (3)
10 INSULIN PEN (ML) SUBCUTANEOUS ONCE
Refills: 0 | Status: COMPLETED | OUTPATIENT
Start: 2024-08-18 | End: 2024-08-18

## 2024-08-18 RX ORDER — CALCIUM GLUCONATE 61(648) MG
1 TABLET ORAL ONCE
Refills: 0 | Status: COMPLETED | OUTPATIENT
Start: 2024-08-18 | End: 2024-08-18

## 2024-08-18 RX ORDER — DEXTROSE 15 G/33 G
25 GEL IN PACKET (GRAM) ORAL ONCE
Refills: 0 | Status: COMPLETED | OUTPATIENT
Start: 2024-08-18 | End: 2024-08-18

## 2024-08-18 RX ADMIN — Medication 25 GRAM(S): at 21:19

## 2024-08-18 RX ADMIN — Medication 10 UNIT(S): at 21:19

## 2024-08-18 RX ADMIN — Medication 100 GRAM(S): at 22:53

## 2024-08-18 RX ADMIN — Medication 100 GRAM(S): at 20:01

## 2024-08-18 NOTE — ED ADULT NURSE NOTE - NSFALLUNIVINTERV_ED_ALL_ED
Bed/Stretcher in lowest position, wheels locked, appropriate side rails in place/Call bell, personal items and telephone in reach/Instruct patient to call for assistance before getting out of bed/chair/stretcher/Non-slip footwear applied when patient is off stretcher/[place] to call system/Physically safe environment - no spills, clutter or unnecessary equipment/Purposeful proactive rounding/Room/bathroom lighting operational, light cord in reach

## 2024-08-18 NOTE — ED PROVIDER NOTE - OBJECTIVE STATEMENT
85 y/o male    · HPI Objective Statement: 83M HX Myasthenia gravis HTN cc acute onset of urinary retention could not urinate since 10AM, pt states he has never had this happen to him before.   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 Pain does not radiate, he has never had pain like this before. He has subjective shortness of breath. Denies headache, lightheadedness, n/v/d, abdominal pain, fever/chills. Heart rate noted to be in 30s/40s, family says it has been like that since he was at [facility], sometimes it is fast and sometimes it is slow. They discussed pacemaker but have not proceeded with one at this time.

## 2024-08-18 NOTE — ED ADULT NURSE NOTE - NS ED NURSE REPORT GIVEN DT
----- Message from Shaka Bravo MD sent at 8/22/2019 12:45 PM CDT -----  Let patient know tests are ok   19-Aug-2024 08:15

## 2024-08-18 NOTE — CONSULT NOTE ADULT - SUBJECTIVE AND OBJECTIVE BOX
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  EP consulted for Afib with slow RVR.  Patient reports non-radiating crushing chest pain he never had before resolved with nitro SL at rehab.  In ED patient with peaked P waves on ECG and given calcium gluconate for suspected elevated K++.  K++ found to be 5.4 after treatment, follow up K++ 4.5. Patient with s/p stroke with left side weakness that is slowly resolving.  Family reports that at [facility] it was reported he had slow Afib discussed possible pacemaker but MDs thought he did not need one and was discharged to Rehab.  In [facility] patient started on Eliquis 2.5mg BID at that time.  He sees J. Lisker, Cardiologist and last saw him 11/2023.  He had an echocardiogram 9/2022 showing EF 55%, Mod MS, Mod AR.  Patient was taking 150mg Toprol that was lowered to 100mg of Toprol at [facility]. Patient denies any cardiac catheterization.  Patient denies fever chills, sick contact, lightheadedness, syncope, Abdominal pain, N/V/D.  On assessment, patient A+OX3 [place] in no acute distress, AFib 48, /56, TEMP 97.8F, RR 16, SATing 96% on RA, lung sounds CTA. no JVD, no pedal edema.  Labs show no leukocytosis, anemia with H+H 8.1/25.8 normal creatinine, Alk Phos 157, Lactate 0.9 pBNP 3229  Troponin 54 downtrending to 52 with normal CK and CKMB. Of note, patient has chronic butt since 8/4/2024.      Allergies    No Known Allergies    Intolerances    HOME MEDICATIONS:  Cellcept  Metformin  Entresto  Toprol XL  Slow Fe  Tamsulosin	    MEDICATIONS:                  PAST MEDICAL & SURGICAL HISTORY:  No pertinent past medical history      No significant past surgical history          FAMILY HISTORY:      SOCIAL HISTORY:    Retired contruction worker, lives with wife, former smoker, social ETOH      REVIEW OF SYSTEMS:    CONSTITUTIONAL: Chest pressure  EYES/ENT: No visual changes;  No vertigo or throat pain   NECK: No pain or stiffness  RESPIRATORY: No cough, wheezing, hemoptysis; No shortness of breath  CARDIOVASCULAR: No chest pain or palpitations  GASTROINTESTINAL: No abdominal or epigastric pain.   GENITOURINARY: No dysuria, frequency or hematuria  NEUROLOGICAL: Facial droop.  Resolving left side weakness  SKIN: No itching, burning, rashes, or lesions   All other review of systems is negative unless indicated above.    PHYSICAL EXAM:  T(C): 37.3 (08-18-24 @ 20:15), Max: 37.3 (08-18-24 @ 20:15)  HR: 45 (08-18-24 @ 23:00) (41 - 47)  BP: 121/45 (08-18-24 @ 23:00) (101/47 - 130/40)  RR: 19 (08-18-24 @ 23:00) (17 - 20)  SpO2: 100% (08-18-24 @ 23:00) (98% - 100%)  Wt(kg): --  I&O's Summary      Appearance: right facial droop	  HEENT:  Normal oral mucosa, PERRL, EOMI	  Cardiovascular: Bradycardia irreg irreg. No JVD, No murmurs/rubs/gallops  Respiratory: Lungs clear to auscultation bilaterally  Gastrointestinal:  Soft, Non-tender, + BS	  Skin: No rashes, No ecchymoses, No cyanosis	  Neurologic: right facial droop  Extremities: No clubbing, cyanosis or edema  Vascular: Peripheral pulses palpable 2+ bilaterally  Psychiatry: A & O x 3, Mood & affect appropriate    LABS:	 	    CBC Full  -  ( 18 Aug 2024 20:00 )  WBC Count : 6.51 K/uL  Hemoglobin : 8.1 g/dL  Hematocrit : 25.8 %  Platelet Count - Automated : 185 K/uL  Mean Cell Volume : 92.1 fL  Mean Cell Hemoglobin : 28.9 pg  Mean Cell Hemoglobin Concentration : 31.4 gm/dL  Auto Neutrophil # : 5.30 K/uL  Auto Lymphocyte # : 0.71 K/uL  Auto Monocyte # : 0.41 K/uL  Auto Eosinophil # : 0.03 K/uL  Auto Basophil # : 0.03 K/uL  Auto Neutrophil % : 81.3 %  Auto Lymphocyte % : 10.9 %  Auto Monocyte % : 6.3 %  Auto Eosinophil % : 0.5 %  Auto Basophil % : 0.5 %    08-18    137  |  104  |  29<H>  ----------------------------<  100<H>  4.5   |  24  |  0.96  08-18    138  |  104  |  29<H>  ----------------------------<  120<H>  5.4<H>   |  25  |  1.01    Ca    8.3<L>      18 Aug 2024 22:55  Ca    8.5      18 Aug 2024 20:00  Mg     2.40     08-18    TPro  5.8<L>  /  Alb  3.3  /  TBili  0.6  /  DBili  x   /  AST  17  /  ALT  15  /  AlkPhos  157<H>  08-18      proBNP:   Lipid Profile:   HgA1c:   TSH:       CARDIAC MARKERS:            TELEMETRY: 	    ECG:  	  RADIOLOGY:  OTHER: 	    PREVIOUS DIAGNOSTIC TESTING:    [ ] Echocardiogram:  [ ] Catheterization:  [ ] Stress Test:

## 2024-08-18 NOTE — CONSULT NOTE ADULT - ASSESSMENT
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 Otherwise hemodynamically stable not a candidate for CCU

## 2024-08-18 NOTE — CONSULT NOTE ADULT - PROBLEM SELECTOR RECOMMENDATION 9
Admit to Telemetry  CHADVASC 7  Continue Eliquis 2.5mg BID  Hold Toprol xL  Hold AV marion Blocking agents  Obtain T+S, TSH, Lipid Panel, HgA1c  Daily CBC, BMP MAg Phos, PT/INR/PTT  TTE in am to evaluate LV function  Obtain records from [facility] to include Echocardiogram and ECGs  Obtain records from Dr. J. Lisker, Cardiologist  Plan discussed with Jessie Baldwin Fellow

## 2024-08-18 NOTE — ED PROVIDER NOTE - ATTENDING CONTRIBUTION TO CARE
Dr. Mccarthy, Attending Physician-  I performed a face to face bedside interview with patient regarding history of present illness, review of symptoms and past medical history. I completed an independent physical exam.  I have discussed patient's plan of care with the resident.    84yoM w/Hx of myasthenia on mycophenolate, HTN, HFpEF, DM2, afib, embolic CVA s/p thrombectomy 2w ago (8/4) with residual R facial droop and L sided weakness at [facility] on Eliquis presents from rehab with acute onset crushing substernal chest pain. Pain does not radiate, he has never had pain like this before. He has subjective shortness of breath. Denies headache, lightheadedness, n/v/d, abdominal pain, fever/chills. Per family pt has been bradycardic since discharge from [facility], pacemaker was discussed but not performed at that time. On Eliquis. Denies HA, fever/chills, SOB, n/v/d/c, new focal weakness, ROS otherwise negative.    General: Alert and Orientated x 3. No apparent distress.  Head: Normocephalic and atraumatic.  Eyes: PERRLA with EOMI.   ENT: MMM  Neck: Supple.  Cardiac: Normal S1 and S2 w/ RRR.   Pulmonary: CTA bilaterally. No increased WOB.   Abdominal: Soft, non-tender, non-distended  Neurologic: LUE, LLE weakness from recent stroke. Hard of hearing. Clear coherent speech, AOx3.   Extremities: No lower extremity edema, bruising, soreness   Skin: Color appropriate for race. Intact, warm, and well-perfused.  Psychiatric: Appropriate mood and affect. No apparent risk to self or others. Pleasant.    DDx incl. ACS/MI vs electrolyte/metabolic abnormality vs new arrhythmia, EKG w/ a fib slow ventricular response, large T waves in precordial leads, new from EKG one year ago. Will get labs, cxr, TBA for further evaluation. - Eric Mccarthy MD. EM Attending

## 2024-08-18 NOTE — ED PROVIDER NOTE - PROGRESS NOTE DETAILS
Initial trop 54, repeat 52. EKG unchanged despite potassium shift, still bradycardic with stable blood pressures. EP consulted for bradycardia. NIF documented as -10, discussed with respiratory, they state based on his age/general weakness this is not a concerning number and he is not having any respiratory difficulty. Will admit to tele for bradycardia, EP following recommend holding metoprolol and getting Echo in the AM.

## 2024-08-18 NOTE — ED ADULT NURSE NOTE - NSFALLRISK_ED_ALL_ED
No Left UE Active ROM was WFL (within functional limits)/Left UE Passive ROM was WFL  (within functional limits)/Right UE Active ROM was WNL (within normal limits)/Right UE Passive ROM was WNL (within normal limits)

## 2024-08-18 NOTE — ED ADULT TRIAGE NOTE - CHIEF COMPLAINT QUOTE
pt coming from a nursing home, pt c/o chest pain x 30 min.  pt received asa 324mg pta.  Hx:  DM, CVA left side residule, HTN, a-fib

## 2024-08-18 NOTE — ED ADULT NURSE NOTE - OBJECTIVE STATEMENT
Break RN: A&OX4, awake, and alert, non ambulatory, h/o CVA left sided weakness, HTN, a-fib. Patient is coming to ED in regards to chest pain that is mid sternal non radiating, intermitted, no alleviating or worsen factors. Patient endorses SOB "at times". Patient denies weakness, blurred vision, no facial droop or slurred speech noted. Patient arrives with patent 18G to RF, 20G IV placed to LF, 2L NC, ginny on tele. will continue to monitor.

## 2024-08-18 NOTE — ED PROVIDER NOTE - PHYSICAL EXAMINATION
General: Alert and Orientated x 3. No apparent distress.  Head: Normocephalic and atraumatic.  Eyes: PERRLA with EOMI.   ENT: MMM  Neck: Supple.  Cardiac: Normal S1 and S2 w/ RRR.   Pulmonary: CTA bilaterally. No increased WOB.   Abdominal: Soft, non-tender, non-distended  Neurologic: LUE, LLE weakness from recent stroke. Hard of hearing. Clear coherent speech, AOx3.   Extremities: No lower extremity edema, bruising, soreness   Skin: Color appropriate for race. Intact, warm, and well-perfused.  Psychiatric: Appropriate mood and affect. No apparent risk to self or others.

## 2024-08-18 NOTE — ED PROVIDER NOTE - CLINICAL SUMMARY MEDICAL DECISION MAKING FREE TEXT BOX
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 on eliquis presents from rehab with acute onset crushing substernal chest pain. He is hemodynamically stable, afebrile, bradycardic into 30s/40s on exam he appears generally well, has residual left sided deficits from recent stroke. AOx3, clear, coherent speech. Lung are clear, he has Elliott in place draining clear urine, abdomen soft, non-tender, no lower extremity swelling/bruising. Primary concern ACS, EKG w/ a fib slow ventricular response, large T waves in precordial leads, new from EKG one year ago. Will get labs, cxr, reassess.

## 2024-08-19 ENCOUNTER — RESULT REVIEW (OUTPATIENT)
Age: 84
End: 2024-08-19

## 2024-08-19 DIAGNOSIS — R07.9 CHEST PAIN, UNSPECIFIED: ICD-10-CM

## 2024-08-19 DIAGNOSIS — R33.9 RETENTION OF URINE, UNSPECIFIED: ICD-10-CM

## 2024-08-19 DIAGNOSIS — I48.91 UNSPECIFIED ATRIAL FIBRILLATION: ICD-10-CM

## 2024-08-19 DIAGNOSIS — I50.30 UNSPECIFIED DIASTOLIC (CONGESTIVE) HEART FAILURE: ICD-10-CM

## 2024-08-19 DIAGNOSIS — D64.9 ANEMIA, UNSPECIFIED: ICD-10-CM

## 2024-08-19 DIAGNOSIS — Z29.9 ENCOUNTER FOR PROPHYLACTIC MEASURES, UNSPECIFIED: ICD-10-CM

## 2024-08-19 DIAGNOSIS — I10 ESSENTIAL (PRIMARY) HYPERTENSION: ICD-10-CM

## 2024-08-19 DIAGNOSIS — Z92.89 PERSONAL HISTORY OF OTHER MEDICAL TREATMENT: Chronic | ICD-10-CM

## 2024-08-19 DIAGNOSIS — G70.00 MYASTHENIA GRAVIS WITHOUT (ACUTE) EXACERBATION: ICD-10-CM

## 2024-08-19 DIAGNOSIS — I63.9 CEREBRAL INFARCTION, UNSPECIFIED: ICD-10-CM

## 2024-08-19 DIAGNOSIS — E11.9 TYPE 2 DIABETES MELLITUS WITHOUT COMPLICATIONS: ICD-10-CM

## 2024-08-19 DIAGNOSIS — R79.89 OTHER SPECIFIED ABNORMAL FINDINGS OF BLOOD CHEMISTRY: ICD-10-CM

## 2024-08-19 PROBLEM — Z78.9 OTHER SPECIFIED HEALTH STATUS: Chronic | Status: INACTIVE | Noted: 2019-11-26 | Resolved: 2024-08-19

## 2024-08-19 LAB
A1C WITH ESTIMATED AVERAGE GLUCOSE RESULT: 5.2 % — SIGNIFICANT CHANGE UP (ref 4–5.6)
ADD ON TEST-SPECIMEN IN LAB: SIGNIFICANT CHANGE UP
ADD ON TEST-SPECIMEN IN LAB: SIGNIFICANT CHANGE UP
ANION GAP SERPL CALC-SCNC: 13 MMOL/L — SIGNIFICANT CHANGE UP (ref 7–14)
APTT BLD: 35.6 SEC — SIGNIFICANT CHANGE UP (ref 24.5–35.6)
BLD GP AB SCN SERPL QL: NEGATIVE — SIGNIFICANT CHANGE UP
BUN SERPL-MCNC: 25 MG/DL — HIGH (ref 7–23)
CALCIUM SERPL-MCNC: 8.6 MG/DL — SIGNIFICANT CHANGE UP (ref 8.4–10.5)
CHLORIDE SERPL-SCNC: 103 MMOL/L — SIGNIFICANT CHANGE UP (ref 98–107)
CHOLEST SERPL-MCNC: 91 MG/DL — SIGNIFICANT CHANGE UP
CO2 SERPL-SCNC: 22 MMOL/L — SIGNIFICANT CHANGE UP (ref 22–31)
CREAT SERPL-MCNC: 0.89 MG/DL — SIGNIFICANT CHANGE UP (ref 0.5–1.3)
EGFR: 84 ML/MIN/1.73M2 — SIGNIFICANT CHANGE UP
ESTIMATED AVERAGE GLUCOSE: 103 — SIGNIFICANT CHANGE UP
FERRITIN SERPL-MCNC: 142 NG/ML — SIGNIFICANT CHANGE UP (ref 30–400)
FOLATE SERPL-MCNC: 9 NG/ML — SIGNIFICANT CHANGE UP (ref 3.1–17.5)
GLUCOSE BLDC GLUCOMTR-MCNC: 107 MG/DL — HIGH (ref 70–99)
GLUCOSE BLDC GLUCOMTR-MCNC: 123 MG/DL — HIGH (ref 70–99)
GLUCOSE BLDC GLUCOMTR-MCNC: 132 MG/DL — HIGH (ref 70–99)
GLUCOSE BLDC GLUCOMTR-MCNC: 171 MG/DL — HIGH (ref 70–99)
GLUCOSE BLDC GLUCOMTR-MCNC: 92 MG/DL — SIGNIFICANT CHANGE UP (ref 70–99)
GLUCOSE SERPL-MCNC: 83 MG/DL — SIGNIFICANT CHANGE UP (ref 70–99)
HAPTOGLOB SERPL-MCNC: 64 MG/DL — SIGNIFICANT CHANGE UP (ref 34–200)
HCT VFR BLD CALC: 24.6 % — LOW (ref 39–50)
HCT VFR BLD CALC: 27.8 % — LOW (ref 39–50)
HDLC SERPL-MCNC: 38 MG/DL — LOW
HGB BLD-MCNC: 7.8 G/DL — LOW (ref 13–17)
HGB BLD-MCNC: 8.6 G/DL — LOW (ref 13–17)
INR BLD: 1.56 RATIO — HIGH (ref 0.85–1.18)
IRON SATN MFR SERPL: 25 % — SIGNIFICANT CHANGE UP (ref 14–50)
IRON SATN MFR SERPL: 58 UG/DL — SIGNIFICANT CHANGE UP (ref 45–165)
LDH SERPL L TO P-CCNC: 178 U/L — SIGNIFICANT CHANGE UP (ref 135–225)
LIPID PNL WITH DIRECT LDL SERPL: 45 MG/DL — SIGNIFICANT CHANGE UP
MAGNESIUM SERPL-MCNC: 2.3 MG/DL — SIGNIFICANT CHANGE UP (ref 1.6–2.6)
MCHC RBC-ENTMCNC: 28.5 PG — SIGNIFICANT CHANGE UP (ref 27–34)
MCHC RBC-ENTMCNC: 29.1 PG — SIGNIFICANT CHANGE UP (ref 27–34)
MCHC RBC-ENTMCNC: 30.9 GM/DL — LOW (ref 32–36)
MCHC RBC-ENTMCNC: 31.7 GM/DL — LOW (ref 32–36)
MCV RBC AUTO: 91.8 FL — SIGNIFICANT CHANGE UP (ref 80–100)
MCV RBC AUTO: 92.1 FL — SIGNIFICANT CHANGE UP (ref 80–100)
NON HDL CHOLESTEROL: 53 MG/DL — SIGNIFICANT CHANGE UP
NRBC # BLD: 0 /100 WBCS — SIGNIFICANT CHANGE UP (ref 0–0)
NRBC # BLD: 0 /100 WBCS — SIGNIFICANT CHANGE UP (ref 0–0)
NRBC # FLD: 0 K/UL — SIGNIFICANT CHANGE UP (ref 0–0)
NRBC # FLD: 0 K/UL — SIGNIFICANT CHANGE UP (ref 0–0)
NT-PROBNP SERPL-SCNC: 2517 PG/ML — HIGH
PHOSPHATE SERPL-MCNC: 3.4 MG/DL — SIGNIFICANT CHANGE UP (ref 2.5–4.5)
PLATELET # BLD AUTO: 171 K/UL — SIGNIFICANT CHANGE UP (ref 150–400)
PLATELET # BLD AUTO: 199 K/UL — SIGNIFICANT CHANGE UP (ref 150–400)
POTASSIUM SERPL-MCNC: 4.4 MMOL/L — SIGNIFICANT CHANGE UP (ref 3.5–5.3)
POTASSIUM SERPL-SCNC: 4.4 MMOL/L — SIGNIFICANT CHANGE UP (ref 3.5–5.3)
PROTHROM AB SERPL-ACNC: 17.2 SEC — HIGH (ref 9.5–13)
RBC # BLD: 2.68 M/UL — LOW (ref 4.2–5.8)
RBC # BLD: 2.68 M/UL — LOW (ref 4.2–5.8)
RBC # BLD: 3.02 M/UL — LOW (ref 4.2–5.8)
RBC # FLD: 15.7 % — HIGH (ref 10.3–14.5)
RBC # FLD: 16.1 % — HIGH (ref 10.3–14.5)
RETICS #: 83.9 K/UL — SIGNIFICANT CHANGE UP (ref 25–125)
RETICS/RBC NFR: 3.1 % — HIGH (ref 0.5–2.5)
RH IG SCN BLD-IMP: POSITIVE — SIGNIFICANT CHANGE UP
SODIUM SERPL-SCNC: 138 MMOL/L — SIGNIFICANT CHANGE UP (ref 135–145)
T3FREE SERPL-MCNC: 2.35 PG/ML — SIGNIFICANT CHANGE UP (ref 2–4.4)
T4 AB SER-ACNC: 6.08 UG/DL — SIGNIFICANT CHANGE UP (ref 5.1–13)
TIBC SERPL-MCNC: 230 UG/DL — SIGNIFICANT CHANGE UP (ref 220–430)
TRIGL SERPL-MCNC: 40 MG/DL — SIGNIFICANT CHANGE UP
TROPONIN T, HIGH SENSITIVITY RESULT: 49 NG/L — SIGNIFICANT CHANGE UP
TSH SERPL-MCNC: 7.01 UIU/ML — HIGH (ref 0.27–4.2)
UIBC SERPL-MCNC: 172 UG/DL — SIGNIFICANT CHANGE UP (ref 110–370)
VIT B12 SERPL-MCNC: 312 PG/ML — SIGNIFICANT CHANGE UP (ref 200–900)
WBC # BLD: 5.1 K/UL — SIGNIFICANT CHANGE UP (ref 3.8–10.5)
WBC # BLD: 6.54 K/UL — SIGNIFICANT CHANGE UP (ref 3.8–10.5)
WBC # FLD AUTO: 5.1 K/UL — SIGNIFICANT CHANGE UP (ref 3.8–10.5)
WBC # FLD AUTO: 6.54 K/UL — SIGNIFICANT CHANGE UP (ref 3.8–10.5)

## 2024-08-19 PROCEDURE — 93306 TTE W/DOPPLER COMPLETE: CPT | Mod: 26

## 2024-08-19 PROCEDURE — 99223 1ST HOSP IP/OBS HIGH 75: CPT | Mod: GC

## 2024-08-19 PROCEDURE — 99497 ADVNCD CARE PLAN 30 MIN: CPT | Mod: GC,25

## 2024-08-19 RX ORDER — POLYETHYLENE GLYCOL 3350 17 G/17G
17 POWDER, FOR SOLUTION ORAL
Refills: 0 | Status: DISCONTINUED | OUTPATIENT
Start: 2024-08-19 | End: 2024-08-30

## 2024-08-19 RX ORDER — AMLODIPINE BESYLATE 10 MG/1
5 TABLET ORAL DAILY
Refills: 0 | Status: DISCONTINUED | OUTPATIENT
Start: 2024-08-19 | End: 2024-08-19

## 2024-08-19 RX ORDER — APIXABAN 5 MG/1
2.5 TABLET, FILM COATED ORAL EVERY 12 HOURS
Refills: 0 | Status: DISCONTINUED | OUTPATIENT
Start: 2024-08-19 | End: 2024-08-19

## 2024-08-19 RX ORDER — DEXTROSE 15 G/33 G
25 GEL IN PACKET (GRAM) ORAL ONCE
Refills: 0 | Status: DISCONTINUED | OUTPATIENT
Start: 2024-08-19 | End: 2024-08-30

## 2024-08-19 RX ORDER — MYCOPHENOLATE MOFETIL 250 MG/1
500 CAPSULE ORAL DAILY
Refills: 0 | Status: DISCONTINUED | OUTPATIENT
Start: 2024-08-19 | End: 2024-08-30

## 2024-08-19 RX ORDER — APIXABAN 5 MG/1
5 TABLET, FILM COATED ORAL EVERY 12 HOURS
Refills: 0 | Status: DISCONTINUED | OUTPATIENT
Start: 2024-08-19 | End: 2024-08-20

## 2024-08-19 RX ORDER — DEXTROSE 15 G/33 G
12.5 GEL IN PACKET (GRAM) ORAL ONCE
Refills: 0 | Status: DISCONTINUED | OUTPATIENT
Start: 2024-08-19 | End: 2024-08-30

## 2024-08-19 RX ORDER — ASPIRIN 81 MG
1 TABLET, DELAYED RELEASE (ENTERIC COATED) ORAL
Refills: 0 | DISCHARGE

## 2024-08-19 RX ORDER — FOLIC ACID 1 MG
1 TABLET ORAL DAILY
Refills: 0 | Status: DISCONTINUED | OUTPATIENT
Start: 2024-08-19 | End: 2024-08-30

## 2024-08-19 RX ORDER — PREDNISONE 10 MG
5 TABLET, DOSE PACK ORAL DAILY
Refills: 0 | Status: DISCONTINUED | OUTPATIENT
Start: 2024-08-19 | End: 2024-08-30

## 2024-08-19 RX ORDER — TAMSULOSIN HYDROCHLORIDE 0.4 MG/1
0.4 CAPSULE ORAL AT BEDTIME
Refills: 0 | Status: DISCONTINUED | OUTPATIENT
Start: 2024-08-19 | End: 2024-08-20

## 2024-08-19 RX ORDER — CYANOCOBALAMIN (VITAMIN B-12) 500MCG/0.1
1000 GEL (ML) NASAL DAILY
Refills: 0 | Status: DISCONTINUED | OUTPATIENT
Start: 2024-08-19 | End: 2024-08-30

## 2024-08-19 RX ORDER — METFORMIN HYDROCHLORIDE 850 MG/1
1 TABLET, FILM COATED ORAL
Refills: 0 | DISCHARGE

## 2024-08-19 RX ORDER — ASPIRIN 81 MG
81 TABLET, DELAYED RELEASE (ENTERIC COATED) ORAL DAILY
Refills: 0 | Status: DISCONTINUED | OUTPATIENT
Start: 2024-08-19 | End: 2024-08-30

## 2024-08-19 RX ORDER — DEXTROSE 15 G/33 G
15 GEL IN PACKET (GRAM) ORAL ONCE
Refills: 0 | Status: DISCONTINUED | OUTPATIENT
Start: 2024-08-19 | End: 2024-08-30

## 2024-08-19 RX ORDER — CHLORHEXIDINE GLUCONATE 40 MG/ML
1 SOLUTION TOPICAL DAILY
Refills: 0 | Status: DISCONTINUED | OUTPATIENT
Start: 2024-08-19 | End: 2024-08-30

## 2024-08-19 RX ORDER — GLUCAGON INJECTION, SOLUTION 1 MG/.2ML
1 INJECTION, SOLUTION SUBCUTANEOUS ONCE
Refills: 0 | Status: DISCONTINUED | OUTPATIENT
Start: 2024-08-19 | End: 2024-08-30

## 2024-08-19 RX ADMIN — Medication 81 MILLIGRAM(S): at 12:38

## 2024-08-19 RX ADMIN — MYCOPHENOLATE MOFETIL 500 MILLIGRAM(S): 250 CAPSULE ORAL at 12:37

## 2024-08-19 RX ADMIN — POLYETHYLENE GLYCOL 3350 17 GRAM(S): 17 POWDER, FOR SOLUTION ORAL at 18:27

## 2024-08-19 RX ADMIN — CHLORHEXIDINE GLUCONATE 1 APPLICATION(S): 40 SOLUTION TOPICAL at 22:56

## 2024-08-19 RX ADMIN — Medication 1: at 12:42

## 2024-08-19 RX ADMIN — APIXABAN 5 MILLIGRAM(S): 5 TABLET, FILM COATED ORAL at 18:27

## 2024-08-19 RX ADMIN — Medication 1000 MICROGRAM(S): at 12:36

## 2024-08-19 RX ADMIN — APIXABAN 2.5 MILLIGRAM(S): 5 TABLET, FILM COATED ORAL at 05:52

## 2024-08-19 RX ADMIN — Medication 5 MILLIGRAM(S): at 05:52

## 2024-08-19 RX ADMIN — TAMSULOSIN HYDROCHLORIDE 0.4 MILLIGRAM(S): 0.4 CAPSULE ORAL at 22:09

## 2024-08-19 RX ADMIN — Medication 500 MILLILITER(S): at 09:04

## 2024-08-19 RX ADMIN — Medication 1 MILLIGRAM(S): at 12:39

## 2024-08-19 NOTE — H&P ADULT - NSICDXFAMILYHX_GEN_ALL_CORE_FT
FAMILY HISTORY:  Father  Still living? Unknown  No family history of cardiac disease, Age at diagnosis: Age Unknown    Mother  Still living? Unknown  No family history of cardiac disease, Age at diagnosis: Age Unknown    Sibling  Still living? Unknown  No family history of cardiac disease, Age at diagnosis: Age Unknown

## 2024-08-19 NOTE — H&P ADULT - PROBLEM SELECTOR PLAN 8
Home meds: Metformin 500mg BID  - hold home Meformin while inpatient  - LDSS  - A1c Home meds: Amlodipine 5mg qd, Entresto 49-51mg BID, Metoprolol succinate 100mg qd, and Hydralazine 25mg q8h  - hold home meds in setting of bradycardia and risk of hemodynamic instability

## 2024-08-19 NOTE — H&P ADULT - PROBLEM/PLAN-4
Called pt, lmom to call regarding medications   Lmom regarding new dose as well DISPLAY PLAN FREE TEXT

## 2024-08-19 NOTE — H&P ADULT - NSICDXPASTMEDICALHX_GEN_ALL_CORE_FT
PAST MEDICAL HISTORY:  (HFpEF) heart failure with preserved ejection fraction     Atrial fibrillation     Embolic stroke     HTN (hypertension)     Myasthenia gravis     T2DM (type 2 diabetes mellitus)

## 2024-08-19 NOTE — H&P ADULT - NSICDXPASTSURGICALHX_GEN_ALL_CORE_FT
PAST SURGICAL HISTORY:  No significant past surgical history      PAST SURGICAL HISTORY:  History of thrombolytic therapy

## 2024-08-19 NOTE — PHYSICAL THERAPY INITIAL EVALUATION ADULT - GENERAL OBSERVATIONS, REHAB EVAL
Upon entry, pt semi-supine in bed in NAD; + telemetry. HR 58 bpm. Wife present at bedside. Pt left as received with all tubes/lines intact, bed alarm on, call bell in reach and in NAD.

## 2024-08-19 NOTE — PROGRESS NOTE ADULT - SUBJECTIVE AND OBJECTIVE BOX
Patient is seen and examined. Patient denies any chest pain, SOB, palpitations or dizziness. Family at the bedside  Telemetry shows atrial fibrillation with VR 40s-60s, 2 sec pauses      PAST MEDICAL & SURGICAL HISTORY:  No pertinent past medical history    Atrial fibrillation    Embolic stroke    Myasthenia gravis    (HFpEF) heart failure with preserved ejection fraction    HTN (hypertension)    T2DM (type 2 diabetes mellitus)    No significant past surgical history    History of thrombolytic therapy        MEDICATIONS  (STANDING):  amLODIPine   Tablet 5 milliGRAM(s) Oral daily  apixaban 2.5 milliGRAM(s) Oral every 12 hours  aspirin  chewable 81 milliGRAM(s) Oral daily  atorvastatin 40 milliGRAM(s) Oral at bedtime  cyanocobalamin 1000 MICROGram(s) Oral daily  dextrose 5%. 1000 milliLiter(s) (100 mL/Hr) IV Continuous <Continuous>  dextrose 5%. 1000 milliLiter(s) (50 mL/Hr) IV Continuous <Continuous>  dextrose 50% Injectable 25 Gram(s) IV Push once  dextrose 50% Injectable 25 Gram(s) IV Push once  dextrose 50% Injectable 12.5 Gram(s) IV Push once  folic acid 1 milliGRAM(s) Oral daily  glucagon  Injectable 1 milliGRAM(s) IntraMuscular once  insulin lispro (ADMELOG) corrective regimen sliding scale   SubCutaneous at bedtime  insulin lispro (ADMELOG) corrective regimen sliding scale   SubCutaneous three times a day before meals  mycophenolate mofetil 500 milliGRAM(s) Oral daily  polyethylene glycol 3350 17 Gram(s) Oral two times a day  predniSONE   Tablet 5 milliGRAM(s) Oral daily  tamsulosin 0.4 milliGRAM(s) Oral at bedtime    MEDICATIONS  (PRN):  dextrose Oral Gel 15 Gram(s) Oral once PRN Blood Glucose LESS THAN 70 milliGRAM(s)/deciliter    Vital Signs Last 24 Hrs  T(C): 36.7 (19 Aug 2024 08:42), Max: 37.3 (18 Aug 2024 20:15)  T(F): 98 (19 Aug 2024 08:42), Max: 99.1 (18 Aug 2024 20:15)  HR: 60 (19 Aug 2024 08:42) (41 - 60)  BP: 125/50 (19 Aug 2024 08:42) (101/47 - 130/40)  BP(mean): 68 (18 Aug 2024 23:00) (61 - 68)  RR: 18 (19 Aug 2024 08:42) (17 - 20)  SpO2: 95% (19 Aug 2024 08:42) (95% - 100%)    Parameters below as of 18 Aug 2024 23:00  Patient On (Oxygen Delivery Method): nasal cannula  O2 Flow (L/min): 2      LABS:                        7.8    5.10  )-----------( 171      ( 19 Aug 2024 05:10 )             24.6     08-19    138  |  103  |  25<H>  ----------------------------<  83  4.4   |  22  |  0.89    Ca    8.6      19 Aug 2024 05:10  Phos  3.4     08-19  Mg     2.30     08-19    TPro  5.8<L>  /  Alb  3.3  /  TBili  0.6  /  DBili  x   /  AST  17  /  ALT  15  /  AlkPhos  157<H>  08-18    CARDIAC MARKERS ( 18 Aug 2024 22:55 )  x     / x     / x     / x     / 1.8 ng/mL  CARDIAC MARKERS ( 18 Aug 2024 21:14 )  x     / x     / x     / x     / 1.8 ng/mL      PT/INR - ( 19 Aug 2024 05:10 )   PT: 17.2 sec;   INR: 1.56 ratio         PTT - ( 19 Aug 2024 05:10 )  PTT:35.6 sec  Urinalysis Basic - ( 19 Aug 2024 05:10 )    Color: x / Appearance: x / SG: x / pH: x  Gluc: 83 mg/dL / Ketone: x  / Bili: x / Urobili: x   Blood: x / Protein: x / Nitrite: x   Leuk Esterase: x / RBC: x / WBC x   Sq Epi: x / Non Sq Epi: x / Bacteria: x        PHYSICAL EXAM:    GENERAL: In no apparent distress, well nourished, and hydrated.  HEART: Irregular rate and rhythm; No murmurs, rubs, or gallops.  PULMONARY: Clear to auscultation and percussion.  No rales, wheezing, or rhonchi bilaterally.  ABDOMEN: Soft, Nontender, Nondistended; Bowel sounds present  EXTREMITIES:  2+ Peripheral Pulses, No clubbing, cyanosis, or edema

## 2024-08-19 NOTE — H&P ADULT - PROBLEM SELECTOR PLAN 1
Presented with chest pain resolved with nitro. ECG demonstrated AFib with slow ventricular rate in 30-40s. Trop 54->52. BNP 3229. Normal CKMB. Low concern for ACS at this time. Follows with cardiologist Dr. Lisker.  [facility]: 7  - EP following  - Hold all AV marion blocking agents  - continue Eliquis 2.5mg BID  - TTE  - obtain records from [facility] and Dr. Lisker including echo/ecg  - TSH/T4 Presented with chest pain resolved with nitro. ECG demonstrated AFib with slow ventricular rate in 30-40s. Trop 54->52. BNP 3229. Normal CKMB. Low concern for ACS at this time. Follows with cardiologist Dr. Lisker.  [facility]: 7  - EP following  - Hold all AV marion blocking agents  - continue Eliquis 2.5mg BID  - TTE  - obtain records from [facility] and Dr. Lisker including echo/ecg  - TSH/T4; FT3 level added

## 2024-08-19 NOTE — H&P ADULT - PROBLEM SELECTOR PLAN 6
Since admission for stroke at [facility]. Has chronic butt since 8/4.  - hold home Tamsulosin 0.4mg qhs   - continue butt Home meds: Entresto 49-51mg BID, Metoprolol succinate 100mg qd  TTE 9/2022: EF 55% with mod MS and AR  - hold home metoprolol and entresto in setting of bradycardia and risk of hemodynamic instability Home meds: Entresto 49-51mg BID, Metoprolol succinate 100mg qd  TTE 9/2022: EF 55% with mod MS and AR  - hold home metoprolol and Entresto in setting of bradycardia and risk of hemodynamic instability Home meds: Entresto 49-51mg BID, Metoprolol succinate 100mg qd  TTE 9/2022: EF 55% with mod MS and AR  - hold home metoprolol and Entresto in setting of bradycardia and risk of hemodynamic instability  - giving lowered dose of amlodipine  - evaluate for restart of the above cardioprotective meds  - f/u thyroid studies  - giving low dose of IVF due to suspected dehydration with low blood pressure readings

## 2024-08-19 NOTE — H&P ADULT - PROBLEM SELECTOR PLAN 3
- continue home Cellcept 500mg qd and Prednisone 5mg qd Recent embolic CVA s/p thrombectomy (8/4/24 at [facility]) with residual R facial droop and L sided weakness. Currently at rehab. On a pureed diet with moderately thick-liquids at rehab.  - continue ASA81 and atorvastatin 40mg qhs  - PT/OT  - S&S

## 2024-08-19 NOTE — PHYSICAL THERAPY INITIAL EVALUATION ADULT - TRANSFER SAFETY CONCERNS NOTED: SIT/STAND, REHAB EVAL
impulsive; leaning towards left/decreased safety awareness/losing balance/decreased sequencing ability/decreased weight-shifting ability

## 2024-08-19 NOTE — H&P ADULT - PROBLEM SELECTOR PLAN 2
Recent embolic CVA s/p thrombectomy (8/4/24 at [facility]) with residual R facial droop and L sided weakness. Currently at rehab. On a pureed diet with moderately thick-liquids at rehab.  - continue ASA81 and atorvastatin 40mg qhs Due to AFib with slow ventricular rate. Low concern for ACS with low/downtrending trops and normal CKMB.  - further work-up as above Described as acute onset, crushing and substernal without radiation.  No associated shortness of breath or diaphoresis.  Palliated with Tylenol  - Troponin = 54 --> 52.  Pro-BNP = 3229  - noted with A-fib with slow ventricular rate down to 36 bpm. Low concern for ACS with low/downtrending trops and normal CKMB.  - already seen by EP Team (appreciated)  - continues on Telemetry  - further work-up as above Described as acute onset, crushing and substernal without radiation.  No associated shortness of breath or diaphoresis.  Palliated with Tylenol  - Troponin = 54 --> 52.  Pro-BNP = 3229  - noted with A-fib with slow ventricular rate down to 36 bpm. Low concern for ACS with low/downtrending trops and normal CKMB.  - already seen by EP Team (appreciated)  - beta blocker on hold due to slow a-fib  - BP borderline low (to SBP of 101); continuing with lower dose of amlodipine.  Holding Entresto (eval for restart)  - continues on Telemetry  - further work-up as above

## 2024-08-19 NOTE — H&P ADULT - PROBLEM SELECTOR PLAN 4
Hg 8.1 on admission, previously 11.8 in 10/2023. No overt bleeding.   - Maintain active T&S  - Transfuse for Hg <7  - iron studies, B12, folate, LDH, haptoglobin, reticulocyte count - continue home Cellcept 500mg qd and Prednisone 5mg qd

## 2024-08-19 NOTE — PROGRESS NOTE ADULT - ASSESSMENT
84y Male with a hx of AFib (on Eliquis), Myasthenia Gravis (on Cellcept), HFpEF, HTN, T2DM, urinary retention s/p Elliott, and recent embolic CVA s/p thrombectomy (8/4/24 at [facility]) with residual R facial droop and L sided weakness who  presents from rehab with chest pain, found to be in AFib with slow ventricular rate in 30-40s. Hemodynamically stable.

## 2024-08-19 NOTE — PATIENT PROFILE ADULT - FALL HARM RISK - HARM RISK INTERVENTIONS
Assistance with ambulation/Assistance OOB with selected safe patient handling equipment/Communicate Risk of Fall with Harm to all staff/Discuss with provider need for PT consult/Monitor gait and stability/Orthostatic vital signs/Provide patient with walking aids - walker, cane, crutches/Reinforce activity limits and safety measures with patient and family/Tailored Fall Risk Interventions/Visual Cue: Yellow wristband and red socks/Bed in lowest position, wheels locked, appropriate side rails in place/Call bell, personal items and telephone in reach/Instruct patient to call for assistance before getting out of bed or chair/Non-slip footwear when patient is out of bed/[place] to call system/Physically safe environment - no spills, clutter or unnecessary equipment/Purposeful Proactive Rounding/Room/bathroom lighting operational, light cord in reach

## 2024-08-19 NOTE — H&P ADULT - ATTENDING COMMENTS
84 year-old male, with past history as noted above, being evaluated for report of acute onset of substernal crushing chest pain; non-radiating and not associated with shortness of breath or diaphoresis.  Pro-BNP elevated to 3229.  Trop = 54 --> 52.  Noted with slow A-fib to 36 bpm.  Per report, was evaluated at [facility] for same, during recent admission wherein patient underwent thrombectomy for an embolic CVA.  Need to obtain report from OSH.  Holding all AV marion blocking agents.  Continuing with apixaban 2.5 mg.  TSH elevated to 7.01 on AM lab-work.  T4 low normal.  Would get Free T3 level.  Could be that steroid therapy is affecting the thyroid function.  Seen by EP for the slow A-fib (appreciated); f/u for further recommendations.  F/up AM lab-work.    Patient with Elliott catheter in place, inserted at [facility] for urinary retention, per report.  Continuing tamsulosin.  Evaluate for TOV.    All other management as prescribed. 84 year-old male, with past history as noted above, being evaluated for report of acute onset of substernal crushing chest pain; non-radiating and not associated with shortness of breath or diaphoresis.  Pro-BNP elevated to 3229.  Trop = 54 --> 52.  Noted with slow A-fib to 36 bpm.  Per report, was evaluated at [facility] for same, during recent admission wherein patient underwent thrombectomy for an embolic CVA.  Need to obtain report from OSH.  Holding all AV marion blocking agents.  Continuing with apixaban 2.5 mg.  TSH elevated to 7.01 on AM lab-work.  T4 low normal.  Would get Free T3 level (added to AM lab-work).  Could be that steroid therapy is affecting the thyroid function.  Seen by EP for the slow A-fib (appreciated); f/u for further recommendations.  Holding Entresto due to borderline low blood pressure (SBP of 101).  Continuing with amlodipine at 5 mg instead of 10 mg.  Evaluate for restart of Entresto and possibly metoprolol.  F/up AM lab-work.    Patient with Elliott catheter in place, inserted at [facility] for urinary retention, per report.  Continuing tamsulosin.  Evaluate for TOV.    All other management as prescribed.

## 2024-08-19 NOTE — H&P ADULT - NSHPREVIEWOFSYSTEMS_GEN_ALL_CORE
Review of Systems:  General: no fever or weight loss  Skin: no rashes or itch  HEENT: no trauma or HA. No drainage or bleeding.  CV: (+) chest pain. No palpitations  Pulm: no SOB, cough, or wheezing  GI: (+) constipation. no nausea, vomiting, or abd pain  Urinary: (+) Elliott. no incontinence or dysuria  Neuro: (+) R facial droop. no confusion, seizure, or numbness  MSK: (+) L sided weakness. no stiffness or swelling Review of Systems:  General: no fever or weight loss  Skin: has small scabs on medial and lateral aspects of the L-lower thigh area (sequela of procedure in [facility], per patient).  No rashes or itch  HEENT: no trauma or HA. No drainage or bleeding.  CV: (+) chest pain. No palpitations  Pulm: no SOB, cough, or wheezing  GI: (+) constipation. no nausea, vomiting, or abd pain  Urinary: (+) Elliott. no incontinence or dysuria  Neuro: (+) R facial droop. no confusion, seizure, or numbness  MSK: (+) L sided weakness. no stiffness or swelling

## 2024-08-19 NOTE — PROGRESS NOTE ADULT - ASSESSMENT
This is an 84 year old man, former Smoker, with past medical history of Atrial Fibrillation previously not on AC (Nose Bleeds), recent embolic CVA s/p thrombectomy (8/4/2024 [facility]) with residual right side facial droop and left side weakness, Myasthenia Gravis on CellCept, HFpEF on Entresto, Hypertension, type 2 Diabetes Mellitus, presented from rehab with chest pain found to have Afib with slow RVR HR 30s. Currently in atrial fibrillation [facility] VR 40s-60s. Toprol XL held. TSH 7.01 uIU/mL.   Problem/Recommendation - 1:  Problem: Atrial fibrillation with slow ventricular response.   Continue to monitor on Telemetry  CHADVASC 7  Continue Eliquis and increase dose to 5mg BID ( weight >60kg and creatinine 0.89)  Hold AV marion Blocking agents  TSH 7.01, check T3, T4  TTE in am to evaluate LV function  No pacing indications at this time

## 2024-08-19 NOTE — H&P ADULT - NSHPSOCIALHISTORY_GEN_ALL_CORE
Coming from rehab center. Lives with wife. Prior tobacco use, quit many years ago. Coming from rehab center.   Lives with wife.   Prior tobacco use; quit many years ago.

## 2024-08-19 NOTE — PROGRESS NOTE ADULT - PROBLEM SELECTOR PLAN 10
DVT ppx: Eliquis   Diet: Pureed with moderately thick liquids.   Dispo: pending clinical course. PT consult  Code Status: FULL CODE    POC d/w wife Vitamin B12 level at lower end of normal  Started B12 supplementation   Check MMA level

## 2024-08-19 NOTE — PROGRESS NOTE ADULT - SUBJECTIVE AND OBJECTIVE BOX
PROGRESS NOTE:     Patient is a 84y old  Male who presents with a chief complaint of Chest pain (19 Aug 2024 11:39)      SUBJECTIVE / OVERNIGHT EVENTS: No acute events. Chest pain resolved.     ADDITIONAL REVIEW OF SYSTEMS:    MEDICATIONS  (STANDING):  amLODIPine   Tablet 5 milliGRAM(s) Oral daily  apixaban 5 milliGRAM(s) Oral every 12 hours  aspirin  chewable 81 milliGRAM(s) Oral daily  atorvastatin 40 milliGRAM(s) Oral at bedtime  cyanocobalamin 1000 MICROGram(s) Oral daily  dextrose 5%. 1000 milliLiter(s) (100 mL/Hr) IV Continuous <Continuous>  dextrose 5%. 1000 milliLiter(s) (50 mL/Hr) IV Continuous <Continuous>  dextrose 50% Injectable 12.5 Gram(s) IV Push once  dextrose 50% Injectable 25 Gram(s) IV Push once  dextrose 50% Injectable 25 Gram(s) IV Push once  folic acid 1 milliGRAM(s) Oral daily  glucagon  Injectable 1 milliGRAM(s) IntraMuscular once  insulin lispro (ADMELOG) corrective regimen sliding scale   SubCutaneous three times a day before meals  insulin lispro (ADMELOG) corrective regimen sliding scale   SubCutaneous at bedtime  mycophenolate mofetil 500 milliGRAM(s) Oral daily  polyethylene glycol 3350 17 Gram(s) Oral two times a day  predniSONE   Tablet 5 milliGRAM(s) Oral daily  tamsulosin 0.4 milliGRAM(s) Oral at bedtime    MEDICATIONS  (PRN):  dextrose Oral Gel 15 Gram(s) Oral once PRN Blood Glucose LESS THAN 70 milliGRAM(s)/deciliter      CAPILLARY BLOOD GLUCOSE      POCT Blood Glucose.: 171 mg/dL (19 Aug 2024 12:13)  POCT Blood Glucose.: 107 mg/dL (19 Aug 2024 09:18)  POCT Blood Glucose.: 92 mg/dL (19 Aug 2024 05:32)  POCT Blood Glucose.: 129 mg/dL (18 Aug 2024 22:18)  POCT Blood Glucose.: 128 mg/dL (18 Aug 2024 21:14)  POCT Blood Glucose.: 142 mg/dL (18 Aug 2024 18:34)    I&O's Summary      PHYSICAL EXAM:  Vital Signs Last 24 Hrs  T(C): 36.7 (19 Aug 2024 08:42), Max: 37.3 (18 Aug 2024 20:15)  T(F): 98 (19 Aug 2024 08:42), Max: 99.1 (18 Aug 2024 20:15)  HR: 60 (19 Aug 2024 08:42) (41 - 60)  BP: 125/50 (19 Aug 2024 08:42) (101/47 - 130/40)  BP(mean): 68 (18 Aug 2024 23:00) (61 - 68)  RR: 18 (19 Aug 2024 08:42) (17 - 20)  SpO2: 95% (19 Aug 2024 08:42) (95% - 100%)    Parameters below as of 18 Aug 2024 23:00  Patient On (Oxygen Delivery Method): nasal cannula  O2 Flow (L/min): 2    CONSTITUTIONAL: NAD  EYES: PERRL. EOMI. No scleral icterus.  CV: Irregular. Normal S1 and S2. No murmurs, rubs, or gallops. No edema. Pulses equal bilaterally.  PULM: Clear to auscultation throughout. Respirations unlabored. No wheezing, rales, or rhonchi.  GI: Soft, non-tender, non-distended. No palpable masses.  MSK: No cyanosis or clubbing.   SKIN: Adherent one scab each on bilateral lower medial and lateral thigh areas; no signs of infection.  Non-tender (from time of embolectomy, per patient)  NEURO: (+) mild R facial droop. CN grossly intact.  PSYCH: A+O x 3.  Pleasant.  Mood and affect appropriate to situation    LABS:                        8.6    6.54  )-----------( 199      ( 19 Aug 2024 13:05 )             27.8     08-19    138  |  103  |  25<H>  ----------------------------<  83  4.4   |  22  |  0.89    Ca    8.6      19 Aug 2024 05:10  Phos  3.4     08-19  Mg     2.30     08-19    TPro  5.8<L>  /  Alb  3.3  /  TBili  0.6  /  DBili  x   /  AST  17  /  ALT  15  /  AlkPhos  157<H>  08-18    PT/INR - ( 19 Aug 2024 05:10 )   PT: 17.2 sec;   INR: 1.56 ratio         PTT - ( 19 Aug 2024 05:10 )  PTT:35.6 sec  CARDIAC MARKERS ( 18 Aug 2024 22:55 )  x     / x     / x     / x     / 1.8 ng/mL  CARDIAC MARKERS ( 18 Aug 2024 21:14 )  x     / x     / x     / x     / 1.8 ng/mL      Urinalysis Basic - ( 19 Aug 2024 05:10 )    Color: x / Appearance: x / SG: x / pH: x  Gluc: 83 mg/dL / Ketone: x  / Bili: x / Urobili: x   Blood: x / Protein: x / Nitrite: x   Leuk Esterase: x / RBC: x / WBC x   Sq Epi: x / Non Sq Epi: x / Bacteria: x          RADIOLOGY & ADDITIONAL TESTS:  Results Reviewed:   Imaging Personally Reviewed:  Electrocardiogram Personally Reviewed:    COORDINATION OF CARE:  Care Discussed with Consultants/Other Providers [Y/N]:  Prior or Outpatient Records Reviewed [Y/N]:

## 2024-08-19 NOTE — PHYSICAL THERAPY INITIAL EVALUATION ADULT - ADDITIONAL COMMENTS
Pt admitted from [facility]. Prior to recent CVA, patient lived in a private house with his wife; there are 5 steps to enter.     At [facility], has been able to stand with 1 person assist.

## 2024-08-19 NOTE — H&P ADULT - NSHPPHYSICALEXAM_GEN_ALL_CORE
Vital Signs Last 24 Hrs  T(C): 37.3 (18 Aug 2024 20:15), Max: 37.3 (18 Aug 2024 20:15)  T(F): 99.1 (18 Aug 2024 20:15), Max: 99.1 (18 Aug 2024 20:15)  HR: 45 (18 Aug 2024 23:00) (41 - 47)  BP: 121/45 (18 Aug 2024 23:00) (101/47 - 130/40)  BP(mean): 68 (18 Aug 2024 23:00) (61 - 68)  RR: 19 (18 Aug 2024 23:00) (17 - 20)  SpO2: 100% (18 Aug 2024 23:00) (98% - 100%)    Parameters below as of 18 Aug 2024 23:00  Patient On (Oxygen Delivery Method): nasal cannula  O2 Flow (L/min): 2    Physical Exam:  CONSTITUTIONAL: no apparent distress.  SKIN: No rashes or ecchymosis.  EYES: PERRLA. EOMI. No scleral icterus.  ENT: Supple. No discharge or glossitis. Oral mucosa with moist membranes. Hearing intact bilaterally.  CV: RRR. Normal S1 and S2. No murmurs, rubs, or gallops. No edema. Pulses equal bilaterally.  PULM: Clear to auscultation throughout. Respirations unlabored. No wheezing, rales, or rhonchi.  GI: Soft, non-tender, non-distended. No palpable masses.  MSK: No cyanosis or clubbing.   NEURO: (+) mild R facial droop. CN grossly intact.  PSYCH: A+O x3 Vital Signs Last 24 Hrs  T(C): 37.3 (18 Aug 2024 20:15), Max: 37.3 (18 Aug 2024 20:15)  T(F): 99.1 (18 Aug 2024 20:15), Max: 99.1 (18 Aug 2024 20:15)  HR: 45 (18 Aug 2024 23:00) (41 - 47)  BP: 121/45 (18 Aug 2024 23:00) (101/47 - 130/40)  BP(mean): 68 (18 Aug 2024 23:00) (61 - 68)  RR: 19 (18 Aug 2024 23:00) (17 - 20)  SpO2: 100% (18 Aug 2024 23:00) (98% - 100%)    Parameters below as of 18 Aug 2024 23:00  Patient On (Oxygen Delivery Method): nasal cannula  O2 Flow (L/min): 2    Physical Exam:  CONSTITUTIONAL: Adequately groomed, adequately nourished male, lying propped up in bed in no apparent distress.  SKIN: No rashes or ecchymosis.  EYES: PERRL. EOMI. No scleral icterus.  ENT: Supple. No discharge or glossitis. Oral mucosa with moist membranes. Hearing intact bilaterally.  CV: RRR. Normal S1 and S2. No murmurs, rubs, or gallops. No edema. Pulses equal bilaterally.  PULM: Clear to auscultation throughout. Respirations unlabored. No wheezing, rales, or rhonchi.  GI: Soft, non-tender, non-distended. No palpable masses.  MSK: No cyanosis or clubbing.   SKIN: Adherent one scab each on bilateral lower medial and lateral thigh areas; no signs of infection.  Non-tender (from time of embolectomy, per patient)  NEURO: (+) mild R facial droop. CN grossly intact.  PSYCH: A+O x 3.  Pleasant.  Mood and affect appropriate to situation

## 2024-08-19 NOTE — H&P ADULT - PROBLEM SELECTOR PLAN 5
Home meds: Entresto 49-51mg BID, Metoprolol succinate 100mg qd  TTE 9/2022: EF 55% with mod MS and AR  - hold home metoprolol and entresto in setting of bradycardia and risk of hemodynamic instability Hg 8.1 on admission, previously 11.8 in 10/2023. No overt bleeding.   - Maintain active T&S  - Transfuse for Hg <7  - iron studies, B12, folate, LDH, haptoglobin, reticulocyte count

## 2024-08-19 NOTE — PHYSICAL THERAPY INITIAL EVALUATION ADULT - GAIT DEVIATIONS NOTED, PT EVAL
forward flexed posture with downward gaze; leaning towards left/decreased velocity of limb motion/decreased step length/decreased weight-shifting ability

## 2024-08-19 NOTE — H&P ADULT - PROBLEM SELECTOR PLAN 9
DVT ppx: Eliquis 2.5mg BID  Diet: Pureed with moderately thick liquids. S&S consult  Dispo: pending clinical course. PT consult  Code Status: FULL CODE Home meds: Metformin 500mg BID  - hold home Meformin while inpatient  - LDSS  - A1c Home meds: Metformin 500mg BID  - hold home Metformin while inpatient  - LDSS  - A1c

## 2024-08-19 NOTE — H&P ADULT - HISTORY OF PRESENT ILLNESS
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    Patient reports he acutely developed mid-sternal chest pain without radiation this past afternoon. No SOB or diaphoresis. He was given Tylenol and SL nitro x2, which resolved the pain. No further chest pain, SOB, palpitations, lightheadedness, or blurry vision.    In the ED, patient was found to be bradycardic to 30-40s. EKG demonstrated AFib with slow ventricular rate. Hemodynamically stable. Evaluated by cardiology.

## 2024-08-19 NOTE — H&P ADULT - PROBLEM SELECTOR PLAN 7
Home meds: Amlodipine 5mg qd, Entresto 49-51mg BID, Metoprolol succinate 100mg qd, and Hydralazine 25mg q8h  - hold home meds in setting of bradycardia and risk of hemodynamic instability Since admission for stroke at [facility]. Has chronic butt since 8/4.  - hold home Tamsulosin 0.4mg qhs   - continue butt Since admission for stroke at [facility]. Has chronic Elliott since 8/4.  - continue Tamsulosin 0.4mg qhs   - continue Elliott and evaluate for TOV

## 2024-08-19 NOTE — PHYSICAL THERAPY INITIAL EVALUATION ADULT - PERTINENT HX OF CURRENT PROBLEM, REHAB EVAL
Pt is an 84 year old male presenting with chest pain without radiation. Received SL nitro x2, which resolved the pain. In ED, patient found bradycardiac and EKG showed Afib with slow ventricular rate. Of note, patient with recent embolic CVA s/p thrombectomy (8/4/24 at [facility]).

## 2024-08-19 NOTE — H&P ADULT - PROBLEM SELECTOR PLAN 10
DVT ppx: Eliquis 2.5mg BID  Diet: Pureed with moderately thick liquids. S&S consult  Dispo: pending clinical course. PT consult  Code Status: FULL CODE

## 2024-08-19 NOTE — H&P ADULT - ASSESSMENT
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 Hemodynamically stable. 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 Hemodynamically stable.

## 2024-08-19 NOTE — H&P ADULT - NSHPLABSRESULTS_GEN_ALL_CORE
Labs:                        8.1    6.51  )-----------( 185      ( 18 Aug 2024 20:00 )             25.8     08-18    137  |  104  |  29<H>  ----------------------------<  100<H>  4.5   |  24  |  0.96    Ca    8.3<L>      18 Aug 2024 22:55  Mg     2.40     08-18    TPro  5.8<L>  /  Alb  3.3  /  TBili  0.6  /  DBili  x   /  AST  17  /  ALT  15  /  AlkPhos  157<H>  08-18    PT/INR - ( 18 Aug 2024 20:00 )   PT: 18.2 sec;   INR: 1.63 ratio      PTT - ( 18 Aug 2024 20:00 )  PTT:37.1 sec    EKG: Afib with slow ventricular rate of 47bpm. QTc 423 Labs:                        8.1    6.51  )-----------( 185      ( 18 Aug 2024 20:00 )             25.8     08-18    137  |  104  |  29<H>  ----------------------------<  100<H>  4.5   |  24  |  0.96    Ca    8.3<L>      18 Aug 2024 22:55  Mg     2.40     08-18    TPro  5.8<L>  /  Alb  3.3  /  TBili  0.6  /  DBili  x   /  AST  17  /  ALT  15  /  AlkPhos  157<H>  08-18    PT/INR - ( 18 Aug 2024 20:00 )   PT: 18.2 sec;   INR: 1.63 ratio      PTT - ( 18 Aug 2024 20:00 )  PTT:37.1 sec    EKG: Afib with slow ventricular rate of 47bpm. QTc 423    RADIOLOGY:  Xray Chest 1 View- PORTABLE-Urgent 08.18.24  Impression:  No focal consolidation. Labs:                        8.1    6.51  )-----------( 185      ( 18 Aug 2024 20:00 )             25.8     08-18    137  |  104  |  29<H>  ----------------------------<  100<H>  4.5   |  24  |  0.96    Ca    8.3<L>      18 Aug 2024 22:55  Mg     2.40     08-18    TPro  5.8<L>  /  Alb  3.3  /  TBili  0.6  /  DBili  x   /  AST  17  /  ALT  15  /  AlkPhos  157<H>  08-18    PT/INR - ( 18 Aug 2024 20:00 )   PT: 18.2 sec;   INR: 1.63 ratio      PTT - ( 18 Aug 2024 20:00 )  PTT:37.1 sec    EKG: Afib with slow ventricular rate of 47bpm    RADIOLOGY:  Xray Chest 1 View- PORTABLE-Urgent 08.18.24  Impression:  No focal consolidation.

## 2024-08-20 LAB
24R-OH-CALCIDIOL SERPL-MCNC: 16.1 NG/ML — LOW (ref 30–80)
ANION GAP SERPL CALC-SCNC: 10 MMOL/L — SIGNIFICANT CHANGE UP (ref 7–14)
BUN SERPL-MCNC: 27 MG/DL — HIGH (ref 7–23)
CALCIUM SERPL-MCNC: 8.9 MG/DL — SIGNIFICANT CHANGE UP (ref 8.4–10.5)
CHLORIDE SERPL-SCNC: 104 MMOL/L — SIGNIFICANT CHANGE UP (ref 98–107)
CO2 SERPL-SCNC: 26 MMOL/L — SIGNIFICANT CHANGE UP (ref 22–31)
CREAT SERPL-MCNC: 0.97 MG/DL — SIGNIFICANT CHANGE UP (ref 0.5–1.3)
EGFR: 76 ML/MIN/1.73M2 — SIGNIFICANT CHANGE UP
GLUCOSE BLDC GLUCOMTR-MCNC: 105 MG/DL — HIGH (ref 70–99)
GLUCOSE BLDC GLUCOMTR-MCNC: 144 MG/DL — HIGH (ref 70–99)
GLUCOSE BLDC GLUCOMTR-MCNC: 152 MG/DL — HIGH (ref 70–99)
GLUCOSE BLDC GLUCOMTR-MCNC: 152 MG/DL — HIGH (ref 70–99)
GLUCOSE SERPL-MCNC: 94 MG/DL — SIGNIFICANT CHANGE UP (ref 70–99)
HCT VFR BLD CALC: 26.8 % — LOW (ref 39–50)
HCT VFR BLD CALC: 28.7 % — LOW (ref 39–50)
HGB BLD-MCNC: 8.2 G/DL — LOW (ref 13–17)
HGB BLD-MCNC: 8.8 G/DL — LOW (ref 13–17)
MCHC RBC-ENTMCNC: 28.4 PG — SIGNIFICANT CHANGE UP (ref 27–34)
MCHC RBC-ENTMCNC: 28.6 PG — SIGNIFICANT CHANGE UP (ref 27–34)
MCHC RBC-ENTMCNC: 30.6 GM/DL — LOW (ref 32–36)
MCHC RBC-ENTMCNC: 30.7 GM/DL — LOW (ref 32–36)
MCV RBC AUTO: 92.7 FL — SIGNIFICANT CHANGE UP (ref 80–100)
MCV RBC AUTO: 93.2 FL — SIGNIFICANT CHANGE UP (ref 80–100)
MRSA PCR RESULT.: SIGNIFICANT CHANGE UP
NRBC # BLD: 0 /100 WBCS — SIGNIFICANT CHANGE UP (ref 0–0)
NRBC # BLD: 0 /100 WBCS — SIGNIFICANT CHANGE UP (ref 0–0)
NRBC # FLD: 0 K/UL — SIGNIFICANT CHANGE UP (ref 0–0)
NRBC # FLD: 0 K/UL — SIGNIFICANT CHANGE UP (ref 0–0)
PLATELET # BLD AUTO: 162 K/UL — SIGNIFICANT CHANGE UP (ref 150–400)
PLATELET # BLD AUTO: 183 K/UL — SIGNIFICANT CHANGE UP (ref 150–400)
POTASSIUM SERPL-MCNC: 4.5 MMOL/L — SIGNIFICANT CHANGE UP (ref 3.5–5.3)
POTASSIUM SERPL-SCNC: 4.5 MMOL/L — SIGNIFICANT CHANGE UP (ref 3.5–5.3)
RBC # BLD: 2.89 M/UL — LOW (ref 4.2–5.8)
RBC # BLD: 3.08 M/UL — LOW (ref 4.2–5.8)
RBC # FLD: 16.1 % — HIGH (ref 10.3–14.5)
RBC # FLD: 16.2 % — HIGH (ref 10.3–14.5)
S AUREUS DNA NOSE QL NAA+PROBE: DETECTED
SODIUM SERPL-SCNC: 140 MMOL/L — SIGNIFICANT CHANGE UP (ref 135–145)
WBC # BLD: 5.97 K/UL — SIGNIFICANT CHANGE UP (ref 3.8–10.5)
WBC # BLD: 6.66 K/UL — SIGNIFICANT CHANGE UP (ref 3.8–10.5)
WBC # FLD AUTO: 5.97 K/UL — SIGNIFICANT CHANGE UP (ref 3.8–10.5)
WBC # FLD AUTO: 6.66 K/UL — SIGNIFICANT CHANGE UP (ref 3.8–10.5)

## 2024-08-20 PROCEDURE — 99222 1ST HOSP IP/OBS MODERATE 55: CPT

## 2024-08-20 PROCEDURE — 76770 US EXAM ABDO BACK WALL COMP: CPT | Mod: 26

## 2024-08-20 PROCEDURE — 99232 SBSQ HOSP IP/OBS MODERATE 35: CPT

## 2024-08-20 RX ORDER — MUPIROCIN 2 %
1 OINTMENT (GRAM) TOPICAL
Refills: 0 | Status: COMPLETED | OUTPATIENT
Start: 2024-08-20 | End: 2024-08-25

## 2024-08-20 RX ORDER — FOLIC ACID/MULTIVIT,IRON,MINER 0.4MG-18MG
2000 TABLET,CHEWABLE ORAL DAILY
Refills: 0 | Status: DISCONTINUED | OUTPATIENT
Start: 2024-08-20 | End: 2024-08-30

## 2024-08-20 RX ORDER — FUROSEMIDE 40 MG
40 TABLET ORAL DAILY
Refills: 0 | Status: DISCONTINUED | OUTPATIENT
Start: 2024-08-20 | End: 2024-08-21

## 2024-08-20 RX ORDER — ERGOCALCIFEROL (VITAMIN D2) 200 MCG/ML
50000 DROPS ORAL
Refills: 0 | Status: DISCONTINUED | OUTPATIENT
Start: 2024-08-20 | End: 2024-08-20

## 2024-08-20 RX ORDER — TAMSULOSIN HYDROCHLORIDE 0.4 MG/1
0.8 CAPSULE ORAL AT BEDTIME
Refills: 0 | Status: DISCONTINUED | OUTPATIENT
Start: 2024-08-20 | End: 2024-08-30

## 2024-08-20 RX ADMIN — Medication 1 APPLICATION(S): at 17:39

## 2024-08-20 RX ADMIN — Medication 1: at 12:18

## 2024-08-20 RX ADMIN — Medication 5 MILLIGRAM(S): at 06:08

## 2024-08-20 RX ADMIN — Medication 81 MILLIGRAM(S): at 12:17

## 2024-08-20 RX ADMIN — TAMSULOSIN HYDROCHLORIDE 0.8 MILLIGRAM(S): 0.4 CAPSULE ORAL at 21:56

## 2024-08-20 RX ADMIN — APIXABAN 5 MILLIGRAM(S): 5 TABLET, FILM COATED ORAL at 06:08

## 2024-08-20 RX ADMIN — MYCOPHENOLATE MOFETIL 500 MILLIGRAM(S): 250 CAPSULE ORAL at 12:18

## 2024-08-20 RX ADMIN — Medication 40 MILLIGRAM(S): at 21:55

## 2024-08-20 RX ADMIN — Medication 1000 MICROGRAM(S): at 12:18

## 2024-08-20 RX ADMIN — POLYETHYLENE GLYCOL 3350 17 GRAM(S): 17 POWDER, FOR SOLUTION ORAL at 17:38

## 2024-08-20 RX ADMIN — Medication 1 MILLIGRAM(S): at 12:18

## 2024-08-20 RX ADMIN — CHLORHEXIDINE GLUCONATE 1 APPLICATION(S): 40 SOLUTION TOPICAL at 12:19

## 2024-08-20 NOTE — SWALLOW BEDSIDE ASSESSMENT ADULT - COMMENTS
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) with residual R facial droop and L sided weakness who  presents from rehab with chest pain, found to be in AFib with slow ventricular rate. Also w/ significant AS and AI on echo."    CXR 8/18: IMPRESSION: Retrocardiac opacity and/or left basilar effusion obscures the left hemidiaphragm. Bilateral perihilar airspace opacities.    SLP attempted to see patient earlier this date however Urology at bedside (see note). Patient received upright awake/ alert, noted with O2 via nasal cannula, family present at bedside (wife, daughter, brother). Patient reports having had a swallowing evaluation at [place] but unsure if objective testing was completed. Daughter reports plan at rehab was to be assessed for advancement to mechanical soft but unsure if plan was for a clinical evaluation or objective test. Wife also reported patient with recent pneumonia.

## 2024-08-20 NOTE — SWALLOW BEDSIDE ASSESSMENT ADULT - PHARYNGEAL PHASE
Within functional limits no overt s/s penetration/ aspiration noted however given chest imaging/ recent CVA/ unknown history of objective testing suggest objective testing to rule out silent aspiration

## 2024-08-20 NOTE — SWALLOW BEDSIDE ASSESSMENT ADULT - ADDITIONAL RECOMMENDATIONS
1. Medical team advised to reconsult this service if patient is with a change in medical status. 2. This service to follow

## 2024-08-20 NOTE — PROGRESS NOTE ADULT - ASSESSMENT
This is an 84 year old man, former Smoker, with past medical history of Atrial Fibrillation previously not on AC (Nose Bleeds), recent embolic CVA s/p thrombectomy (8/4/2024 [facility]) with residual right side facial droop and left side weakness, Myasthenia Gravis on CellCept, HFpEF on Entresto, Hypertension, type 2 Diabetes Mellitus, presented from rehab with chest pain found to have Afib with slow RVR HR 30s. Currently in atrial fibrillation witrh VR 40s-60s. Toprol XL held. TSH 7.01 uIU/mL. TTE showed significant aortic stenosis and severe aortic regurgitation, normal LVEF.    Problem/Recommendation - 1:  Problem:   long-standing persistent AF  with slow ventricular response-asymptomatic   Continue to monitor on Telemetry  CHADVASC 7  Continue Eliquis and increase dose to 5mg BID ( weight >60kg and creatinine 0.89)  Avoid  AV marion Blocking agents, normal LVEF noted  TSH 7.01, check T3, T4    No pacing indications at this time

## 2024-08-20 NOTE — CONSULT NOTE ADULT - ASSESSMENT
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 Patient reports he acutely developed mid-sternal chest pain without radiation this past afternoon. No SOB or diaphoresis. He was given Tylenol and SL nitro x2, which resolved the pain. No further chest pain, SOB, palpitations, lightheadedness, or blurry vision. In the ED, patient was found to be bradycardic to 30-40s. EKG demonstrated AFib with slow ventricular rate. Hemodynamically stable. Evaluated by cardiology.    Urology consulted for hematuria that began this morning. Primary team had patient on schedule for CIC. This morning nursing attempted straight cath but unsuccessful. Noted pt had bleeding following procedure and retaining urine. Previously seen by Dr Singh in November of 2023 for urinary retention and incomplete emptying iso BPH. Recommended Cysto, Uro dynamic study and twice daily CIC but family deferred treatment. Started on tamsulosin w/ improvement. No previous episodes of hematuria. 3-way 20 Yakut coude placed by urology team w/ 50 CC clot burden and started on CBI.     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 Patient reports he acutely developed mid-sternal chest pain without radiation this past afternoon. No SOB or diaphoresis. He was given Tylenol and SL nitro x2, which resolved the pain. No further chest pain, SOB, palpitations, lightheadedness, or blurry vision. In the ED, patient was found to be bradycardic to 30-40s. EKG demonstrated AFib with slow ventricular rate. Hemodynamically stable. Evaluated by cardiology.    Urology consulted for hematuria that began this morning. Primary team had patient on schedule for CIC. This morning nursing attempted straight cath but unsuccessful. Noted pt had bleeding following procedure and retaining urine. Previously seen by Dr Singh in November of 2023 for urinary retention and incomplete emptying iso BPH. Recommended Cysto, Uro dynamic study and twice daily CIC but family deferred treatment. Started on tamsulosin w/ improvement. No previous episodes of hematuria. 3-way 20 Ivorian coude placed by urology team w/ 50 CC clot burden and started on CBI. RBUS w/ no evidence of organized clot     Recommendations  -------------------------------------------------------------  Continue on CBI  Monitor color, H/H and Cr  Please increase flomax from 0.4mg to 0.8mg daily; Pt is on .8mg flomax at home and is recommended dose from his urologist  Finasteride 5mg daily  Recommend scrotal support for swelling likely 2/2 Thrombectomy  Urology to follow    Plan discussed with Dr. 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 Patient reports he acutely developed mid-sternal chest pain without radiation this past afternoon. No SOB or diaphoresis. He was given Tylenol and SL nitro x2, which resolved the pain. No further chest pain, SOB, palpitations, lightheadedness, or blurry vision. In the ED, patient was found to be bradycardic to 30-40s. EKG demonstrated AFib with slow ventricular rate. Hemodynamically stable. Evaluated by cardiology.    Urology consulted for hematuria that began this morning. Primary team had patient on schedule for CIC. This morning nursing attempted straight cath but unsuccessful. Noted pt had bleeding following procedure and retaining urine. Previously seen by Dr Singh in November of 2023 for urinary retention and incomplete emptying iso BPH. Recommended Cysto, Uro dynamic study and twice daily CIC but family deferred treatment. Started on tamsulosin w/ improvement. No previous episodes of hematuria. 3-way 20 [place]inean coude placed by urology team w/ 50 CC clot burden and started on CBI. RBUS w/ no evidence of organized clot     Recommendations  -------------------------------------------------------------  Continue on CBI  Hold AC if able   Monitor color, H/H and Cr  Please increase flomax from 0.4mg to 0.8mg daily; Pt is on .8mg flomax at home and is recommended dose from his urologist  Finasteride 5mg daily  Recommend scrotal support for swelling likely 2/2 Thrombectomy  Urology to follow    Plan discussed with Dr. 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 3-way 20 [place] coude placed by urology team w/ 50 CC clot burden and started on CBI. RBUS w/ no evidence of organized clot    Plan:  -Continue on CBI  -Hold AC if able   -Monitor color, H/H and Cr  -Please increase flomax from 0.4mg to 0.8mg daily; Pt is on .8mg flomax at home and is recommended dose from his urologist  -Would send UA/ucx  -Finasteride 5mg daily  -Recommend scrotal support for swelling likely 2/2 Thrombectomy  -Urology to follow    Plan discussed with Dr. Malachi West  Urology #[id number]

## 2024-08-20 NOTE — PROGRESS NOTE ADULT - SUBJECTIVE AND OBJECTIVE BOX
PROGRESS NOTE:     Patient is a 84y old  Male who presents with a chief complaint of Chest pain (20 Aug 2024 12:07)      SUBJECTIVE / OVERNIGHT EVENTS: Pt's Elliott removed yesterday and required straight cath overnight, now w/ blood in urine.     ADDITIONAL REVIEW OF SYSTEMS:    MEDICATIONS  (STANDING):  apixaban 5 milliGRAM(s) Oral every 12 hours  aspirin  chewable 81 milliGRAM(s) Oral daily  atorvastatin 40 milliGRAM(s) Oral at bedtime  chlorhexidine 2% Cloths 1 Application(s) Topical daily  cyanocobalamin 1000 MICROGram(s) Oral daily  dextrose 5%. 1000 milliLiter(s) (50 mL/Hr) IV Continuous <Continuous>  dextrose 5%. 1000 milliLiter(s) (100 mL/Hr) IV Continuous <Continuous>  dextrose 50% Injectable 25 Gram(s) IV Push once  dextrose 50% Injectable 12.5 Gram(s) IV Push once  dextrose 50% Injectable 25 Gram(s) IV Push once  folic acid 1 milliGRAM(s) Oral daily  glucagon  Injectable 1 milliGRAM(s) IntraMuscular once  insulin lispro (ADMELOG) corrective regimen sliding scale   SubCutaneous three times a day before meals  insulin lispro (ADMELOG) corrective regimen sliding scale   SubCutaneous at bedtime  mupirocin 2% Ointment 1 Application(s) Both Nostrils two times a day  mycophenolate mofetil 500 milliGRAM(s) Oral daily  polyethylene glycol 3350 17 Gram(s) Oral two times a day  predniSONE   Tablet 5 milliGRAM(s) Oral daily  tamsulosin 0.4 milliGRAM(s) Oral at bedtime    MEDICATIONS  (PRN):  dextrose Oral Gel 15 Gram(s) Oral once PRN Blood Glucose LESS THAN 70 milliGRAM(s)/deciliter      CAPILLARY BLOOD GLUCOSE      POCT Blood Glucose.: 152 mg/dL (20 Aug 2024 12:04)  POCT Blood Glucose.: 105 mg/dL (20 Aug 2024 08:28)  POCT Blood Glucose.: 123 mg/dL (19 Aug 2024 22:54)  POCT Blood Glucose.: 132 mg/dL (19 Aug 2024 18:24)    I&O's Summary    19 Aug 2024 07:01  -  20 Aug 2024 07:00  --------------------------------------------------------  IN: 400 mL / OUT: 1000 mL / NET: -600 mL    20 Aug 2024 07:01  -  20 Aug 2024 12:30  --------------------------------------------------------  IN: 0 mL / OUT: 425 mL / NET: -425 mL        PHYSICAL EXAM:  Vital Signs Last 24 Hrs  T(C): 36.8 (20 Aug 2024 05:30), Max: 36.8 (20 Aug 2024 05:30)  T(F): 98.3 (20 Aug 2024 05:30), Max: 98.3 (20 Aug 2024 05:30)  HR: 53 (20 Aug 2024 05:30) (35 - 62)  BP: 110/56 (20 Aug 2024 05:30) (110/50 - 123/54)  BP(mean): --  RR: 18 (20 Aug 2024 05:30) (18 - 18)  SpO2: 99% (20 Aug 2024 05:30) (96% - 100%)    Parameters below as of 20 Aug 2024 05:30  Patient On (Oxygen Delivery Method): room air    CONSTITUTIONAL: NAD  EYES: PERRL. EOMI. No scleral icterus.  CV: Irregular. Normal S1 and S2. No murmurs, rubs, or gallops. No edema. Pulses equal bilaterally.  PULM: Clear to auscultation throughout. Respirations unlabored. No wheezing, rales, or rhonchi.  GI: Soft, non-tender, non-distended. No palpable masses.  MSK: No cyanosis or clubbing.   SKIN: Adherent one scab each on bilateral lower medial and lateral thigh areas; no signs of infection.  Non-tender (from time of embolectomy, per patient)  NEURO: (+) mild R facial droop. CN grossly intact.  PSYCH: A+O x 3.  Pleasant.  Mood and affect appropriate to situation    LABS:                        8.8    6.66  )-----------( 183      ( 20 Aug 2024 06:45 )             28.7     08-20    140  |  104  |  27<H>  ----------------------------<  94  4.5   |  26  |  0.97    Ca    8.9      20 Aug 2024 06:45  Phos  3.4     08-19  Mg     2.30     08-19    TPro  5.8<L>  /  Alb  3.3  /  TBili  0.6  /  DBili  x   /  AST  17  /  ALT  15  /  AlkPhos  157<H>  08-18    PT/INR - ( 19 Aug 2024 05:10 )   PT: 17.2 sec;   INR: 1.56 ratio         PTT - ( 19 Aug 2024 05:10 )  PTT:35.6 sec  CARDIAC MARKERS ( 18 Aug 2024 22:55 )  x     / x     / x     / x     / 1.8 ng/mL  CARDIAC MARKERS ( 18 Aug 2024 21:14 )  x     / x     / x     / x     / 1.8 ng/mL      Urinalysis Basic - ( 20 Aug 2024 06:45 )    Color: x / Appearance: x / SG: x / pH: x  Gluc: 94 mg/dL / Ketone: x  / Bili: x / Urobili: x   Blood: x / Protein: x / Nitrite: x   Leuk Esterase: x / RBC: x / WBC x   Sq Epi: x / Non Sq Epi: x / Bacteria: x          RADIOLOGY & ADDITIONAL TESTS:  Results Reviewed:   Imaging Personally Reviewed:  Electrocardiogram Personally Reviewed:  TTE:   1. Technically difficult image quality.   2. The left ventricular cavity is normal in size. Left ventricular wall thickness is severely increased. Left ventricular systolic function is normal with an ejection fraction visually estimated at 60 to 65%. There are no regional wall motion abnormalities seen. Severe left ventricular hypertrophy. There is increased LV mass and concentric hypertrophy.   3. Normal right ventricular cavity size and probably normal right ventricular systolic function.   4. There is mitral valve thickening of the anterior and posterior leaflets. There is reduced leaflet mobility of the mitral valve. There is calcification of the mitral valve annulus. There is moderate leaflet calcification. There is mild to moderate mitral valve stenosis. The transmitral peak gradient is 14.0 mmHg and mean gradient is 4.00 mmHg. There is mild mitral regurgitation.   5. The aortic valve appears trileaflet with reduced systolic excursion. There is calcification of the aortic valve leaflets. The peak transaortic velocity is 4.16 m/s, peak transaortic gradient is 69.2 mmHg and mean transaortic gradient is 39.0 mmHg. Significant aortic stenosis is present. However, unable to estimate the aortic valve area as pulsed wave Doppler at the level of the LVOT not performed (no LVOT VTI measurements made). There is severe aortic regurgitation. Vena contracta width ~ 0.7 cm.   6. The left atrium is severely dilated with an indexed volume of 59.19 ml/m².   7. Left pleural effusion noted.    COORDINATION OF CARE:  Care Discussed with Consultants/Other Providers [Y/N]:  Prior or Outpatient Records Reviewed [Y/N]:

## 2024-08-20 NOTE — CONSULT NOTE ADULT - SUBJECTIVE AND OBJECTIVE BOX
Urology Consult    HPI:  ALVARO TRAORE is a 84y Male with a hx of AFib (on Eliquis), Myasthenia Gravis (on Cellcept), HFpEF, HTN, T2DM, urinary retention s/p Elliott, and recent embolic CVA s/p thrombectomy (8/4/24 at [facility]) with residual R facial droop and L sided weakness who  presents from rehab with chest pain. Patient reports he acutely developed mid-sternal chest pain without radiation this past afternoon. No SOB or diaphoresis. He was given Tylenol and SL nitro x2, which resolved the pain. No further chest pain, SOB, palpitations, lightheadedness, or blurry vision. In the ED, patient was found to be bradycardic to 30-40s. EKG demonstrated AFib with slow ventricular rate. Hemodynamically stable. Evaluated by cardiology.    Urology consulted for hematuria that began this morning. Primary team had patient on schedule for CIC. This morning nursing attempted straight cath but unsuccessful. Noted pt had bleeding following procedure and retaining urine. Previously seen by Dr Singh in November of 2023 for urinary retention and incomplete emptying iso BPH. Recommended Cysto, Uro dynamic study and twice daily CIC but family deferred treatment. Started on tamsulosin w/ improvement. No previous episodes of hematuria. 3-way 20 [place] coude placed by urology team w/ 50 CC clot burden and started on CBI.     PAST MEDICAL & SURGICAL HISTORY:  Atrial fibrillation  Embolic stroke  Myasthenia gravis  (HFpEF) heart failure with preserved ejection fraction  HTN (hypertension)  T2DM (type 2 diabetes mellitus)  History of thrombolytic therapy    FAMILY HISTORY:  No family history of cardiac disease (Father, Mother, Sibling)    SOCIAL HISTORY:   Tobacco hx:    MEDICATIONS  (STANDING):  apixaban 5 milliGRAM(s) Oral every 12 hours  aspirin  chewable 81 milliGRAM(s) Oral daily  atorvastatin 40 milliGRAM(s) Oral at bedtime  chlorhexidine 2% Cloths 1 Application(s) Topical daily  cholecalciferol 2000 Unit(s) Oral daily  cyanocobalamin 1000 MICROGram(s) Oral daily  dextrose 5%. 1000 milliLiter(s) (100 mL/Hr) IV Continuous <Continuous>  dextrose 5%. 1000 milliLiter(s) (50 mL/Hr) IV Continuous <Continuous>  dextrose 50% Injectable 25 Gram(s) IV Push once  dextrose 50% Injectable 25 Gram(s) IV Push once  dextrose 50% Injectable 12.5 Gram(s) IV Push once  folic acid 1 milliGRAM(s) Oral daily  glucagon  Injectable 1 milliGRAM(s) IntraMuscular once  insulin lispro (ADMELOG) corrective regimen sliding scale   SubCutaneous at bedtime  insulin lispro (ADMELOG) corrective regimen sliding scale   SubCutaneous three times a day before meals  mupirocin 2% Ointment 1 Application(s) Both Nostrils two times a day  mycophenolate mofetil 500 milliGRAM(s) Oral daily  polyethylene glycol 3350 17 Gram(s) Oral two times a day  predniSONE   Tablet 5 milliGRAM(s) Oral daily  tamsulosin 0.4 milliGRAM(s) Oral at bedtime    MEDICATIONS  (PRN):  dextrose Oral Gel 15 Gram(s) Oral once PRN Blood Glucose LESS THAN 70 milliGRAM(s)/deciliter    Allergies    No Known Allergies    Intolerances    REVIEW OF SYSTEMS: Pertinent positives and negatives as stated in HPI, otherwise negative    Vital signs  T(C): 36.8 (08-20-24 @ 14:07), Max: 36.8 (08-20-24 @ 05:30)  HR: 63 (08-20-24 @ 14:07)  BP: 129/51 (08-20-24 @ 14:07)  SpO2: 99% (08-20-24 @ 14:07)  Wt(kg): --    Output    Vital signs reviewed.   CONSTITUTIONAL: Well-appearing; well-nourished; in no apparent distress. Non-toxic appearing.   HEAD: Normocephalic, atraumatic.  EYES: Normal conjunctiva and no sclera injection noted  ENT: Normal nose; no rhinorrhea  CARD: Normal S1, S2  RESP: Normal chest excursion with respiration; breath sounds clear and equal bilaterally  ABD/GI: Soft, non-distended; non-tender  : Scrotal swelling, non-tender on exam  EXT/MS: Moves all extremities; distal pulses are normal  SKIN: Normal for age and race; warm; dry; good turgor; no apparent lesions or exudate noted.  NEURO: Awake, alert, oriented x 3  PSYCH: Normal mood; appropriate affect.    LABS:    08-20 @ 06:45    WBC 6.66  / Hct 28.7  / SCr 0.97     08-19 @ 13:05    WBC 6.54  / Hct 27.8  / SCr --       08-20    140  |  104  |  27<H>  ----------------------------<  94  4.5   |  26  |  0.97    Ca    8.9      20 Aug 2024 06:45  Phos  3.4     08-19  Mg     2.30     08-19    TPro  5.8<L>  /  Alb  3.3  /  TBili  0.6  /  DBili  x   /  AST  17  /  ALT  15  /  AlkPhos  157<H>  08-18    PT/INR - ( 19 Aug 2024 05:10 )   PT: 17.2 sec;   INR: 1.56 ratio         PTT - ( 19 Aug 2024 05:10 )  PTT:35.6 sec  Urinalysis Basic - ( 20 Aug 2024 06:45 )    Color: x / Appearance: x / SG: x / pH: x  Gluc: 94 mg/dL / Ketone: x  / Bili: x / Urobili: x   Blood: x / Protein: x / Nitrite: x   Leuk Esterase: x / RBC: x / WBC x   Sq Epi: x / Non Sq Epi: x / Bacteria: x        Urine Cx:   Blood Cx:    RADIOLOGY:

## 2024-08-20 NOTE — PROGRESS NOTE ADULT - SUBJECTIVE AND OBJECTIVE BOX
Patient is a 84y old  Male who presents with a chief complaint of Chest pain (19 Aug 2024 14:31)  Patient denies CP, SOB or palpitations or lightheadedness or dizziness.      PAST MEDICAL & SURGICAL HISTORY:  No pertinent past medical history    Atrial fibrillation    Embolic stroke    Myasthenia gravis    (HFpEF) heart failure with preserved ejection fraction    HTN (hypertension)    T2DM (type 2 diabetes mellitus)    No significant past surgical history    History of thrombolytic therapy        MEDICATIONS  (STANDING):  apixaban 5 milliGRAM(s) Oral every 12 hours  aspirin  chewable 81 milliGRAM(s) Oral daily  atorvastatin 40 milliGRAM(s) Oral at bedtime  chlorhexidine 2% Cloths 1 Application(s) Topical daily  cyanocobalamin 1000 MICROGram(s) Oral daily  dextrose 5%. 1000 milliLiter(s) (100 mL/Hr) IV Continuous <Continuous>  dextrose 5%. 1000 milliLiter(s) (50 mL/Hr) IV Continuous <Continuous>  dextrose 50% Injectable 25 Gram(s) IV Push once  dextrose 50% Injectable 12.5 Gram(s) IV Push once  dextrose 50% Injectable 25 Gram(s) IV Push once  folic acid 1 milliGRAM(s) Oral daily  glucagon  Injectable 1 milliGRAM(s) IntraMuscular once  insulin lispro (ADMELOG) corrective regimen sliding scale   SubCutaneous at bedtime  insulin lispro (ADMELOG) corrective regimen sliding scale   SubCutaneous three times a day before meals  mupirocin 2% Ointment 1 Application(s) Both Nostrils two times a day  mycophenolate mofetil 500 milliGRAM(s) Oral daily  polyethylene glycol 3350 17 Gram(s) Oral two times a day  predniSONE   Tablet 5 milliGRAM(s) Oral daily  tamsulosin 0.4 milliGRAM(s) Oral at bedtime    MEDICATIONS  (PRN):  dextrose Oral Gel 15 Gram(s) Oral once PRN Blood Glucose LESS THAN 70 milliGRAM(s)/deciliter            Vital Signs Last 24 Hrs  T(C): 36.8 (20 Aug 2024 05:30), Max: 36.8 (20 Aug 2024 05:30)  T(F): 98.3 (20 Aug 2024 05:30), Max: 98.3 (20 Aug 2024 05:30)  HR: 53 (20 Aug 2024 05:30) (35 - 62)  BP: 110/56 (20 Aug 2024 05:30) (110/50 - 123/54)  BP(mean): --  RR: 18 (20 Aug 2024 05:30) (18 - 18)  SpO2: 99% (20 Aug 2024 05:30) (96% - 100%)    Parameters below as of 20 Aug 2024 05:30  Patient On (Oxygen Delivery Method): room air                INTERPRETATION OF TELEMETRY: AF with slow V rate (slowest down to 31 bpm transiently) mostly during sleep hours, HR up to 60-70's bpm during non-sleep hours    ECG:        LABS:                        8.8    6.66  )-----------( 183      ( 20 Aug 2024 06:45 )             28.7     08-20    140  |  104  |  27<H>  ----------------------------<  94  4.5   |  26  |  0.97    Ca    8.9      20 Aug 2024 06:45  Phos  3.4     08-19  Mg     2.30     08-19    TPro  5.8<L>  /  Alb  3.3  /  TBili  0.6  /  DBili  x   /  AST  17  /  ALT  15  /  AlkPhos  157<H>  08-18    CARDIAC MARKERS ( 18 Aug 2024 22:55 )  x     / x     / x     / x     / 1.8 ng/mL  CARDIAC MARKERS ( 18 Aug 2024 21:14 )  x     / x     / x     / x     / 1.8 ng/mL      PT/INR - ( 19 Aug 2024 05:10 )   PT: 17.2 sec;   INR: 1.56 ratio         PTT - ( 19 Aug 2024 05:10 )  PTT:35.6 sec  Urinalysis Basic - ( 20 Aug 2024 06:45 )    Color: x / Appearance: x / SG: x / pH: x  Gluc: 94 mg/dL / Ketone: x  / Bili: x / Urobili: x   Blood: x / Protein: x / Nitrite: x   Leuk Esterase: x / RBC: x / WBC x   Sq Epi: x / Non Sq Epi: x / Bacteria: x        BNP  RADIOLOGY & ADDITIONAL STUDIES:  CONCLUSIONS:      1. Technically difficult image quality.   2. The left ventricular cavity is normal in size. Left ventricular wall thickness is severely increased. Left ventricularsystolic function is normal with an ejection fraction visually estimated at 60 to 65%. There are no regional wall motion abnormalities seen. Severe left ventricular hypertrophy. There is increased LV mass and concentric hypertrophy.   3. Normal rightventricular cavity size and probably normal right ventricular systolic function.   4. There is mitral valve thickening of the anterior and posterior leaflets. There is reduced leaflet mobility of the mitral valve. There is calcification of the mitralvalve annulus. There is moderate leaflet calcification. There is mild to moderate mitral valve stenosis. The transmitral peak gradient is 14.0 mmHg and mean gradient is 4.00 mmHg. There is mild mitral regurgitation.   5. The aortic valve appears trileaflet with reduced systolic excursion. There is calcification of the aortic valve leaflets. The peak transaortic velocity is 4.16 m/s, peak transaortic gradient is 69.2 mmHg and mean transaortic gradient is 39.0 mmHg. Significant aortic stenosis is present. However, unable to estimate the aortic valve area as pulsed wave Doppler at the level of the LVOT not performed (no LVOT VTI measurements made). There is severe aortic regurgitation. Vena contracta width ~ 0.7 cm.   6. The left atrium is severely dilated with an indexed volume of 59.19 ml/m².   7. Left pleural effusion noted.        PHYSICAL EXAM:    GENERAL: In no apparent distress, well nourished, and hydrated.  NECK: Supple and normal thyroid.  No JVD or carotid bruit.  Carotid pulse is 2+ bilaterally.  HEART: S1S2 irregularly irregular ; III/VI systolic murmurs, no rubs, or gallops.  PULMONARY: Clear to auscultation and perfusion.  No rales, wheezing, or rhonchi bilaterally.  ABDOMEN: Soft, Nontender, Nondistended; Bowel sounds present  EXTREMITIES:  2+ Peripheral Pulses, No clubbing, cyanosis, trace pitting edema  NEUROLOGICAL: alert oriented x4 speech clear no  focal deficit noted

## 2024-08-20 NOTE — SWALLOW BEDSIDE ASSESSMENT ADULT - SWALLOW EVAL: RECOMMENDED FEEDING/EATING TECHNIQUES
small spoonfuls; small sips; slow rate of intake/crush medication (when feasible)/maintain upright posture during/after eating for 30 mins/oral hygiene/position upright (90 degrees)

## 2024-08-20 NOTE — PROGRESS NOTE ADULT - NS ATTEND AMEND GEN_ALL_CORE FT
This is an 84 year old man, former smoker, with past medical history of atrial fibrillation previously not on AC (Nose Bleeds), recent embolic CVA s/p thrombectomy (8/4/2024 [facility]) with residual right side facial droop and left side weakness, Myasthenia Gravis on CellCept, HFpEF on Entresto, Hypertension, type 2 Diabetes Mellitus, presented from rehab with chest pain found to have Afib with slow RVR HR 30s. Currently in atrial fibrillation with VR 40s-60s. Toprol XL held. TSH 7.01 uIU/mL. TTE showed significant aortic stenosis and severe aortic regurgitation, normal LVEF.    Long-standing persistent AF  with slow ventricular response-asymptomatic. Continue to monitor on Telemetry. CHADVASC 7. Continue Eliquis and increase dose to 5mg BID ( weight >60kg and creatinine 0.89). Avoid  AV marion Blocking agents, normal LVEF noted. TSH 7.01, check T3, T4. No pacing indications at this time
This is an 84 year old man, former smoker, with past medical history of Atrial Fibrillation previously not on AC (Nose Bleeds), recent embolic CVA s/p thrombectomy (8/4/2024 [facility]) with residual right side facial droop and left side weakness, Myasthenia Gravis on CellCept, HFpEF on Entresto, Hypertension, type 2 Diabetes Mellitus, presented from rehab with chest pain found to have Afib with slow RVR HR 30s. Currently in atrial fibrillation [facility] VR 40s-60s. Toprol XL held. TSH 7.01 uIU/mL. Continue Eliquis and increase dose to 5mg BID ( weight >60kg and creatinine 0.89)  Hold AV marion Blocking agents. TSH 7.01, check T3, T4. TTE in am to evaluate LV function.

## 2024-08-20 NOTE — SWALLOW BEDSIDE ASSESSMENT ADULT - COMMENTS
Hospitalist 8/20, "84y Male with a hx of AFib (on Eliquis), Myasthenia Gravis (on Cellcept), HFpEF, HTN, T2DM, urinary retention s/p Elliott, and recent embolic CVA s/p thrombectomy (8/4/24 at [facility]) with residual R facial droop and L sided weakness who  presents from rehab with chest pain, found to be in AFib with slow ventricular rate. Also w/ significant AS and AI on echo."    SLP arrived to patient's room. Urology currently at bedside providing patient care. SLP service to follow as schedule permits.

## 2024-08-20 NOTE — CONSULT NOTE ADULT - SUBJECTIVE AND OBJECTIVE BOX
Cardiology Consult Note   [Please check [email or web address] password: "[email or web address]" for cardiology service schedule and contact information]    HPI:  ALVARO TRAORE is a 84y Male with a hx of AFib (on Eliquis), Myasthenia Gravis (on Cellcept), HFpEF, HTN, T2DM, urinary retention s/p Elliott, and recent embolic CVA s/p thrombectomy (8/4/24 at [facility]) with residual R facial droop and L sided weakness who  presents from rehab with chest pain.    Patient reports he acutely developed mid-sternal chest pain without radiation this past afternoon. No SOB or diaphoresis. He was given Tylenol and SL nitro x2, which resolved the pain. No further chest pain, SOB, palpitations, lightheadedness, or blurry vision.    In the ED, patient was found to be bradycardic to 30-40s. EKG demonstrated AFib with slow ventricular rate. Hemodynamically stable. Evaluated by cardiology.  (19 Aug 2024 01:32)      PAST MEDICAL & SURGICAL HISTORY:  Atrial fibrillation      Embolic stroke      Myasthenia gravis      (HFpEF) heart failure with preserved ejection fraction      HTN (hypertension)      T2DM (type 2 diabetes mellitus)      History of thrombolytic therapy        FAMILY HISTORY:  No family history of cardiac disease (Father, Mother, Sibling)      SOCIAL HISTORY:  unchanged    MEDICATIONS:  apixaban 5 milliGRAM(s) Oral every 12 hours  aspirin  chewable 81 milliGRAM(s) Oral daily          polyethylene glycol 3350 17 Gram(s) Oral two times a day    atorvastatin 40 milliGRAM(s) Oral at bedtime  dextrose 50% Injectable 25 Gram(s) IV Push once  dextrose 50% Injectable 12.5 Gram(s) IV Push once  dextrose 50% Injectable 25 Gram(s) IV Push once  dextrose Oral Gel 15 Gram(s) Oral once PRN  glucagon  Injectable 1 milliGRAM(s) IntraMuscular once  insulin lispro (ADMELOG) corrective regimen sliding scale   SubCutaneous at bedtime  insulin lispro (ADMELOG) corrective regimen sliding scale   SubCutaneous three times a day before meals  predniSONE   Tablet 5 milliGRAM(s) Oral daily    chlorhexidine 2% Cloths 1 Application(s) Topical daily  cyanocobalamin 1000 MICROGram(s) Oral daily  dextrose 5%. 1000 milliLiter(s) IV Continuous <Continuous>  dextrose 5%. 1000 milliLiter(s) IV Continuous <Continuous>  folic acid 1 milliGRAM(s) Oral daily  mupirocin 2% Ointment 1 Application(s) Both Nostrils two times a day  mycophenolate mofetil 500 milliGRAM(s) Oral daily  tamsulosin 0.4 milliGRAM(s) Oral at bedtime        -------------------------------------------------------------------------------------------  PHYSICAL EXAM:  T(C): 36.8 (08-20-24 @ 05:30), Max: 36.8 (08-20-24 @ 05:30)  HR: 53 (08-20-24 @ 05:30) (35 - 62)  BP: 110/56 (08-20-24 @ 05:30) (110/50 - 123/54)  RR: 18 (08-20-24 @ 05:30) (18 - 18)  SpO2: 99% (08-20-24 @ 05:30) (96% - 100%)  Wt(kg): --  I&O's Summary    19 Aug 2024 07:01  -  20 Aug 2024 07:00  --------------------------------------------------------  IN: 400 mL / OUT: 1000 mL / NET: -600 mL    20 Aug 2024 07:01  -  20 Aug 2024 12:51  --------------------------------------------------------  IN: 0 mL / OUT: 425 mL / NET: -425 mL        GENERAL: NAD  HEAD: Atraumatic, Normocephalic.  ENT: Moist mucous membranes.  NECK: Supple, No JVD.  CHEST/LUNG: Clear to auscultation bilaterally; No rales, rhonchi, wheezing, or rubs. Unlabored respirations.  HEART: Regular rate and rhythm; No murmurs, rubs, or gallops.  ABDOMEN: Bowel sounds present; Soft, Nontender, Nondistended.   EXTREMITIES:  2+ Peripheral Pulses, brisk capillary refill. No clubbing, cyanosis, or edema.    -------------------------------------------------------------------------------------------  LABS:                          8.8    6.66  )-----------( 183      ( 20 Aug 2024 06:45 )             28.7     08-20    140  |  104  |  27<H>  ----------------------------<  94  4.5   |  26  |  0.97    Ca    8.9      20 Aug 2024 06:45  Phos  3.4     08-19  Mg     2.30     08-19    TPro  5.8<L>  /  Alb  3.3  /  TBili  0.6  /  DBili  x   /  AST  17  /  ALT  15  /  AlkPhos  157<H>  08-18    PT/INR - ( 19 Aug 2024 05:10 )   PT: 17.2 sec;   INR: 1.56 ratio         PTT - ( 19 Aug 2024 05:10 )  PTT:35.6 sec  CARDIAC MARKERS ( 19 Aug 2024 05:10 )  49 ng/L / x     / x     / x     / x     / x      CARDIAC MARKERS ( 18 Aug 2024 22:55 )  x     / x     / x     / x     / x     / 1.8 ng/mL  CARDIAC MARKERS ( 18 Aug 2024 21:14 )  52 ng/L / x     / x     / x     / x     / 1.8 ng/mL  CARDIAC MARKERS ( 18 Aug 2024 20:00 )  54 ng/L / x     / x     / x     / x     / x                  -------------------------------------------------------------------------------------------  Cardiovascular Diagnostic Testing:    ECG:     Echo:     Stress Testing:    Cath:    -------------------------------------------------------------------------------------------            Gabriel Shoemaker MD   Cardiology Fellow      Cardiology Consult Note   [Please check [email or web address] password: [email or web address]" for cardiology service schedule and contact information]    HPI:  ALVARO TRAORE is a 84y Male with a hx of AFib (on Eliquis), Myasthenia Gravis (on Cellcept), HFpEF, HTN, T2DM, urinary retention s/p Elliott, and recent embolic CVA s/p thrombectomy (8/4/24 at [facility]) with residual R facial droop and L sided weakness who  presents from rehab with chest pain.   Patient reports he acutely developed mid-sternal chest pain without radiation this past afternoon. No SOB or diaphoresis. He was given Tylenol and SL nitro x2, which resolved the pain. No further chest pain, SOB, palpitations, lightheadedness, or blurry vision.  In the ED, patient was found to be bradycardic to 30-40s. EKG demonstrated AFib with slow ventricular rate. Hemodynamically stable. Evaluated by cardiology.  (19 Aug 2024 01:32)    Interval History: Seen by EP for afib with slow ventricular response, beta blockers stopped. He reports no further episodes of chest pain. He denies chest pain, dyspnea or dizziness when working with physical therapy for rehabilitation of stroke. No recent falls. No loss of consciousness.     PAST MEDICAL & SURGICAL HISTORY:  Atrial fibrillation  Embolic stroke  Myasthenia gravis  (HFpEF) heart failure with preserved ejection fraction  HTN (hypertension)  T2DM (type 2 diabetes mellitus)  History of thrombolytic therapy    FAMILY HISTORY:  No family history of cardiac disease (Father, Mother, Sibling)    SOCIAL HISTORY: Former smoker    MEDICATIONS:  apixaban 5 milliGRAM(s) Oral every 12 hours  aspirin  chewable 81 milliGRAM(s) Oral daily  polyethylene glycol 3350 17 Gram(s) Oral two times a day  atorvastatin 40 milliGRAM(s) Oral at bedtime  dextrose 50% Injectable 25 Gram(s) IV Push once  dextrose 50% Injectable 12.5 Gram(s) IV Push once  dextrose 50% Injectable 25 Gram(s) IV Push once  dextrose Oral Gel 15 Gram(s) Oral once PRN  glucagon  Injectable 1 milliGRAM(s) IntraMuscular once  insulin lispro (ADMELOG) corrective regimen sliding scale   SubCutaneous at bedtime  insulin lispro (ADMELOG) corrective regimen sliding scale   SubCutaneous three times a day before meals  predniSONE   Tablet 5 milliGRAM(s) Oral daily  chlorhexidine 2% Cloths 1 Application(s) Topical daily  cyanocobalamin 1000 MICROGram(s) Oral daily  dextrose 5%. 1000 milliLiter(s) IV Continuous <Continuous>  dextrose 5%. 1000 milliLiter(s) IV Continuous <Continuous>  folic acid 1 milliGRAM(s) Oral daily  mupirocin 2% Ointment 1 Application(s) Both Nostrils two times a day  mycophenolate mofetil 500 milliGRAM(s) Oral daily  tamsulosin 0.4 milliGRAM(s) Oral at bedtime        -------------------------------------------------------------------------------------------  PHYSICAL EXAM:  T(C): 36.8 (08-20-24 @ 05:30), Max: 36.8 (08-20-24 @ 05:30)  HR: 53 (08-20-24 @ 05:30) (35 - 62)  BP: 110/56 (08-20-24 @ 05:30) (110/50 - 123/54)  RR: 18 (08-20-24 @ 05:30) (18 - 18)  SpO2: 99% (08-20-24 @ 05:30) (96% - 100%)  Wt(kg): --  I&O's Summary    19 Aug 2024 07:01  -  20 Aug 2024 07:00  --------------------------------------------------------  IN: 400 mL / OUT: 1000 mL / NET: -600 mL    20 Aug 2024 07:01  -  20 Aug 2024 12:51  --------------------------------------------------------  IN: 0 mL / OUT: 425 mL / NET: -425 mL        GENERAL: NAD  HEAD: Atraumatic, Normocephalic.  NECK: Supple, No elevated JVP.   CHEST/LUNG: Clear to auscultation bilaterally  HEART: Irregular rhythm. 2/6 systolic late peaking murmur left sternal border 2nd ics, + pulsus parvus et tardus.   ABDOMEN: Soft, NT, ND  EXTREMITIES: Extremities, warm, no lower extremity edema.     -------------------------------------------------------------------------------------------  LABS:                          8.8    6.66  )-----------( 183      ( 20 Aug 2024 06:45 )             28.7     08-20    140  |  104  |  27<H>  ----------------------------<  94  4.5   |  26  |  0.97    Ca    8.9      20 Aug 2024 06:45  Phos  3.4     08-19  Mg     2.30     08-19    TPro  5.8<L>  /  Alb  3.3  /  TBili  0.6  /  DBili  x   /  AST  17  /  ALT  15  /  AlkPhos  157<H>  08-18    PT/INR - ( 19 Aug 2024 05:10 )   PT: 17.2 sec;   INR: 1.56 ratio         PTT - ( 19 Aug 2024 05:10 )  PTT:35.6 sec  CARDIAC MARKERS ( 19 Aug 2024 05:10 )  49 ng/L / x     / x     / x     / x     / x      CARDIAC MARKERS ( 18 Aug 2024 22:55 )  x     / x     / x     / x     / x     / 1.8 ng/mL  CARDIAC MARKERS ( 18 Aug 2024 21:14 )  52 ng/L / x     / x     / x     / x     / 1.8 ng/mL  CARDIAC MARKERS ( 18 Aug 2024 20:00 )  54 ng/L / x     / x     / x     / x     / x                     Cardiology Consult Note   [Please check [email or web address] password: [email or web address]" for cardiology service schedule and contact information]    HPI:  ALVARO TRAORE is a 84y Male with a hx of AFib (on Eliquis), Myasthenia Gravis (on Cellcept), HFpEF, HTN, T2DM, urinary retention s/p Elliott, and recent embolic CVA s/p thrombectomy (8/4/24 at [facility]) with residual R facial droop and L sided weakness who  presents from rehab with chest pain.   Patient reports he acutely developed mid-sternal chest pain without radiation this past afternoon. No SOB or diaphoresis. He was given Tylenol and SL nitro x2, which resolved the pain. No further chest pain, SOB, palpitations, lightheadedness, or blurry vision.  In the ED, patient was found to be bradycardic to 30-40s. EKG demonstrated AFib with slow ventricular rate. Hemodynamically stable. Evaluated by cardiology.  (19 Aug 2024 01:32)    Interval History: Seen by EP for afib with slow ventricular response, beta blockers stopped. He reports no further episodes of chest pain. He denies chest pain, dyspnea or dizziness when working with physical therapy for rehabilitation of stroke. No recent falls. No loss of consciousness.     PAST MEDICAL & SURGICAL HISTORY:  Atrial fibrillation  Embolic stroke  Myasthenia gravis  (HFpEF) heart failure with preserved ejection fraction  HTN (hypertension)  T2DM (type 2 diabetes mellitus)  History of thrombolytic therapy    FAMILY HISTORY:  No family history of cardiac disease (Father, Mother, Sibling)    SOCIAL HISTORY: Former smoker    MEDICATIONS:  apixaban 5 milliGRAM(s) Oral every 12 hours  aspirin  chewable 81 milliGRAM(s) Oral daily  polyethylene glycol 3350 17 Gram(s) Oral two times a day  atorvastatin 40 milliGRAM(s) Oral at bedtime  dextrose 50% Injectable 25 Gram(s) IV Push once  dextrose 50% Injectable 12.5 Gram(s) IV Push once  dextrose 50% Injectable 25 Gram(s) IV Push once  dextrose Oral Gel 15 Gram(s) Oral once PRN  glucagon  Injectable 1 milliGRAM(s) IntraMuscular once  insulin lispro (ADMELOG) corrective regimen sliding scale   SubCutaneous at bedtime  insulin lispro (ADMELOG) corrective regimen sliding scale   SubCutaneous three times a day before meals  predniSONE   Tablet 5 milliGRAM(s) Oral daily  chlorhexidine 2% Cloths 1 Application(s) Topical daily  cyanocobalamin 1000 MICROGram(s) Oral daily  dextrose 5%. 1000 milliLiter(s) IV Continuous <Continuous>  dextrose 5%. 1000 milliLiter(s) IV Continuous <Continuous>  folic acid 1 milliGRAM(s) Oral daily  mupirocin 2% Ointment 1 Application(s) Both Nostrils two times a day  mycophenolate mofetil 500 milliGRAM(s) Oral daily  tamsulosin 0.4 milliGRAM(s) Oral at bedtime        -------------------------------------------------------------------------------------------  PHYSICAL EXAM:  T(C): 36.8 (08-20-24 @ 05:30), Max: 36.8 (08-20-24 @ 05:30)  HR: 53 (08-20-24 @ 05:30) (35 - 62)  BP: 110/56 (08-20-24 @ 05:30) (110/50 - 123/54)  RR: 18 (08-20-24 @ 05:30) (18 - 18)  SpO2: 99% (08-20-24 @ 05:30) (96% - 100%)  Wt(kg): --  I&O's Summary    19 Aug 2024 07:01  -  20 Aug 2024 07:00  --------------------------------------------------------  IN: 400 mL / OUT: 1000 mL / NET: -600 mL    20 Aug 2024 07:01  -  20 Aug 2024 12:51  --------------------------------------------------------  IN: 0 mL / OUT: 425 mL / NET: -425 mL        GENERAL: NAD  HEAD: Atraumatic, Normocephalic.  NECK: Supple, JVP 9 cm.   CHEST/LUNG: Clear to auscultation bilaterally  HEART: Irregular rhythm. 2/6 systolic late peaking murmur left sternal border 2nd ics, + pulsus parvus et tardus.   ABDOMEN: Soft, NT, ND  EXTREMITIES: Extremities, warm, no lower extremity edema.     -------------------------------------------------------------------------------------------  LABS:                          8.8    6.66  )-----------( 183      ( 20 Aug 2024 06:45 )             28.7     08-20    140  |  104  |  27<H>  ----------------------------<  94  4.5   |  26  |  0.97    Ca    8.9      20 Aug 2024 06:45  Phos  3.4     08-19  Mg     2.30     08-19    TPro  5.8<L>  /  Alb  3.3  /  TBili  0.6  /  DBili  x   /  AST  17  /  ALT  15  /  AlkPhos  157<H>  08-18    PT/INR - ( 19 Aug 2024 05:10 )   PT: 17.2 sec;   INR: 1.56 ratio         PTT - ( 19 Aug 2024 05:10 )  PTT:35.6 sec  CARDIAC MARKERS ( 19 Aug 2024 05:10 )  49 ng/L / x     / x     / x     / x     / x      CARDIAC MARKERS ( 18 Aug 2024 22:55 )  x     / x     / x     / x     / x     / 1.8 ng/mL  CARDIAC MARKERS ( 18 Aug 2024 21:14 )  52 ng/L / x     / x     / x     / x     / 1.8 ng/mL  CARDIAC MARKERS ( 18 Aug 2024 20:00 )  54 ng/L / x     / x     / x     / x     / x                     Cardiology Consult Note   [Please check [email or web address] password: [email or web address]" for cardiology service schedule and contact information]    HPI:  ALVARO TRAORE is a 84y Male with a hx of AFib (on Eliquis), Myasthenia Gravis (on Cellcept), HFpEF, HTN, T2DM, urinary retention s/p Elliott, and recent embolic CVA s/p thrombectomy (8/4/24 at [facility]) with residual R facial droop and L sided weakness who  presents from rehab with chest pain.   Patient reports he acutely developed mid-sternal chest pain without radiation this past afternoon. No SOB or diaphoresis. He was given Tylenol and SL nitro x2, which resolved the pain. No further chest pain, SOB, palpitations, lightheadedness, or blurry vision.  In the ED, patient was found to be bradycardic to 30-40s. EKG demonstrated AFib with slow ventricular rate. Hemodynamically stable. Evaluated by cardiology.  (19 Aug 2024 01:32)    Interval History: Seen by EP for afib with slow ventricular response, beta blockers stopped. He reports no further episodes of chest pain. He denies chest pain, dyspnea or dizziness when working with physical therapy for rehabilitation of stroke. No recent falls. No loss of consciousness.     Tele - Afib 35 - 60s.     PAST MEDICAL & SURGICAL HISTORY:  Atrial fibrillation  Embolic stroke  Myasthenia gravis  (HFpEF) heart failure with preserved ejection fraction  HTN (hypertension)  T2DM (type 2 diabetes mellitus)  History of thrombolytic therapy    FAMILY HISTORY:  No family history of cardiac disease (Father, Mother, Sibling)    SOCIAL HISTORY: Former smoker    MEDICATIONS:  apixaban 5 milliGRAM(s) Oral every 12 hours  aspirin  chewable 81 milliGRAM(s) Oral daily  polyethylene glycol 3350 17 Gram(s) Oral two times a day  atorvastatin 40 milliGRAM(s) Oral at bedtime  dextrose 50% Injectable 25 Gram(s) IV Push once  dextrose 50% Injectable 12.5 Gram(s) IV Push once  dextrose 50% Injectable 25 Gram(s) IV Push once  dextrose Oral Gel 15 Gram(s) Oral once PRN  glucagon  Injectable 1 milliGRAM(s) IntraMuscular once  insulin lispro (ADMELOG) corrective regimen sliding scale   SubCutaneous at bedtime  insulin lispro (ADMELOG) corrective regimen sliding scale   SubCutaneous three times a day before meals  predniSONE   Tablet 5 milliGRAM(s) Oral daily  chlorhexidine 2% Cloths 1 Application(s) Topical daily  cyanocobalamin 1000 MICROGram(s) Oral daily  dextrose 5%. 1000 milliLiter(s) IV Continuous <Continuous>  dextrose 5%. 1000 milliLiter(s) IV Continuous <Continuous>  folic acid 1 milliGRAM(s) Oral daily  mupirocin 2% Ointment 1 Application(s) Both Nostrils two times a day  mycophenolate mofetil 500 milliGRAM(s) Oral daily  tamsulosin 0.4 milliGRAM(s) Oral at bedtime        -------------------------------------------------------------------------------------------  PHYSICAL EXAM:  T(C): 36.8 (08-20-24 @ 05:30), Max: 36.8 (08-20-24 @ 05:30)  HR: 53 (08-20-24 @ 05:30) (35 - 62)  BP: 110/56 (08-20-24 @ 05:30) (110/50 - 123/54)  RR: 18 (08-20-24 @ 05:30) (18 - 18)  SpO2: 99% (08-20-24 @ 05:30) (96% - 100%)  Wt(kg): --  I&O's Summary    19 Aug 2024 07:01  -  20 Aug 2024 07:00  --------------------------------------------------------  IN: 400 mL / OUT: 1000 mL / NET: -600 mL    20 Aug 2024 07:01  -  20 Aug 2024 12:51  --------------------------------------------------------  IN: 0 mL / OUT: 425 mL / NET: -425 mL        GENERAL: NAD  HEAD: Atraumatic, Normocephalic.  NECK: Supple, JVP 9 cm.   CHEST/LUNG: Clear to auscultation bilaterally  HEART: Irregular rhythm. 2/6 systolic late peaking murmur left sternal border 2nd ics, + pulsus parvus et tardus.   ABDOMEN: Soft, NT, ND  EXTREMITIES: Extremities, warm, no lower extremity edema.     -------------------------------------------------------------------------------------------  LABS:                          8.8    6.66  )-----------( 183      ( 20 Aug 2024 06:45 )             28.7     08-20    140  |  104  |  27<H>  ----------------------------<  94  4.5   |  26  |  0.97    Ca    8.9      20 Aug 2024 06:45  Phos  3.4     08-19  Mg     2.30     08-19    TPro  5.8<L>  /  Alb  3.3  /  TBili  0.6  /  DBili  x   /  AST  17  /  ALT  15  /  AlkPhos  157<H>  08-18    PT/INR - ( 19 Aug 2024 05:10 )   PT: 17.2 sec;   INR: 1.56 ratio         PTT - ( 19 Aug 2024 05:10 )  PTT:35.6 sec  CARDIAC MARKERS ( 19 Aug 2024 05:10 )  49 ng/L / x     / x     / x     / x     / x      CARDIAC MARKERS ( 18 Aug 2024 22:55 )  x     / x     / x     / x     / x     / 1.8 ng/mL  CARDIAC MARKERS ( 18 Aug 2024 21:14 )  52 ng/L / x     / x     / x     / x     / 1.8 ng/mL  CARDIAC MARKERS ( 18 Aug 2024 20:00 )  54 ng/L / x     / x     / x     / x     / x

## 2024-08-20 NOTE — SWALLOW BEDSIDE ASSESSMENT ADULT - ASR SWALLOW RECOMMEND DIAG
Cinesophagram to objectively assess swallow function given patient with recent CVA, unclear objective testing history and recent pneumonia/ current chest imaging to rule out silent aspiration and determine candidacy for diet advancement/VFSS/MBS

## 2024-08-20 NOTE — CONSULT NOTE ADULT - ASSESSMENT
84 year old man with atrial fibrilliation, Myasthenia Gravis (on Cellcept), HFpEF, HTN, T2DM, urinary retention s/p Elliott, and recent embolic CVA s/p thrombectomy (8/4/24 at [facility]) with residual R facial droop and L sided weakness who  presents from rehab with chest pain.     #Severe AS  #Severe AI  #HFpEF   84 year old man with atrial fibrilliation, Myasthenia Gravis (on Cellcept), HFpEF, HTN, T2DM, urinary retention s/p Elliott, and recent embolic CVA s/p thrombectomy (8/4/24 at [facility]) with residual R facial droop and L sided weakness who initially presented with chest pain. Cardiology consulted for management of severe AS/AI.     TTE - EF 60-65%, LV concentric hypertrophy, mild-moderate MS, mild MR, severe AS/AI, dilated LA, mild-moderate TR    #Severe AS  #Severe AI  #HFpEF   84 year old man with atrial fibrilliation, Myasthenia Gravis (on Cellcept), HFpEF, HTN, T2DM, urinary retention s/p Elliott, and recent embolic CVA s/p thrombectomy (8/4/24 at [facility]) with residual R facial droop and L sided weakness who initially presented with chest pain. Cardiology consulted for management of severe AS/AI. Follows with Dr. Lisker outpatient.     #Severe AS  #Severe AI  #Acute Decompensated HFpEF  ACC/AHA Stage C Valvular HFpEF  probnp 3229, troponin 54 -->52, BUN/Cr 27/0.97  Chest X-ray - perihilar infiltrates, left sided pleural effusion.   TTE - EF 60-65%, LV concentric hypertrophy, mild-moderate MS, mild MR, severe AS/AI, dilated LA, mild-moderate TR, RA - 15 mmHg  EKG - 36 BPM, atrial fibrillation, no ST T  wave changes.   - Holding home entresto  - Start lasix 40 IV QD  - Strict I/Os  - Daily standing weights  - Plan to start SGLT2 near discharge  - Will discuss case with structural cardiology.     #Afib  CHADsVASc - 7  - c/w eliquis 5 mg PO BID  - holding beta blockers iso afib with slow ventricular response  - EP following.     Zachery Bledsoe MD  PGY-3 84 year old man with atrial fibrilliation, Myasthenia Gravis (on Cellcept), HFpEF, HTN, T2DM, urinary retention s/p Elliott, and recent embolic CVA s/p thrombectomy (8/4/24 at [facility]) with residual R facial droop and L sided weakness who initially presented with chest pain. Cardiology consulted for management of severe AS/AI. Follows with Dr. Lisker outpatient.     #Severe AS  #Severe AI  #Acute Decompensated HFpEF  ACC/AHA Stage C Valvular HFpEF  probnp 3229, troponin 54 -->52, BUN/Cr 27/0.97  Chest X-ray - perihilar infiltrates, left sided pleural effusion.   TTE - EF 60-65%, LV concentric hypertrophy, mild-moderate MS, mild MR, severe AS/AI, dilated LA, mild-moderate TR, RA - 15 mmHg  EKG - 36 BPM, atrial fibrillation, no ST T  wave changes.   - Holding home entresto  - Start lasix 40 IV QD  - Strict I/Os  - Daily standing weights  - Plan to start SGLT2 near discharge  - Will discuss case with structural cardiology.     #Afib  CHADsVASc - 7  - c/w eliquis 5 mg PO BID  - on ASA for recent CVA.   - holding beta blockers iso afib with slow ventricular response  - EP following.     Zachery Bledsoe MD  PGY-3

## 2024-08-20 NOTE — SWALLOW BEDSIDE ASSESSMENT ADULT - SWALLOW EVAL: DIAGNOSIS
1. Functional oral stage for puree, regular solids, moderately thick liquids, mildly thick liquids and thin liquids characterized by adequate acceptance and containment, adequate mastication of regular solids, adequate anterior to posterior transport with adequate oral clearance. 2. Pharyngeal stage for the aforementioned consistencies characterized by initiation of the pharyngeal swallow and hyolaryngeal excursion upon digital palpation with no overt s/s penetration/ aspiration (i.e., throat clearing, coughing, wet vocal quality) noted post swallow; however, given patient with recent CVA, unclear objective testing history and pneumonia suggest objective testing to rule out silent aspiration prior to diet advancement.

## 2024-08-20 NOTE — PROGRESS NOTE ADULT - ASSESSMENT
84y Male with a hx of AFib (on Eliquis), Myasthenia Gravis (on Cellcept), HFpEF, HTN, T2DM, urinary retention s/p Elliott, and recent embolic CVA s/p thrombectomy (8/4/24 at [facility]) with residual R facial droop and L sided weakness who  presents from rehab with chest pain, found to be in AFib with slow ventricular rate. Also w/ significant AS and AI on echo.

## 2024-08-21 DIAGNOSIS — R31.9 HEMATURIA, UNSPECIFIED: ICD-10-CM

## 2024-08-21 DIAGNOSIS — I50.33 ACUTE ON CHRONIC DIASTOLIC (CONGESTIVE) HEART FAILURE: ICD-10-CM

## 2024-08-21 LAB
ANION GAP SERPL CALC-SCNC: 7 MMOL/L — SIGNIFICANT CHANGE UP (ref 7–14)
APPEARANCE UR: ABNORMAL
BILIRUB UR-MCNC: NEGATIVE — SIGNIFICANT CHANGE UP
BUN SERPL-MCNC: 27 MG/DL — HIGH (ref 7–23)
CALCIUM SERPL-MCNC: 8.5 MG/DL — SIGNIFICANT CHANGE UP (ref 8.4–10.5)
CHLORIDE SERPL-SCNC: 104 MMOL/L — SIGNIFICANT CHANGE UP (ref 98–107)
CO2 SERPL-SCNC: 26 MMOL/L — SIGNIFICANT CHANGE UP (ref 22–31)
COLOR SPEC: YELLOW — SIGNIFICANT CHANGE UP
CREAT SERPL-MCNC: 0.89 MG/DL — SIGNIFICANT CHANGE UP (ref 0.5–1.3)
DIFF PNL FLD: ABNORMAL
EGFR: 84 ML/MIN/1.73M2 — SIGNIFICANT CHANGE UP
GLUCOSE BLDC GLUCOMTR-MCNC: 114 MG/DL — HIGH (ref 70–99)
GLUCOSE BLDC GLUCOMTR-MCNC: 131 MG/DL — HIGH (ref 70–99)
GLUCOSE BLDC GLUCOMTR-MCNC: 133 MG/DL — HIGH (ref 70–99)
GLUCOSE BLDC GLUCOMTR-MCNC: 163 MG/DL — HIGH (ref 70–99)
GLUCOSE SERPL-MCNC: 103 MG/DL — HIGH (ref 70–99)
GLUCOSE UR QL: NEGATIVE MG/DL — SIGNIFICANT CHANGE UP
HCT VFR BLD CALC: 23.9 % — LOW (ref 39–50)
HCT VFR BLD CALC: 24.8 % — LOW (ref 39–50)
HGB BLD-MCNC: 7.5 G/DL — LOW (ref 13–17)
HGB BLD-MCNC: 7.8 G/DL — LOW (ref 13–17)
KETONES UR-MCNC: NEGATIVE MG/DL — SIGNIFICANT CHANGE UP
LEUKOCYTE ESTERASE UR-ACNC: ABNORMAL
MAGNESIUM SERPL-MCNC: 2.1 MG/DL — SIGNIFICANT CHANGE UP (ref 1.6–2.6)
MCHC RBC-ENTMCNC: 29.3 PG — SIGNIFICANT CHANGE UP (ref 27–34)
MCHC RBC-ENTMCNC: 29.4 PG — SIGNIFICANT CHANGE UP (ref 27–34)
MCHC RBC-ENTMCNC: 31.4 GM/DL — LOW (ref 32–36)
MCHC RBC-ENTMCNC: 31.5 GM/DL — LOW (ref 32–36)
MCV RBC AUTO: 93.4 FL — SIGNIFICANT CHANGE UP (ref 80–100)
MCV RBC AUTO: 93.6 FL — SIGNIFICANT CHANGE UP (ref 80–100)
NITRITE UR-MCNC: NEGATIVE — SIGNIFICANT CHANGE UP
NRBC # BLD: 0 /100 WBCS — SIGNIFICANT CHANGE UP (ref 0–0)
NRBC # BLD: 0 /100 WBCS — SIGNIFICANT CHANGE UP (ref 0–0)
NRBC # FLD: 0 K/UL — SIGNIFICANT CHANGE UP (ref 0–0)
NRBC # FLD: 0 K/UL — SIGNIFICANT CHANGE UP (ref 0–0)
PH UR: 6.5 — SIGNIFICANT CHANGE UP (ref 5–8)
PHOSPHATE SERPL-MCNC: 3.3 MG/DL — SIGNIFICANT CHANGE UP (ref 2.5–4.5)
PLATELET # BLD AUTO: 139 K/UL — LOW (ref 150–400)
PLATELET # BLD AUTO: 143 K/UL — LOW (ref 150–400)
POTASSIUM SERPL-MCNC: 5.3 MMOL/L — SIGNIFICANT CHANGE UP (ref 3.5–5.3)
POTASSIUM SERPL-SCNC: 5.3 MMOL/L — SIGNIFICANT CHANGE UP (ref 3.5–5.3)
PROT UR-MCNC: 300 MG/DL
RBC # BLD: 2.56 M/UL — LOW (ref 4.2–5.8)
RBC # BLD: 2.65 M/UL — LOW (ref 4.2–5.8)
RBC # FLD: 15.9 % — HIGH (ref 10.3–14.5)
RBC # FLD: 16.2 % — HIGH (ref 10.3–14.5)
RBC CASTS # UR COMP ASSIST: >50 /HPF — SIGNIFICANT CHANGE UP (ref 0–4)
SODIUM SERPL-SCNC: 137 MMOL/L — SIGNIFICANT CHANGE UP (ref 135–145)
SP GR SPEC: 1.01 — SIGNIFICANT CHANGE UP (ref 1–1.03)
UROBILINOGEN FLD QL: 1 MG/DL — SIGNIFICANT CHANGE UP (ref 0.2–1)
WBC # BLD: 6.69 K/UL — SIGNIFICANT CHANGE UP (ref 3.8–10.5)
WBC # BLD: 7.28 K/UL — SIGNIFICANT CHANGE UP (ref 3.8–10.5)
WBC # FLD AUTO: 6.69 K/UL — SIGNIFICANT CHANGE UP (ref 3.8–10.5)
WBC # FLD AUTO: 7.28 K/UL — SIGNIFICANT CHANGE UP (ref 3.8–10.5)
WBC UR QL: >50 /HPF — SIGNIFICANT CHANGE UP (ref 0–5)

## 2024-08-21 PROCEDURE — 74230 X-RAY XM SWLNG FUNCJ C+: CPT | Mod: 26

## 2024-08-21 PROCEDURE — 99233 SBSQ HOSP IP/OBS HIGH 50: CPT

## 2024-08-21 PROCEDURE — 99232 SBSQ HOSP IP/OBS MODERATE 35: CPT | Mod: GC

## 2024-08-21 PROCEDURE — 99222 1ST HOSP IP/OBS MODERATE 55: CPT | Mod: GC

## 2024-08-21 RX ORDER — FINASTERIDE 1 MG/1
5 TABLET, FILM COATED ORAL DAILY
Refills: 0 | Status: DISCONTINUED | OUTPATIENT
Start: 2024-08-21 | End: 2024-08-30

## 2024-08-21 RX ORDER — FUROSEMIDE 40 MG
40 TABLET ORAL
Refills: 0 | Status: DISCONTINUED | OUTPATIENT
Start: 2024-08-21 | End: 2024-08-23

## 2024-08-21 RX ADMIN — Medication 40 MILLIGRAM(S): at 22:32

## 2024-08-21 RX ADMIN — Medication 81 MILLIGRAM(S): at 11:39

## 2024-08-21 RX ADMIN — Medication 1 MILLIGRAM(S): at 11:40

## 2024-08-21 RX ADMIN — Medication 40 MILLIGRAM(S): at 05:28

## 2024-08-21 RX ADMIN — Medication 5 MILLIGRAM(S): at 05:28

## 2024-08-21 RX ADMIN — Medication 1 APPLICATION(S): at 18:11

## 2024-08-21 RX ADMIN — Medication 1: at 12:42

## 2024-08-21 RX ADMIN — Medication 2000 UNIT(S): at 11:39

## 2024-08-21 RX ADMIN — MYCOPHENOLATE MOFETIL 500 MILLIGRAM(S): 250 CAPSULE ORAL at 11:39

## 2024-08-21 RX ADMIN — CHLORHEXIDINE GLUCONATE 1 APPLICATION(S): 40 SOLUTION TOPICAL at 11:44

## 2024-08-21 RX ADMIN — Medication 40 MILLIGRAM(S): at 17:12

## 2024-08-21 RX ADMIN — TAMSULOSIN HYDROCHLORIDE 0.8 MILLIGRAM(S): 0.4 CAPSULE ORAL at 22:33

## 2024-08-21 RX ADMIN — Medication 1000 MICROGRAM(S): at 11:39

## 2024-08-21 RX ADMIN — Medication 1 APPLICATION(S): at 05:27

## 2024-08-21 RX ADMIN — POLYETHYLENE GLYCOL 3350 17 GRAM(S): 17 POWDER, FOR SOLUTION ORAL at 05:27

## 2024-08-21 NOTE — ADVANCED PRACTICE NURSE CONSULT - RECOMMEDATIONS
Topical recommendations:     Sacrum and scrotum- Cleanse with skin cleanser, pat dry. Apply TRIAD paste twice a day and PRN with incontinent episodes. With episodes of incontinence only remove soiled layer of Triad, then reinforce with thin layer.     Continue low air loss bed therapy,  heel elevation with offloading boots, turn & reposition q2h with fluidized pillow, continue moisture management with barrier creams as specified above & single breathable pad, continue measures to decrease friction/shear.   Plan discussed with patient- educated on topical wound therapy to optimize wound healing. Questions answered.      Eryn Membreno, ACP provider, notified of findings and recs.     Please reconsult if any wound changes or if we can be of further assistance (ext [phone]).

## 2024-08-21 NOTE — SWALLOW VFSS/MBS ASSESSMENT ADULT - DEMONSTRATES NEED FOR REFERRAL TO ANOTHER SERVICE
Recommend RD consult/follow to ensure adequate nutrition; Patient may benefit from PO supplements (i.e., boost, ensure, magic cup) to meet daily caloric intake requirements./Registered Dietitian

## 2024-08-21 NOTE — POST DISCHARGE NOTE - NOTIFICATION:
Patient's US scan reflected on the Radiology Incidental Findings report for today. Patient is already established @  for Urology care.

## 2024-08-21 NOTE — SWALLOW VFSS/MBS ASSESSMENT ADULT - DIAGNOSTIC IMPRESSIONS
1. Functional Oral Stage for Puree, Soft & Bite-Size, and Regular Solids as characterized by adequate oral acceptance/containment, adequate mastication and bolus manipulation for solids, adequate anterior-posterior transport, and adequate oral clearance.  2. Mild Oral Stage for Moderately-Thick, Mildly-Thick, and Thin Liquids as characterized by adequate oral acceptance/containment, adequate anterior-posterior transport, premature spillage to the hypopharynx secondary to reduced tongue to palate seal (greater for Thin and Mildly-Thick Liquids than Moderately-Thick Liquids), and reduced oral clearance with trace-mild lingual/palatal surface stasis post primary-swallow. Cued re-swallow benefits to improve oral clearance.   3. Moderate Pharyngeal Stage for Mildly-Thick and Thin Liquids as characterized by delay in initiation of the pharyngeal swallow trigger (Bolus Head at the Vallecular for Thin Liquids), reduced laryngeal elevation, reduced laryngeal vestibule closure, reduced base of tongue retraction, and reduced pharyngeal contractility. There are trace pharyngeal clearance deficits diffused at the Base of Tongue/Vallecular/Lateral Pharyngeal Walls/Pyriforms. Cued re-swallow partially assists pharyngeal clearance. There is Laryngeal Penetration DURING the swallow, without retrieval, leaving trace residue in the laryngeal vestibule above the level of the vocal folds for Mildly-Thick Liquids. Patient was provided verbal cues to complete strong, volitional cough. Patient's cough, which was judged to be clinically weak, did not effectively clear the trace residue from the laryngeal vestibule. There is Deep Laryngeal Penetration DURING the swallow, without retrieval, leaving trace residue in the laryngeal vestibule at the level of the vocal folds for Thin Liquids (continued in recommended consistencies). 1. Functional Oral Stage for Puree, Soft & Bite-Size, and Regular Solids as characterized by adequate oral acceptance/containment, adequate mastication and bolus manipulation for solids, adequate anterior-posterior transport, and adequate oral clearance.  2. Mild Oral Stage for Moderately-Thick, Mildly-Thick, and Thin Liquids as characterized by adequate oral acceptance/containment, adequate anterior-posterior transport, premature spillage to the hypopharynx secondary to reduced tongue to palate seal (greater for Thin and Mildly-Thick Liquids than Moderately-Thick Liquids), and reduced oral clearance with trace-mild lingual/palatal surface stasis post primary-swallow. Cued re-swallow benefits to improve oral clearance.   3. Moderate-Severe Pharyngeal Stage for Mildly-Thick and Thin Liquids as characterized by delay in initiation of the pharyngeal swallow trigger (Bolus Head at the Vallecular for Thin Liquids), reduced laryngeal elevation, reduced laryngeal vestibule closure, reduced base of tongue retraction, and reduced pharyngeal contractility. There are trace pharyngeal clearance deficits diffused at the Base of Tongue/Vallecular/Lateral Pharyngeal Walls/Pyriforms. Cued re-swallow partially assists pharyngeal clearance. There is Laryngeal Penetration DURING the swallow, without retrieval, leaving trace residue in the laryngeal vestibule above the level of the vocal folds for Mildly-Thick Liquids. Patient was provided verbal cues to complete strong, volitional cough. Patient's cough, which was judged to be clinically weak, did not effectively clear the trace residue from the laryngeal vestibule. Compensatory strategy of Single/Small Sips was utilized, however did not effectively eliminate laryngeal penetration for Mildly-Thick Liquids (continued in recommended consistencies).

## 2024-08-21 NOTE — SWALLOW VFSS/MBS ASSESSMENT ADULT - UNSUCCESSFUL STRATEGIES TRIALED DURING PROCEDURE
single/small sips/nonproductive volitional cough following clinician cue nonproductive volitional cough following clinician cue

## 2024-08-21 NOTE — PROGRESS NOTE ADULT - ASSESSMENT
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 3-way 20 Brazilian coude placed by urology team w/ 50 CC clot burden and started on CBI. RBUS w/ no evidence of organized clot    Plan:  -Continue on CBI, urology to manage  -US with no evidence of clot in the bladder or hydronephrosis   -Continue holding eliquis if ok with primary   -Monitor H&H 7.5/24 from 8.2/27 today  -Continue flomax 0.8mg and finasteride 5mg daily  -Recommend scrotal support for swelling likely 2/2 Thrombectomy    Plan discussed with Dr. Malachi Cadet [place] [facility]  [address] [place], [place] [id number] [phone]

## 2024-08-21 NOTE — PROGRESS NOTE ADULT - PROBLEM SELECTOR PLAN 4
hematuria due to traumatic catheterization  - appreciate urology input   - Elliott replaced by urology   - c/w CBI   - check UA and urine culture   - renal US w/ b/l renal lesions, will need MRI for further assessment   - monitor urine color

## 2024-08-21 NOTE — SWALLOW VFSS/MBS ASSESSMENT ADULT - ROSENBEK'S PENETRATION ASPIRATION SCALE
(1) no aspiration, contrast does not enter airway (5) contrast contacts vocal cords, visible residue remains (penetration) (3) contrast remains above the vocal cords, visible residue remains (penetration) (7) contrast passes glottis, visible subglottic residue remains despite patient’s response (aspiration)

## 2024-08-21 NOTE — SWALLOW VFSS/MBS ASSESSMENT ADULT - RESIDUE IN PYRIFORM SINUSES
Md Mahmood made aware of patient rectal temp 94.2F. Patient placed on bear hugger blanket.  came to bedside to draw remainder of labs (blood cultures, lactate).
Trace

## 2024-08-21 NOTE — PROGRESS NOTE ADULT - SUBJECTIVE AND OBJECTIVE BOX
PROGRESS NOTE:     Patient is a 84y old  Male who presents with a chief complaint of Chest pain (21 Aug 2024 09:09)      SUBJECTIVE / OVERNIGHT EVENTS: No acute events.     ADDITIONAL REVIEW OF SYSTEMS:    MEDICATIONS  (STANDING):  aspirin  chewable 81 milliGRAM(s) Oral daily  atorvastatin 40 milliGRAM(s) Oral at bedtime  chlorhexidine 2% Cloths 1 Application(s) Topical daily  cholecalciferol 2000 Unit(s) Oral daily  cyanocobalamin 1000 MICROGram(s) Oral daily  dextrose 5%. 1000 milliLiter(s) (50 mL/Hr) IV Continuous <Continuous>  dextrose 5%. 1000 milliLiter(s) (100 mL/Hr) IV Continuous <Continuous>  dextrose 50% Injectable 25 Gram(s) IV Push once  dextrose 50% Injectable 25 Gram(s) IV Push once  dextrose 50% Injectable 12.5 Gram(s) IV Push once  folic acid 1 milliGRAM(s) Oral daily  furosemide   Injectable 40 milliGRAM(s) IV Push daily  glucagon  Injectable 1 milliGRAM(s) IntraMuscular once  insulin lispro (ADMELOG) corrective regimen sliding scale   SubCutaneous three times a day before meals  insulin lispro (ADMELOG) corrective regimen sliding scale   SubCutaneous at bedtime  mupirocin 2% Ointment 1 Application(s) Both Nostrils two times a day  mycophenolate mofetil 500 milliGRAM(s) Oral daily  polyethylene glycol 3350 17 Gram(s) Oral two times a day  predniSONE   Tablet 5 milliGRAM(s) Oral daily  tamsulosin 0.8 milliGRAM(s) Oral at bedtime    MEDICATIONS  (PRN):  dextrose Oral Gel 15 Gram(s) Oral once PRN Blood Glucose LESS THAN 70 milliGRAM(s)/deciliter      CAPILLARY BLOOD GLUCOSE      POCT Blood Glucose.: 114 mg/dL (21 Aug 2024 08:15)  POCT Blood Glucose.: 152 mg/dL (20 Aug 2024 22:56)  POCT Blood Glucose.: 144 mg/dL (20 Aug 2024 17:28)    I&O's Summary    20 Aug 2024 07:01  -  21 Aug 2024 07:00  --------------------------------------------------------  IN: 08751 mL / OUT: 75530 mL / NET: 2375 mL    21 Aug 2024 07:01  -  21 Aug 2024 12:24  --------------------------------------------------------  IN: 5275 mL / OUT: 6900 mL / NET: -1625 mL        PHYSICAL EXAM:  Vital Signs Last 24 Hrs  T(C): 36.5 (21 Aug 2024 05:27), Max: 37.2 (20 Aug 2024 20:07)  T(F): 97.7 (21 Aug 2024 05:27), Max: 99 (20 Aug 2024 20:07)  HR: 66 (21 Aug 2024 05:27) (63 - 112)  BP: 138/52 (21 Aug 2024 05:27) (129/51 - 138/52)  BP(mean): --  RR: 16 (21 Aug 2024 05:27) (16 - 18)  SpO2: 95% (21 Aug 2024 05:27) (95% - 99%)    Parameters below as of 21 Aug 2024 05:27  Patient On (Oxygen Delivery Method): room air      CONSTITUTIONAL: NAD  EYES: PERRL. EOMI. No scleral icterus.  CV: Irregular. Normal S1 and S2. No murmurs, rubs, or gallops. No edema. Pulses equal bilaterally.  PULM: Clear to auscultation throughout. Respirations unlabored. No wheezing, rales, or rhonchi.  GI: Soft, non-tender, non-distended. No palpable masses.  MSK: No cyanosis or clubbing.   : Elliott w/ CBI and reddish urine   SKIN: Adherent one scab each on bilateral lower medial and lateral thigh areas; no signs of infection.  Non-tender (from time of embolectomy, per patient)  NEURO: (+) mild R facial droop. CN grossly intact.  PSYCH: A+O x 3.  Pleasant.  Mood and affect appropriate to situation    LABS:                        7.5    6.69  )-----------( 139      ( 21 Aug 2024 04:30 )             23.9     08-21    137  |  104  |  27<H>  ----------------------------<  103<H>  5.3   |  26  |  0.89    Ca    8.5      21 Aug 2024 04:41  Phos  3.3     08-21  Mg     2.10     08-21            Urinalysis Basic - ( 21 Aug 2024 04:41 )    Color: x / Appearance: x / SG: x / pH: x  Gluc: 103 mg/dL / Ketone: x  / Bili: x / Urobili: x   Blood: x / Protein: x / Nitrite: x   Leuk Esterase: x / RBC: x / WBC x   Sq Epi: x / Non Sq Epi: x / Bacteria: x          RADIOLOGY & ADDITIONAL TESTS:  Results Reviewed:   Imaging Personally Reviewed:  Electrocardiogram Personally Reviewed:    COORDINATION OF CARE:  Care Discussed with Consultants/Other Providers [Y/N]:  Prior or Outpatient Records Reviewed [Y/N]:

## 2024-08-21 NOTE — SWALLOW VFSS/MBS ASSESSMENT ADULT - RECOMMENDED CONSISTENCY
(continued). Patient was sensate to the Penetration given reflexive cough; However patient's cough, which was again judged to be clinically weak, did not effectively clear the trace residue from the upper airway (continued). Compensatory strategy of Single/Small Sips was utilized, however did not effectively eliminate laryngeal penetration for Mildly-Thick and Thin Liquids.   4. Mild Pharyngeal Stage for Puree, Soft & Bite-Size, Regular Solids, and Moderately-Thick Liquids as characterized by delay in initiation of the pharyngeal swallow trigger (Bolus Head at the Vallecular for Moderately-Thick Liquids), adequate laryngeal elevation, adequate laryngeal vestibule closure, reduced base of tongue retraction, and reduced pharyngeal contractility. There are trace pharyngeal clearance deficits diffused at the Base of Tongue/Vallecular/Lateral Pharyngeal Walls/Pyriforms for Moderately-Thick Liquids. Cued re-swallow partially assists pharyngeal clearance. There is No Laryngeal Penetration/Aspiration before, during, or after the swallow for Puree, Soft & Bite-Size, Regular Solids, and Moderately-Thick Liquids.    RECOMMENDED CONSISTENCIES:  1. Regular Solids with Moderately-Thick Liquids.   2. Feeding/Swallowing Guidelines: Upright for PO/During Mealtimes, 2 Swallows Per Bolus For Liquids.   3. Maintain Adequate Oral Hygiene.  4. Aspiration Precautions.  5. Reflux Precautions. (continued) There is Deep Laryngeal Penetration DURING the swallow, without retrieval, leading to trace Aspiration for Thin Liquids. Patient was sensate to the Aspiration event given reflexive cough; However patient's cough, which was again judged to be clinically weak, did not effectively clear the trace residue from the upper airway/trachea.   4. Mild Pharyngeal Stage for Puree, Soft & Bite-Size, Regular Solids, and Moderately-Thick Liquids as characterized by delay in initiation of the pharyngeal swallow trigger (Bolus Head at the Vallecular for Moderately-Thick Liquids), adequate laryngeal elevation, adequate laryngeal vestibule closure, reduced base of tongue retraction, and reduced pharyngeal contractility. There are trace pharyngeal clearance deficits diffused at the Base of Tongue/Vallecular/Lateral Pharyngeal Walls/Pyriforms for Moderately-Thick Liquids. Cued re-swallow partially assists pharyngeal clearance. There is No Laryngeal Penetration/Aspiration before, during, or after the swallow for Puree, Soft & Bite-Size, Regular Solids, and Moderately-Thick Liquids.    RECOMMENDED CONSISTENCIES:  1. Regular Solids with Moderately-Thick Liquids.   2. Feeding/Swallowing Guidelines: Upright for PO/During Mealtimes, 2 Swallows Per Bolus For Liquids.   3. Maintain Adequate Oral Hygiene.  4. Aspiration Precautions.  5. Reflux Precautions.

## 2024-08-21 NOTE — SWALLOW VFSS/MBS ASSESSMENT ADULT - ORAL PHASE COMMENTS
Premature spillage to the hypopharynx (Vallecular).   Trace lingual surface stasis post primary-swallow; Cued re-swallow benefits oral clearance. Premature spillage to the hypopharynx (Vallecular/Pyriforms).  Trace-mild lingual surface/palatal stasis post primary-swallow; Cued re-swallow benefits oral clearance. Premature spillage to the hypopharynx (Vallecular/Pyriforms).   Trace lingual surface stasis post primary-swallow.

## 2024-08-21 NOTE — PROGRESS NOTE ADULT - ASSESSMENT
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 Cardiology consulted for management of severe AS/AI. Follows with Dr. Lisker outpatient.     #Severe AS  #Severe AI  #Acute Decompensated HFpEF  ACC/AHA Stage C Valvular HFpEF  probnp 3229, troponin 54 -->52, BUN/Cr 27/0.97  Chest X-ray - perihilar infiltrates, left sided pleural effusion.   TTE - EF 60-65%, LV concentric hypertrophy, mild-moderate MS, mild MR, severe AS/AI, dilated LA, mild-moderate TR, RA - 15 mmHg  EKG - 36 BPM, atrial fibrillation, no ST T  wave changes.   - Holding home entresto  - Increase lasix to 40 mg IV BID.   - Strict I/Os  - Daily standing weights  - Plan to start SGLT2 near discharge   - Will discuss case with structural cardiology.     #Afib  CHADsVASc - 7  - c/w eliquis 5 mg PO BID, currently held iso hematuria  - on ASA for recent CVA.   - holding beta blockers iso afib with slow ventricular response  - EP following.     #Renal Mass  Concern for RCC on kidney ultrasound.  - Evaluation of renal mass may guide candidacy for valve intervention given need for anticoagulation after valve and current gross hematuria.     Zachery Bledsoe MD  PGY-3

## 2024-08-21 NOTE — SWALLOW VFSS/MBS ASSESSMENT ADULT - COMMENTS
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 Also w/ significant AS and AI on echo."    CXR 8/18: IMPRESSION: Retrocardiac opacity and/or left basilar effusion obscures the left hemidiaphragm. Bilateral perihilar airspace opacities.    Of Note: Patient seen by this service on 8/20/24 for initial Clinical Swallow Evaluation with recommendations of: "Puree with Moderately Thick Liquids; Cinesophagram to objectively assess swallow function given patient with recent CVA, unclear objective testing history and recent pneumonia/ current chest imaging to rule out silent aspiration and determine candidacy for diet advancement" (see note).     Patient received in Radiology Suite this AM for Cinesophagram. Patient transferred to specialized seating unit with lateral projection. Patient alert and cooperative. Able to make wants/needs known via verbal communication and follow simple directives given verbal cueing and repetition of verbal prompts.

## 2024-08-21 NOTE — ADVANCED PRACTICE NURSE CONSULT - REASON FOR CONSULT
Patient seen on skin care rounds after wound care referral received for assessment of skin impairment and recommendations of topical management. 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 Also w/ significant AS and AI on echo.   Chart reviewed: WBC 6.69, H/H 7.5/23.9, platelets 139, A1C 5.2, BMI 22.7, Juan Alberto 15.

## 2024-08-21 NOTE — PROGRESS NOTE ADULT - ASSESSMENT
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 Also w/ significant AS and AI on echo.

## 2024-08-21 NOTE — ADVANCED PRACTICE NURSE CONSULT - ASSESSMENT
General: A&Ox4, turns one assist, bedbound, incontinent of stool, indwelling urinary cathter with CBI. Skin warm, thin, fragile, scattered areas of hyperpigmentation and hypopigmentation to bilateral lower legs, scattered areas of ecchymosis on bilateral upper extremities.   Blanchable erythema on bilateral heels. Edematous scrotum.     Sacrum- Stage 2 pressure injury presenting measuring [id number] with 40% pink moist dermis and 60% reepithelialization. Periwound skin with blanching erythema 2/2 MAD. No s/s of soft tissue infection. Goals of care: continue to offload, moist wound healing.     Scrotum- wound of unknown etiology- likely from moisture and friction measuring 2cmx1.5cmx0.2cm exposing 100% fibrin. Regular borders. Well defined. No pain upon palpation. Periwound skin with erythema 2/2 MAD. No s.s of soft tissue infection. Goals of care: Moist wound healing, manage moisture, protect from frictional forces.        General: A&Ox4, turns one assist, bedbound, incontinent of stool, indwelling urinary cathter with CBI. Skin warm, thin, fragile, scattered areas of hyperpigmentation and hypopigmentation to bilateral lower legs, scattered areas of ecchymosis on bilateral upper extremities.   Blanchable erythema on bilateral heels. Edematous scrotum.     Sacrum- Stage 2 pressure injury presenting measuring [id number] with 40% pink moist dermis and 60% reepithelialization. Periwound skin with blanching erythema 2/2 MAD. No s/s of soft tissue infection. Goals of care: continue to offload, moist wound healing.     Scrotum- wound of unknown etiology- likely from moisture and friction measuring 2cmx1.5cmx0.2cm exposing 100% fibrin. Regular borders. Well defined. No pain upon palpation. Periwound skin with erythema 2/2 MAD. No s.s of soft tissue infection. Goals of care: Moist wound healing, manage moisture, protect from frictional forces.     Patient continues to be at high risk for skin breakdown. On assessment patient on a low air loss surface, heel offloading boots in place, positioning pillow utilized, moisture management in place with use of one incontinence pad. Turning and positioning Q2hrs.

## 2024-08-21 NOTE — PROGRESS NOTE ADULT - SUBJECTIVE AND OBJECTIVE BOX
Cardiology Progress Note  ------------------------------------------------------------------------------------------  SUBJECTIVE:   - Tele:  - No events overnight. Denies CP, SOB or Palpitations.     -------------------------------------------------------------------------------------------  VS:  T(F): 99 (), Max: 99 ()  HR: 112 () (63 - 112)  BP: 132/50 () (129/51 - 132/50)  RR: 18 ()  SpO2: 99% ()  I&O's Summary    19 Aug 2024 07:  -  20 Aug 2024 07:00  --------------------------------------------------------  IN: 400 mL / OUT: 1000 mL / NET: -600 mL    20 Aug 2024 07:01  -  21 Aug 2024 06:28  --------------------------------------------------------  IN: 23039 mL / OUT: 00855 mL / NET: 1375 mL      PHYSICAL EXAM:  GENERAL: NAD  HEAD: Atraumatic, Normocephalic.  ENT: Moist mucous membranes.  NECK: Supple, No JVD.  CHEST/LUNG: Clear to auscultation bilaterally; No rales, rhonchi, wheezing, or rubs. Unlabored respirations.  HEART: Regular rate and rhythm; No murmurs, rubs, or gallops.  ABDOMEN: Bowel sounds present; Soft, Nontender, Nondistended.   EXTREMITIES: No edema. 2+ Peripheral Pulses, brisk capillary refill. No clubbing or cyanosis.     -------------------------------------------------------------------------------------------  LABS:                          7.5    6.69  )-----------( 139      ( 21 Aug 2024 04:30 )             23.9     08-21    137  |  104  |  27<H>  ----------------------------<  103<H>  5.3   |  26  |  0.89    Ca    8.5      21 Aug 2024 04:41  Phos  3.3     08-21  Mg     2.10     08-21        CARDIAC MARKERS ( 19 Aug 2024 05:10 )  49 ng/L / x     / x     / x     / x     / x      CARDIAC MARKERS ( 18 Aug 2024 22:55 )  x     / x     / x     / x     / x     / 1.8 ng/mL  CARDIAC MARKERS ( 18 Aug 2024 21:14 )  52 ng/L / x     / x     / x     / x     / 1.8 ng/mL  CARDIAC MARKERS ( 18 Aug 2024 20:00 )  54 ng/L / x     / x     / x     / x     / x          Total Cholesterol: 91  LDL: --  HDL: 38  T        -------------------------------------------------------------------------------------------  Meds:  aspirin  chewable 81 milliGRAM(s) Oral daily  atorvastatin 40 milliGRAM(s) Oral at bedtime  chlorhexidine 2% Cloths 1 Application(s) Topical daily  cholecalciferol 2000 Unit(s) Oral daily  cyanocobalamin 1000 MICROGram(s) Oral daily  dextrose 5%. 1000 milliLiter(s) IV Continuous <Continuous>  dextrose 5%. 1000 milliLiter(s) IV Continuous <Continuous>  dextrose 50% Injectable 25 Gram(s) IV Push once  dextrose 50% Injectable 12.5 Gram(s) IV Push once  dextrose 50% Injectable 25 Gram(s) IV Push once  dextrose Oral Gel 15 Gram(s) Oral once PRN  folic acid 1 milliGRAM(s) Oral daily  furosemide   Injectable 40 milliGRAM(s) IV Push daily  glucagon  Injectable 1 milliGRAM(s) IntraMuscular once  insulin lispro (ADMELOG) corrective regimen sliding scale   SubCutaneous at bedtime  insulin lispro (ADMELOG) corrective regimen sliding scale   SubCutaneous three times a day before meals  mupirocin 2% Ointment 1 Application(s) Both Nostrils two times a day  mycophenolate mofetil 500 milliGRAM(s) Oral daily  polyethylene glycol 3350 17 Gram(s) Oral two times a day  predniSONE   Tablet 5 milliGRAM(s) Oral daily  tamsulosin 0.8 milliGRAM(s) Oral at bedtime    -------------------------------------------------------------------------------------------   Cardiology Progress Note  ------------------------------------------------------------------------------------------  SUBJECTIVE:   - Tele: Afib 30s to 60s.   - No events overnight. Denies CP, SOB or Palpitations.     -------------------------------------------------------------------------------------------  VS:  T(F): 99 (), Max: 99 ()  HR: 112 () (63 - 112)  BP: 132/50 () (129/51 - 132/50)  RR: 18 ()  SpO2: 99% ()  I&O's Summary    19 Aug 2024 07:  -  20 Aug 2024 07:00  --------------------------------------------------------  IN: 400 mL / OUT: 1000 mL / NET: -600 mL    20 Aug 2024 07:  -  21 Aug 2024 06:28  --------------------------------------------------------  IN: 74271 mL / OUT: 43290 mL / NET: 1375 mL      PHYSICAL EXAM:  GENERAL: NAD  HEAD: Atraumatic, Normocephalic.  ENT: Moist mucous membranes.  NECK: Supple, No JVD.  CHEST/LUNG: Clear to auscultation bilaterally; No rales, rhonchi, wheezing, or rubs. Unlabored respirations.  HEART: Regular rate and rhythm; No murmurs, rubs, or gallops.  ABDOMEN: Bowel sounds present; Soft, Nontender, Nondistended.   EXTREMITIES: No edema. 2+ Peripheral Pulses, brisk capillary refill. No clubbing or cyanosis.     -------------------------------------------------------------------------------------------  LABS:                          7.5    6.69  )-----------( 139      ( 21 Aug 2024 04:30 )             23.9     08-21    137  |  104  |  27<H>  ----------------------------<  103<H>  5.3   |  26  |  0.89    Ca    8.5      21 Aug 2024 04:41  Phos  3.3     08-21  Mg     2.10     08-21        CARDIAC MARKERS ( 19 Aug 2024 05:10 )  49 ng/L / x     / x     / x     / x     / x      CARDIAC MARKERS ( 18 Aug 2024 22:55 )  x     / x     / x     / x     / x     / 1.8 ng/mL  CARDIAC MARKERS ( 18 Aug 2024 21:14 )  52 ng/L / x     / x     / x     / x     / 1.8 ng/mL  CARDIAC MARKERS ( 18 Aug 2024 20:00 )  54 ng/L / x     / x     / x     / x     / x          Total Cholesterol: 91  LDL: --  HDL: 38  T        -------------------------------------------------------------------------------------------  Meds:  aspirin  chewable 81 milliGRAM(s) Oral daily  atorvastatin 40 milliGRAM(s) Oral at bedtime  chlorhexidine 2% Cloths 1 Application(s) Topical daily  cholecalciferol 2000 Unit(s) Oral daily  cyanocobalamin 1000 MICROGram(s) Oral daily  dextrose 5%. 1000 milliLiter(s) IV Continuous <Continuous>  dextrose 5%. 1000 milliLiter(s) IV Continuous <Continuous>  dextrose 50% Injectable 25 Gram(s) IV Push once  dextrose 50% Injectable 12.5 Gram(s) IV Push once  dextrose 50% Injectable 25 Gram(s) IV Push once  dextrose Oral Gel 15 Gram(s) Oral once PRN  folic acid 1 milliGRAM(s) Oral daily  furosemide   Injectable 40 milliGRAM(s) IV Push daily  glucagon  Injectable 1 milliGRAM(s) IntraMuscular once  insulin lispro (ADMELOG) corrective regimen sliding scale   SubCutaneous at bedtime  insulin lispro (ADMELOG) corrective regimen sliding scale   SubCutaneous three times a day before meals  mupirocin 2% Ointment 1 Application(s) Both Nostrils two times a day  mycophenolate mofetil 500 milliGRAM(s) Oral daily  polyethylene glycol 3350 17 Gram(s) Oral two times a day  predniSONE   Tablet 5 milliGRAM(s) Oral daily  tamsulosin 0.8 milliGRAM(s) Oral at bedtime    -------------------------------------------------------------------------------------------   Cardiology Progress Note  ------------------------------------------------------------------------------------------  SUBJECTIVE:   - Tele: Afib 30s to 60s.   - No events overnight. Denies CP, SOB or Palpitations.     -------------------------------------------------------------------------------------------  VS:  T(F): 99 (), Max: 99 ()  HR: 112 () (63 - 112)  BP: 132/50 () (129/51 - 132/50)  RR: 18 ()  SpO2: 99% ()  I&O's Summary    19 Aug 2024 07:  -  20 Aug 2024 07:00  --------------------------------------------------------  IN: 400 mL / OUT: 1000 mL / NET: -600 mL    20 Aug 2024 07:  -  21 Aug 2024 06:28  --------------------------------------------------------  IN: 53364 mL / OUT: 33571 mL / NET: 1375 mL      PHYSICAL EXAM:  GENERAL: NAD  HEAD: Atraumatic, Normocephalic.  NECK: Elevated JVP.   CHEST/LUNG: Clear to auscultation bilaterally  HEART: Irregular rhythm. 2/6 systolic late peaking murmur left sternal border 2nd ics, + pulsus parvus et tardus.   ABDOMEN: Soft, Nontender, Nondistended.   EXTREMITIES: Extremities, warm, no lower extremity edema.     -------------------------------------------------------------------------------------------  LABS:                          7.5    6.69  )-----------( 139      ( 21 Aug 2024 04:30 )             23.9     08-21    137  |  104  |  27<H>  ----------------------------<  103<H>  5.3   |  26  |  0.89    Ca    8.5      21 Aug 2024 04:41  Phos  3.3     08-  Mg     2.10     08-21        CARDIAC MARKERS ( 19 Aug 2024 05:10 )  49 ng/L / x     / x     / x     / x     / x      CARDIAC MARKERS ( 18 Aug 2024 22:55 )  x     / x     / x     / x     / x     / 1.8 ng/mL  CARDIAC MARKERS ( 18 Aug 2024 21:14 )  52 ng/L / x     / x     / x     / x     / 1.8 ng/mL  CARDIAC MARKERS ( 18 Aug 2024 20:00 )  54 ng/L / x     / x     / x     / x     / x          Total Cholesterol: 91  LDL: --  HDL: 38  T        -------------------------------------------------------------------------------------------  Meds:  aspirin  chewable 81 milliGRAM(s) Oral daily  atorvastatin 40 milliGRAM(s) Oral at bedtime  chlorhexidine 2% Cloths 1 Application(s) Topical daily  cholecalciferol 2000 Unit(s) Oral daily  cyanocobalamin 1000 MICROGram(s) Oral daily  dextrose 5%. 1000 milliLiter(s) IV Continuous <Continuous>  dextrose 5%. 1000 milliLiter(s) IV Continuous <Continuous>  dextrose 50% Injectable 25 Gram(s) IV Push once  dextrose 50% Injectable 12.5 Gram(s) IV Push once  dextrose 50% Injectable 25 Gram(s) IV Push once  dextrose Oral Gel 15 Gram(s) Oral once PRN  folic acid 1 milliGRAM(s) Oral daily  furosemide   Injectable 40 milliGRAM(s) IV Push daily  glucagon  Injectable 1 milliGRAM(s) IntraMuscular once  insulin lispro (ADMELOG) corrective regimen sliding scale   SubCutaneous at bedtime  insulin lispro (ADMELOG) corrective regimen sliding scale   SubCutaneous three times a day before meals  mupirocin 2% Ointment 1 Application(s) Both Nostrils two times a day  mycophenolate mofetil 500 milliGRAM(s) Oral daily  polyethylene glycol 3350 17 Gram(s) Oral two times a day  predniSONE   Tablet 5 milliGRAM(s) Oral daily  tamsulosin 0.8 milliGRAM(s) Oral at bedtime    -------------------------------------------------------------------------------------------

## 2024-08-21 NOTE — PROGRESS NOTE ADULT - SUBJECTIVE AND OBJECTIVE BOX
Subjective  Patient seen and examined at bedside. No clots irrigated back. ELiquis held by primary.     Objective    Vital signs  T(F): , Max: 99 (08-20-24 @ 20:07)  HR: 66 (08-21-24 @ 05:27)  BP: 138/52 (08-21-24 @ 05:27)  SpO2: 95% (08-21-24 @ 05:27)  Wt(kg): --    Output     OUT:    Intermittent Catheterization - Urethral (mL): 425 mL  Total OUT: 425 mL    Total NET: -425 mL          Gen: NAD  Abd: soft, nontender, nondistended  : butt secured in place, draining clear pink on moderate drip CBI    Labs      08-21 @ 04:41    WBC --    / Hct --    / SCr 0.89     08-21 @ 04:30    WBC 6.69  / Hct 23.9  / SCr --

## 2024-08-21 NOTE — SWALLOW VFSS/MBS ASSESSMENT ADULT - ADDITIONAL RECOMMENDATIONS
Medical team advised to reconsult this service if patient demonstrates change in medical status impacting oral/nutritional intake and/or change in tolerance of recommended oral diet. This department to follow up as schedule permits to assess for diet tolerance/swallow therapy. Patient may benefit from swallow therapy pending discharge planning (e.g., Home Care vs. Rehab vs. Outpatient at [facility]; Phone #: [phone]).

## 2024-08-22 LAB
ANION GAP SERPL CALC-SCNC: 11 MMOL/L — SIGNIFICANT CHANGE UP (ref 7–14)
BLD GP AB SCN SERPL QL: NEGATIVE — SIGNIFICANT CHANGE UP
BUN SERPL-MCNC: 30 MG/DL — HIGH (ref 7–23)
CALCIUM SERPL-MCNC: 8.3 MG/DL — LOW (ref 8.4–10.5)
CHLORIDE SERPL-SCNC: 102 MMOL/L — SIGNIFICANT CHANGE UP (ref 98–107)
CO2 SERPL-SCNC: 26 MMOL/L — SIGNIFICANT CHANGE UP (ref 22–31)
CREAT SERPL-MCNC: 0.88 MG/DL — SIGNIFICANT CHANGE UP (ref 0.5–1.3)
EGFR: 84 ML/MIN/1.73M2 — SIGNIFICANT CHANGE UP
GLUCOSE BLDC GLUCOMTR-MCNC: 110 MG/DL — HIGH (ref 70–99)
GLUCOSE BLDC GLUCOMTR-MCNC: 125 MG/DL — HIGH (ref 70–99)
GLUCOSE BLDC GLUCOMTR-MCNC: 159 MG/DL — HIGH (ref 70–99)
GLUCOSE BLDC GLUCOMTR-MCNC: 177 MG/DL — HIGH (ref 70–99)
GLUCOSE SERPL-MCNC: 93 MG/DL — SIGNIFICANT CHANGE UP (ref 70–99)
HCT VFR BLD CALC: 23.5 % — LOW (ref 39–50)
HGB BLD-MCNC: 7.4 G/DL — LOW (ref 13–17)
MAGNESIUM SERPL-MCNC: 1.9 MG/DL — SIGNIFICANT CHANGE UP (ref 1.6–2.6)
MCHC RBC-ENTMCNC: 29.4 PG — SIGNIFICANT CHANGE UP (ref 27–34)
MCHC RBC-ENTMCNC: 31.5 GM/DL — LOW (ref 32–36)
MCV RBC AUTO: 93.3 FL — SIGNIFICANT CHANGE UP (ref 80–100)
NRBC # BLD: 0 /100 WBCS — SIGNIFICANT CHANGE UP (ref 0–0)
NRBC # FLD: 0 K/UL — SIGNIFICANT CHANGE UP (ref 0–0)
PHOSPHATE SERPL-MCNC: 3.4 MG/DL — SIGNIFICANT CHANGE UP (ref 2.5–4.5)
PLATELET # BLD AUTO: 145 K/UL — LOW (ref 150–400)
POTASSIUM SERPL-MCNC: 4.4 MMOL/L — SIGNIFICANT CHANGE UP (ref 3.5–5.3)
POTASSIUM SERPL-SCNC: 4.4 MMOL/L — SIGNIFICANT CHANGE UP (ref 3.5–5.3)
RBC # BLD: 2.52 M/UL — LOW (ref 4.2–5.8)
RBC # FLD: 16 % — HIGH (ref 10.3–14.5)
RH IG SCN BLD-IMP: POSITIVE — SIGNIFICANT CHANGE UP
SODIUM SERPL-SCNC: 139 MMOL/L — SIGNIFICANT CHANGE UP (ref 135–145)
WBC # BLD: 6.16 K/UL — SIGNIFICANT CHANGE UP (ref 3.8–10.5)
WBC # FLD AUTO: 6.16 K/UL — SIGNIFICANT CHANGE UP (ref 3.8–10.5)

## 2024-08-22 PROCEDURE — 99232 SBSQ HOSP IP/OBS MODERATE 35: CPT

## 2024-08-22 PROCEDURE — 99233 SBSQ HOSP IP/OBS HIGH 50: CPT

## 2024-08-22 PROCEDURE — 99232 SBSQ HOSP IP/OBS MODERATE 35: CPT | Mod: GC

## 2024-08-22 RX ADMIN — Medication 5 MILLIGRAM(S): at 05:14

## 2024-08-22 RX ADMIN — Medication 1: at 12:36

## 2024-08-22 RX ADMIN — CHLORHEXIDINE GLUCONATE 1 APPLICATION(S): 40 SOLUTION TOPICAL at 11:54

## 2024-08-22 RX ADMIN — MYCOPHENOLATE MOFETIL 500 MILLIGRAM(S): 250 CAPSULE ORAL at 11:51

## 2024-08-22 RX ADMIN — Medication 1 APPLICATION(S): at 17:18

## 2024-08-22 RX ADMIN — Medication 1000 MICROGRAM(S): at 11:53

## 2024-08-22 RX ADMIN — FINASTERIDE 5 MILLIGRAM(S): 1 TABLET, FILM COATED ORAL at 11:50

## 2024-08-22 RX ADMIN — Medication 40 MILLIGRAM(S): at 21:03

## 2024-08-22 RX ADMIN — Medication 1 MILLIGRAM(S): at 11:51

## 2024-08-22 RX ADMIN — Medication 40 MILLIGRAM(S): at 05:14

## 2024-08-22 RX ADMIN — Medication 1 APPLICATION(S): at 05:15

## 2024-08-22 RX ADMIN — Medication 2000 UNIT(S): at 11:50

## 2024-08-22 RX ADMIN — POLYETHYLENE GLYCOL 3350 17 GRAM(S): 17 POWDER, FOR SOLUTION ORAL at 05:14

## 2024-08-22 RX ADMIN — TAMSULOSIN HYDROCHLORIDE 0.8 MILLIGRAM(S): 0.4 CAPSULE ORAL at 21:03

## 2024-08-22 RX ADMIN — Medication 81 MILLIGRAM(S): at 11:51

## 2024-08-22 NOTE — OCCUPATIONAL THERAPY INITIAL EVALUATION ADULT - PERTINENT HX OF CURRENT PROBLEM, REHAB EVAL
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 Patient reports he acutely developed mid-sternal chest pain without radiation this past afternoon. No SOB or diaphoresis. He was given Tylenol and SL nitro x2, which resolved the pain. No further chest pain, SOB, palpitations, lightheadedness, or blurry vision."  Admit dx: Chest pain     X-Ray Kidney/Bladder: " Bilateral renal lesions, suspicious for renal cell carcinoma"

## 2024-08-22 NOTE — PROGRESS NOTE ADULT - PROBLEM SELECTOR PLAN 4
hematuria due to traumatic catheterization  - appreciate urology input   - Elliott replaced by urology   - c/w CBI   - check UA and urine culture   - renal US w/ b/l renal lesions, will obtain CT for further characterization   - monitor urine color hematuria due to traumatic catheterization  - appreciate urology input   - Elliott replaced by urology   - s/p CBI, now clamped   - renal US w/ b/l renal lesions, will obtain CT for further characterization   - monitor urine color

## 2024-08-22 NOTE — DIETITIAN INITIAL EVALUATION ADULT - OTHER CALCULATIONS
Defer fluid needs to MD/Team.   As per [facility] HIE Wt hx: 81.6kg (Nov, 2023), ABW (76kg (8/19/2024). Weight is trending down x9 months (6.8%). Obtain daily weights to monitor weight trend.

## 2024-08-22 NOTE — PROGRESS NOTE ADULT - SUBJECTIVE AND OBJECTIVE BOX
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 Also w/ significant AS and AI on echo.        (22 Aug 2024 11:54)      SUBJECTIVE / OVERNIGHT EVENTS: No acute events.     ADDITIONAL REVIEW OF SYSTEMS:    MEDICATIONS  (STANDING):  aspirin  chewable 81 milliGRAM(s) Oral daily  atorvastatin 40 milliGRAM(s) Oral at bedtime  chlorhexidine 2% Cloths 1 Application(s) Topical daily  cholecalciferol 2000 Unit(s) Oral daily  cyanocobalamin 1000 MICROGram(s) Oral daily  dextrose 5%. 1000 milliLiter(s) (50 mL/Hr) IV Continuous <Continuous>  dextrose 5%. 1000 milliLiter(s) (100 mL/Hr) IV Continuous <Continuous>  dextrose 50% Injectable 25 Gram(s) IV Push once  dextrose 50% Injectable 25 Gram(s) IV Push once  dextrose 50% Injectable 12.5 Gram(s) IV Push once  finasteride 5 milliGRAM(s) Oral daily  folic acid 1 milliGRAM(s) Oral daily  furosemide   Injectable 40 milliGRAM(s) IV Push two times a day  glucagon  Injectable 1 milliGRAM(s) IntraMuscular once  insulin lispro (ADMELOG) corrective regimen sliding scale   SubCutaneous at bedtime  insulin lispro (ADMELOG) corrective regimen sliding scale   SubCutaneous three times a day before meals  mupirocin 2% Ointment 1 Application(s) Both Nostrils two times a day  mycophenolate mofetil 500 milliGRAM(s) Oral daily  polyethylene glycol 3350 17 Gram(s) Oral two times a day  predniSONE   Tablet 5 milliGRAM(s) Oral daily  tamsulosin 0.8 milliGRAM(s) Oral at bedtime    MEDICATIONS  (PRN):  dextrose Oral Gel 15 Gram(s) Oral once PRN Blood Glucose LESS THAN 70 milliGRAM(s)/deciliter      CAPILLARY BLOOD GLUCOSE      POCT Blood Glucose.: 177 mg/dL (22 Aug 2024 12:11)  POCT Blood Glucose.: 110 mg/dL (22 Aug 2024 08:13)  POCT Blood Glucose.: 133 mg/dL (21 Aug 2024 22:24)  POCT Blood Glucose.: 131 mg/dL (21 Aug 2024 17:09)    I&O's Summary    21 Aug 2024 07:01  -  22 Aug 2024 07:00  --------------------------------------------------------  IN: 89586 mL / OUT: 97462 mL / NET: 175 mL    22 Aug 2024 07:01  -  22 Aug 2024 13:46  --------------------------------------------------------  IN: 8470 mL / OUT: 8500 mL / NET: -30 mL        PHYSICAL EXAM:  Vital Signs Last 24 Hrs  T(C): 36.7 (22 Aug 2024 05:14), Max: 36.7 (22 Aug 2024 05:14)  T(F): 98 (22 Aug 2024 05:14), Max: 98 (22 Aug 2024 05:14)  HR: 47 (22 Aug 2024 11:50) (47 - 71)  BP: 131/50 (22 Aug 2024 11:50) (117/51 - 131/50)  BP(mean): --  RR: 18 (22 Aug 2024 05:14) (18 - 18)  SpO2: 99% (22 Aug 2024 05:14) (98% - 99%)    Parameters below as of 22 Aug 2024 05:14  Patient On (Oxygen Delivery Method): room air      CONSTITUTIONAL: NAD  EYES: PERRL. EOMI. No scleral icterus.  CV: Irregular. Normal S1 and S2. No murmurs, rubs, or gallops. No edema. Pulses equal bilaterally.  PULM: Clear to auscultation throughout. Respirations unlabored. No wheezing, rales, or rhonchi.  GI: Soft, non-tender, non-distended. No palpable masses.  MSK: No cyanosis or clubbing.   : Elliott w/ pink urine   SKIN: Adherent one scab each on bilateral lower medial and lateral thigh areas; no signs of infection.  Non-tender (from time of embolectomy, per patient)  NEURO: (+) mild R facial droop. CN grossly intact.  PSYCH: A+O x 3.  Pleasant.  Mood and affect appropriate to situation      LABS:                        7.4    6.16  )-----------( 145      ( 22 Aug 2024 05:21 )             23.5     08-22    139  |  102  |  30<H>  ----------------------------<  93  4.4   |  26  |  0.88    Ca    8.3<L>      22 Aug 2024 05:21  Phos  3.4     08-22  Mg     1.90     08-22            Urinalysis Basic - ( 22 Aug 2024 05:21 )    Color: x / Appearance: x / SG: x / pH: x  Gluc: 93 mg/dL / Ketone: x  / Bili: x / Urobili: x   Blood: x / Protein: x / Nitrite: x   Leuk Esterase: x / RBC: x / WBC x   Sq Epi: x / Non Sq Epi: x / Bacteria: x          RADIOLOGY & ADDITIONAL TESTS:  Results Reviewed:   Imaging Personally Reviewed:  Electrocardiogram Personally Reviewed:    COORDINATION OF CARE:  Care Discussed with Consultants/Other Providers [Y/N]:  Prior or Outpatient Records Reviewed [Y/N]:

## 2024-08-22 NOTE — PROGRESS NOTE ADULT - SUBJECTIVE AND OBJECTIVE BOX
Subjective  Patient seen and examined. CBI clamped however patient turned bloody again.     Objective    Vital signs  T(F): , Max: 98 (08-22-24 @ 05:14)  HR: 71 (08-22-24 @ 05:14)  BP: 117/51 (08-22-24 @ 05:14)  SpO2: 99% (08-22-24 @ 05:14)  Wt(kg): --    Output         Gen: NAD  Abd: soft, nontender, nondistended  : daquan secured in place, on CBI    Labs      08-22 @ 05:21    WBC 6.16  / Hct 23.5  / SCr 0.88     08-21 @ 14:37    WBC 7.28  / Hct 24.8  / SCr --

## 2024-08-22 NOTE — PROGRESS NOTE ADULT - SUBJECTIVE AND OBJECTIVE BOX
Cardiology Progress Note  ------------------------------------------------------------------------------------------  SUBJECTIVE:   - Tele:  - No events overnight. Denies CP, SOB or Palpitations.     -------------------------------------------------------------------------------------------  VS:  T(F): 97.5 (08-21), Max: 97.5 (08-21)  HR: 70 (08-21) (67 - 70)  BP: 128/60 (08-21) (128/60 - 131/50)  RR: 18 (08-21)  SpO2: 98% (08-21)  I&O's Summary    20 Aug 2024 07:01  -  21 Aug 2024 07:00  --------------------------------------------------------  IN: 84387 mL / OUT: 09381 mL / NET: 2375 mL    21 Aug 2024 07:01  -  22 Aug 2024 06:43  --------------------------------------------------------  IN: 05612 mL / OUT: 47838 mL / NET: 1175 mL      PHYSICAL EXAM:  GENERAL: NAD  HEAD: Atraumatic, Normocephalic.  ENT: Moist mucous membranes.  NECK: Supple, No JVD.  CHEST/LUNG: Clear to auscultation bilaterally; No rales, rhonchi, wheezing, or rubs. Unlabored respirations.  HEART: Regular rate and rhythm; No murmurs, rubs, or gallops.  ABDOMEN: Bowel sounds present; Soft, Nontender, Nondistended.   EXTREMITIES: No edema. 2+ Peripheral Pulses, brisk capillary refill. No clubbing or cyanosis.     -------------------------------------------------------------------------------------------  LABS:                          7.4    6.16  )-----------( 145      ( 22 Aug 2024 05:21 )             23.5     08-22    139  |  102  |  30<H>  ----------------------------<  93  4.4   |  26  |  0.88    Ca    8.3<L>      22 Aug 2024 05:21  Phos  3.4     08-22  Mg     1.90     08-22        CARDIAC MARKERS ( 19 Aug 2024 05:10 )  49 ng/L / x     / x     / x     / x     / x      CARDIAC MARKERS ( 18 Aug 2024 22:55 )  x     / x     / x     / x     / x     / 1.8 ng/mL  CARDIAC MARKERS ( 18 Aug 2024 21:14 )  52 ng/L / x     / x     / x     / x     / 1.8 ng/mL  CARDIAC MARKERS ( 18 Aug 2024 20:00 )  54 ng/L / x     / x     / x     / x     / x                -------------------------------------------------------------------------------------------  Meds:  aspirin  chewable 81 milliGRAM(s) Oral daily  atorvastatin 40 milliGRAM(s) Oral at bedtime  chlorhexidine 2% Cloths 1 Application(s) Topical daily  cholecalciferol 2000 Unit(s) Oral daily  cyanocobalamin 1000 MICROGram(s) Oral daily  dextrose 5%. 1000 milliLiter(s) IV Continuous <Continuous>  dextrose 5%. 1000 milliLiter(s) IV Continuous <Continuous>  dextrose 50% Injectable 25 Gram(s) IV Push once  dextrose 50% Injectable 25 Gram(s) IV Push once  dextrose 50% Injectable 12.5 Gram(s) IV Push once  dextrose Oral Gel 15 Gram(s) Oral once PRN  finasteride 5 milliGRAM(s) Oral daily  folic acid 1 milliGRAM(s) Oral daily  furosemide   Injectable 40 milliGRAM(s) IV Push two times a day  glucagon  Injectable 1 milliGRAM(s) IntraMuscular once  insulin lispro (ADMELOG) corrective regimen sliding scale   SubCutaneous at bedtime  insulin lispro (ADMELOG) corrective regimen sliding scale   SubCutaneous three times a day before meals  mupirocin 2% Ointment 1 Application(s) Both Nostrils two times a day  mycophenolate mofetil 500 milliGRAM(s) Oral daily  polyethylene glycol 3350 17 Gram(s) Oral two times a day  predniSONE   Tablet 5 milliGRAM(s) Oral daily  tamsulosin 0.8 milliGRAM(s) Oral at bedtime    -------------------------------------------------------------------------------------------   Cardiology Progress Note  ------------------------------------------------------------------------------------------  SUBJECTIVE:   - Tele: afib 40 to 50s.   - No events overnight. Denies CP, SOB or Palpitations.     -------------------------------------------------------------------------------------------  VS:  T(F): 97.5 (08-21), Max: 97.5 (08-21)  HR: 70 (08-21) (67 - 70)  BP: 128/60 (08-21) (128/60 - 131/50)  RR: 18 (08-21)  SpO2: 98% (08-21)  I&O's Summary    20 Aug 2024 07:01  -  21 Aug 2024 07:00  --------------------------------------------------------  IN: 72958 mL / OUT: 01540 mL / NET: 2375 mL    21 Aug 2024 07:01  -  22 Aug 2024 06:43  --------------------------------------------------------  IN: 32355 mL / OUT: 56630 mL / NET: 1175 mL      PHYSICAL EXAM:  GENERAL: NAD  HEAD: Atraumatic, Normocephalic.  NECK: Supple, JVP 13 cm.   CHEST/LUNG: Clear to auscultation bilaterally  HEART: Irregular rhythm. 2/6 late peaking systolic murmur left sternal border 2nd ics. + pulsus parvus et tardus.   ABDOMEN: Soft, Nontender, Nondistended.   EXTREMITIES: warm, no lower extremity edema.   -------------------------------------------------------------------------------------------  LABS:                          7.4    6.16  )-----------( 145      ( 22 Aug 2024 05:21 )             23.5     08-22    139  |  102  |  30<H>  ----------------------------<  93  4.4   |  26  |  0.88    Ca    8.3<L>      22 Aug 2024 05:21  Phos  3.4     08-22  Mg     1.90     08-22        CARDIAC MARKERS ( 19 Aug 2024 05:10 )  49 ng/L / x     / x     / x     / x     / x      CARDIAC MARKERS ( 18 Aug 2024 22:55 )  x     / x     / x     / x     / x     / 1.8 ng/mL  CARDIAC MARKERS ( 18 Aug 2024 21:14 )  52 ng/L / x     / x     / x     / x     / 1.8 ng/mL  CARDIAC MARKERS ( 18 Aug 2024 20:00 )  54 ng/L / x     / x     / x     / x     / x                -------------------------------------------------------------------------------------------  Meds:  aspirin  chewable 81 milliGRAM(s) Oral daily  atorvastatin 40 milliGRAM(s) Oral at bedtime  chlorhexidine 2% Cloths 1 Application(s) Topical daily  cholecalciferol 2000 Unit(s) Oral daily  cyanocobalamin 1000 MICROGram(s) Oral daily  dextrose 5%. 1000 milliLiter(s) IV Continuous <Continuous>  dextrose 5%. 1000 milliLiter(s) IV Continuous <Continuous>  dextrose 50% Injectable 25 Gram(s) IV Push once  dextrose 50% Injectable 25 Gram(s) IV Push once  dextrose 50% Injectable 12.5 Gram(s) IV Push once  dextrose Oral Gel 15 Gram(s) Oral once PRN  finasteride 5 milliGRAM(s) Oral daily  folic acid 1 milliGRAM(s) Oral daily  furosemide   Injectable 40 milliGRAM(s) IV Push two times a day  glucagon  Injectable 1 milliGRAM(s) IntraMuscular once  insulin lispro (ADMELOG) corrective regimen sliding scale   SubCutaneous at bedtime  insulin lispro (ADMELOG) corrective regimen sliding scale   SubCutaneous three times a day before meals  mupirocin 2% Ointment 1 Application(s) Both Nostrils two times a day  mycophenolate mofetil 500 milliGRAM(s) Oral daily  polyethylene glycol 3350 17 Gram(s) Oral two times a day  predniSONE   Tablet 5 milliGRAM(s) Oral daily  tamsulosin 0.8 milliGRAM(s) Oral at bedtime    -------------------------------------------------------------------------------------------

## 2024-08-22 NOTE — DIETITIAN INITIAL EVALUATION ADULT - OTHER INFO
Patient seen at bedside. Information obtained from comprehensive chart review and per RN today. No recent episodes of nausea, vomiting, diarrhea or constipation. Unknown last BM. Bowel regimen in place. Pt was seen by SLP on 8/21, for Swallow VFSS/MBS Assessment with recommendation for Regular Solids with Moderately-Thick Liquids. Intake is variable % per RN flowsheets. Feeding skills: Assisted with feeding. Per wound care note 8/21, PI to sacrum stage II. Pt amenable to Magic cup reduced sugar 3x daily.

## 2024-08-22 NOTE — PROGRESS NOTE ADULT - ASSESSMENT
84y Male with a hx of AFib (on Eliquis), Myasthenia Gravis (on Cellcept), HFpEF, HTN, T2DM, BPH/retention with chronic butt, and recent embolic CVA s/p thrombectomy (8/4/24 at [facility]) presenting with substernal chest pain, now with hematuria due to traumatic catheterization. 3-way 20 [place] coude placed by urology team w/ 50 CC clot burden and started on CBI. RBUS w/ no evidence of organized clot    Plan:  -Continue on CBI, urology to manage  -US with no evidence of clot in the bladder or hydronephrosis   -Continue holding eliquis if ok with primary   -Monitor H&H, stable today  -Continue flomax 0.8mg and finasteride 5mg daily    Plan discussed with Dr. Malachi Cadet [facility] for Urology  [address] [place] [id number] [phone]

## 2024-08-22 NOTE — DIETITIAN INITIAL EVALUATION ADULT - PERTINENT MEDS FT
MEDICATIONS  (STANDING):  aspirin  chewable 81 milliGRAM(s) Oral daily  atorvastatin 40 milliGRAM(s) Oral at bedtime  chlorhexidine 2% Cloths 1 Application(s) Topical daily  cholecalciferol 2000 Unit(s) Oral daily  cyanocobalamin 1000 MICROGram(s) Oral daily  dextrose 5%. 1000 milliLiter(s) (50 mL/Hr) IV Continuous <Continuous>  dextrose 5%. 1000 milliLiter(s) (100 mL/Hr) IV Continuous <Continuous>  dextrose 50% Injectable 25 Gram(s) IV Push once  dextrose 50% Injectable 12.5 Gram(s) IV Push once  dextrose 50% Injectable 25 Gram(s) IV Push once  finasteride 5 milliGRAM(s) Oral daily  folic acid 1 milliGRAM(s) Oral daily  furosemide   Injectable 40 milliGRAM(s) IV Push two times a day  glucagon  Injectable 1 milliGRAM(s) IntraMuscular once  insulin lispro (ADMELOG) corrective regimen sliding scale   SubCutaneous three times a day before meals  insulin lispro (ADMELOG) corrective regimen sliding scale   SubCutaneous at bedtime  mupirocin 2% Ointment 1 Application(s) Both Nostrils two times a day  mycophenolate mofetil 500 milliGRAM(s) Oral daily  polyethylene glycol 3350 17 Gram(s) Oral two times a day  predniSONE   Tablet 5 milliGRAM(s) Oral daily  tamsulosin 0.8 milliGRAM(s) Oral at bedtime    MEDICATIONS  (PRN):  dextrose Oral Gel 15 Gram(s) Oral once PRN Blood Glucose LESS THAN 70 milliGRAM(s)/deciliter

## 2024-08-22 NOTE — DIETITIAN INITIAL EVALUATION ADULT - PERTINENT LABORATORY DATA
08-22    139  |  102  |  30<H>  ----------------------------<  93  4.4   |  26  |  0.88    Ca    8.3<L>      22 Aug 2024 05:21  Phos  3.4     08-22  Mg     1.90     08-22    POCT Blood Glucose.: 110 mg/dL (08-22-24 @ 08:13)  A1C with Estimated Average Glucose Result: 5.2 % (08-19-24 @ 05:10)

## 2024-08-22 NOTE — OCCUPATIONAL THERAPY INITIAL EVALUATION ADULT - ADDITIONAL COMMENTS
Pt was admitted from [facility] (due to prior CVA). Prior to staying at the [facility], pt resided in a private home with his wife with 5 steps to enter and a tub bathroom with grab bars and a shower chair. Pt reports ambulating with assist at [facility], and using a cane prior to [facility]. Pt reports being independent with ADLs Prior to admission.

## 2024-08-22 NOTE — DIETITIAN INITIAL EVALUATION ADULT - ADD RECOMMEND
- Continue current diet order. Defer food and fluid consistency to SLP/Team.   - Recommend Provide Magic Cup daily TID (280kcal, 9gm pro).  - Recommend MVI for micronutrient support/aid wound healing.   - Continue with cholecalciferol, cyanocobalamin, folic acid for micronutrient support.   - Please consistently document % PO intake in nursing flowsheets to assess adequacy of nutritional intake/monitor need for further nutritional intervention(s).   - Monitor weights, diet tolerance, skin integrity, BMs, pertinent labs.   - RDN services remain available as needed.

## 2024-08-22 NOTE — DIETITIAN INITIAL EVALUATION ADULT - REASON FOR ADMISSION
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 Also w/ significant AS and AI on echo.

## 2024-08-22 NOTE — PROGRESS NOTE ADULT - ASSESSMENT
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 Cardiology consulted for management of severe AS/AI. Follows with Dr. Lisker outpatient.     #Severe AS  #Severe AI  #Acute Decompensated HFpEF  ACC/AHA Stage C Valvular HFpEF  probnp 3229, troponin 54 -->52, BUN/Cr 27/0.97  Chest X-ray - perihilar infiltrates, left sided pleural effusion.   TTE - EF 60-65%, LV concentric hypertrophy, mild-moderate MS, mild MR, severe AS/AI, dilated LA, mild-moderate TR, RA - 15 mmHg  EKG - 36 BPM, atrial fibrillation, no ST T  wave changes.   - Holding home entresto  - c/w lasix 40 mg IV BID   - Strict I/Os  - Daily standing weights  - Plan to start SGLT2 near discharge   - Please consult palliative care given poor candidacy for repair of aortic valve. Discussed with urology at bedside that likely no surgical intervention for bilateral renal masses.   - Will discuss plan with family.     #Afib  CHADsVASc - 7  - c/w eliquis 5 mg PO BID, currently held iso hematuria  - on ASA for recent CVA.   - holding beta blockers iso afib with slow ventricular response  - EP following.       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 Cardiology consulted for management of severe AS/AI. Follows with Dr. Lisker outpatient.     #Severe AS  #Severe AI  #Acute Decompensated HFpEF  ACC/AHA Stage C Valvular HFpEF  probnp 3229, troponin 54 -->52, BUN/Cr 27/0.97  Chest X-ray - perihilar infiltrates, left sided pleural effusion.   TTE - EF 60-65%, LV concentric hypertrophy, mild-moderate MS, mild MR, severe AS/AI, dilated LA, mild-moderate TR, RA - 15 mmHg  EKG - 36 BPM, atrial fibrillation, no ST T  wave changes.   - Holding home entresto  - c/w lasix 40 mg IV BID   - Strict I/Os  - Daily standing weights  - Plan to start SGLT2 near discharge   - Please consult palliative care given poor candidacy for repair of aortic valve given severe AI. Patient with potential renal malignancy and not surgical candidate per urology.   - Will discuss plan with family.     #Afib  CHADsVASc - 7  - c/w eliquis 5 mg PO BID, currently held iso hematuria  - on ASA for recent CVA.   - holding beta blockers iso afib with slow ventricular response  - EP following.       Zachery Bledsoe MD  PGY-3

## 2024-08-22 NOTE — PROGRESS NOTE ADULT - ASSESSMENT
84y Male with a hx of AFib (on Eliquis), Myasthenia Gravis (on Cellcept), HFpEF, HTN, T2DM, urinary retention s/p Elliott, and recent embolic CVA s/p thrombectomy (8/4/24 at [facility]) with residual R facial droop and L sided weakness who  presents from rehab with chest pain, found to be in AFib with slow ventricular rate. Also w/ significant AS and AI on echo. Course c/b hematuria.

## 2024-08-22 NOTE — DIETITIAN INITIAL EVALUATION ADULT - ORAL INTAKE PTA/DIET HISTORY
Pt is very hard of earing. Pt provide minimal nutritional information. Patient reports good appetite/PO intake PTA, consumes a regular diet at baseline. Pt confirms NKFA, food intolerance. Pt does not recall his UBW at this time. Medications PTA inclusive of metformin, vitamin D, Eliquis, atorvastatin. Per GOC patient is full code.

## 2024-08-22 NOTE — OCCUPATIONAL THERAPY INITIAL EVALUATION ADULT - DIAGNOSIS, OT EVAL
Pt with impaired strength, balance, and endurance impacting ability to complete ADLs, IADLs, functional mobility/transfers.

## 2024-08-22 NOTE — OCCUPATIONAL THERAPY INITIAL EVALUATION ADULT - PERSONAL SAFETY AND JUDGMENT, REHAB EVAL
Pt benefits from frequent cueing to maintain proper sequencing and pacing with RW/at risk behaviors demonstrated

## 2024-08-23 LAB
ANION GAP SERPL CALC-SCNC: 11 MMOL/L — SIGNIFICANT CHANGE UP (ref 7–14)
BUN SERPL-MCNC: 26 MG/DL — HIGH (ref 7–23)
CALCIUM SERPL-MCNC: 8.2 MG/DL — LOW (ref 8.4–10.5)
CHLORIDE SERPL-SCNC: 102 MMOL/L — SIGNIFICANT CHANGE UP (ref 98–107)
CO2 SERPL-SCNC: 25 MMOL/L — SIGNIFICANT CHANGE UP (ref 22–31)
CREAT SERPL-MCNC: 0.77 MG/DL — SIGNIFICANT CHANGE UP (ref 0.5–1.3)
EGFR: 88 ML/MIN/1.73M2 — SIGNIFICANT CHANGE UP
GLUCOSE BLDC GLUCOMTR-MCNC: 126 MG/DL — HIGH (ref 70–99)
GLUCOSE BLDC GLUCOMTR-MCNC: 166 MG/DL — HIGH (ref 70–99)
GLUCOSE BLDC GLUCOMTR-MCNC: 194 MG/DL — HIGH (ref 70–99)
GLUCOSE BLDC GLUCOMTR-MCNC: 334 MG/DL — HIGH (ref 70–99)
GLUCOSE SERPL-MCNC: 96 MG/DL — SIGNIFICANT CHANGE UP (ref 70–99)
HCT VFR BLD CALC: 24 % — LOW (ref 39–50)
HGB BLD-MCNC: 7.4 G/DL — LOW (ref 13–17)
MAGNESIUM SERPL-MCNC: 2.1 MG/DL — SIGNIFICANT CHANGE UP (ref 1.6–2.6)
MCHC RBC-ENTMCNC: 29.1 PG — SIGNIFICANT CHANGE UP (ref 27–34)
MCHC RBC-ENTMCNC: 30.8 GM/DL — LOW (ref 32–36)
MCV RBC AUTO: 94.5 FL — SIGNIFICANT CHANGE UP (ref 80–100)
METHYLMALONATE SERPL-SCNC: 528 NMOL/L — HIGH (ref 0–378)
NRBC # BLD: 0 /100 WBCS — SIGNIFICANT CHANGE UP (ref 0–0)
NRBC # FLD: 0 K/UL — SIGNIFICANT CHANGE UP (ref 0–0)
PHOSPHATE SERPL-MCNC: 2.7 MG/DL — SIGNIFICANT CHANGE UP (ref 2.5–4.5)
PLATELET # BLD AUTO: 122 K/UL — LOW (ref 150–400)
POTASSIUM SERPL-MCNC: 4.2 MMOL/L — SIGNIFICANT CHANGE UP (ref 3.5–5.3)
POTASSIUM SERPL-SCNC: 4.2 MMOL/L — SIGNIFICANT CHANGE UP (ref 3.5–5.3)
RBC # BLD: 2.54 M/UL — LOW (ref 4.2–5.8)
RBC # FLD: 16.1 % — HIGH (ref 10.3–14.5)
SODIUM SERPL-SCNC: 138 MMOL/L — SIGNIFICANT CHANGE UP (ref 135–145)
WBC # BLD: 5.38 K/UL — SIGNIFICANT CHANGE UP (ref 3.8–10.5)
WBC # FLD AUTO: 5.38 K/UL — SIGNIFICANT CHANGE UP (ref 3.8–10.5)

## 2024-08-23 PROCEDURE — 99231 SBSQ HOSP IP/OBS SF/LOW 25: CPT

## 2024-08-23 PROCEDURE — 99233 SBSQ HOSP IP/OBS HIGH 50: CPT

## 2024-08-23 PROCEDURE — 99232 SBSQ HOSP IP/OBS MODERATE 35: CPT | Mod: GC

## 2024-08-23 RX ORDER — BUMETANIDE 2 MG/1
4 TABLET ORAL EVERY 12 HOURS
Refills: 0 | Status: DISCONTINUED | OUTPATIENT
Start: 2024-08-23 | End: 2024-08-24

## 2024-08-23 RX ADMIN — FINASTERIDE 5 MILLIGRAM(S): 1 TABLET, FILM COATED ORAL at 12:36

## 2024-08-23 RX ADMIN — TAMSULOSIN HYDROCHLORIDE 0.8 MILLIGRAM(S): 0.4 CAPSULE ORAL at 21:57

## 2024-08-23 RX ADMIN — BUMETANIDE 132 MILLIGRAM(S): 2 TABLET ORAL at 18:19

## 2024-08-23 RX ADMIN — Medication 1 APPLICATION(S): at 05:19

## 2024-08-23 RX ADMIN — MYCOPHENOLATE MOFETIL 500 MILLIGRAM(S): 250 CAPSULE ORAL at 12:36

## 2024-08-23 RX ADMIN — Medication 81 MILLIGRAM(S): at 12:35

## 2024-08-23 RX ADMIN — POLYETHYLENE GLYCOL 3350 17 GRAM(S): 17 POWDER, FOR SOLUTION ORAL at 05:18

## 2024-08-23 RX ADMIN — Medication 1: at 12:37

## 2024-08-23 RX ADMIN — Medication 2000 UNIT(S): at 12:35

## 2024-08-23 RX ADMIN — Medication 1 MILLIGRAM(S): at 12:35

## 2024-08-23 RX ADMIN — CHLORHEXIDINE GLUCONATE 1 APPLICATION(S): 40 SOLUTION TOPICAL at 12:36

## 2024-08-23 RX ADMIN — Medication 1: at 18:10

## 2024-08-23 RX ADMIN — Medication 5 MILLIGRAM(S): at 05:18

## 2024-08-23 RX ADMIN — Medication 4: at 09:18

## 2024-08-23 RX ADMIN — Medication 1 APPLICATION(S): at 18:11

## 2024-08-23 RX ADMIN — Medication 1000 MICROGRAM(S): at 12:36

## 2024-08-23 RX ADMIN — Medication 40 MILLIGRAM(S): at 21:58

## 2024-08-23 NOTE — PROGRESS NOTE ADULT - ASSESSMENT
84y Male with a hx of AFib (on Eliquis), Myasthenia Gravis (on Cellcept), HFpEF, HTN, T2DM, BPH/retention with chronic butt, and recent embolic CVA s/p thrombectomy (8/4/24 at [facility]) presenting with substernal chest pain, now with hematuria due to traumatic catheterization. 3-way 20 [place] coude placed by urology team w/ 50 CC clot burden and started on CBI. RBUS w/ no evidence of organized clot    Plan:  -CBI clamped, urine clear   -US with no evidence of clot in the bladder or hydronephrosis. Bilateral renal masses.   -CT renal mass protocol pending. Outpatient follow up/surveillance of renal masses given poor surgical candidate   -Continue holding eliquis if ok with primary   -Monitor H&H, stable today  -Continue flomax 0.8mg and finasteride 5mg daily    [facility]  [address], [place] [id number] [phone]

## 2024-08-23 NOTE — PROGRESS NOTE ADULT - ASSESSMENT
84y Male with a hx of AFib (on Eliquis), Myasthenia Gravis (on Cellcept), HFpEF, HTN, T2DM, urinary retention s/p Elliott, and recent embolic CVA s/p thrombectomy (8/4/24 at [facility]) with residual R facial droop and L sided weakness who  presents from rehab with chest pain, found to be in AFib with slow ventricular rate. Also w/ significant AS and AI on echo. Course c/b hematuria.

## 2024-08-23 NOTE — PROGRESS NOTE ADULT - PROBLEM SELECTOR PLAN 4
hematuria due to traumatic catheterization  - appreciate urology input   - Elliott replaced by urology   - s/p CBI, now clamped   - renal US w/ b/l renal lesions, awaiting CT A/P for further characterization   - monitor urine color

## 2024-08-23 NOTE — PROGRESS NOTE ADULT - SUBJECTIVE AND OBJECTIVE BOX
PROGRESS NOTE:     Patient is a 84y old  Male who presents with a chief complaint of Chest pain (23 Aug 2024 07:53)      SUBJECTIVE / OVERNIGHT EVENTS: No acute events.     ADDITIONAL REVIEW OF SYSTEMS:    MEDICATIONS  (STANDING):  aspirin  chewable 81 milliGRAM(s) Oral daily  atorvastatin 40 milliGRAM(s) Oral at bedtime  chlorhexidine 2% Cloths 1 Application(s) Topical daily  cholecalciferol 2000 Unit(s) Oral daily  cyanocobalamin 1000 MICROGram(s) Oral daily  dextrose 5%. 1000 milliLiter(s) (50 mL/Hr) IV Continuous <Continuous>  dextrose 5%. 1000 milliLiter(s) (100 mL/Hr) IV Continuous <Continuous>  dextrose 50% Injectable 25 Gram(s) IV Push once  dextrose 50% Injectable 25 Gram(s) IV Push once  dextrose 50% Injectable 12.5 Gram(s) IV Push once  finasteride 5 milliGRAM(s) Oral daily  folic acid 1 milliGRAM(s) Oral daily  furosemide   Injectable 40 milliGRAM(s) IV Push two times a day  glucagon  Injectable 1 milliGRAM(s) IntraMuscular once  insulin lispro (ADMELOG) corrective regimen sliding scale   SubCutaneous three times a day before meals  insulin lispro (ADMELOG) corrective regimen sliding scale   SubCutaneous at bedtime  mupirocin 2% Ointment 1 Application(s) Both Nostrils two times a day  mycophenolate mofetil 500 milliGRAM(s) Oral daily  polyethylene glycol 3350 17 Gram(s) Oral two times a day  predniSONE   Tablet 5 milliGRAM(s) Oral daily  tamsulosin 0.8 milliGRAM(s) Oral at bedtime    MEDICATIONS  (PRN):  dextrose Oral Gel 15 Gram(s) Oral once PRN Blood Glucose LESS THAN 70 milliGRAM(s)/deciliter      CAPILLARY BLOOD GLUCOSE      POCT Blood Glucose.: 194 mg/dL (23 Aug 2024 12:30)  POCT Blood Glucose.: 334 mg/dL (23 Aug 2024 08:58)  POCT Blood Glucose.: 159 mg/dL (22 Aug 2024 22:15)  POCT Blood Glucose.: 125 mg/dL (22 Aug 2024 17:39)    I&O's Summary    22 Aug 2024 07:01  -  23 Aug 2024 07:00  --------------------------------------------------------  IN: 56603 mL / OUT: 23851 mL / NET: -8180 mL    23 Aug 2024 07:01  -  23 Aug 2024 13:05  --------------------------------------------------------  IN: 0 mL / OUT: 350 mL / NET: -350 mL        PHYSICAL EXAM:  Vital Signs Last 24 Hrs  T(C): 36.2 (23 Aug 2024 12:48), Max: 36.5 (22 Aug 2024 21:00)  T(F): 97.2 (23 Aug 2024 12:48), Max: 97.7 (22 Aug 2024 21:00)  HR: 55 (23 Aug 2024 12:48) (45 - 59)  BP: 167/50 (23 Aug 2024 12:48) (106/39 - 167/50)  BP(mean): --  RR: 18 (23 Aug 2024 12:48) (18 - 18)  SpO2: 99% (23 Aug 2024 12:48) (98% - 100%)    Parameters below as of 23 Aug 2024 12:48  Patient On (Oxygen Delivery Method): nasal cannula      CONSTITUTIONAL: NAD  EYES: PERRL. EOMI. No scleral icterus.  CV: Irregular. Normal S1 and S2. No murmurs, rubs, or gallops. No edema. Pulses equal bilaterally.  PULM: Clear to auscultation throughout. Respirations unlabored. No wheezing, rales, or rhonchi.  GI: Soft, non-tender, non-distended. No palpable masses.  MSK: No cyanosis or clubbing.   : Elliott w/ clear urine   SKIN: Adherent one scab each on bilateral lower medial and lateral thigh areas; no signs of infection.  Non-tender (from time of embolectomy, per patient)  NEURO: (+) mild R facial droop. CN grossly intact.  PSYCH: A+O x 3.  Pleasant.  Mood and affect appropriate to situation    LABS:                        7.4    5.38  )-----------( 122      ( 23 Aug 2024 07:50 )             24.0     08-23    138  |  102  |  26<H>  ----------------------------<  96  4.2   |  25  |  0.77    Ca    8.2<L>      23 Aug 2024 07:50  Phos  2.7     08-23  Mg     2.10     08-23            Urinalysis Basic - ( 23 Aug 2024 07:50 )    Color: x / Appearance: x / SG: x / pH: x  Gluc: 96 mg/dL / Ketone: x  / Bili: x / Urobili: x   Blood: x / Protein: x / Nitrite: x   Leuk Esterase: x / RBC: x / WBC x   Sq Epi: x / Non Sq Epi: x / Bacteria: x          RADIOLOGY & ADDITIONAL TESTS:  Results Reviewed:   Imaging Personally Reviewed:  Electrocardiogram Personally Reviewed:    COORDINATION OF CARE:  Care Discussed with Consultants/Other Providers [Y/N]:  Prior or Outpatient Records Reviewed [Y/N]:

## 2024-08-23 NOTE — PROGRESS NOTE ADULT - SUBJECTIVE AND OBJECTIVE BOX
Cardiology Progress Note  ------------------------------------------------------------------------------------------  SUBJECTIVE:   - Tele:  - No events overnight. Denies CP, SOB or Palpitations.     -------------------------------------------------------------------------------------------  VS:  T(F): 97.3 (08-23), Max: 97.7 (08-22)  HR: 45 (08-23) (45 - 59)  BP: 106/39 (08-23) (106/39 - 156/44)  RR: 18 (08-23)  SpO2: 98% (08-23)  I&O's Summary    21 Aug 2024 07:01  -  22 Aug 2024 07:00  --------------------------------------------------------  IN: 79622 mL / OUT: 49798 mL / NET: 175 mL    22 Aug 2024 07:01  -  23 Aug 2024 06:38  --------------------------------------------------------  IN: 11787 mL / OUT: 76305 mL / NET: -8180 mL      PHYSICAL EXAM:  GENERAL: NAD  HEAD: Atraumatic, Normocephalic.  ENT: Moist mucous membranes.  NECK: Supple, No JVD.  CHEST/LUNG: Clear to auscultation bilaterally; No rales, rhonchi, wheezing, or rubs. Unlabored respirations.  HEART: Regular rate and rhythm; No murmurs, rubs, or gallops.  ABDOMEN: Bowel sounds present; Soft, Nontender, Nondistended.   EXTREMITIES: No edema. 2+ Peripheral Pulses, brisk capillary refill. No clubbing or cyanosis.     -------------------------------------------------------------------------------------------  LABS:                          7.4    6.16  )-----------( 145      ( 22 Aug 2024 05:21 )             23.5     08-22    139  |  102  |  30<H>  ----------------------------<  93  4.4   |  26  |  0.88    Ca    8.3<L>      22 Aug 2024 05:21  Phos  3.4     08-22  Mg     1.90     08-22        CARDIAC MARKERS ( 19 Aug 2024 05:10 )  49 ng/L / x     / x     / x     / x     / x      CARDIAC MARKERS ( 18 Aug 2024 22:55 )  x     / x     / x     / x     / x     / 1.8 ng/mL  CARDIAC MARKERS ( 18 Aug 2024 21:14 )  52 ng/L / x     / x     / x     / x     / 1.8 ng/mL  CARDIAC MARKERS ( 18 Aug 2024 20:00 )  54 ng/L / x     / x     / x     / x     / x                -------------------------------------------------------------------------------------------  Meds:  aspirin  chewable 81 milliGRAM(s) Oral daily  atorvastatin 40 milliGRAM(s) Oral at bedtime  chlorhexidine 2% Cloths 1 Application(s) Topical daily  cholecalciferol 2000 Unit(s) Oral daily  cyanocobalamin 1000 MICROGram(s) Oral daily  dextrose 5%. 1000 milliLiter(s) IV Continuous <Continuous>  dextrose 5%. 1000 milliLiter(s) IV Continuous <Continuous>  dextrose 50% Injectable 25 Gram(s) IV Push once  dextrose 50% Injectable 12.5 Gram(s) IV Push once  dextrose 50% Injectable 25 Gram(s) IV Push once  dextrose Oral Gel 15 Gram(s) Oral once PRN  finasteride 5 milliGRAM(s) Oral daily  folic acid 1 milliGRAM(s) Oral daily  furosemide   Injectable 40 milliGRAM(s) IV Push two times a day  glucagon  Injectable 1 milliGRAM(s) IntraMuscular once  insulin lispro (ADMELOG) corrective regimen sliding scale   SubCutaneous at bedtime  insulin lispro (ADMELOG) corrective regimen sliding scale   SubCutaneous three times a day before meals  mupirocin 2% Ointment 1 Application(s) Both Nostrils two times a day  mycophenolate mofetil 500 milliGRAM(s) Oral daily  polyethylene glycol 3350 17 Gram(s) Oral two times a day  predniSONE   Tablet 5 milliGRAM(s) Oral daily  tamsulosin 0.8 milliGRAM(s) Oral at bedtime    -------------------------------------------------------------------------------------------   Cardiology Progress Note  ------------------------------------------------------------------------------------------  SUBJECTIVE:   - Tele: Afib 37 to 50s.   - No events overnight. Denies chest pain, dyspnea or palpitations. Urine- slight blood tinged today.     -------------------------------------------------------------------------------------------  VS:  T(F): 97.3 (08-23), Max: 97.7 (08-22)  HR: 45 (08-23) (45 - 59)  BP: 106/39 (08-23) (106/39 - 156/44)  RR: 18 (08-23)  SpO2: 98% (08-23)  I&O's Summary    21 Aug 2024 07:01  -  22 Aug 2024 07:00  --------------------------------------------------------  IN: 56671 mL / OUT: 61852 mL / NET: 175 mL    22 Aug 2024 07:01  -  23 Aug 2024 06:38  --------------------------------------------------------  IN: 29870 mL / OUT: 30389 mL / NET: -8180 mL        PHYSICAL EXAM:  GENERAL: NAD  HEAD: Atraumatic, Normocephalic.  NECK: Supple, JVP 15 cm.   CHEST/LUNG: Clear to auscultation bilaterally  HEART: Irregular rhythm. 2/6 late peaking systolic murmur left sternal border 2nd ics. + pulsus parvus et tardus.   ABDOMEN: Soft, Nontender, Nondistended.   EXTREMITIES: warm, no lower extremity edema.   -------------------------------------------------------------------------------------------  LABS:                          7.4    6.16  )-----------( 145      ( 22 Aug 2024 05:21 )             23.5     08-22    139  |  102  |  30<H>  ----------------------------<  93  4.4   |  26  |  0.88    Ca    8.3<L>      22 Aug 2024 05:21  Phos  3.4     08-22  Mg     1.90     08-22        CARDIAC MARKERS ( 19 Aug 2024 05:10 )  49 ng/L / x     / x     / x     / x     / x      CARDIAC MARKERS ( 18 Aug 2024 22:55 )  x     / x     / x     / x     / x     / 1.8 ng/mL  CARDIAC MARKERS ( 18 Aug 2024 21:14 )  52 ng/L / x     / x     / x     / x     / 1.8 ng/mL  CARDIAC MARKERS ( 18 Aug 2024 20:00 )  54 ng/L / x     / x     / x     / x     / x                -------------------------------------------------------------------------------------------  Meds:  aspirin  chewable 81 milliGRAM(s) Oral daily  atorvastatin 40 milliGRAM(s) Oral at bedtime  chlorhexidine 2% Cloths 1 Application(s) Topical daily  cholecalciferol 2000 Unit(s) Oral daily  cyanocobalamin 1000 MICROGram(s) Oral daily  dextrose 5%. 1000 milliLiter(s) IV Continuous <Continuous>  dextrose 5%. 1000 milliLiter(s) IV Continuous <Continuous>  dextrose 50% Injectable 25 Gram(s) IV Push once  dextrose 50% Injectable 12.5 Gram(s) IV Push once  dextrose 50% Injectable 25 Gram(s) IV Push once  dextrose Oral Gel 15 Gram(s) Oral once PRN  finasteride 5 milliGRAM(s) Oral daily  folic acid 1 milliGRAM(s) Oral daily  furosemide   Injectable 40 milliGRAM(s) IV Push two times a day  glucagon  Injectable 1 milliGRAM(s) IntraMuscular once  insulin lispro (ADMELOG) corrective regimen sliding scale   SubCutaneous at bedtime  insulin lispro (ADMELOG) corrective regimen sliding scale   SubCutaneous three times a day before meals  mupirocin 2% Ointment 1 Application(s) Both Nostrils two times a day  mycophenolate mofetil 500 milliGRAM(s) Oral daily  polyethylene glycol 3350 17 Gram(s) Oral two times a day  predniSONE   Tablet 5 milliGRAM(s) Oral daily  tamsulosin 0.8 milliGRAM(s) Oral at bedtime    -------------------------------------------------------------------------------------------   Cardiology Progress Note  ------------------------------------------------------------------------------------------  SUBJECTIVE:   - Tele: Afib 37 to 50s.   - No events overnight. Denies chest pain, dyspnea or palpitations. Urine- slight blood tinged today.     -------------------------------------------------------------------------------------------  VS:  T(F): 97.3 (08-23), Max: 97.7 (08-22)  HR: 45 (08-23) (45 - 59)  BP: 106/39 (08-23) (106/39 - 156/44)  RR: 18 (08-23)  SpO2: 98% (08-23)  I&O's Summary    21 Aug 2024 07:01  -  22 Aug 2024 07:00  --------------------------------------------------------  IN: 11067 mL / OUT: 07353 mL / NET: 175 mL    22 Aug 2024 07:01  -  23 Aug 2024 06:38  --------------------------------------------------------  IN: 16790 mL / OUT: 66874 mL / NET: -8180 mL        PHYSICAL EXAM:  GENERAL: NAD  HEAD: Atraumatic, Normocephalic.  NECK: Supple, JVP 16 cm.   CHEST/LUNG: Clear to auscultation bilaterally  HEART: Irregular rhythm. 2/6 late peaking systolic murmur left sternal border 2nd ics. + pulsus parvus et tardus.   ABDOMEN: Soft, Nontender, Nondistended.   EXTREMITIES: warm, no lower extremity edema.   -------------------------------------------------------------------------------------------  LABS:                          7.4    6.16  )-----------( 145      ( 22 Aug 2024 05:21 )             23.5     08-22    139  |  102  |  30<H>  ----------------------------<  93  4.4   |  26  |  0.88    Ca    8.3<L>      22 Aug 2024 05:21  Phos  3.4     08-22  Mg     1.90     08-22        CARDIAC MARKERS ( 19 Aug 2024 05:10 )  49 ng/L / x     / x     / x     / x     / x      CARDIAC MARKERS ( 18 Aug 2024 22:55 )  x     / x     / x     / x     / x     / 1.8 ng/mL  CARDIAC MARKERS ( 18 Aug 2024 21:14 )  52 ng/L / x     / x     / x     / x     / 1.8 ng/mL  CARDIAC MARKERS ( 18 Aug 2024 20:00 )  54 ng/L / x     / x     / x     / x     / x                -------------------------------------------------------------------------------------------  Meds:  aspirin  chewable 81 milliGRAM(s) Oral daily  atorvastatin 40 milliGRAM(s) Oral at bedtime  chlorhexidine 2% Cloths 1 Application(s) Topical daily  cholecalciferol 2000 Unit(s) Oral daily  cyanocobalamin 1000 MICROGram(s) Oral daily  dextrose 5%. 1000 milliLiter(s) IV Continuous <Continuous>  dextrose 5%. 1000 milliLiter(s) IV Continuous <Continuous>  dextrose 50% Injectable 25 Gram(s) IV Push once  dextrose 50% Injectable 12.5 Gram(s) IV Push once  dextrose 50% Injectable 25 Gram(s) IV Push once  dextrose Oral Gel 15 Gram(s) Oral once PRN  finasteride 5 milliGRAM(s) Oral daily  folic acid 1 milliGRAM(s) Oral daily  furosemide   Injectable 40 milliGRAM(s) IV Push two times a day  glucagon  Injectable 1 milliGRAM(s) IntraMuscular once  insulin lispro (ADMELOG) corrective regimen sliding scale   SubCutaneous at bedtime  insulin lispro (ADMELOG) corrective regimen sliding scale   SubCutaneous three times a day before meals  mupirocin 2% Ointment 1 Application(s) Both Nostrils two times a day  mycophenolate mofetil 500 milliGRAM(s) Oral daily  polyethylene glycol 3350 17 Gram(s) Oral two times a day  predniSONE   Tablet 5 milliGRAM(s) Oral daily  tamsulosin 0.8 milliGRAM(s) Oral at bedtime    -------------------------------------------------------------------------------------------

## 2024-08-23 NOTE — PROGRESS NOTE ADULT - ASSESSMENT
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 Cardiology consulted for management of severe AS/AI. Follows with Dr. Lisker outpatient.     #Severe AS  #Severe AI  #Acute Decompensated HFpEF  ACC/AHA Stage C Valvular HFpEF  probnp 3229, troponin 54 -->52, BUN/Cr 27/0.97  Chest X-ray - perihilar infiltrates, left sided pleural effusion.   TTE - EF 60-65%, LV concentric hypertrophy, mild-moderate MS, mild MR, severe AS/AI, dilated LA, mild-moderate TR, RA - 15 mmHg  EKG - 36 BPM, atrial fibrillation, no ST T  wave changes.   - Holding home entresto  - c/w lasix 40 mg IV BID   - Strict I/Os  - Daily standing weights  - Plan to start SGLT2 near discharge   - Please consult palliative care given poor candidacy for repair of aortic valve given severe AI. Patient with potential renal malignancy and not surgical candidate per urology.   - Discussed plan with family.     #Afib  CHADsVASc - 7  - c/w eliquis 5 mg PO BID, currently held iso hematuria  - on ASA for recent CVA.   - holding beta blockers iso afib with slow ventricular response  - EP following.       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 Cardiology consulted for management of severe AS/AI. Follows with Dr. Lisker outpatient.     #Severe AS  #Severe AI  #Acute Decompensated HFpEF  ACC/AHA Stage C Valvular HFpEF  probnp 3229, troponin 54 -->52, BUN/Cr 27/0.97  Chest X-ray - perihilar infiltrates, left sided pleural effusion.   TTE - EF 60-65%, LV concentric hypertrophy, mild-moderate MS, mild MR, severe AS/AI, dilated LA, mild-moderate TR, RA - 15 mmHg  EKG - 36 BPM, atrial fibrillation, no ST T  wave changes.   Patient appears significantly volume overloaded on exam, potentially driven by irrigation fluid from CBI.   - Holding home entresto  - Increase diuresis to bumetanide 4 mg IVPB BID.   - Strict I/Os  - Daily standing weights  - Plan to start SGLT2 near discharge   - Please consult palliative care given poor candidacy for repair of aortic valve. Patient with potential renal malignancy and not surgical candidate per urology. Pending CT A&P to eval for malignancy.   - Discussed plan with family.     #Afib  CHADsVASc - 7  - c/w eliquis 5 mg PO BID, currently held iso hematuria  - on ASA for recent CVA.   - holding beta blockers iso afib with slow ventricular response  - EP following.       Zachery Bledsoe MD  PGY-3

## 2024-08-24 DIAGNOSIS — N28.89 OTHER SPECIFIED DISORDERS OF KIDNEY AND URETER: ICD-10-CM

## 2024-08-24 LAB
ANION GAP SERPL CALC-SCNC: 11 MMOL/L — SIGNIFICANT CHANGE UP (ref 7–14)
BLD GP AB SCN SERPL QL: NEGATIVE — SIGNIFICANT CHANGE UP
BUN SERPL-MCNC: 27 MG/DL — HIGH (ref 7–23)
CALCIUM SERPL-MCNC: 8.1 MG/DL — LOW (ref 8.4–10.5)
CHLORIDE SERPL-SCNC: 101 MMOL/L — SIGNIFICANT CHANGE UP (ref 98–107)
CO2 SERPL-SCNC: 28 MMOL/L — SIGNIFICANT CHANGE UP (ref 22–31)
CREAT SERPL-MCNC: 0.82 MG/DL — SIGNIFICANT CHANGE UP (ref 0.5–1.3)
EGFR: 86 ML/MIN/1.73M2 — SIGNIFICANT CHANGE UP
GLUCOSE BLDC GLUCOMTR-MCNC: 111 MG/DL — HIGH (ref 70–99)
GLUCOSE BLDC GLUCOMTR-MCNC: 131 MG/DL — HIGH (ref 70–99)
GLUCOSE BLDC GLUCOMTR-MCNC: 146 MG/DL — HIGH (ref 70–99)
GLUCOSE BLDC GLUCOMTR-MCNC: 161 MG/DL — HIGH (ref 70–99)
GLUCOSE SERPL-MCNC: 99 MG/DL — SIGNIFICANT CHANGE UP (ref 70–99)
HCT VFR BLD CALC: 22.3 % — LOW (ref 39–50)
HGB BLD-MCNC: 7.1 G/DL — LOW (ref 13–17)
MAGNESIUM SERPL-MCNC: 2 MG/DL — SIGNIFICANT CHANGE UP (ref 1.6–2.6)
MCHC RBC-ENTMCNC: 29.6 PG — SIGNIFICANT CHANGE UP (ref 27–34)
MCHC RBC-ENTMCNC: 31.8 GM/DL — LOW (ref 32–36)
MCV RBC AUTO: 92.9 FL — SIGNIFICANT CHANGE UP (ref 80–100)
NRBC # BLD: 0 /100 WBCS — SIGNIFICANT CHANGE UP (ref 0–0)
NRBC # FLD: 0 K/UL — SIGNIFICANT CHANGE UP (ref 0–0)
PHOSPHATE SERPL-MCNC: 3.1 MG/DL — SIGNIFICANT CHANGE UP (ref 2.5–4.5)
PLATELET # BLD AUTO: 147 K/UL — LOW (ref 150–400)
POTASSIUM SERPL-MCNC: 3.8 MMOL/L — SIGNIFICANT CHANGE UP (ref 3.5–5.3)
POTASSIUM SERPL-SCNC: 3.8 MMOL/L — SIGNIFICANT CHANGE UP (ref 3.5–5.3)
RBC # BLD: 2.4 M/UL — LOW (ref 4.2–5.8)
RBC # FLD: 16 % — HIGH (ref 10.3–14.5)
RH IG SCN BLD-IMP: POSITIVE — SIGNIFICANT CHANGE UP
SODIUM SERPL-SCNC: 140 MMOL/L — SIGNIFICANT CHANGE UP (ref 135–145)
WBC # BLD: 5.41 K/UL — SIGNIFICANT CHANGE UP (ref 3.8–10.5)
WBC # FLD AUTO: 5.41 K/UL — SIGNIFICANT CHANGE UP (ref 3.8–10.5)

## 2024-08-24 PROCEDURE — 99233 SBSQ HOSP IP/OBS HIGH 50: CPT

## 2024-08-24 PROCEDURE — 99231 SBSQ HOSP IP/OBS SF/LOW 25: CPT

## 2024-08-24 PROCEDURE — 74178 CT ABD&PLV WO CNTR FLWD CNTR: CPT | Mod: 26

## 2024-08-24 PROCEDURE — 99232 SBSQ HOSP IP/OBS MODERATE 35: CPT

## 2024-08-24 RX ADMIN — Medication 1000 MICROGRAM(S): at 12:34

## 2024-08-24 RX ADMIN — Medication 81 MILLIGRAM(S): at 12:34

## 2024-08-24 RX ADMIN — BUMETANIDE 132 MILLIGRAM(S): 2 TABLET ORAL at 05:16

## 2024-08-24 RX ADMIN — Medication 2000 UNIT(S): at 12:34

## 2024-08-24 RX ADMIN — Medication 1: at 12:36

## 2024-08-24 RX ADMIN — Medication 1 APPLICATION(S): at 17:21

## 2024-08-24 RX ADMIN — MYCOPHENOLATE MOFETIL 500 MILLIGRAM(S): 250 CAPSULE ORAL at 12:35

## 2024-08-24 RX ADMIN — POLYETHYLENE GLYCOL 3350 17 GRAM(S): 17 POWDER, FOR SOLUTION ORAL at 05:15

## 2024-08-24 RX ADMIN — Medication 40 MILLIGRAM(S): at 21:34

## 2024-08-24 RX ADMIN — Medication 1 APPLICATION(S): at 05:15

## 2024-08-24 RX ADMIN — FINASTERIDE 5 MILLIGRAM(S): 1 TABLET, FILM COATED ORAL at 12:35

## 2024-08-24 RX ADMIN — Medication 1 MILLIGRAM(S): at 12:34

## 2024-08-24 RX ADMIN — TAMSULOSIN HYDROCHLORIDE 0.8 MILLIGRAM(S): 0.4 CAPSULE ORAL at 21:34

## 2024-08-24 RX ADMIN — Medication 5 MILLIGRAM(S): at 05:15

## 2024-08-24 RX ADMIN — CHLORHEXIDINE GLUCONATE 1 APPLICATION(S): 40 SOLUTION TOPICAL at 12:56

## 2024-08-24 NOTE — PROGRESS NOTE ADULT - ASSESSMENT
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 3-way 20 Indonesian coude placed by urology team w/ 50 CC clot burden and started on CBI. RBUS w/ no evidence of organized clot.     Plan:  -AM labs reviewed 5.4/7.1/0.82  -CBI clamped, urine clear. Will f/u in the afternoon  -US with no evidence of clot in the bladder or hydronephrosis. Bilateral renal masses.   -CT renal mass protocol pending. Outpatient follow up/surveillance of renal masses given poor surgical candidate   -Continue holding eliquis if ok with primary   -Monitor H&H: 7.1 from 7.4, would consider transfusion  -Continue flomax 0.8mg and finasteride 5mg daily    [facility]  [address] [place] [id number] [phone]

## 2024-08-24 NOTE — PROGRESS NOTE ADULT - SUBJECTIVE AND OBJECTIVE BOX
PROGRESS NOTE:     Patient is a 84y old  Male who presents with a chief complaint of Chest pain (24 Aug 2024 09:46)      SUBJECTIVE / OVERNIGHT EVENTS: No acute events.     ADDITIONAL REVIEW OF SYSTEMS:    MEDICATIONS  (STANDING):  aspirin  chewable 81 milliGRAM(s) Oral daily  atorvastatin 40 milliGRAM(s) Oral at bedtime  buMETAnide IVPB 4 milliGRAM(s) IV Intermittent every 12 hours  chlorhexidine 2% Cloths 1 Application(s) Topical daily  cholecalciferol 2000 Unit(s) Oral daily  cyanocobalamin 1000 MICROGram(s) Oral daily  dextrose 5%. 1000 milliLiter(s) (50 mL/Hr) IV Continuous <Continuous>  dextrose 5%. 1000 milliLiter(s) (100 mL/Hr) IV Continuous <Continuous>  dextrose 50% Injectable 25 Gram(s) IV Push once  dextrose 50% Injectable 12.5 Gram(s) IV Push once  dextrose 50% Injectable 25 Gram(s) IV Push once  finasteride 5 milliGRAM(s) Oral daily  folic acid 1 milliGRAM(s) Oral daily  glucagon  Injectable 1 milliGRAM(s) IntraMuscular once  insulin lispro (ADMELOG) corrective regimen sliding scale   SubCutaneous three times a day before meals  insulin lispro (ADMELOG) corrective regimen sliding scale   SubCutaneous at bedtime  mupirocin 2% Ointment 1 Application(s) Both Nostrils two times a day  mycophenolate mofetil 500 milliGRAM(s) Oral daily  polyethylene glycol 3350 17 Gram(s) Oral two times a day  predniSONE   Tablet 5 milliGRAM(s) Oral daily  tamsulosin 0.8 milliGRAM(s) Oral at bedtime    MEDICATIONS  (PRN):  dextrose Oral Gel 15 Gram(s) Oral once PRN Blood Glucose LESS THAN 70 milliGRAM(s)/deciliter      CAPILLARY BLOOD GLUCOSE      POCT Blood Glucose.: 111 mg/dL (24 Aug 2024 08:27)  POCT Blood Glucose.: 126 mg/dL (23 Aug 2024 21:14)  POCT Blood Glucose.: 166 mg/dL (23 Aug 2024 17:23)  POCT Blood Glucose.: 194 mg/dL (23 Aug 2024 12:30)    I&O's Summary    23 Aug 2024 07:01  -  24 Aug 2024 07:00  --------------------------------------------------------  IN: 500 mL / OUT: 4950 mL / NET: -4450 mL        PHYSICAL EXAM:  Vital Signs Last 24 Hrs  T(C): 36.8 (24 Aug 2024 05:15), Max: 36.8 (23 Aug 2024 21:57)  T(F): 98.3 (24 Aug 2024 05:15), Max: 98.3 (24 Aug 2024 05:15)  HR: 55 (24 Aug 2024 05:15) (45 - 68)  BP: 132/45 (24 Aug 2024 05:15) (132/45 - 167/50)  BP(mean): --  RR: 18 (24 Aug 2024 05:15) (18 - 18)  SpO2: 100% (24 Aug 2024 05:15) (99% - 100%)    Parameters below as of 24 Aug 2024 05:15  Patient On (Oxygen Delivery Method): nasal cannula  O2 Flow (L/min): 2    CONSTITUTIONAL: NAD  EYES: PERRL. EOMI. No scleral icterus.  CV: Irregular. Normal S1 and S2. No murmurs, rubs, or gallops. No edema. Pulses equal bilaterally.  PULM: Clear to auscultation throughout. Respirations unlabored. No wheezing, rales, or rhonchi.  GI: Soft, non-tender, non-distended. No palpable masses.  MSK: No cyanosis or clubbing.   : Elliott w/ clear urine   SKIN: Adherent one scab each on bilateral lower medial and lateral thigh areas; no signs of infection.  Non-tender   NEURO: (+) mild R facial droop. CN grossly intact.  PSYCH: A+O x 3.  Pleasant.  Mood and affect appropriate to situation    LABS:                        7.1    5.41  )-----------( 147      ( 24 Aug 2024 01:10 )             22.3     08-24    140  |  101  |  27<H>  ----------------------------<  99  3.8   |  28  |  0.82    Ca    8.1<L>      24 Aug 2024 00:21  Phos  3.1     08-24  Mg     2.00     08-24            Urinalysis Basic - ( 24 Aug 2024 00:21 )    Color: x / Appearance: x / SG: x / pH: x  Gluc: 99 mg/dL / Ketone: x  / Bili: x / Urobili: x   Blood: x / Protein: x / Nitrite: x   Leuk Esterase: x / RBC: x / WBC x   Sq Epi: x / Non Sq Epi: x / Bacteria: x          RADIOLOGY & ADDITIONAL TESTS:  Results Reviewed:   Imaging Personally Reviewed:  Electrocardiogram Personally Reviewed:    COORDINATION OF CARE:  Care Discussed with Consultants/Other Providers [Y/N]:  Prior or Outpatient Records Reviewed [Y/N]:

## 2024-08-24 NOTE — PROGRESS NOTE ADULT - SUBJECTIVE AND OBJECTIVE BOX
Interval Events/Subjective    Patient seen and examined at bedside.  No chest pain, shortness of breath, or palpitations            Current Meds:  aspirin  chewable 81 milliGRAM(s) Oral daily  atorvastatin 40 milliGRAM(s) Oral at bedtime  buMETAnide IVPB 4 milliGRAM(s) IV Intermittent every 12 hours  chlorhexidine 2% Cloths 1 Application(s) Topical daily  cholecalciferol 2000 Unit(s) Oral daily  cyanocobalamin 1000 MICROGram(s) Oral daily  dextrose 5%. 1000 milliLiter(s) IV Continuous <Continuous>  dextrose 5%. 1000 milliLiter(s) IV Continuous <Continuous>  dextrose 50% Injectable 25 Gram(s) IV Push once  dextrose 50% Injectable 25 Gram(s) IV Push once  dextrose 50% Injectable 12.5 Gram(s) IV Push once  dextrose Oral Gel 15 Gram(s) Oral once PRN  finasteride 5 milliGRAM(s) Oral daily  folic acid 1 milliGRAM(s) Oral daily  glucagon  Injectable 1 milliGRAM(s) IntraMuscular once  insulin lispro (ADMELOG) corrective regimen sliding scale   SubCutaneous at bedtime  insulin lispro (ADMELOG) corrective regimen sliding scale   SubCutaneous three times a day before meals  mupirocin 2% Ointment 1 Application(s) Both Nostrils two times a day  mycophenolate mofetil 500 milliGRAM(s) Oral daily  polyethylene glycol 3350 17 Gram(s) Oral two times a day  predniSONE   Tablet 5 milliGRAM(s) Oral daily  tamsulosin 0.8 milliGRAM(s) Oral at bedtime    Vitals:  T(F): 98.3 (08-24), Max: 98.3 (08-24)  HR: 55 (08-24) (45 - 68)  BP: 132/45 (08-24) (132/45 - 167/50)  RR: 18 (08-24)  SpO2: 100% (08-24)  I&O's Summary    23 Aug 2024 07:01  -  24 Aug 2024 07:00  --------------------------------------------------------  IN: 500 mL / OUT: 4950 mL / NET: -4450 mL    Physical Exam:  GENERAL: NAD  HEAD: Atraumatic, Normocephalic.  NECK: Supple, JVP 14cm.   CHEST/LUNG: Clear to auscultation bilaterally  HEART: Irregular rhythm. 2/6 late peaking systolic murmur left sternal border 2nd ics. + pulsus parvus et tardus.   ABDOMEN: Soft, Nontender, Nondistended.   EXTREMITIES: warm, no lower extremity edema.                         7.1    5.41  )-----------( 147      ( 24 Aug 2024 01:10 )             22.3     08-24    140  |  101  |  27<H>  ----------------------------<  99  3.8   |  28  |  0.82    Ca    8.1<L>      24 Aug 2024 00:21  Phos  3.1     08-24  Mg     2.00     08-24    CARDIAC MARKERS ( 19 Aug 2024 05:10 )  49 ng/L / x     / x     / x     / x     / x      CARDIAC MARKERS ( 18 Aug 2024 22:55 )  x     / x     / x     / x     / x     / 1.8 ng/mL  CARDIAC MARKERS ( 18 Aug 2024 21:14 )  52 ng/L / x     / x     / x     / x     / 1.8 ng/mL  CARDIAC MARKERS ( 18 Aug 2024 20:00 )  54 ng/L / x     / x     / x     / x     / x            CONCLUSIONS:      1. Technically difficult image quality.   2. The left ventricular cavity is normal in size. Left ventricular wall thickness is severely increased. Left ventricularsystolic function is normal with an ejection fraction visually estimated at 60 to 65%. There are no regional wall motion abnormalities seen. Severe left ventricular hypertrophy. There is increased LV mass and concentric hypertrophy.   3. Normal rightventricular cavity size and probably normal right ventricular systolic function.   4. There is mitral valve thickening of the anterior and posterior leaflets. There is reduced leaflet mobility of the mitral valve. There is calcification of the mitralvalve annulus. There is moderate leaflet calcification. There is mild to moderate mitral valve stenosis. The transmitral peak gradient is 14.0 mmHg and mean gradient is 4.00 mmHg. There is mild mitral regurgitation.   5. The aortic valve appears trileaflet with reduced systolic excursion. There is calcification of the aortic valve leaflets. The peak transaortic velocity is 4.16 m/s, peak transaortic gradient is 69.2 mmHg and mean transaortic gradient is 39.0 mmHg. Significant aortic stenosis is present. However, unable to estimate the aortic valve area as pulsed wave Doppler at the level of the LVOT not performed (no LVOT VTI measurements made). There is severe aortic regurgitation. Vena contracta width ~ 0.7 cm.   6. The left atrium is severely dilated with an indexed volume of 59.19 ml/m².   7. Left pleural effusion noted.

## 2024-08-24 NOTE — PROGRESS NOTE ADULT - SUBJECTIVE AND OBJECTIVE BOX
Subjective  Patient seen and examined at bedside. Pt in USOH    Objective    Vital signs  T(F): , Max: 98.3 (08-24-24 @ 05:15)  HR: 55 (08-24-24 @ 05:15)  BP: 132/45 (08-24-24 @ 05:15)  SpO2: 100% (08-24-24 @ 05:15)  Wt(kg): --    Output     OUT:    Indwelling Catheter - Urethral (mL): 350 mL  Total OUT: 350 mL    Total NET: -350 mL          Gen: USOH  : Clamped w/ CYU    Labs      08-24 @ 01:10    WBC 5.41  / Hct 22.3  / SCr --       08-24 @ 00:21    WBC --    / Hct --    / SCr 0.82

## 2024-08-24 NOTE — PROGRESS NOTE ADULT - PROBLEM SELECTOR PLAN 4
hematuria due to traumatic catheterization, improved   - appreciate urology input   - Elliott replaced by urology   - s/p CBI, now clamped   - monitor urine color

## 2024-08-24 NOTE — PROGRESS NOTE ADULT - ASSESSMENT
84 year old man with atrial fibrilliation, Myasthenia Gravis (on Cellcept), HFpEF, HTN, T2DM, urinary retention s/p Elliott, and recent embolic CVA s/p thrombectomy (8/4/24 at [facility]) with residual R facial droop and L sided weakness who initially presented with chest pain. Cardiology consulted for management of severe AS/AI. Follows with Dr. Lisker outpatient.     #Severe AS  #Severe AI  #Acute Decompensated HFpEF  ACC/AHA Stage C Valvular HFpEF  probnp 3229, troponin 54 -->52, BUN/Cr 27/0.97  Chest X-ray - perihilar infiltrates, left sided pleural effusion.   TTE - EF 60-65%, LV concentric hypertrophy, mild-moderate MS, mild MR, severe AS/AI, dilated LA, mild-moderate TR, RA - 15 mmHg  EKG - 36 BPM, atrial fibrillation, no ST T  wave changes.     - Holding home entresto  - Continue bumetanide 4 mg IVPB BID today  - Plan to start SGLT2 near discharge   - Pending review of echo and multivalvular disease with structural interventional team to determine candidacy for possible TAVR, high suspect the patient's comorbidities and functional status may preclude this procedure.     #Afib  CHADsVASc - 7  - c/w eliquis 5 mg PO BID, currently held iso hematuria  - on ASA for recent CVA.   - holding beta blockers iso afib with slow ventricular response  - EP following.    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 Cardiology consulted for management of severe AS/AI. Follows with Dr. Lisker outpatient.     #Severe AS  #Severe AI  #Acute Decompensated HFpEF  ACC/AHA Stage C Valvular HFpEF  probnp 3229, troponin 54 -->52, BUN/Cr 27/0.97  Chest X-ray - perihilar infiltrates, left sided pleural effusion.   TTE - EF 60-65%, LV concentric hypertrophy, mild-moderate MS, mild MR, severe AS/AI, dilated LA, mild-moderate TR, RA - 15 mmHg  EKG - 36 BPM, atrial fibrillation, no ST T  wave changes.     - Holding home entresto  - Hold IV bumex today, we will monitor need for diuretics daily to avoid overdiurising the patient given his pre-load dependent state from AS  - Goal net negative 500cc - 1L  - Plan to start SGLT2 near discharge   - Pending review of echo and multivalvular disease with structural interventional team to determine candidacy for possible TAVR, high suspect the patient's comorbidities and functional status may preclude this procedure.     #Afib  CHADsVASc - 7  - c/w eliquis 5 mg PO BID, currently held iso hematuria  - on ASA for recent CVA.   - holding beta blockers iso afib with slow ventricular response  - EP following.

## 2024-08-25 DIAGNOSIS — I72.9 ANEURYSM OF UNSPECIFIED SITE: ICD-10-CM

## 2024-08-25 LAB
ANION GAP SERPL CALC-SCNC: 13 MMOL/L — SIGNIFICANT CHANGE UP (ref 7–14)
BUN SERPL-MCNC: 29 MG/DL — HIGH (ref 7–23)
CALCIUM SERPL-MCNC: 8.3 MG/DL — LOW (ref 8.4–10.5)
CHLORIDE SERPL-SCNC: 99 MMOL/L — SIGNIFICANT CHANGE UP (ref 98–107)
CO2 SERPL-SCNC: 29 MMOL/L — SIGNIFICANT CHANGE UP (ref 22–31)
CREAT SERPL-MCNC: 0.83 MG/DL — SIGNIFICANT CHANGE UP (ref 0.5–1.3)
EGFR: 86 ML/MIN/1.73M2 — SIGNIFICANT CHANGE UP
GLUCOSE BLDC GLUCOMTR-MCNC: 136 MG/DL — HIGH (ref 70–99)
GLUCOSE BLDC GLUCOMTR-MCNC: 138 MG/DL — HIGH (ref 70–99)
GLUCOSE BLDC GLUCOMTR-MCNC: 166 MG/DL — HIGH (ref 70–99)
GLUCOSE BLDC GLUCOMTR-MCNC: 229 MG/DL — HIGH (ref 70–99)
GLUCOSE SERPL-MCNC: 120 MG/DL — HIGH (ref 70–99)
HCT VFR BLD CALC: 24.7 % — LOW (ref 39–50)
HGB BLD-MCNC: 8 G/DL — LOW (ref 13–17)
MAGNESIUM SERPL-MCNC: 2 MG/DL — SIGNIFICANT CHANGE UP (ref 1.6–2.6)
MCHC RBC-ENTMCNC: 29.7 PG — SIGNIFICANT CHANGE UP (ref 27–34)
MCHC RBC-ENTMCNC: 32.4 GM/DL — SIGNIFICANT CHANGE UP (ref 32–36)
MCV RBC AUTO: 91.8 FL — SIGNIFICANT CHANGE UP (ref 80–100)
NRBC # BLD: 0 /100 WBCS — SIGNIFICANT CHANGE UP (ref 0–0)
NRBC # FLD: 0 K/UL — SIGNIFICANT CHANGE UP (ref 0–0)
PHOSPHATE SERPL-MCNC: 2.4 MG/DL — LOW (ref 2.5–4.5)
PLATELET # BLD AUTO: 154 K/UL — SIGNIFICANT CHANGE UP (ref 150–400)
POTASSIUM SERPL-MCNC: 3.7 MMOL/L — SIGNIFICANT CHANGE UP (ref 3.5–5.3)
POTASSIUM SERPL-SCNC: 3.7 MMOL/L — SIGNIFICANT CHANGE UP (ref 3.5–5.3)
RBC # BLD: 2.69 M/UL — LOW (ref 4.2–5.8)
RBC # FLD: 16.1 % — HIGH (ref 10.3–14.5)
SODIUM SERPL-SCNC: 141 MMOL/L — SIGNIFICANT CHANGE UP (ref 135–145)
WBC # BLD: 8.69 K/UL — SIGNIFICANT CHANGE UP (ref 3.8–10.5)
WBC # FLD AUTO: 8.69 K/UL — SIGNIFICANT CHANGE UP (ref 3.8–10.5)

## 2024-08-25 PROCEDURE — 99233 SBSQ HOSP IP/OBS HIGH 50: CPT

## 2024-08-25 PROCEDURE — 99232 SBSQ HOSP IP/OBS MODERATE 35: CPT

## 2024-08-25 PROCEDURE — 99223 1ST HOSP IP/OBS HIGH 75: CPT

## 2024-08-25 PROCEDURE — 99232 SBSQ HOSP IP/OBS MODERATE 35: CPT | Mod: GC

## 2024-08-25 RX ORDER — BUMETANIDE 2 MG/1
4 TABLET ORAL ONCE
Refills: 0 | Status: COMPLETED | OUTPATIENT
Start: 2024-08-25 | End: 2024-08-25

## 2024-08-25 RX ADMIN — Medication 1 MILLIGRAM(S): at 12:16

## 2024-08-25 RX ADMIN — Medication 1 APPLICATION(S): at 05:20

## 2024-08-25 RX ADMIN — Medication 2000 UNIT(S): at 12:15

## 2024-08-25 RX ADMIN — Medication 5 MILLIGRAM(S): at 05:20

## 2024-08-25 RX ADMIN — Medication 40 MILLIGRAM(S): at 21:34

## 2024-08-25 RX ADMIN — FINASTERIDE 5 MILLIGRAM(S): 1 TABLET, FILM COATED ORAL at 12:15

## 2024-08-25 RX ADMIN — Medication 1: at 17:48

## 2024-08-25 RX ADMIN — Medication 81 MILLIGRAM(S): at 12:16

## 2024-08-25 RX ADMIN — TAMSULOSIN HYDROCHLORIDE 0.8 MILLIGRAM(S): 0.4 CAPSULE ORAL at 21:38

## 2024-08-25 RX ADMIN — BUMETANIDE 132 MILLIGRAM(S): 2 TABLET ORAL at 12:15

## 2024-08-25 RX ADMIN — MYCOPHENOLATE MOFETIL 500 MILLIGRAM(S): 250 CAPSULE ORAL at 12:16

## 2024-08-25 RX ADMIN — Medication 1000 MICROGRAM(S): at 12:16

## 2024-08-25 RX ADMIN — CHLORHEXIDINE GLUCONATE 1 APPLICATION(S): 40 SOLUTION TOPICAL at 12:25

## 2024-08-25 RX ADMIN — Medication 2: at 12:52

## 2024-08-25 NOTE — PROGRESS NOTE ADULT - PROBLEM SELECTOR PLAN 10
Home meds: Amlodipine 5mg qd, Entresto 49-51mg BID, Metoprolol succinate 100mg qd, and Hydralazine 25mg q8h  - hold home meds   - diuresis as above   - monitor BP

## 2024-08-25 NOTE — PROGRESS NOTE ADULT - PROBLEM SELECTOR PLAN 4
hematuria due to traumatic catheterization, resolved   - appreciate urology input   - Elliott replaced by urology   - s/p CBI, now clamped   - monitor urine color

## 2024-08-25 NOTE — CONSULT NOTE ADULT - ATTENDING COMMENTS
R fem PSA no signs of bleeding  will get duplex for further eval to determine if it's injectable
The patient was unable to be seen by me on the day of this note.  Please see my addendum for the note on 8/21/2024.    Do not bill.    Marcelo Rhodes MD [facility] FAC  Cardiology  [phone]

## 2024-08-25 NOTE — PROGRESS NOTE ADULT - ASSESSMENT
84y Male with a hx of AFib (on Eliquis), Myasthenia Gravis (on Cellcept), HFpEF, HTN, T2DM, urinary retention s/p Elliott, and recent embolic CVA s/p thrombectomy (8/4/24 at [facility]) with residual R facial droop and L sided weakness who  presents from rehab with chest pain, found to be in AFib with slow ventricular rate. Also w/ significant AS and AI on echo. Course c/b hematuria and incidentally found renal masses.

## 2024-08-25 NOTE — PROGRESS NOTE ADULT - SUBJECTIVE AND OBJECTIVE BOX
Interval Events/Subjective    Patient seen and examined at bedside.  Was able to lay the patient flat on his back with no dyspnea though reports a chocking sensation in his throat.     Current Meds:  aspirin  chewable 81 milliGRAM(s) Oral daily  atorvastatin 40 milliGRAM(s) Oral at bedtime  chlorhexidine 2% Cloths 1 Application(s) Topical daily  cholecalciferol 2000 Unit(s) Oral daily  cyanocobalamin 1000 MICROGram(s) Oral daily  dextrose 5%. 1000 milliLiter(s) IV Continuous <Continuous>  dextrose 5%. 1000 milliLiter(s) IV Continuous <Continuous>  dextrose 50% Injectable 25 Gram(s) IV Push once  dextrose 50% Injectable 25 Gram(s) IV Push once  dextrose 50% Injectable 12.5 Gram(s) IV Push once  dextrose Oral Gel 15 Gram(s) Oral once PRN  finasteride 5 milliGRAM(s) Oral daily  folic acid 1 milliGRAM(s) Oral daily  glucagon  Injectable 1 milliGRAM(s) IntraMuscular once  insulin lispro (ADMELOG) corrective regimen sliding scale   SubCutaneous three times a day before meals  insulin lispro (ADMELOG) corrective regimen sliding scale   SubCutaneous at bedtime  mycophenolate mofetil 500 milliGRAM(s) Oral daily  polyethylene glycol 3350 17 Gram(s) Oral two times a day  predniSONE   Tablet 5 milliGRAM(s) Oral daily  tamsulosin 0.8 milliGRAM(s) Oral at bedtime    Vitals:  T(F): 98.1 (08-25), Max: 98.8 (08-24)  HR: 63 (08-25) (63 - 81)  BP: 134/36 (08-25) (110/45 - 138/56)  RR: 18 (08-25)  SpO2: 100% (08-25)  I&O's Summary    24 Aug 2024 07:01  -  25 Aug 2024 07:00  --------------------------------------------------------  IN: 151 mL / OUT: 1300 mL / NET: -1149 mL    25 Aug 2024 07:01  -  25 Aug 2024 10:29  --------------------------------------------------------  IN: 0 mL / OUT: 1500 mL / NET: -1500 mL        Physical Exam:  GENERAL: NAD  HEAD: Atraumatic, Normocephalic.  NECK: Supple, JVP 1/3 up the neck at 60 degrees  CHEST/LUNG: Clear to auscultation bilaterally  HEART: Irregular rhythm. 2/6 late peaking systolic murmur left sternal border 2nd ics. + pulsus parvus et tardus.   ABDOMEN: Soft, Nontender, Nondistended.   EXTREMITIES: warm, no lower extremity edema.                             8.0    8.69  )-----------( 154      ( 25 Aug 2024 05:40 )             24.7     08-25    141  |  99  |  29<H>  ----------------------------<  120<H>  3.7   |  29  |  0.83    Ca    8.3<L>      25 Aug 2024 05:40  Phos  2.4     08-25  Mg     2.00     08-25        CARDIAC MARKERS ( 19 Aug 2024 05:10 )  49 ng/L / x     / x     / x     / x     / x      CARDIAC MARKERS ( 18 Aug 2024 22:55 )  x     / x     / x     / x     / x     / 1.8 ng/mL  CARDIAC MARKERS ( 18 Aug 2024 21:14 )  52 ng/L / x     / x     / x     / x     / 1.8 ng/mL  CARDIAC MARKERS ( 18 Aug 2024 20:00 )  54 ng/L / x     / x     / x     / x     / x

## 2024-08-25 NOTE — CONSULT NOTE ADULT - ASSESSMENT
83 yo M w/ PMhx of Myasthenia Gravis (on Cellcept), HFpEF, HTN, T2DM, urinary retention s/p goley, and recent embolic CVA s/p thrombectomy (8/4/24 at [facility]) with residual R facial droop and L sided weakness who  presents from rehab with chest pain, found to be in AFib with slow ventricular rate. Also w/ significant AS and AI on echo. Course c/b hematuria and incidentally found renal masses. 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 Also w/ significant AS and AI on echo. Course c/b hematuria and incidentally found renal masses. 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 found on CT A/P      - I/s/o of recent thrombectomy for CVA, c/f PSA after femoral access   - Obtain R grion US for further evaluation      - Will evaluate w/ POC US for poss US guided compression   - No clinical signs of arterial insufficiency at this time       Vascular Surgery   [id number]  83 yo M w/ PMhx of Myasthenia Gravis (on Cellcept), HFpEF, HTN, T2DM, urinary retention s/p goley, and recent embolic CVA s/p thrombectomy (8/4/24 at [facility]) with residual R facial droop and L sided weakness who  presents from rehab with chest pain, found to be in AFib with slow ventricular rate. Also w/ significant AS and AI on echo. Course c/b hematuria and incidentally found renal masses. 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

## 2024-08-25 NOTE — PROGRESS NOTE ADULT - ASSESSMENT
84y Male with a hx of AFib (on Eliquis), Myasthenia Gravis (on Cellcept), HFpEF, HTN, T2DM, BPH/retention with chronic butt, and recent embolic CVA s/p thrombectomy (8/4/24 at [facility]) presenting with substernal chest pain, now with hematuria due to traumatic catheterization. 3-way 20 french coude placed by urology team w/ 50 CC clot burden and started on CBI. RBUS w/ no evidence of organized clot.     Plan:    -CBI clamped, urine clear. Okay to disconnect CBI tubing   -US with no evidence of clot in the bladder or hydronephrosis. Bilateral renal masses.   -CT renal mass protocol reviewed. Bilateral renal masses. Outpatient follow up/surveillance of renal masses given poor surgical candidate   -Continue holding eliquis if ok with primary   -Continue flomax 0.8mg and finasteride 5mg daily  -Urology to sign off. Please reconsult as needed     [facility]  [address] [place] [id number]  [phone]

## 2024-08-25 NOTE — PROGRESS NOTE ADULT - SUBJECTIVE AND OBJECTIVE BOX
Interval events:   urine clear off cbi           OBJECTIVE:  Vital Signs Last 24 Hrs  T(C): 36.7 (25 Aug 2024 05:20), Max: 37.1 (24 Aug 2024 18:33)  T(F): 98.1 (25 Aug 2024 05:20), Max: 98.8 (24 Aug 2024 18:50)  HR: 63 (25 Aug 2024 05:20) (63 - 81)  BP: 134/36 (25 Aug 2024 05:20) (110/45 - 138/56)  BP(mean): --  RR: 18 (25 Aug 2024 05:20) (18 - 18)  SpO2: 100% (25 Aug 2024 05:20) (100% - 100%)    Parameters below as of 25 Aug 2024 05:20  Patient On (Oxygen Delivery Method): nasal cannula  O2 Flow (L/min): 2      Physical Examination:  GEN: NAD, resting quietly  : butt clear off CBI       LABS:                        8.0    8.69  )-----------( 154      ( 25 Aug 2024 05:40 )             24.7       08-25    141  |  99  |  29<H>  ----------------------------<  120<H>  3.7   |  29  |  0.83    Ca    8.3<L>      25 Aug 2024 05:40  Phos  2.4     08-25  Mg     2.00     08-25

## 2024-08-25 NOTE — PROGRESS NOTE ADULT - ASSESSMENT
84 year old man with atrial fibrilliation, Myasthenia Gravis (on Cellcept), HFpEF, HTN, T2DM, urinary retention s/p Elliott, and recent embolic CVA s/p thrombectomy (8/4/24 at [facility]) with residual R facial droop and L sided weakness who initially presented with chest pain. Cardiology consulted for management of severe AS/AI. Follows with Dr. Lisker outpatient.     #Severe AS  #Severe AI  #Acute Decompensated HFpEF  ACC/AHA Stage C Valvular HFpEF  probnp 3229, troponin 54 -->52, BUN/Cr 27/0.97  Chest X-ray - perihilar infiltrates, left sided pleural effusion.   TTE - EF 60-65%, LV concentric hypertrophy, mild-moderate MS, mild MR, severe AS/AI, dilated LA, mild-moderate TR, RA - 15 mmHg  EKG - 36 BPM, atrial fibrillation, no ST T  wave changes.     - Holding home entresto  - Redose IV bumex 4mg today, will keep a close eye on diuretic needs to avoid overdiuresis given his severe AS  - Goal net negative 500cc - 1L  - Plan to start SGLT2 near discharge   - Pending review of echo and multivalvular disease with structural interventional team to determine candidacy for possible TAVR, high suspect the patient's comorbidities and functional status may preclude this procedure.     #Afib  CHADsVASc - 7  - c/w eliquis 5 mg PO BID, currently held iso hematuria  - on ASA for recent CVA.   - holding beta blockers iso afib with slow ventricular response  - EP following.    84 year old man with atrial fibrilliation, Myasthenia Gravis (on Cellcept), HFpEF, HTN, T2DM, urinary retention s/p Elliott, and recent embolic CVA s/p thrombectomy (8/4/24 at [facility]) with residual R facial droop and L sided weakness who initially presented with chest pain. Cardiology consulted for management of severe AS/AI. Follows with Dr. Lisker outpatient.     #Severe AS  #Severe AI  #Acute Decompensated HFpEF  ACC/AHA Stage C Valvular HFpEF  probnp 3229, troponin 54 -->52, BUN/Cr 27/0.97  Chest X-ray - perihilar infiltrates, left sided pleural effusion.   TTE - EF 60-65%, LV concentric hypertrophy, mild-moderate MS, mild MR, severe AS/AI, dilated LA, mild-moderate TR, RA - 15 mmHg  EKG - 36 BPM, atrial fibrillation, no ST T  wave changes.     - Holding home entresto  - Redose IV bumex 4mg today, will keep a close eye on diuretic needs to avoid overdiuresis given his severe AS  - Goal net negative 500cc - 1L  - Plan to start SGLT2 near discharge   - Pending review of echo and multivalvular disease with structural interventional team to determine candidacy for possible TAVR, high suspect the patient's comorbidities and functional status may preclude this procedure.   - CT imaging suggestive of bilateral RCC    #Afib  CHADsVASc - 7  - c/w eliquis 5 mg PO BID, currently held iso hematuria  - on ASA for recent CVA.   - holding beta blockers iso afib with slow ventricular response  - EP following.

## 2024-08-25 NOTE — CONSULT NOTE ADULT - SUBJECTIVE AND OBJECTIVE BOX
VASCULAR  SURGERY CONSULT NOTE    HPI:  83 yo M w/ PMhx of Myasthenia Gravis (on Cellcept), HFpEF, HTN, T2DM, urinary retention s/p goley, and recent embolic CVA s/p thrombectomy (8/4/24 at [facility]) with residual R facial droop and L sided weakness who  presents from rehab with chest pain, found to be in AFib with slow ventricular rate. Also w/ significant AS and AI on echo. Course c/b hematuria and incidentally found renal masses.     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 Patient/family unsure if R femoral artery was accessed for recent thrombectomy.    Patient denies pain in the R groin, no pain to either lower extremity. No prior symptoms of claudication, rest pain, non-healing wounds. Patient has been non-ambulatory since recent CVA.         PAST MEDICAL HISTORY:  No pertinent past medical history    Atrial fibrillation    Embolic stroke    Myasthenia gravis    (HFpEF) heart failure with preserved ejection fraction    HTN (hypertension)    T2DM (type 2 diabetes mellitus)        PAST SURGICAL HISTORY:  No significant past surgical history    History of thrombolytic therapy        MEDICATIONS:  aspirin  chewable 81 milliGRAM(s) Oral daily  atorvastatin 40 milliGRAM(s) Oral at bedtime  chlorhexidine 2% Cloths 1 Application(s) Topical daily  cholecalciferol 2000 Unit(s) Oral daily  cyanocobalamin 1000 MICROGram(s) Oral daily  dextrose 5%. 1000 milliLiter(s) IV Continuous <Continuous>  dextrose 5%. 1000 milliLiter(s) IV Continuous <Continuous>  dextrose 50% Injectable 25 Gram(s) IV Push once  dextrose 50% Injectable 12.5 Gram(s) IV Push once  dextrose 50% Injectable 25 Gram(s) IV Push once  dextrose Oral Gel 15 Gram(s) Oral once PRN  finasteride 5 milliGRAM(s) Oral daily  folic acid 1 milliGRAM(s) Oral daily  glucagon  Injectable 1 milliGRAM(s) IntraMuscular once  insulin lispro (ADMELOG) corrective regimen sliding scale   SubCutaneous at bedtime  insulin lispro (ADMELOG) corrective regimen sliding scale   SubCutaneous three times a day before meals  mycophenolate mofetil 500 milliGRAM(s) Oral daily  polyethylene glycol 3350 17 Gram(s) Oral two times a day  predniSONE   Tablet 5 milliGRAM(s) Oral daily  tamsulosin 0.8 milliGRAM(s) Oral at bedtime      ALLERGIES:  No Known Allergies      VITALS & I/Os:  Vital Signs Last 24 Hrs  T(C): 36.7 (25 Aug 2024 05:20), Max: 37.1 (24 Aug 2024 18:33)  T(F): 98.1 (25 Aug 2024 05:20), Max: 98.8 (24 Aug 2024 18:50)  HR: 63 (25 Aug 2024 05:20) (63 - 81)  BP: 134/36 (25 Aug 2024 05:20) (128/45 - 138/56)  RR: 18 (25 Aug 2024 05:20) (18 - 18)  SpO2: 100% (25 Aug 2024 05:20) (100% - 100%)    Parameters below as of 25 Aug 2024 05:20  Patient On (Oxygen Delivery Method): nasal cannula  O2 Flow (L/min): 2      I&O's Summary    24 Aug 2024 07:01  -  25 Aug 2024 07:00  --------------------------------------------------------  IN: 151 mL / OUT: 1300 mL / NET: -1149 mL    25 Aug 2024 07:01  -  25 Aug 2024 14:35  --------------------------------------------------------  IN: 0 mL / OUT: 1500 mL / NET: -1500 mL        PHYSICAL EXAM:  General: NAD, fatigued  Respiratory: on NC   Abdominal: Soft, nondistended, nontender  Extremities: Warm  RLE: R groin w/ pulsatile mass. Non-tender to palpation, no skin changes. Palpable PT pulse. Sensory and motor intact  LLE: Palpable femoral pulse. Palpable DP pulse. Weaker compared to RLE, sensory intact       LABS:                        8.0    8.69  )-----------( 154      ( 25 Aug 2024 05:40 )             24.7     08-25    141  |  99  |  29<H>  ----------------------------<  120<H>  3.7   |  29  |  0.83    Ca    8.3<L>      25 Aug 2024 05:40  Phos  2.4     08-25  Mg     2.00     08-25        Urinalysis Basic - ( 25 Aug 2024 05:40 )    Color: x / Appearance: x / SG: x / pH: x  Gluc: 120 mg/dL / Ketone: x  / Bili: x / Urobili: x   Blood: x / Protein: x / Nitrite: x   Leuk Esterase: x / RBC: x / WBC x   Sq Epi: x / Non Sq Epi: x / Bacteria: x

## 2024-08-25 NOTE — PROGRESS NOTE ADULT - SUBJECTIVE AND OBJECTIVE BOX
PROGRESS NOTE:     Patient is a 84y old  Male who presents with a chief complaint of Chest pain (25 Aug 2024 10:29)      SUBJECTIVE / OVERNIGHT EVENTS: No acute events.     ADDITIONAL REVIEW OF SYSTEMS:    MEDICATIONS  (STANDING):  aspirin  chewable 81 milliGRAM(s) Oral daily  atorvastatin 40 milliGRAM(s) Oral at bedtime  buMETAnide IVPB 4 milliGRAM(s) IV Intermittent once  chlorhexidine 2% Cloths 1 Application(s) Topical daily  cholecalciferol 2000 Unit(s) Oral daily  cyanocobalamin 1000 MICROGram(s) Oral daily  dextrose 5%. 1000 milliLiter(s) (100 mL/Hr) IV Continuous <Continuous>  dextrose 5%. 1000 milliLiter(s) (50 mL/Hr) IV Continuous <Continuous>  dextrose 50% Injectable 25 Gram(s) IV Push once  dextrose 50% Injectable 12.5 Gram(s) IV Push once  dextrose 50% Injectable 25 Gram(s) IV Push once  finasteride 5 milliGRAM(s) Oral daily  folic acid 1 milliGRAM(s) Oral daily  glucagon  Injectable 1 milliGRAM(s) IntraMuscular once  insulin lispro (ADMELOG) corrective regimen sliding scale   SubCutaneous at bedtime  insulin lispro (ADMELOG) corrective regimen sliding scale   SubCutaneous three times a day before meals  mycophenolate mofetil 500 milliGRAM(s) Oral daily  polyethylene glycol 3350 17 Gram(s) Oral two times a day  predniSONE   Tablet 5 milliGRAM(s) Oral daily  tamsulosin 0.8 milliGRAM(s) Oral at bedtime    MEDICATIONS  (PRN):  dextrose Oral Gel 15 Gram(s) Oral once PRN Blood Glucose LESS THAN 70 milliGRAM(s)/deciliter      CAPILLARY BLOOD GLUCOSE      POCT Blood Glucose.: 136 mg/dL (25 Aug 2024 08:25)  POCT Blood Glucose.: 146 mg/dL (24 Aug 2024 22:36)  POCT Blood Glucose.: 131 mg/dL (24 Aug 2024 17:16)  POCT Blood Glucose.: 161 mg/dL (24 Aug 2024 12:08)    I&O's Summary    24 Aug 2024 07:01  -  25 Aug 2024 07:00  --------------------------------------------------------  IN: 151 mL / OUT: 1300 mL / NET: -1149 mL    25 Aug 2024 07:01  -  25 Aug 2024 11:35  --------------------------------------------------------  IN: 0 mL / OUT: 1500 mL / NET: -1500 mL        PHYSICAL EXAM:  Vital Signs Last 24 Hrs  T(C): 36.7 (25 Aug 2024 05:20), Max: 37.1 (24 Aug 2024 18:33)  T(F): 98.1 (25 Aug 2024 05:20), Max: 98.8 (24 Aug 2024 18:50)  HR: 63 (25 Aug 2024 05:20) (63 - 81)  BP: 134/36 (25 Aug 2024 05:20) (110/45 - 138/56)  BP(mean): --  RR: 18 (25 Aug 2024 05:20) (18 - 18)  SpO2: 100% (25 Aug 2024 05:20) (100% - 100%)    Parameters below as of 25 Aug 2024 05:20  Patient On (Oxygen Delivery Method): nasal cannula  O2 Flow (L/min): 2    CONSTITUTIONAL: NAD  EYES: PERRL. EOMI. No scleral icterus.  CV: Irregular. Normal S1 and S2. No murmurs, rubs, or gallops. No edema. Pulses equal bilaterally.  PULM: Clear to auscultation throughout. Respirations unlabored. No wheezing, rales, or rhonchi.  GI: Soft, non-tender, non-distended. No palpable masses.  MSK: No cyanosis or clubbing.   : Elliott w/ clear urine   SKIN: Adherent one scab each on bilateral lower medial and lateral thigh areas; no signs of infection.  Non-tender   NEURO: (+) mild R facial droop. CN grossly intact.  PSYCH: A+O x 3.  Pleasant.  Mood and affect appropriate to situation    LABS:                        8.0    8.69  )-----------( 154      ( 25 Aug 2024 05:40 )             24.7     08-25    141  |  99  |  29<H>  ----------------------------<  120<H>  3.7   |  29  |  0.83    Ca    8.3<L>      25 Aug 2024 05:40  Phos  2.4     08-25  Mg     2.00     08-25            Urinalysis Basic - ( 25 Aug 2024 05:40 )    Color: x / Appearance: x / SG: x / pH: x  Gluc: 120 mg/dL / Ketone: x  / Bili: x / Urobili: x   Blood: x / Protein: x / Nitrite: x   Leuk Esterase: x / RBC: x / WBC x   Sq Epi: x / Non Sq Epi: x / Bacteria: x          RADIOLOGY & ADDITIONAL TESTS:  Results Reviewed:   Imaging Personally Reviewed:  Electrocardiogram Personally Reviewed:    COORDINATION OF CARE:  Care Discussed with Consultants/Other Providers [Y/N]:  Prior or Outpatient Records Reviewed [Y/N]:

## 2024-08-26 ENCOUNTER — RESULT REVIEW (OUTPATIENT)
Age: 84
End: 2024-08-26

## 2024-08-26 LAB
ANION GAP SERPL CALC-SCNC: 13 MMOL/L — SIGNIFICANT CHANGE UP (ref 7–14)
BUN SERPL-MCNC: 33 MG/DL — HIGH (ref 7–23)
CALCIUM SERPL-MCNC: 8.5 MG/DL — SIGNIFICANT CHANGE UP (ref 8.4–10.5)
CHLORIDE SERPL-SCNC: 96 MMOL/L — LOW (ref 98–107)
CO2 SERPL-SCNC: 29 MMOL/L — SIGNIFICANT CHANGE UP (ref 22–31)
CREAT SERPL-MCNC: 0.87 MG/DL — SIGNIFICANT CHANGE UP (ref 0.5–1.3)
EGFR: 85 ML/MIN/1.73M2 — SIGNIFICANT CHANGE UP
GLUCOSE BLDC GLUCOMTR-MCNC: 120 MG/DL — HIGH (ref 70–99)
GLUCOSE BLDC GLUCOMTR-MCNC: 133 MG/DL — HIGH (ref 70–99)
GLUCOSE BLDC GLUCOMTR-MCNC: 140 MG/DL — HIGH (ref 70–99)
GLUCOSE BLDC GLUCOMTR-MCNC: 234 MG/DL — HIGH (ref 70–99)
GLUCOSE SERPL-MCNC: 97 MG/DL — SIGNIFICANT CHANGE UP (ref 70–99)
HCT VFR BLD CALC: 24.1 % — LOW (ref 39–50)
HGB BLD-MCNC: 7.6 G/DL — LOW (ref 13–17)
MAGNESIUM SERPL-MCNC: 2 MG/DL — SIGNIFICANT CHANGE UP (ref 1.6–2.6)
MCHC RBC-ENTMCNC: 29.1 PG — SIGNIFICANT CHANGE UP (ref 27–34)
MCHC RBC-ENTMCNC: 31.5 GM/DL — LOW (ref 32–36)
MCV RBC AUTO: 92.3 FL — SIGNIFICANT CHANGE UP (ref 80–100)
NRBC # BLD: 0 /100 WBCS — SIGNIFICANT CHANGE UP (ref 0–0)
NRBC # FLD: 0 K/UL — SIGNIFICANT CHANGE UP (ref 0–0)
PHOSPHATE SERPL-MCNC: 2.9 MG/DL — SIGNIFICANT CHANGE UP (ref 2.5–4.5)
PLATELET # BLD AUTO: 137 K/UL — LOW (ref 150–400)
POTASSIUM SERPL-MCNC: 3.4 MMOL/L — LOW (ref 3.5–5.3)
POTASSIUM SERPL-SCNC: 3.4 MMOL/L — LOW (ref 3.5–5.3)
RBC # BLD: 2.61 M/UL — LOW (ref 4.2–5.8)
RBC # FLD: 16.1 % — HIGH (ref 10.3–14.5)
SODIUM SERPL-SCNC: 138 MMOL/L — SIGNIFICANT CHANGE UP (ref 135–145)
WBC # BLD: 5.77 K/UL — SIGNIFICANT CHANGE UP (ref 3.8–10.5)
WBC # FLD AUTO: 5.77 K/UL — SIGNIFICANT CHANGE UP (ref 3.8–10.5)

## 2024-08-26 PROCEDURE — 99232 SBSQ HOSP IP/OBS MODERATE 35: CPT

## 2024-08-26 PROCEDURE — 93926 LOWER EXTREMITY STUDY: CPT | Mod: 26,RT

## 2024-08-26 PROCEDURE — 99232 SBSQ HOSP IP/OBS MODERATE 35: CPT | Mod: GC

## 2024-08-26 RX ORDER — POTASSIUM CHLORIDE 10 MEQ
40 TABLET, EXT RELEASE, PARTICLES/CRYSTALS ORAL EVERY 4 HOURS
Refills: 0 | Status: COMPLETED | OUTPATIENT
Start: 2024-08-26 | End: 2024-08-26

## 2024-08-26 RX ORDER — APIXABAN 5 MG/1
5 TABLET, FILM COATED ORAL EVERY 12 HOURS
Refills: 0 | Status: DISCONTINUED | OUTPATIENT
Start: 2024-08-26 | End: 2024-08-30

## 2024-08-26 RX ORDER — POTASSIUM CHLORIDE 10 MEQ
40 TABLET, EXT RELEASE, PARTICLES/CRYSTALS ORAL EVERY 4 HOURS
Refills: 0 | Status: DISCONTINUED | OUTPATIENT
Start: 2024-08-26 | End: 2024-08-26

## 2024-08-26 RX ADMIN — Medication 81 MILLIGRAM(S): at 12:28

## 2024-08-26 RX ADMIN — FINASTERIDE 5 MILLIGRAM(S): 1 TABLET, FILM COATED ORAL at 12:28

## 2024-08-26 RX ADMIN — Medication 1 MILLIGRAM(S): at 12:27

## 2024-08-26 RX ADMIN — Medication 2: at 12:29

## 2024-08-26 RX ADMIN — MYCOPHENOLATE MOFETIL 500 MILLIGRAM(S): 250 CAPSULE ORAL at 12:28

## 2024-08-26 RX ADMIN — Medication 40 MILLIGRAM(S): at 22:43

## 2024-08-26 RX ADMIN — Medication 5 MILLIGRAM(S): at 05:55

## 2024-08-26 RX ADMIN — Medication 40 MILLIEQUIVALENT(S): at 12:27

## 2024-08-26 RX ADMIN — Medication 2000 UNIT(S): at 12:27

## 2024-08-26 RX ADMIN — POLYETHYLENE GLYCOL 3350 17 GRAM(S): 17 POWDER, FOR SOLUTION ORAL at 17:22

## 2024-08-26 RX ADMIN — APIXABAN 5 MILLIGRAM(S): 5 TABLET, FILM COATED ORAL at 23:20

## 2024-08-26 RX ADMIN — TAMSULOSIN HYDROCHLORIDE 0.8 MILLIGRAM(S): 0.4 CAPSULE ORAL at 22:43

## 2024-08-26 RX ADMIN — Medication 1000 MICROGRAM(S): at 12:27

## 2024-08-26 RX ADMIN — Medication 40 MILLIEQUIVALENT(S): at 17:22

## 2024-08-26 RX ADMIN — POLYETHYLENE GLYCOL 3350 17 GRAM(S): 17 POWDER, FOR SOLUTION ORAL at 05:55

## 2024-08-26 RX ADMIN — APIXABAN 5 MILLIGRAM(S): 5 TABLET, FILM COATED ORAL at 12:27

## 2024-08-26 RX ADMIN — CHLORHEXIDINE GLUCONATE 1 APPLICATION(S): 40 SOLUTION TOPICAL at 12:39

## 2024-08-26 NOTE — PROGRESS NOTE ADULT - ASSESSMENT
84y Male with a hx of AFib (on Eliquis), Myasthenia Gravis (on Cellcept), HFpEF, HTN, T2DM, urinary retention s/p Elliott, and recent embolic CVA s/p thrombectomy (8/4/24 at [facility]) with residual R facial droop and L sided weakness who  presents from rehab with chest pain, found to be in AFib with slow ventricular rate. Also w/ significant AS and AI on echo. Course c/b hematuria and incidentally found renal masses.

## 2024-08-26 NOTE — PROGRESS NOTE ADULT - PROBLEM SELECTOR PLAN 4
hematuria due to traumatic catheterization, resolved   - appreciate urology input   - Elliott replaced by urology   - s/p CBI, now clamped   - monitor urine color    restarting eliquis as per above  monitor for hematuria

## 2024-08-26 NOTE — PROGRESS NOTE ADULT - TIME BILLING
84-year-old man with h/o afib, Myasthenia Gravis (on Cellcept), HFpEF, HTN, T2DM, urinary retention s/p Elliott, and recent embolic CVA s/p thrombectomy, now presenting with chest pain and acute decompensated HFpEF in setting of severe AS/AI

## 2024-08-26 NOTE — PROGRESS NOTE ADULT - SUBJECTIVE AND OBJECTIVE BOX
Cardiology Progress Note  ------------------------------------------------------------------------------------------  SUBJECTIVE:   - No events overnight. Denies CP, SOB or Palpitations.     -------------------------------------------------------------------------------------------  VS:  T(F): 97.9 (08-26), Max: 98.7 (08-26)  HR: 79 (08-26) (71 - 79)  BP: 123/42 (08-26) (123/42 - 126/42)  RR: 18 (08-26)  SpO2: 100% (08-26)  I&O's Summary    25 Aug 2024 07:01  -  26 Aug 2024 07:00  --------------------------------------------------------  IN: 0 mL / OUT: 1500 mL / NET: -1500 mL      PHYSICAL EXAM:  GENERAL: NAD  HEAD: Atraumatic, Normocephalic, JVD to earlobe  ENT: Moist mucous membranes.  NECK: Supple, No JVD.  CHEST/LUNG: Clear to auscultation bilaterally; No rales, rhonchi, wheezing, or rubs. Unlabored respirations.  HEART: Regular rate and rhythm; No murmurs, rubs, or gallops.  ABDOMEN: Bowel sounds present; Soft, Nontender, Nondistended.   EXTREMITIES: No edema. 2+ Peripheral Pulses, brisk capillary refill. No clubbing or cyanosis.     -------------------------------------------------------------------------------------------  LABS:                          7.6    5.77  )-----------( 137      ( 26 Aug 2024 06:10 )             24.1     08-26    138  |  96<L>  |  33<H>  ----------------------------<  97  3.4<L>   |  29  |  0.87    Ca    8.5      26 Aug 2024 06:10  Phos  2.9     08-26  Mg     2.00     08-26                  -------------------------------------------------------------------------------------------  Meds:  apixaban 5 milliGRAM(s) Oral every 12 hours  aspirin  chewable 81 milliGRAM(s) Oral daily  atorvastatin 40 milliGRAM(s) Oral at bedtime  chlorhexidine 2% Cloths 1 Application(s) Topical daily  cholecalciferol 2000 Unit(s) Oral daily  cyanocobalamin 1000 MICROGram(s) Oral daily  dextrose 5%. 1000 milliLiter(s) IV Continuous <Continuous>  dextrose 5%. 1000 milliLiter(s) IV Continuous <Continuous>  dextrose 50% Injectable 25 Gram(s) IV Push once  dextrose 50% Injectable 12.5 Gram(s) IV Push once  dextrose 50% Injectable 25 Gram(s) IV Push once  dextrose Oral Gel 15 Gram(s) Oral once PRN  finasteride 5 milliGRAM(s) Oral daily  folic acid 1 milliGRAM(s) Oral daily  glucagon  Injectable 1 milliGRAM(s) IntraMuscular once  insulin lispro (ADMELOG) corrective regimen sliding scale   SubCutaneous at bedtime  insulin lispro (ADMELOG) corrective regimen sliding scale   SubCutaneous three times a day before meals  mycophenolate mofetil 500 milliGRAM(s) Oral daily  polyethylene glycol 3350 17 Gram(s) Oral two times a day  potassium chloride   Powder 40 milliEquivalent(s) Oral every 4 hours  predniSONE   Tablet 5 milliGRAM(s) Oral daily  tamsulosin 0.8 milliGRAM(s) Oral at bedtime      8/19/2024 TTE  CONCLUSIONS:      1. Technically difficult image quality.   2. The left ventricular cavity is normal in size. Left ventricular wall thickness is severely increased. Left ventricularsystolic function is normal with an ejection fraction visually estimated at 60 to 65%. There are no regional wall motion abnormalities seen. Severe left ventricular hypertrophy. There is increased LV mass and concentric hypertrophy.   3. Normal rightventricular cavity size and probably normal right ventricular systolic function.   4. There is mitral valve thickening of the anterior and posterior leaflets. There is reduced leaflet mobility of the mitral valve. There is calcification of the mitralvalve annulus. There is moderate leaflet calcification. There is mild to moderate mitral valve stenosis. The transmitral peak gradient is 14.0 mmHg and mean gradient is 4.00 mmHg. There is mild mitral regurgitation.   5. The aortic valve appears trileaflet with reduced systolic excursion. There is calcification of the aortic valve leaflets. The peak transaortic velocity is 4.16 m/s, peak transaortic gradient is 69.2 mmHg and mean transaortic gradient is 39.0 mmHg. Significant aortic stenosis is present. However, unable to estimate the aortic valve area as pulsed wave Doppler at the level of the LVOT not performed (no LVOT VTI measurements made). There is severe aortic regurgitation. Vena contracta width ~ 0.7 cm.   6. The left atrium is severely dilated with an indexed volume of 59.19 ml/m².   7. Left pleural effusion noted.

## 2024-08-26 NOTE — PROGRESS NOTE ADULT - ASSESSMENT
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 Cardiology consulted for management of severe AS/AI. Follows with Dr. Lisker outpatient.     #Severe AS  #Severe AI  #Acute Decompensated HFpEF  ACC/AHA Stage C Valvular HFpEF  probnp 3229, troponin 54 -->52, BUN/Cr 27/0.97  Chest X-ray - perihilar infiltrates, left sided pleural effusion.   TTE - EF 60-65%, LV concentric hypertrophy, mild-moderate MS, mild MR, severe AS/AI, dilated LA, mild-moderate TR, RA - 15 mmHg  EKG - 36 BPM, atrial fibrillation, no ST T  wave changes.     - Holding home entresto  - IV bumex 4mg x1, will keep a close eye on diuretic needs to avoid overdiuresis given his severe AS, likely transition to po diuresis tomorrow  - Goal net negative 500cc - 1L  - Plan to start SGLT2 near discharge   - Pending review of echo and multivalvular disease with structural interventional team to determine candidacy for possible TAVR, high suspect the patient's comorbidities and functional status may preclude this procedure.   - CT imaging suggestive of bilateral RCC    #Afib  CHADsVASc - 7  - c/w eliquis 5 mg PO BID, currently held iso hematuria  - on ASA for recent CVA.   - holding beta blockers iso afib with slow ventricular response  - EP following.

## 2024-08-26 NOTE — PROGRESS NOTE ADULT - SUBJECTIVE AND OBJECTIVE BOX
[facility] Division of Hospital Medicine  Sadiq Farah MD  Available via MS Teams  Pager: [phone]    SUBJECTIVE / OVERNIGHT EVENTS:  Patient seen and examined at the bedside.  Patient in no acute distress  Patient currently AAOx3 however displays mild confusion    Is easily redirectable    Case discussed with daughter Mirian Carias - [phone]    ADDITIONAL REVIEW OF SYSTEMS:    MEDICATIONS  (STANDING):  apixaban 5 milliGRAM(s) Oral every 12 hours  aspirin  chewable 81 milliGRAM(s) Oral daily  atorvastatin 40 milliGRAM(s) Oral at bedtime  chlorhexidine 2% Cloths 1 Application(s) Topical daily  cholecalciferol 2000 Unit(s) Oral daily  cyanocobalamin 1000 MICROGram(s) Oral daily  dextrose 5%. 1000 milliLiter(s) (50 mL/Hr) IV Continuous <Continuous>  dextrose 5%. 1000 milliLiter(s) (100 mL/Hr) IV Continuous <Continuous>  dextrose 50% Injectable 25 Gram(s) IV Push once  dextrose 50% Injectable 25 Gram(s) IV Push once  dextrose 50% Injectable 12.5 Gram(s) IV Push once  finasteride 5 milliGRAM(s) Oral daily  folic acid 1 milliGRAM(s) Oral daily  glucagon  Injectable 1 milliGRAM(s) IntraMuscular once  insulin lispro (ADMELOG) corrective regimen sliding scale   SubCutaneous three times a day before meals  insulin lispro (ADMELOG) corrective regimen sliding scale   SubCutaneous at bedtime  mycophenolate mofetil 500 milliGRAM(s) Oral daily  polyethylene glycol 3350 17 Gram(s) Oral two times a day  potassium chloride   Powder 40 milliEquivalent(s) Oral every 4 hours  predniSONE   Tablet 5 milliGRAM(s) Oral daily  tamsulosin 0.8 milliGRAM(s) Oral at bedtime    MEDICATIONS  (PRN):  dextrose Oral Gel 15 Gram(s) Oral once PRN Blood Glucose LESS THAN 70 milliGRAM(s)/deciliter      I&O's Summary    25 Aug 2024 07:01  -  26 Aug 2024 07:00  --------------------------------------------------------  IN: 0 mL / OUT: 1500 mL / NET: -1500 mL        PHYSICAL EXAM:  Vital Signs Last 24 Hrs  T(C): 36.6 (26 Aug 2024 11:14), Max: 37.1 (26 Aug 2024 06:00)  T(F): 97.9 (26 Aug 2024 11:14), Max: 98.7 (26 Aug 2024 06:00)  HR: 79 (26 Aug 2024 11:14) (71 - 79)  BP: 123/42 (26 Aug 2024 11:14) (123/42 - 126/42)  BP(mean): --  RR: 18 (26 Aug 2024 11:14) (18 - 18)  SpO2: 100% (26 Aug 2024 11:14) (100% - 100%)    Parameters below as of 26 Aug 2024 06:00  Patient On (Oxygen Delivery Method): nasal cannula  O2 Flow (L/min): 2    CONSTITUTIONAL: elderly male with signs of cachexia, temporal wasting  EYES: PERRLA  ENMT: Moist oral mucosa, normal dentition  NECK: Supple, no thyromegaly  RESPIRATORY: Normal respiratory effort; lungs are clear to auscultation bilaterally  CARDIOVASCULAR: S1 and S2 +ve, significant systolic murmur noted  ABDOMEN: Nontender to palpation, normoactive bowel sounds, no rebound/guarding;  EXTREMITIES: no significant pedal edema.    NEUROLOGY: CN 2-12 are intact and symmetric; no gross motor or sensory deficits   PSYCH: A+O to person, place, and time; affect appropriate  SKIN: No rashes; no palpable lesions      LABS:                        7.6    5.77  )-----------( 137      ( 26 Aug 2024 06:10 )             24.1     08-26    138  |  96<L>  |  33<H>  ----------------------------<  97  3.4<L>   |  29  |  0.87    Ca    8.5      26 Aug 2024 06:10  Phos  2.9     08-26  Mg     2.00     08-26            Urinalysis Basic - ( 26 Aug 2024 06:10 )    Color: x / Appearance: x / SG: x / pH: x  Gluc: 97 mg/dL / Ketone: x  / Bili: x / Urobili: x   Blood: x / Protein: x / Nitrite: x   Leuk Esterase: x / RBC: x / WBC x   Sq Epi: x / Non Sq Epi: x / Bacteria: x

## 2024-08-26 NOTE — CHART NOTE - NSCHARTNOTEFT_GEN_A_CORE
Notified by RN that during straight cath that patient had bloody output and clots came out on removal of straight cath. Patient started on TOV ovn as H&P noted butt was placed in [facility] on 8/4 for urinary retention. ACP arrived at bedside to assess patient. Patient is calm and endorses no discomfort at this time. Blot clot can be seen coming from his urethra. Scrotum is enlarged. When asked, patient confirms he has had urinary problems for a while now and sees a urologist. Upon appointment visit review, patient sees [facility]. [facility]ID[facility] notes reviewed, has a history of BPH and chronic butt at the very least since 2023.    Urology consult called for evaluation and replacement of butt.    Eryn Espinoza PA-C  Department of Internal Medicine  pager [phone], available on [employer] TEAMS
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 Also w/ significant AS and AI on echo. Course c/b hematuria and incidentally found renal masses. 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 Bedside compression attempted yesterday for 30 mins.  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    Recommendation:  - Given the pseudoaneurysm thrombosed, no acute surgical intervention needed at this time  - Rest of care per primary    Plan dw Attending Dr. Nelson,    Yessica Hopper MD  Vascular fellow

## 2024-08-27 LAB
ANION GAP SERPL CALC-SCNC: 10 MMOL/L — SIGNIFICANT CHANGE UP (ref 7–14)
BUN SERPL-MCNC: 37 MG/DL — HIGH (ref 7–23)
CALCIUM SERPL-MCNC: 8.7 MG/DL — SIGNIFICANT CHANGE UP (ref 8.4–10.5)
CHLORIDE SERPL-SCNC: 102 MMOL/L — SIGNIFICANT CHANGE UP (ref 98–107)
CO2 SERPL-SCNC: 29 MMOL/L — SIGNIFICANT CHANGE UP (ref 22–31)
CREAT SERPL-MCNC: 0.8 MG/DL — SIGNIFICANT CHANGE UP (ref 0.5–1.3)
EGFR: 87 ML/MIN/1.73M2 — SIGNIFICANT CHANGE UP
GLUCOSE BLDC GLUCOMTR-MCNC: 121 MG/DL — HIGH (ref 70–99)
GLUCOSE BLDC GLUCOMTR-MCNC: 126 MG/DL — HIGH (ref 70–99)
GLUCOSE BLDC GLUCOMTR-MCNC: 133 MG/DL — HIGH (ref 70–99)
GLUCOSE BLDC GLUCOMTR-MCNC: 196 MG/DL — HIGH (ref 70–99)
GLUCOSE SERPL-MCNC: 96 MG/DL — SIGNIFICANT CHANGE UP (ref 70–99)
HCT VFR BLD CALC: 25.1 % — LOW (ref 39–50)
HGB BLD-MCNC: 7.8 G/DL — LOW (ref 13–17)
MAGNESIUM SERPL-MCNC: 2.3 MG/DL — SIGNIFICANT CHANGE UP (ref 1.6–2.6)
MCHC RBC-ENTMCNC: 28.9 PG — SIGNIFICANT CHANGE UP (ref 27–34)
MCHC RBC-ENTMCNC: 31.1 GM/DL — LOW (ref 32–36)
MCV RBC AUTO: 93 FL — SIGNIFICANT CHANGE UP (ref 80–100)
NRBC # BLD: 0 /100 WBCS — SIGNIFICANT CHANGE UP (ref 0–0)
NRBC # FLD: 0 K/UL — SIGNIFICANT CHANGE UP (ref 0–0)
PHOSPHATE SERPL-MCNC: 2.9 MG/DL — SIGNIFICANT CHANGE UP (ref 2.5–4.5)
PLATELET # BLD AUTO: 141 K/UL — LOW (ref 150–400)
POTASSIUM SERPL-MCNC: 4.4 MMOL/L — SIGNIFICANT CHANGE UP (ref 3.5–5.3)
POTASSIUM SERPL-SCNC: 4.4 MMOL/L — SIGNIFICANT CHANGE UP (ref 3.5–5.3)
RBC # BLD: 2.7 M/UL — LOW (ref 4.2–5.8)
RBC # FLD: 15.7 % — HIGH (ref 10.3–14.5)
SODIUM SERPL-SCNC: 141 MMOL/L — SIGNIFICANT CHANGE UP (ref 135–145)
WBC # BLD: 5.48 K/UL — SIGNIFICANT CHANGE UP (ref 3.8–10.5)
WBC # FLD AUTO: 5.48 K/UL — SIGNIFICANT CHANGE UP (ref 3.8–10.5)

## 2024-08-27 PROCEDURE — 99232 SBSQ HOSP IP/OBS MODERATE 35: CPT

## 2024-08-27 RX ORDER — DAPAGLIFLOZIN 10 MG/1
1 TABLET, FILM COATED ORAL
Qty: 30 | Refills: 0
Start: 2024-08-27 | End: 2024-09-25

## 2024-08-27 RX ADMIN — POLYETHYLENE GLYCOL 3350 17 GRAM(S): 17 POWDER, FOR SOLUTION ORAL at 06:00

## 2024-08-27 RX ADMIN — Medication 2000 UNIT(S): at 11:46

## 2024-08-27 RX ADMIN — CHLORHEXIDINE GLUCONATE 1 APPLICATION(S): 40 SOLUTION TOPICAL at 11:46

## 2024-08-27 RX ADMIN — Medication 5 MILLIGRAM(S): at 06:00

## 2024-08-27 RX ADMIN — APIXABAN 5 MILLIGRAM(S): 5 TABLET, FILM COATED ORAL at 11:46

## 2024-08-27 RX ADMIN — Medication 81 MILLIGRAM(S): at 11:45

## 2024-08-27 RX ADMIN — APIXABAN 5 MILLIGRAM(S): 5 TABLET, FILM COATED ORAL at 22:32

## 2024-08-27 RX ADMIN — Medication 1000 MICROGRAM(S): at 11:46

## 2024-08-27 RX ADMIN — MYCOPHENOLATE MOFETIL 500 MILLIGRAM(S): 250 CAPSULE ORAL at 11:45

## 2024-08-27 RX ADMIN — Medication 1 MILLIGRAM(S): at 11:46

## 2024-08-27 RX ADMIN — Medication 40 MILLIGRAM(S): at 22:32

## 2024-08-27 RX ADMIN — TAMSULOSIN HYDROCHLORIDE 0.8 MILLIGRAM(S): 0.4 CAPSULE ORAL at 22:32

## 2024-08-27 RX ADMIN — Medication 1: at 13:10

## 2024-08-27 RX ADMIN — FINASTERIDE 5 MILLIGRAM(S): 1 TABLET, FILM COATED ORAL at 11:45

## 2024-08-27 NOTE — PROGRESS NOTE ADULT - PROBLEM SELECTOR PLAN 4
hematuria due to traumatic catheterization, resolved   - appreciate urology input   - Elliott replaced by urology   - s/p CBI, now clamped   - monitor urine color    restarted eliquis as per above  monitor for hematuria

## 2024-08-27 NOTE — PROGRESS NOTE ADULT - ASSESSMENT
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 Cardiology consulted for management of severe AS/AI. Follows with Dr. Lisker outpatient.     #Severe AS  #Severe AI  #Acute Decompensated HFpEF  ACC/AHA Stage C Valvular HFpEF  probnp 3229, troponin 54 -->52, BUN/Cr 27/0.97  Chest X-ray - perihilar infiltrates, left sided pleural effusion.   TTE - EF 60-65%, LV concentric hypertrophy, mild-moderate MS, mild MR, severe AS/AI, dilated LA, mild-moderate TR, RA - 15 mmHg  EKG - 36 BPM, atrial fibrillation, no ST T  wave changes.     - Holding home entresto  - s/p bumex IV  - Plan to start SGLT2 near discharge   - will talk to interventional team today for candidacy for possible TAVR, high suspect the patient's comorbidities and functional status may preclude this procedure.   - CT imaging suggestive of bilateral RCC    #Afib  CHADsVASc - 7  - c/w eliquis 5 mg PO BID, currently held iso hematuria  - on ASA for recent CVA.   - holding beta blockers iso afib with slow ventricular response  - EP following.    GBS with next visit.

## 2024-08-27 NOTE — PROGRESS NOTE ADULT - ASSESSMENT
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 Also w/ significant AS and AI on echo. Course c/b hematuria and incidentally found renal masses.

## 2024-08-27 NOTE — PROGRESS NOTE ADULT - SUBJECTIVE AND OBJECTIVE BOX
[facility] Division of Hospital Medicine  Sadiq Farah MD  Available via MS Teams  Pager: [phone]    SUBJECTIVE / OVERNIGHT EVENTS:  Patient seen and examined at the bedside.   Much more alert today, no active complaints    Case discussed with daughter Aretha -   Family prefers not to tell patient about cancer diagnosis    ADDITIONAL REVIEW OF SYSTEMS:    MEDICATIONS  (STANDING):  apixaban 5 milliGRAM(s) Oral every 12 hours  aspirin  chewable 81 milliGRAM(s) Oral daily  atorvastatin 40 milliGRAM(s) Oral at bedtime  chlorhexidine 2% Cloths 1 Application(s) Topical daily  cholecalciferol 2000 Unit(s) Oral daily  cyanocobalamin 1000 MICROGram(s) Oral daily  dextrose 5%. 1000 milliLiter(s) (100 mL/Hr) IV Continuous <Continuous>  dextrose 5%. 1000 milliLiter(s) (50 mL/Hr) IV Continuous <Continuous>  dextrose 50% Injectable 25 Gram(s) IV Push once  dextrose 50% Injectable 12.5 Gram(s) IV Push once  dextrose 50% Injectable 25 Gram(s) IV Push once  finasteride 5 milliGRAM(s) Oral daily  folic acid 1 milliGRAM(s) Oral daily  glucagon  Injectable 1 milliGRAM(s) IntraMuscular once  insulin lispro (ADMELOG) corrective regimen sliding scale   SubCutaneous at bedtime  insulin lispro (ADMELOG) corrective regimen sliding scale   SubCutaneous three times a day before meals  mycophenolate mofetil 500 milliGRAM(s) Oral daily  polyethylene glycol 3350 17 Gram(s) Oral two times a day  predniSONE   Tablet 5 milliGRAM(s) Oral daily  tamsulosin 0.8 milliGRAM(s) Oral at bedtime    MEDICATIONS  (PRN):  dextrose Oral Gel 15 Gram(s) Oral once PRN Blood Glucose LESS THAN 70 milliGRAM(s)/deciliter      I&O's Summary    26 Aug 2024 07:01  -  27 Aug 2024 07:00  --------------------------------------------------------  IN: 0 mL / OUT: 1100 mL / NET: -1100 mL        PHYSICAL EXAM:  Vital Signs Last 24 Hrs  T(C): 36.5 (27 Aug 2024 11:56), Max: 37.1 (26 Aug 2024 20:44)  T(F): 97.7 (27 Aug 2024 11:56), Max: 98.7 (26 Aug 2024 20:44)  HR: 71 (27 Aug 2024 11:56) (70 - 83)  BP: 125/48 (27 Aug 2024 11:56) (121/46 - 134/56)  BP(mean): --  RR: 18 (27 Aug 2024 11:56) (18 - 18)  SpO2: 100% (27 Aug 2024 11:56) (100% - 100%)    Parameters below as of 27 Aug 2024 11:56  Patient On (Oxygen Delivery Method): nasal cannula  O2 Flow (L/min): 2    CONSTITUTIONAL: elderly male with signs of cachexia, temporal wasting  EYES: PERRLA  ENMT: Moist oral mucosa, normal dentition  NECK: Supple, no thyromegaly  RESPIRATORY: Normal respiratory effort; lungs are clear to auscultation bilaterally  CARDIOVASCULAR: S1 and S2 +ve, significant systolic murmur noted  ABDOMEN: Nontender to palpation, normoactive bowel sounds, no rebound/guarding;  EXTREMITIES: no significant pedal edema.    NEUROLOGY: CN 2-12 are intact and symmetric; no gross motor or sensory deficits   PSYCH: A+O to person, place, and time, mild confusion noted  SKIN: No rashes; no palpable lesions      LABS:                        7.8    5.48  )-----------( 141      ( 27 Aug 2024 05:52 )             25.1     08-27    141  |  102  |  37<H>  ----------------------------<  96  4.4   |  29  |  0.80    Ca    8.7      27 Aug 2024 05:52  Phos  2.9     08-27  Mg     2.30     08-27            Urinalysis Basic - ( 27 Aug 2024 05:52 )    Color: x / Appearance: x / SG: x / pH: x  Gluc: 96 mg/dL / Ketone: x  / Bili: x / Urobili: x   Blood: x / Protein: x / Nitrite: x   Leuk Esterase: x / RBC: x / WBC x   Sq Epi: x / Non Sq Epi: x / Bacteria: x

## 2024-08-28 LAB
ANION GAP SERPL CALC-SCNC: 14 MMOL/L — SIGNIFICANT CHANGE UP (ref 7–14)
BUN SERPL-MCNC: 38 MG/DL — HIGH (ref 7–23)
CALCIUM SERPL-MCNC: 8.8 MG/DL — SIGNIFICANT CHANGE UP (ref 8.4–10.5)
CHLORIDE SERPL-SCNC: 102 MMOL/L — SIGNIFICANT CHANGE UP (ref 98–107)
CO2 SERPL-SCNC: 27 MMOL/L — SIGNIFICANT CHANGE UP (ref 22–31)
CREAT SERPL-MCNC: 0.88 MG/DL — SIGNIFICANT CHANGE UP (ref 0.5–1.3)
EGFR: 84 ML/MIN/1.73M2 — SIGNIFICANT CHANGE UP
GLUCOSE BLDC GLUCOMTR-MCNC: 123 MG/DL — HIGH (ref 70–99)
GLUCOSE BLDC GLUCOMTR-MCNC: 154 MG/DL — HIGH (ref 70–99)
GLUCOSE BLDC GLUCOMTR-MCNC: 158 MG/DL — HIGH (ref 70–99)
GLUCOSE BLDC GLUCOMTR-MCNC: 175 MG/DL — HIGH (ref 70–99)
GLUCOSE SERPL-MCNC: 99 MG/DL — SIGNIFICANT CHANGE UP (ref 70–99)
HCT VFR BLD CALC: 25.7 % — LOW (ref 39–50)
HGB BLD-MCNC: 7.8 G/DL — LOW (ref 13–17)
MAGNESIUM SERPL-MCNC: 2.3 MG/DL — SIGNIFICANT CHANGE UP (ref 1.6–2.6)
MCHC RBC-ENTMCNC: 28.8 PG — SIGNIFICANT CHANGE UP (ref 27–34)
MCHC RBC-ENTMCNC: 30.4 GM/DL — LOW (ref 32–36)
MCV RBC AUTO: 94.8 FL — SIGNIFICANT CHANGE UP (ref 80–100)
NRBC # BLD: 0 /100 WBCS — SIGNIFICANT CHANGE UP (ref 0–0)
NRBC # FLD: 0 K/UL — SIGNIFICANT CHANGE UP (ref 0–0)
PHOSPHATE SERPL-MCNC: 3.2 MG/DL — SIGNIFICANT CHANGE UP (ref 2.5–4.5)
PLATELET # BLD AUTO: 141 K/UL — LOW (ref 150–400)
POTASSIUM SERPL-MCNC: 4.5 MMOL/L — SIGNIFICANT CHANGE UP (ref 3.5–5.3)
POTASSIUM SERPL-SCNC: 4.5 MMOL/L — SIGNIFICANT CHANGE UP (ref 3.5–5.3)
RBC # BLD: 2.71 M/UL — LOW (ref 4.2–5.8)
RBC # FLD: 15.4 % — HIGH (ref 10.3–14.5)
SODIUM SERPL-SCNC: 143 MMOL/L — SIGNIFICANT CHANGE UP (ref 135–145)
WBC # BLD: 5.4 K/UL — SIGNIFICANT CHANGE UP (ref 3.8–10.5)
WBC # FLD AUTO: 5.4 K/UL — SIGNIFICANT CHANGE UP (ref 3.8–10.5)

## 2024-08-28 PROCEDURE — 99232 SBSQ HOSP IP/OBS MODERATE 35: CPT

## 2024-08-28 RX ORDER — PETROLATUM 865 MG/G
1 OINTMENT TOPICAL EVERY 4 HOURS
Refills: 0 | Status: DISCONTINUED | OUTPATIENT
Start: 2024-08-28 | End: 2024-08-30

## 2024-08-28 RX ADMIN — Medication 5 MILLIGRAM(S): at 05:44

## 2024-08-28 RX ADMIN — APIXABAN 5 MILLIGRAM(S): 5 TABLET, FILM COATED ORAL at 12:15

## 2024-08-28 RX ADMIN — APIXABAN 5 MILLIGRAM(S): 5 TABLET, FILM COATED ORAL at 21:42

## 2024-08-28 RX ADMIN — Medication 1: at 12:17

## 2024-08-28 RX ADMIN — FINASTERIDE 5 MILLIGRAM(S): 1 TABLET, FILM COATED ORAL at 12:22

## 2024-08-28 RX ADMIN — CHLORHEXIDINE GLUCONATE 1 APPLICATION(S): 40 SOLUTION TOPICAL at 12:18

## 2024-08-28 RX ADMIN — PETROLATUM 1 APPLICATION(S): 865 OINTMENT TOPICAL at 12:16

## 2024-08-28 RX ADMIN — Medication 1: at 17:50

## 2024-08-28 RX ADMIN — Medication 40 MILLIGRAM(S): at 21:31

## 2024-08-28 RX ADMIN — Medication 2000 UNIT(S): at 12:23

## 2024-08-28 RX ADMIN — MYCOPHENOLATE MOFETIL 500 MILLIGRAM(S): 250 CAPSULE ORAL at 12:23

## 2024-08-28 RX ADMIN — POLYETHYLENE GLYCOL 3350 17 GRAM(S): 17 POWDER, FOR SOLUTION ORAL at 05:44

## 2024-08-28 RX ADMIN — Medication 1000 MICROGRAM(S): at 12:22

## 2024-08-28 RX ADMIN — TAMSULOSIN HYDROCHLORIDE 0.8 MILLIGRAM(S): 0.4 CAPSULE ORAL at 21:31

## 2024-08-28 RX ADMIN — Medication 81 MILLIGRAM(S): at 12:22

## 2024-08-28 RX ADMIN — Medication 1 MILLIGRAM(S): at 12:16

## 2024-08-28 NOTE — PROGRESS NOTE ADULT - SUBJECTIVE AND OBJECTIVE BOX
[facility] Division of Hospital Medicine  Sadiq Farah MD  Available via MS Teams  Pager: [phone]    SUBJECTIVE / OVERNIGHT EVENTS:  Patient seen and examined at the bedside.   No active complaints  Case discuss with daughter - decline surgical options for severe AS  Will start DC planning - anticipate return to [facility]    ADDITIONAL REVIEW OF SYSTEMS:    MEDICATIONS  (STANDING):  apixaban 5 milliGRAM(s) Oral every 12 hours  aspirin  chewable 81 milliGRAM(s) Oral daily  atorvastatin 40 milliGRAM(s) Oral at bedtime  chlorhexidine 2% Cloths 1 Application(s) Topical daily  cholecalciferol 2000 Unit(s) Oral daily  cyanocobalamin 1000 MICROGram(s) Oral daily  dextrose 5%. 1000 milliLiter(s) (50 mL/Hr) IV Continuous <Continuous>  dextrose 5%. 1000 milliLiter(s) (100 mL/Hr) IV Continuous <Continuous>  dextrose 50% Injectable 25 Gram(s) IV Push once  dextrose 50% Injectable 12.5 Gram(s) IV Push once  dextrose 50% Injectable 25 Gram(s) IV Push once  finasteride 5 milliGRAM(s) Oral daily  folic acid 1 milliGRAM(s) Oral daily  glucagon  Injectable 1 milliGRAM(s) IntraMuscular once  insulin lispro (ADMELOG) corrective regimen sliding scale   SubCutaneous three times a day before meals  insulin lispro (ADMELOG) corrective regimen sliding scale   SubCutaneous at bedtime  mycophenolate mofetil 500 milliGRAM(s) Oral daily  polyethylene glycol 3350 17 Gram(s) Oral two times a day  predniSONE   Tablet 5 milliGRAM(s) Oral daily  tamsulosin 0.8 milliGRAM(s) Oral at bedtime    MEDICATIONS  (PRN):  AQUAPHOR (petrolatum Ointment) 1 Application(s) Topical every 4 hours PRN rash  dextrose Oral Gel 15 Gram(s) Oral once PRN Blood Glucose LESS THAN 70 milliGRAM(s)/deciliter      I&O's Summary    27 Aug 2024 07:01  -  28 Aug 2024 07:00  --------------------------------------------------------  IN: 0 mL / OUT: 1000 mL / NET: -1000 mL    28 Aug 2024 07:01  -  28 Aug 2024 13:46  --------------------------------------------------------  IN: 480 mL / OUT: 500 mL / NET: -20 mL        PHYSICAL EXAM:  Vital Signs Last 24 Hrs  T(C): 36.8 (28 Aug 2024 12:00), Max: 37.1 (27 Aug 2024 22:30)  T(F): 98.3 (28 Aug 2024 12:00), Max: 98.8 (27 Aug 2024 22:30)  HR: 50 (28 Aug 2024 12:00) (43 - 50)  BP: 135/50 (28 Aug 2024 12:00) (135/50 - 143/37)  BP(mean): --  RR: 18 (28 Aug 2024 12:00) (18 - 18)  SpO2: 100% (28 Aug 2024 12:00) (100% - 100%)    Parameters below as of 28 Aug 2024 12:00  Patient On (Oxygen Delivery Method): nasal cannula  O2 Flow (L/min): 2    CONSTITUTIONAL: elderly male with signs of cachexia, temporal wasting  EYES: PERRLA  ENMT: Moist oral mucosa, normal dentition  NECK: Supple, no thyromegaly  RESPIRATORY: Normal respiratory effort; lungs are clear to auscultation bilaterally  CARDIOVASCULAR: S1 and S2 +ve, significant systolic murmur noted  ABDOMEN: Nontender to palpation, normoactive bowel sounds, no rebound/guarding;  : butt in place, no hematuria noted  EXTREMITIES: no significant pedal edema.    NEUROLOGY: CN 2-12 are intact and symmetric; no gross motor or sensory deficits   PSYCH: A+O to person, place, and time, mild confusion noted  SKIN: No rashes; no palpable lesions      LABS:                        7.8    5.40  )-----------( 141      ( 28 Aug 2024 06:15 )             25.7     08-28    143  |  102  |  38<H>  ----------------------------<  99  4.5   |  27  |  0.88    Ca    8.8      28 Aug 2024 06:15  Phos  3.2     08-28  Mg     2.30     08-28            Urinalysis Basic - ( 28 Aug 2024 06:15 )    Color: x / Appearance: x / SG: x / pH: x  Gluc: 99 mg/dL / Ketone: x  / Bili: x / Urobili: x   Blood: x / Protein: x / Nitrite: x   Leuk Esterase: x / RBC: x / WBC x   Sq Epi: x / Non Sq Epi: x / Bacteria: x                 [facility] Division of Hospital Medicine  Sadiq Farah MD  Available via MS Teams  Pager: [phone]    SUBJECTIVE / OVERNIGHT EVENTS:  Patient seen and examined at the bedside.   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  Patient was evaluated in ED found to have Afib with slow ventricular rate.  Patient was admitted to tele.  Was evaluated by EP - held all marion blocking agents.  TTE was performed - showed EF 65% with severe aortic regurgitation and significant aoritc stenosis.  Was deemed to not require any pacing intervention. Was noted to have hematuria with clots, eliquis was stopped.  Urology was consulted.  CT abdomen was peformed and showed a normal size prostate and an unremarkable bladder but revealed incidental renal masses, consistent with bilateral renal cell carcinoma.    Per urology, patient was started on CBI as he wsa passing clots through the butt.  Deemed secondary to traumatic catheterization.  Per urology - patient is not a surgucal candidate for evaluation of renal masses.  Findings discussed with family members, family declines surgical options for both renal masses and significant aortic regurg.  After CBI, eliquis restarted with no further hematuria.  Will DC back to rehab center.      ADDITIONAL REVIEW OF SYSTEMS:    MEDICATIONS  (STANDING):  apixaban 5 milliGRAM(s) Oral every 12 hours  aspirin  chewable 81 milliGRAM(s) Oral daily  atorvastatin 40 milliGRAM(s) Oral at bedtime  chlorhexidine 2% Cloths 1 Application(s) Topical daily  cholecalciferol 2000 Unit(s) Oral daily  cyanocobalamin 1000 MICROGram(s) Oral daily  dextrose 5%. 1000 milliLiter(s) (50 mL/Hr) IV Continuous <Continuous>  dextrose 5%. 1000 milliLiter(s) (100 mL/Hr) IV Continuous <Continuous>  dextrose 50% Injectable 25 Gram(s) IV Push once  dextrose 50% Injectable 12.5 Gram(s) IV Push once  dextrose 50% Injectable 25 Gram(s) IV Push once  finasteride 5 milliGRAM(s) Oral daily  folic acid 1 milliGRAM(s) Oral daily  glucagon  Injectable 1 milliGRAM(s) IntraMuscular once  insulin lispro (ADMELOG) corrective regimen sliding scale   SubCutaneous three times a day before meals  insulin lispro (ADMELOG) corrective regimen sliding scale   SubCutaneous at bedtime  mycophenolate mofetil 500 milliGRAM(s) Oral daily  polyethylene glycol 3350 17 Gram(s) Oral two times a day  predniSONE   Tablet 5 milliGRAM(s) Oral daily  tamsulosin 0.8 milliGRAM(s) Oral at bedtime    MEDICATIONS  (PRN):  AQUAPHOR (petrolatum Ointment) 1 Application(s) Topical every 4 hours PRN rash  dextrose Oral Gel 15 Gram(s) Oral once PRN Blood Glucose LESS THAN 70 milliGRAM(s)/deciliter      I&O's Summary    27 Aug 2024 07:01  -  28 Aug 2024 07:00  --------------------------------------------------------  IN: 0 mL / OUT: 1000 mL / NET: -1000 mL    28 Aug 2024 07:01  -  28 Aug 2024 13:46  --------------------------------------------------------  IN: 480 mL / OUT: 500 mL / NET: -20 mL        PHYSICAL EXAM:  Vital Signs Last 24 Hrs  T(C): 36.8 (28 Aug 2024 12:00), Max: 37.1 (27 Aug 2024 22:30)  T(F): 98.3 (28 Aug 2024 12:00), Max: 98.8 (27 Aug 2024 22:30)  HR: 50 (28 Aug 2024 12:00) (43 - 50)  BP: 135/50 (28 Aug 2024 12:00) (135/50 - 143/37)  BP(mean): --  RR: 18 (28 Aug 2024 12:00) (18 - 18)  SpO2: 100% (28 Aug 2024 12:00) (100% - 100%)    Parameters below as of 28 Aug 2024 12:00  Patient On (Oxygen Delivery Method): nasal cannula  O2 Flow (L/min): 2    CONSTITUTIONAL: elderly male with signs of cachexia, temporal wasting  EYES: PERRLA  ENMT: Moist oral mucosa, normal dentition  NECK: Supple, no thyromegaly  RESPIRATORY: Normal respiratory effort; lungs are clear to auscultation bilaterally  CARDIOVASCULAR: S1 and S2 +ve, significant systolic murmur noted  ABDOMEN: Nontender to palpation, normoactive bowel sounds, no rebound/guarding;  : butt in place, no hematuria noted  EXTREMITIES: no significant pedal edema.    NEUROLOGY: CN 2-12 are intact and symmetric; no gross motor or sensory deficits   PSYCH: A+O to person, place, and time, mild confusion noted  SKIN: No rashes; no palpable lesions      LABS:                        7.8    5.40  )-----------( 141      ( 28 Aug 2024 06:15 )             25.7     08-28    143  |  102  |  38<H>  ----------------------------<  99  4.5   |  27  |  0.88    Ca    8.8      28 Aug 2024 06:15  Phos  3.2     08-28  Mg     2.30     08-28            Urinalysis Basic - ( 28 Aug 2024 06:15 )    Color: x / Appearance: x / SG: x / pH: x  Gluc: 99 mg/dL / Ketone: x  / Bili: x / Urobili: x   Blood: x / Protein: x / Nitrite: x   Leuk Esterase: x / RBC: x / WBC x   Sq Epi: x / Non Sq Epi: x / Bacteria: x

## 2024-08-28 NOTE — PROGRESS NOTE ADULT - ASSESSMENT
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 Cardiology consulted for management of severe AS/AI. Follows with Dr. Lisker outpatient.     #Severe AS  #Severe AI  #Acute Decompensated HFpEF  ACC/AHA Stage C Valvular HFpEF  probnp 3229, troponin 54 -->52, BUN/Cr 27/0.97  Chest X-ray - perihilar infiltrates, left sided pleural effusion.   TTE - EF 60-65%, LV concentric hypertrophy, mild-moderate MS, mild MR, severe AS/AI, dilated LA, mild-moderate TR, RA - 15 mmHg  EKG - 36 BPM, atrial fibrillation, no ST T  wave changes.     - Holding home entresto  - s/p bumex IV  - Plan to start SGLT2 near discharge   - fu with Dr. Frye for candidacy for possible TAVR, high suspect the patient's comorbidities and functional status may preclude this procedure.   - CT imaging suggestive of bilateral RCC    #Afib  CHADsVASc - 7  - c/w eliquis 5 mg PO BID, currently held iso hematuria  - on ASA for recent CVA.   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 Cardiology consulted for management of severe AS/AI. Follows with Dr. Lisker outpatient.     #Severe AS  #Severe AI  #Acute Decompensated HFpEF  ACC/AHA Stage C Valvular HFpEF  probnp 3229, troponin 54 -->52, BUN/Cr 27/0.97  Chest X-ray - perihilar infiltrates, left sided pleural effusion.   TTE - EF 60-65%, LV concentric hypertrophy, mild-moderate MS, mild MR, severe AS/AI, dilated LA, mild-moderate TR, RA - 15 mmHg  EKG - 36 BPM, atrial fibrillation, no ST T  wave changes.     - Holding home entresto  - s/p bumex IV  - can start SGLT2 near discharge   - pt doesn't desire TAVR, and pt not a TAVR candidate  - CT imaging suggestive of bilateral RCC    #Afib  CHADsVASc - 7  - c/w eliquis 5 mg PO BID, currently held iso hematuria  - on ASA for recent CVA.   - holding beta blockers iso afib with slow ventricular response    cardiology will sign off

## 2024-08-28 NOTE — PROGRESS NOTE ADULT - SUBJECTIVE AND OBJECTIVE BOX
Cardiology Progress Note  ------------------------------------------------------------------------------------------  SUBJECTIVE:   - No events overnight. Denies CP, SOB or Palpitations.     -------------------------------------------------------------------------------------------  VS:  T(F): 98.1 (08-28), Max: 98.8 (08-27)  HR: 43 (08-28) (43 - 43)  BP: 143/37 (08-28) (140/44 - 143/37)  RR: 18 (08-28)  SpO2: 100% (08-28)  I&O's Summary    27 Aug 2024 07:01  -  28 Aug 2024 07:00  --------------------------------------------------------  IN: 0 mL / OUT: 1000 mL / NET: -1000 mL    28 Aug 2024 07:01  -  28 Aug 2024 12:12  --------------------------------------------------------  IN: 240 mL / OUT: 400 mL / NET: -160 mL      PHYSICAL EXAM:  GENERAL: NAD  HEAD: Atraumatic, Normocephalic.  ENT: Moist mucous membranes.  NECK: Supple, No JVD.  CHEST/LUNG: Clear to auscultation bilaterally; No rales, rhonchi, wheezing, or rubs. Unlabored respirations.  HEART: Regular rate and rhythm; No murmurs, rubs, or gallops.  ABDOMEN: Bowel sounds present; Soft, Nontender, Nondistended.   EXTREMITIES: No edema. 2+ Peripheral Pulses, brisk capillary refill. No clubbing or cyanosis.     -------------------------------------------------------------------------------------------  LABS:                          7.8    5.40  )-----------( 141      ( 28 Aug 2024 06:15 )             25.7     08-28    143  |  102  |  38<H>  ----------------------------<  99  4.5   |  27  |  0.88    Ca    8.8      28 Aug 2024 06:15  Phos  3.2     08-28  Mg     2.30     08-28                  -------------------------------------------------------------------------------------------  Meds:  apixaban 5 milliGRAM(s) Oral every 12 hours  AQUAPHOR (petrolatum Ointment) 1 Application(s) Topical every 4 hours PRN  aspirin  chewable 81 milliGRAM(s) Oral daily  atorvastatin 40 milliGRAM(s) Oral at bedtime  chlorhexidine 2% Cloths 1 Application(s) Topical daily  cholecalciferol 2000 Unit(s) Oral daily  cyanocobalamin 1000 MICROGram(s) Oral daily  dextrose 5%. 1000 milliLiter(s) IV Continuous <Continuous>  dextrose 5%. 1000 milliLiter(s) IV Continuous <Continuous>  dextrose 50% Injectable 25 Gram(s) IV Push once  dextrose 50% Injectable 12.5 Gram(s) IV Push once  dextrose 50% Injectable 25 Gram(s) IV Push once  dextrose Oral Gel 15 Gram(s) Oral once PRN  finasteride 5 milliGRAM(s) Oral daily  folic acid 1 milliGRAM(s) Oral daily  glucagon  Injectable 1 milliGRAM(s) IntraMuscular once  insulin lispro (ADMELOG) corrective regimen sliding scale   SubCutaneous three times a day before meals  insulin lispro (ADMELOG) corrective regimen sliding scale   SubCutaneous at bedtime  mycophenolate mofetil 500 milliGRAM(s) Oral daily  polyethylene glycol 3350 17 Gram(s) Oral two times a day  predniSONE   Tablet 5 milliGRAM(s) Oral daily  tamsulosin 0.8 milliGRAM(s) Oral at bedtime

## 2024-08-28 NOTE — PROVIDER CONTACT NOTE (OTHER) - BACKGROUND
Patient is admitted for chest pain. PMH of T2DM, HTN, HFpEF, Afib, and Myasthenia Gravis.
84 y.o. male admitted for Chest pain. PMHX includes afib, htn, DM type 2 and MG
patient came in with chest pain. PMH HTN, DM, ambolic stroke, mysatheias gravis
patient came in with chest pain. PMH DM, HF, hematuria, HTN
Patient admitted for cardiac work up and chest pain.
Patient is admitted for chest pain. PMH of T2DM, HTN, HFpEF, Afib, and Myasthenia Gravis.
Patient is an 83 y/o M with PmHx of T2DM, MG, and a fib
85yo M w/ h/o afib, myasthenia gravis, HFpEF, HTN, T2DM, chronic Elliott for BPH, CVA who present from rehab w/ chest pain found to be in afib w/ slow ventricular rate in 30s-40s.
Patient is admitted for chest pain. PMH of T2DM, HTN, HFpEF, Afib, and Myasthenia Gravis.
Patient is admitted for chest pain. PMH of T2DM, HTN, HFpEF, Afib, and Myasthenia Gravis.
patient came in for chest pain. PMH DM, HF, HTN,
Patient is admitted for chest pain. PMH of T2DM, HTN, HFpEF, Afib, and Myasthenia Gravis.

## 2024-08-28 NOTE — PROGRESS NOTE ADULT - ASSESSMENT
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 Also w/ significant AS and AI on echo. Course c/b hematuria and incidentally found renal masses.

## 2024-08-28 NOTE — PROVIDER CONTACT NOTE (OTHER) - RECOMMENDATIONS
ACP made aware, awaiting order
ACP notified. Urology came to bedside to irrigate and assess.
ACP made aware
ACP made aware
ACP notified

## 2024-08-28 NOTE — PROVIDER CONTACT NOTE (OTHER) - REASON
/39
/44
Patients heart rate 35
patient in slow afib lowest HR 38 nonsustained
/39
/40
/45
/45
Pt bleeding around butt site
Tip of penis/butt leaking/bleeding
/45
patient c/o slight pain in LUQ
/36
Patient HR dropped to 29 momentarily

## 2024-08-28 NOTE — PROGRESS NOTE ADULT - ATTENDING COMMENTS
Patient seen and examined. Agree with assessment and plan.     Hold CBI  Keep butt in
Patient seen and examined. Agree with assessment and plan.   Wean CBI    Stalin Rivas MD  [address] [place] [address] Rd  [address]  [place] [address]  [phone]
Patient seen and examined. Agree with assessment and plan.   Wean CBI  Outpatient followup for renal mass    Stalin Rivas MD  [address] [facility]  [address]  [place] [id number] [phone]
Pt seen and evaluated. Agree with findings and plans above.
Gross hematuria in setting of traumatic catheterization and eliquis, BPH  Cont CBI - wean as tolerated  Hold anticoag as possible
Gross hematuria in setting of traumatic catheterization and eliquis, BPH  On renal sono there are bilateral renal masses - however appear cortical and without involvement or clot in collecting system and no hydronephrosis.  Therefore not likely cause of hematuria, which is likely from prostate.  Would observe renal masses in this setting - pt is not a surgical candidate and risks of surgery outweigh benefits.  CT scan is pending to better characterize  Cont CBI - wean as tolerated  Hold anticoag as possible
Pt seen and evaluated. Agree with findings and plans above.
some choking sensation when he lays flat.  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 Would do diuresis today and we will reassess tomorrow. Specific recs per cardiology fellow.
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 Cardiology consulted for management of severe AS/AI and volume overload. Today he appears mildly volume overloaded He likely diuresed but inaccurate I ad O's due to bladder irrigation.     JVP is 1/3 the way up the neck at 45 degrees. he does note that he feels a choking sensation when he tried to lay flat.  Lungs CTAB  No pedal edema seen    Recs-   He received IV bumex this am, would discontinue now and we can give you recs for further diuretic doses.
The patient was seen and examined with the Cardiology Consultation Teaching Service.     No overnight events    No chest pain  No dyspnea, orthopnea or PND  No palpitations or dizziness     PMH/PSH:  Atrial fibrillation on apixaban  Chronic heart failure with preserved LV function  Stroke treated with thrombectomy with residual facial droop and left hemiparesis, 8/4/2024  Diabetes  Hypertension  Myasthenia gravis     Ill-appearing man in no acute distress  Alert and oriented  Afebrile  Tachycardia   Normal blood pressure  JVP is elevated  Clear lungs  Irregular rhythm  2/6 systolic murmur heard throughout   Extremities are warm and perfused  No peripheral edema     Normocytic anemia Hb 7.8  Thrombocytopenia 143K  GFR 84    hs-troponin 49, 52, 54  pro-BNP 2517, 3229    Echocardiography demonstrates normal LV systolic function, severe LVH, calcification of the mitral valve with mild-moderate MS, mild MR, significant aortic stenosis with mean gradient 39, and severe AI. LA is severely dilated.    Kidney and bladder US with bilateral masses suspicious for renal cell carcinoma.     Impression and Recommendations:   84-year-old man with atrial fibrillation, chronic heart failure with preserved LV function, myasthenia gravis and recent stroke several weeks ago who presented to a rehabilitation center with after developing chest pain, initially noted also to have slow atrial fibrillation, echocardiography revealing severe AI and AS.     Hospitalization further complicated by the development of hematuria thought to be related to Elliott trauma in the context of anticoagulation, now with renal ultrasound revealing bilateral renal masses.    The patient continues to be mildly volume overloaded. Will continue with diuresis at this time to optimize his volume status. AV marion blockers discontinued given his atrial fibrillation with slow ventricular response. Apixaban is being held given his hematuria and newly discovered renal masses.     Discussed with urology at the bedside. No plan for further work-up on the renal masses at this time. Conservative management would be recommended and surgery would not be pursued even if the diagnosis of renal cell carcinoma was made. The patient is unlikely to be a candidate for valve intervention given his current comorbidities and condition.     Continue aspirin and statin given his recent stroke.    Marcelo Rhodes MD FAC FACP  Cardiology  [phone]
The patient was seen and examined with the Cardiology Consultation Teaching Service.     He is an 84-year-old man with atrial fibrillation, chronic heart failure with preserved LV function, myasthenia gravis and recent stroke several weeks ago who presented to a rehabilitation center with after developing chest pain, noted also to have slow atrial fibrillation.     PMH/PSH:  Atrial fibrillation on apixaban  Chronic heart failure with preserved LV function  Stroke treated with thrombectomy with residual facial droop and left hemiparesis, 8/4/2024  Diabetes  Hypertension  Myasthenia gravis     Ill-appearing man in no acute distress  Alert and oriented  Afebrile  Tachycardia   Normal blood pressure  JVP is elevated  Clear lungs  Irregular rhythm  2/6 harsh systolic murmur heard throughout   Extremities are warm and perfused  No peripheral edema     Normocytic anemia Hb 7.8  Thrombocytopenia 143K  GFR 84    hs-troponin 49, 52, 54  pro-BNP 2517, 3229    Echocardiography demonstrates normal LV systolic function, severe LVH, calcification of the mitral valve with mild-moderate MS, mild MR, significant aortic stenosis with mean gradient 39, and severe AI. LA is severely dilated.    Kidney and bladder US with bilateral masses suspicious for renal cell carcinoma.     Impression and Recommendations:   84-year-old man with atrial fibrillation, chronic heart failure with preserved LV function, myasthenia gravis and recent stroke several weeks ago who presented to a rehabilitation center with after developing chest pain, initially noted also to have slow atrial fibrillation, echocardiography revealing severe AI and AS. Hospitalization further complicated by the development of hematuria with renal ultrasound revealing bilateral renal masses concerning for renal cell carcinoma.    The patient continues to be mildly volume overloaded. Will continue with diuresis at this time to optimize his volume status. AV marion blockers discontinued given his atrial fibrillation with slow ventricular response. Apixaban is being held given his hematuria and newly discovered renal masses.     Patient will need further work-up and characterization of his renal masses prior to consideration of possible valve interventions.     Continue aspirin and statin given his recent stroke.    Marcelo Rhodes MD FAC FACP  Cardiology  [phone]
The patient was seen and examined with the Cardiology Consultation Teaching Service.     No overnight events    No chest pain  No dyspnea, orthopnea or PND  No palpitations or dizziness     PMH/PSH:  Atrial fibrillation on apixaban  Chronic heart failure with preserved LV function  Stroke treated with thrombectomy with residual facial droop and left hemiparesis, 8/4/2024  Diabetes  Hypertension  Myasthenia gravis     Chronically ill-appearing elderly man in no acute distress  Alert and oriented  Afebrile  Blood pressure low normal  Bradycardia  JVP is elevated  Clear lungs  Irregular rhythm  2/6 systolic murmur heard throughout   Extremities are warm and perfused  No peripheral edema     Normocytic anemia Hb 7.4  Thrombocytopenia 122K  GFR 88    hs-troponin 49, 52, 54  pro-BNP 2517, 3229    Echocardiography demonstrates normal LV systolic function, severe LVH, calcification of the mitral valve with mild-moderate MS, mild MR, significant aortic stenosis with mean gradient 39, and severe AI. LA is severely dilated.    Kidney and bladder US with bilateral masses suspicious for renal cell carcinoma.     Impression and Recommendations:   84-year-old man with atrial fibrillation, chronic heart failure with preserved LV function, myasthenia gravis and recent stroke several weeks ago who presented to a rehabilitation center with after developing chest pain, initially noted also to have slow atrial fibrillation, echocardiography revealing severe AI and AS.     Hospitalization further complicated by the development of hematuria thought to be related to Elliott trauma in the context of anticoagulation, now with renal ultrasound revealing bilateral renal masses.    The patient continues to be mildly volume overloaded. Will continue with diuresis at this time to optimize his volume status. AV marion blockers discontinued given his atrial fibrillation with slow ventricular response. Apixaban is being held given his hematuria and newly discovered renal masses.     CT of the renal masses is pending.    Will review echocardiography and valvular disease with structural interventional team to determine the possibility of TAVR evaluation, but I suspect the patient's comorbidities and functional status may preclude this procedure.     Continue aspirin and statin given his recent stroke.    Marcelo Rhodes MD FAC FACP  Cardiology  [phone]
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 He presented with chest pain and acute decompensated HFpEF in setting of severe AS/AI (follows with Dr. Jay Lisker as outpatient cardiologist). ACC/AHA Stage C Valvular HFpEF. Serum proBNP 3229 pg/mL, HSTropT 54 -->52 ng/L, BUN/Cr 27/0.97 mg/dL. Chest X-ray with perihilar infiltrates, left sided pleural effusion. TTE showed LVEF 60-65%, LV concentric hypertrophy, mild-moderate MS, mild MR, severe AS/AI, dilated LA, mild-moderate TR, RA - 15 mmHg. ECG  showed afib with slow ventricular response, no acute ST T  wave changes. Current management includes holding home Entresto. Maintain bumetanide (Bumex), s/p 4mg IV x1 noting cautious diuretic use given his severe AS, likely transition to oral diuretic therapy on 8/27/24. Goal net negative 500cc - 1L / 24 hrs.  Plan to start SGLT2 near discharge. Pending review of echo and multivalvular disease with structural interventional team to determine candidacy for possible TAVR, high suspect the patient's comorbidities and functional status may preclude this procedure.  CT imaging suggestive of bilateral RCC. [id number] – 7 points. Resume Eliquis 5 mg PO BID, currently on hold iso hematuria. On ASA for recent CVA. Holding beta blockers iso afib with slow ventricular response.

## 2024-08-28 NOTE — PROVIDER CONTACT NOTE (OTHER) - ACTION/TREATMENT ORDERED:
ACP made aware, awaiting order. Will continue to monitor
Bhavik Dhaliwal notified
ACP notified.
ACP notified. RN continue to josse
ACP aware. notify when HR sustaining in 30s
ACP made aware. will continue to monitor.
ACP made aware. will continue to monitor. lasix not given
JANEL Vasquez notified
ACP aware. will continue to monitor
ALISTAIR Randolph notified, 0600 Lasix rescheduled as per ACP.
ACP notified. Urology came to bedside to irrigate and assess.
ALISTAIR Randolph notified
Bhavik Dhaliwal notified
ACP made aware

## 2024-08-28 NOTE — PROVIDER CONTACT NOTE (OTHER) - ASSESSMENT
Patient is AOx4, alert and oriented, pt is asymptomatic. No signs of acute distress.
patient asymptomatic
Patient denies sob or chest pain. No changes in mental status.
Patient is AOx4, alert and oriented, pt is asymptomatic. No signs of acute distress.
Tip of penis/butt leaking/bleeding. A small clot noted coming from tip of penis. Pt denies pain/discomfort at site.
Patient is AOx4, alert and oriented, pt is asymptomatic. No signs of acute distress.
Patient is AOx4, alert and oriented, pt is asymptomatic. No signs of acute distress.
Pt bleeding around butt site. Pt denies SOB/chest pain, pain or discomfort at this time.
patient asymptomatic. tends to run low
Patient is asymptomatic and calm at baseline
patient in slow afib lowest HR 38 nonsustained. patient resting in bed
Patient is AOx4, alert and oriented, pt is asymptomatic. No signs of acute distress.
patient asymptomatic
Patient mental staus at baseline. Patient vitals stable and patient denies feelings of lightheadedness or diziness or lethargy

## 2024-08-28 NOTE — PROVIDER CONTACT NOTE (OTHER) - SITUATION
/36
/45
Patients heart rate 35
patients /40 tends to run low diastolically
/39
/39
Tip of penis/butt leaking/bleeding. A small clot noted coming from tip of penis. Pt denies pain/discomfort at site.
/44
Patient HR dropped to 29 momentarily
/45
Pt bleeding around butt site.
patient c/o slight pain in LUQ
patients /45
patient in slow afib lowest HR 38 nonsustained

## 2024-08-28 NOTE — PROGRESS NOTE ADULT - ATTENDING SUPERVISION STATEMENT
Fellow
Fellow
Resident
Resident
Fellow
Resident
Fellow
Resident
Fellow

## 2024-08-28 NOTE — PROVIDER CONTACT NOTE (OTHER) - NAME OF MD/NP/PA/DO NOTIFIED:
ACP Noy Su
Eryn Corralaud
Rhea Vasquez ACP
edgar
Bhavik Dhaliwal
Kasia Harrell
edgar early
Bhavik Dhaliwal
ALISTAIR Randolph
JANEL Vasquez
Leanne Marquez, L
indigo
ALISTAIR Randolph
JANEL Huston

## 2024-08-28 NOTE — PROVIDER CONTACT NOTE (OTHER) - DATE AND TIME:
23-Aug-2024 22:43
21-Aug-2024 09:30
22-Aug-2024 21:18
24-Aug-2024 13:05
20-Aug-2024 00:48
24-Aug-2024 05:51
25-Aug-2024 05:30
28-Aug-2024 00:02
19-Aug-2024 16:15
25-Aug-2024 23:15
23-Aug-2024 05:24
25-Aug-2024 13:03
22-Aug-2024 12:00
23-Aug-2024 09:04

## 2024-08-29 ENCOUNTER — TRANSCRIPTION ENCOUNTER (OUTPATIENT)
Age: 84
End: 2024-08-29

## 2024-08-29 DIAGNOSIS — R07.9 CHEST PAIN, UNSPECIFIED: ICD-10-CM

## 2024-08-29 LAB
ANION GAP SERPL CALC-SCNC: 9 MMOL/L — SIGNIFICANT CHANGE UP (ref 7–14)
BUN SERPL-MCNC: 40 MG/DL — HIGH (ref 7–23)
CALCIUM SERPL-MCNC: 9 MG/DL — SIGNIFICANT CHANGE UP (ref 8.4–10.5)
CHLORIDE SERPL-SCNC: 102 MMOL/L — SIGNIFICANT CHANGE UP (ref 98–107)
CO2 SERPL-SCNC: 29 MMOL/L — SIGNIFICANT CHANGE UP (ref 22–31)
CREAT SERPL-MCNC: 0.91 MG/DL — SIGNIFICANT CHANGE UP (ref 0.5–1.3)
EGFR: 83 ML/MIN/1.73M2 — SIGNIFICANT CHANGE UP
GLUCOSE BLDC GLUCOMTR-MCNC: 109 MG/DL — HIGH (ref 70–99)
GLUCOSE BLDC GLUCOMTR-MCNC: 120 MG/DL — HIGH (ref 70–99)
GLUCOSE BLDC GLUCOMTR-MCNC: 134 MG/DL — HIGH (ref 70–99)
GLUCOSE BLDC GLUCOMTR-MCNC: 301 MG/DL — HIGH (ref 70–99)
GLUCOSE SERPL-MCNC: 109 MG/DL — HIGH (ref 70–99)
HCT VFR BLD CALC: 24.2 % — LOW (ref 39–50)
HGB BLD-MCNC: 7.4 G/DL — LOW (ref 13–17)
MAGNESIUM SERPL-MCNC: 2.4 MG/DL — SIGNIFICANT CHANGE UP (ref 1.6–2.6)
MCHC RBC-ENTMCNC: 28.6 PG — SIGNIFICANT CHANGE UP (ref 27–34)
MCHC RBC-ENTMCNC: 30.6 GM/DL — LOW (ref 32–36)
MCV RBC AUTO: 93.4 FL — SIGNIFICANT CHANGE UP (ref 80–100)
NRBC # BLD: 0 /100 WBCS — SIGNIFICANT CHANGE UP (ref 0–0)
NRBC # FLD: 0 K/UL — SIGNIFICANT CHANGE UP (ref 0–0)
PHOSPHATE SERPL-MCNC: 3.6 MG/DL — SIGNIFICANT CHANGE UP (ref 2.5–4.5)
PLATELET # BLD AUTO: 124 K/UL — LOW (ref 150–400)
POTASSIUM SERPL-MCNC: 4.4 MMOL/L — SIGNIFICANT CHANGE UP (ref 3.5–5.3)
POTASSIUM SERPL-SCNC: 4.4 MMOL/L — SIGNIFICANT CHANGE UP (ref 3.5–5.3)
RBC # BLD: 2.59 M/UL — LOW (ref 4.2–5.8)
RBC # FLD: 15.6 % — HIGH (ref 10.3–14.5)
SODIUM SERPL-SCNC: 140 MMOL/L — SIGNIFICANT CHANGE UP (ref 135–145)
WBC # BLD: 5.12 K/UL — SIGNIFICANT CHANGE UP (ref 3.8–10.5)
WBC # FLD AUTO: 5.12 K/UL — SIGNIFICANT CHANGE UP (ref 3.8–10.5)

## 2024-08-29 PROCEDURE — 99232 SBSQ HOSP IP/OBS MODERATE 35: CPT

## 2024-08-29 RX ADMIN — Medication 40 MILLIGRAM(S): at 21:37

## 2024-08-29 RX ADMIN — POLYETHYLENE GLYCOL 3350 17 GRAM(S): 17 POWDER, FOR SOLUTION ORAL at 04:38

## 2024-08-29 RX ADMIN — TAMSULOSIN HYDROCHLORIDE 0.8 MILLIGRAM(S): 0.4 CAPSULE ORAL at 21:37

## 2024-08-29 RX ADMIN — CHLORHEXIDINE GLUCONATE 1 APPLICATION(S): 40 SOLUTION TOPICAL at 11:38

## 2024-08-29 RX ADMIN — Medication 81 MILLIGRAM(S): at 11:36

## 2024-08-29 RX ADMIN — MYCOPHENOLATE MOFETIL 500 MILLIGRAM(S): 250 CAPSULE ORAL at 11:37

## 2024-08-29 RX ADMIN — Medication 1000 MICROGRAM(S): at 11:37

## 2024-08-29 RX ADMIN — APIXABAN 5 MILLIGRAM(S): 5 TABLET, FILM COATED ORAL at 04:38

## 2024-08-29 RX ADMIN — Medication 1 MILLIGRAM(S): at 11:37

## 2024-08-29 RX ADMIN — Medication 5 MILLIGRAM(S): at 04:37

## 2024-08-29 RX ADMIN — Medication 4: at 12:06

## 2024-08-29 RX ADMIN — FINASTERIDE 5 MILLIGRAM(S): 1 TABLET, FILM COATED ORAL at 11:38

## 2024-08-29 RX ADMIN — Medication 2000 UNIT(S): at 11:37

## 2024-08-29 RX ADMIN — APIXABAN 5 MILLIGRAM(S): 5 TABLET, FILM COATED ORAL at 17:03

## 2024-08-29 NOTE — PROGRESS NOTE ADULT - SUBJECTIVE AND OBJECTIVE BOX
[facility] Division of Hospital Medicine  Sadiq Farah MD  Available via MS Teams  Pager: [phone]    SUBJECTIVE / OVERNIGHT EVENTS:  Patient seen and examined at the bedside.   No overnight events  Pending DC back to [facility]    ADDITIONAL REVIEW OF SYSTEMS:    MEDICATIONS  (STANDING):  apixaban 5 milliGRAM(s) Oral every 12 hours  aspirin  chewable 81 milliGRAM(s) Oral daily  atorvastatin 40 milliGRAM(s) Oral at bedtime  chlorhexidine 2% Cloths 1 Application(s) Topical daily  cholecalciferol 2000 Unit(s) Oral daily  cyanocobalamin 1000 MICROGram(s) Oral daily  dextrose 5%. 1000 milliLiter(s) (100 mL/Hr) IV Continuous <Continuous>  dextrose 5%. 1000 milliLiter(s) (50 mL/Hr) IV Continuous <Continuous>  dextrose 50% Injectable 25 Gram(s) IV Push once  dextrose 50% Injectable 25 Gram(s) IV Push once  dextrose 50% Injectable 12.5 Gram(s) IV Push once  finasteride 5 milliGRAM(s) Oral daily  folic acid 1 milliGRAM(s) Oral daily  glucagon  Injectable 1 milliGRAM(s) IntraMuscular once  insulin lispro (ADMELOG) corrective regimen sliding scale   SubCutaneous at bedtime  insulin lispro (ADMELOG) corrective regimen sliding scale   SubCutaneous three times a day before meals  mycophenolate mofetil 500 milliGRAM(s) Oral daily  polyethylene glycol 3350 17 Gram(s) Oral two times a day  predniSONE   Tablet 5 milliGRAM(s) Oral daily  tamsulosin 0.8 milliGRAM(s) Oral at bedtime    MEDICATIONS  (PRN):  AQUAPHOR (petrolatum Ointment) 1 Application(s) Topical every 4 hours PRN rash  dextrose Oral Gel 15 Gram(s) Oral once PRN Blood Glucose LESS THAN 70 milliGRAM(s)/deciliter      I&O's Summary    28 Aug 2024 07:01  -  29 Aug 2024 07:00  --------------------------------------------------------  IN: 720 mL / OUT: 800 mL / NET: -80 mL    29 Aug 2024 07:01  -  29 Aug 2024 13:40  --------------------------------------------------------  IN: 480 mL / OUT: 900 mL / NET: -420 mL        PHYSICAL EXAM:  Vital Signs Last 24 Hrs  T(C): 36.3 (29 Aug 2024 11:45), Max: 37 (28 Aug 2024 20:45)  T(F): 97.4 (29 Aug 2024 11:45), Max: 98.6 (28 Aug 2024 20:45)  HR: 43 (29 Aug 2024 11:45) (43 - 50)  BP: 134/30 (29 Aug 2024 11:45) (132/45 - 134/30)  BP(mean): --  RR: 18 (29 Aug 2024 11:45) (18 - 18)  SpO2: 100% (29 Aug 2024 11:45) (94% - 100%)    Parameters below as of 29 Aug 2024 11:45  Patient On (Oxygen Delivery Method): nasal cannula  O2 Flow (L/min): 1    CONSTITUTIONAL: elderly male with signs of cachexia, temporal wasting  EYES: PERRLA  ENMT: Moist oral mucosa, normal dentition  NECK: Supple, no thyromegaly  RESPIRATORY: Normal respiratory effort; lungs are clear to auscultation bilaterally  CARDIOVASCULAR: S1 and S2 +ve, significant systolic murmur noted  ABDOMEN: Nontender to palpation, normoactive bowel sounds, no rebound/guarding;  : butt in place, no hematuria noted  EXTREMITIES: no significant pedal edema.    NEUROLOGY: CN 2-12 are intact and symmetric; no gross motor or sensory deficits   PSYCH: A+O to person, place, and time, mild confusion noted  SKIN: No rashes; no palpable lesions      LABS:                        7.4    5.12  )-----------( 124      ( 29 Aug 2024 06:20 )             24.2     08-29    140  |  102  |  40<H>  ----------------------------<  109<H>  4.4   |  29  |  0.91    Ca    9.0      29 Aug 2024 06:20  Phos  3.6     08-29  Mg     2.40     08-29            Urinalysis Basic - ( 29 Aug 2024 06:20 )    Color: x / Appearance: x / SG: x / pH: x  Gluc: 109 mg/dL / Ketone: x  / Bili: x / Urobili: x   Blood: x / Protein: x / Nitrite: x   Leuk Esterase: x / RBC: x / WBC x   Sq Epi: x / Non Sq Epi: x / Bacteria: x

## 2024-08-29 NOTE — DISCHARGE NOTE PROVIDER - NSDCMRMEDTOKEN_GEN_ALL_CORE_FT
amLODIPine 10 mg oral tablet: 1 tab(s) orally once a day  aspirin 81 mg oral tablet: 1 tab(s) orally once a day  atorvastatin 40 mg oral tablet: 1 tab(s) orally once a day (at bedtime)  calcium (as carbonate)-vitamin D 500 mg-400 intl units (10 mcg) oral tablet: 1 tab(s) orally once a day  Eliquis 5 mg oral tablet: 1 tab(s) orally 2 times a day  Farxiga 10 mg oral tablet: 1 tab(s) orally once a day  hydrALAZINE 25 mg oral tablet: 1 tab(s) orally every 8 hours  metFORMIN 500 mg oral tablet: 1 tab(s) orally 2 times a day  metoprolol succinate 100 mg oral tablet, extended release: 1 tab(s) orally once a day  mycophenolate mofetil 500 mg oral tablet: 1 tab(s) orally once a day  predniSONE 5 mg oral tablet: 1 tab(s) orally once a day  sacubitril-valsartan 49 mg-51 mg oral tablet: 1 tab(s) orally 2 times a day  tamsulosin 0.4 mg oral capsule: 1 cap(s) orally once a day   apixaban 5 mg oral tablet: 1 tab(s) orally every 12 hours  aspirin 81 mg oral tablet: 1 tab(s) orally once a day  atorvastatin 40 mg oral tablet: 1 tab(s) orally once a day (at bedtime)  cholecalciferol oral tablet: 2000 unit(s) orally once a day  cyanocobalamin 1000 mcg oral tablet: 1 tab(s) orally once a day  Farxiga 10 mg oral tablet: 1 tab(s) orally once a day  finasteride 5 mg oral tablet: 1 tab(s) orally once a day  folic acid 1 mg oral tablet: 1 tab(s) orally once a day  metFORMIN 500 mg oral tablet: 1 tab(s) orally 2 times a day  mycophenolate mofetil 250 mg oral capsule: 500 milligram(s) orally once a day  polyethylene glycol 3350 oral powder for reconstitution: 17 gram(s) orally 2 times a day  predniSONE 5 mg oral tablet: 1 tab(s) orally once a day  tamsulosin 0.4 mg oral capsule: 2 cap(s) orally once a day (at bedtime)

## 2024-08-29 NOTE — DISCHARGE NOTE PROVIDER - NSDCFUSCHEDAPPT_GEN_ALL_CORE_FT
[facility]  UROLOGY [address] [place] [address]  Scheduled Appointment: 11/07/2024

## 2024-08-29 NOTE — DISCHARGE NOTE PROVIDER - CARE PROVIDERS DIRECT ADDRESSES
,[email or web address] ,[email or web address]

## 2024-08-29 NOTE — DISCHARGE NOTE PROVIDER - NSDCCPCAREPLAN_GEN_ALL_CORE_FT
PRINCIPAL DISCHARGE DIAGNOSIS  Diagnosis: Severe aortic regurgitation  Assessment and Plan of Treatment: Patient was admitted to the hospital secondary to chest pain.  TTE was performed that showed ejection fraction of 65% and severe aortic regurgitation + significant aortic stenosis.  Patient was deemed to be a poor surgical candidate for valve repair due to his age and comorbid conditions.  His heart condition instead will be treated medically.  Patient was also evaluated by electrophysiology team for bradycardia (slow heart rate).  Per EP team, no indication for pacemaker at this time.  Patient will be discharged back to rehab facility.      SECONDARY DISCHARGE DIAGNOSES  Diagnosis: Renal cell carcinoma  Assessment and Plan of Treatment: Patient was found to have bilateral renal masses suspicious for renal cell carcinoma.  Due to his age and comorbidities, he was deemed to be a poor surgical candidate.  Recommended for outpatient follow up.     PRINCIPAL DISCHARGE DIAGNOSIS  Diagnosis: Severe aortic regurgitation  Assessment and Plan of Treatment: Patient was admitted to the hospital secondary to chest pain.  TTE was performed that showed ejection fraction of 65% and severe aortic regurgitation + significant aortic stenosis.  Patient was deemed to be a poor surgical candidate for valve repair due to his age and comorbid conditions.  His heart condition instead will be treated medically.  Patient was also evaluated by electrophysiology team for bradycardia (slow heart rate).  Per EP team, no indication for pacemaker at this time.  Patient will be discharged back to rehab facility.      SECONDARY DISCHARGE DIAGNOSES  Diagnosis: Renal cell carcinoma  Assessment and Plan of Treatment: Patient was found to have bilateral renal masses suspicious for renal cell carcinoma.  Due to his age and comorbidities, he was deemed to be a poor surgical candidate.  Recommended for outpatient follow up.    Diagnosis: Pseudoaneurysm  Assessment and Plan of Treatment: Please follow up with vascular surgery in 1-2 weeks for additional follow and routine survillance.

## 2024-08-29 NOTE — DISCHARGE NOTE PROVIDER - HOSPITAL COURSE
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  Patient was evaluated in ED found to have Afib with slow ventricular rate.  Patient was admitted to tele.  Was evaluated by EP - held all marion blocking agents.  TTE was performed - showed EF 65% with severe aortic regurgitation and significant aoritc stenosis.  Was deemed to not require any pacing intervention. Was noted to have hematuria with clots, eliquis was stopped.  Urology was consulted.  CT abdomen was performed and showed a normal size prostate and an unremarkable bladder but revealed incidental renal masses, consistent with bilateral renal cell carcinoma.    Per urology, patient was started on CBI as he was passing clots through the butt.  Deemed secondary to traumatic catheterization.  Per urology - patient is not a surgical candidate for evaluation of renal masses.  Patient also was found to have a thrombosed femoral pseudoaneurysm, no intervention needed per vascular surgery.  Findings discussed with family members, family declines surgical options for both renal masses and significant aortic regurg.  After CBI, eliquis restarted with no further hematuria.  Will DC back to rehab center.

## 2024-08-29 NOTE — DISCHARGE NOTE PROVIDER - PROVIDER TOKENS
PROVIDER:[TOKEN:[[id number]]] PROVIDER:[TOKEN:[[id number]]],PROVIDER:[TOKEN:[[id number]]] PROVIDER:[TOKEN:[[id number]]],PROVIDER:[TOKEN:[[id number]]],PROVIDER:[TOKEN:[[id number]]]

## 2024-08-29 NOTE — DISCHARGE NOTE PROVIDER - CARE PROVIDER_API CALL
Kun Zuluaga Abraham  Internal Medicine  [facility], [address]  [place] [address]  Phone: [phone]  Fax: [phone]  Follow Up Time:    Kun Zuluaga Abraham  Internal Medicine  [address], [address]  [place] [address]  Phone: [phone]  Fax: [phone]  Follow Up Time:     Daniel Singh  Urology  [address] [place] [address], [address]  [place] [address]  Phone: [phone]  Fax: [phone]  Follow Up Time:    Kun Zuluaga  Internal Medicine  [address], [address]  [place] [address]  Phone: [phone]  Fax: [phone]  Follow Up Time:     Daniel Singh  Urology  [address], [address]  [place] [address]  Phone: [phone]  Fax: [phone]  Follow Up Time:     Elvi Nelson  Vascular Surgery  [address], [address]  [place] [address]  Phone: [phone]  Fax: [phone]  Follow Up Time:

## 2024-08-29 NOTE — DISCHARGE NOTE PROVIDER - NSFOLLOWUPCLINICS_GEN_ALL_ED_FT
Cardiology at [facility]  Cardiology  [address] [place], [place] [address]  Phone: [phone]  Fax:

## 2024-08-29 NOTE — DISCHARGE NOTE PROVIDER - NSDCCPTREATMENT_GEN_ALL_CORE_FT
PRINCIPAL PROCEDURE  Procedure: CT abdomen  Findings and Treatment: FINDINGS:  LOWER CHEST: Cardiomegaly. Aortic valve, mitral annular, coronary artery   calcification. Small left pleural effusion, with associated passive   atelectasis.  LIVER: Scattered hypodense foci too small to characterize.  BILE DUCTS: Normal caliber.  GALLBLADDER: Within normal limits.  SPLEEN: Splenomegaly.  PANCREAS: Within normal limits.  ADRENALS: Within normal limits.  KIDNEYS/URETERS:  6.5 x 4.6 x 4.9 cm heterogenous solid enhancing left upper pole mass   compatible with RCC. There are internal areas of necrosis.  2.0 x 1.7 x 2.1 cm cystic and solid mass with enhancing solid component   in the right upper pole compatible with RCC.  Additional left renal cysts. No hydronephrosis bilaterally.  BLADDER: Elliott catheter.  REPRODUCTIVE ORGANS: Prostate size within normal limits.  BOWEL: No bowel obstruction. Colonic diverticulosis without   diverticulitis. Large rectal stool burden. Appendix is not visualized.  PERITONEUM/RETROPERITONEUM: Within normal limits.  VESSELS: The bilateral renal veins are patent. Atherosclerotic changes.   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  LYMPH NODES: No lymphadenopathy.  ABDOMINAL WALL: Within normal limits.  BONES: Degenerative changes of the spine.  IMPRESSION:  6.5 cm left upper pole heterogenous mass compatible with renal cell   carcinoma.  2.1 cm right upper pole cystic-solid mass compatible with renal cell   carcinoma.  Scattered subcentimeter hypodense foci in the liver remain indeterminate.  Small left pleural effusion with associated atelectasis.      SECONDARY PROCEDURE  Procedure: Transthoracic echocardiography (TTE)  Findings and Treatment: CONCLUSIONS:      1. Technically difficult image quality.   2. The left ventricular cavity is normal in size. Left ventricular wall thickness is severely increased. Left ventricularsystolic function is normal with an ejection fraction visually estimated at 60 to 65%. There are no regional wall motion abnormalities seen. Severe left ventricular hypertrophy. There is increased LV mass and concentric hypertrophy.   3. Normal right ventricular cavity size and probably normal right ventricular systolic function.   4. There is mitral valve thickening of the anterior and posterior leaflets. There is reduced leaflet mobility of the mitral valve. There is calcification of the mitralvalve annulus. There is moderate leaflet calcification. There is mild to moderate mitral valve stenosis. The transmitral peak gradient is 14.0 mmHg and mean gradient is 4.00 mmHg. There is mild mitral regurgitation.   5. The aortic valve appears trileaflet with reduced systolic excursion. There is calcification of the aortic valve leaflets. The peak transaortic velocity is 4.16 m/s, peak transaortic gradient is 69.2 mmHg and mean transaortic gradient is 39.0 mmHg. Significant aortic stenosis is present. However, unable to estimate the aortic valve area as pulsed wave Doppler at the level of the LVOT not performed (no LVOT VTI measurements made). There is severe aortic regurgitation. Vena contracta width ~ 0.7 cm.   6. The left atrium is severely dilated with an indexed volume of 59.19 ml/m².   7. Left pleural effusion noted.

## 2024-08-29 NOTE — PROGRESS NOTE ADULT - ASSESSMENT
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 Also w/ significant AS and AI on echo. Course c/b hematuria and incidentally found renal masses.

## 2024-08-30 ENCOUNTER — TRANSCRIPTION ENCOUNTER (OUTPATIENT)
Age: 84
End: 2024-08-30

## 2024-08-30 VITALS
RESPIRATION RATE: 18 BRPM | HEART RATE: 40 BPM | OXYGEN SATURATION: 99 % | DIASTOLIC BLOOD PRESSURE: 52 MMHG | SYSTOLIC BLOOD PRESSURE: 146 MMHG

## 2024-08-30 LAB
ANION GAP SERPL CALC-SCNC: 11 MMOL/L — SIGNIFICANT CHANGE UP (ref 7–14)
BASOPHILS # BLD AUTO: 0.03 K/UL — SIGNIFICANT CHANGE UP (ref 0–0.2)
BASOPHILS NFR BLD AUTO: 0.6 % — SIGNIFICANT CHANGE UP (ref 0–2)
BLD GP AB SCN SERPL QL: NEGATIVE — SIGNIFICANT CHANGE UP
BUN SERPL-MCNC: 37 MG/DL — HIGH (ref 7–23)
CALCIUM SERPL-MCNC: 8.7 MG/DL — SIGNIFICANT CHANGE UP (ref 8.4–10.5)
CHLORIDE SERPL-SCNC: 103 MMOL/L — SIGNIFICANT CHANGE UP (ref 98–107)
CO2 SERPL-SCNC: 27 MMOL/L — SIGNIFICANT CHANGE UP (ref 22–31)
CREAT SERPL-MCNC: 0.88 MG/DL — SIGNIFICANT CHANGE UP (ref 0.5–1.3)
EGFR: 84 ML/MIN/1.73M2 — SIGNIFICANT CHANGE UP
EOSINOPHIL # BLD AUTO: 0.15 K/UL — SIGNIFICANT CHANGE UP (ref 0–0.5)
EOSINOPHIL NFR BLD AUTO: 3 % — SIGNIFICANT CHANGE UP (ref 0–6)
GLUCOSE BLDC GLUCOMTR-MCNC: 114 MG/DL — HIGH (ref 70–99)
GLUCOSE BLDC GLUCOMTR-MCNC: 228 MG/DL — HIGH (ref 70–99)
GLUCOSE SERPL-MCNC: 93 MG/DL — SIGNIFICANT CHANGE UP (ref 70–99)
HCT VFR BLD CALC: 23.6 % — LOW (ref 39–50)
HGB BLD-MCNC: 7.2 G/DL — LOW (ref 13–17)
IANC: 3.26 K/UL — SIGNIFICANT CHANGE UP (ref 1.8–7.4)
IMM GRANULOCYTES NFR BLD AUTO: 0.4 % — SIGNIFICANT CHANGE UP (ref 0–0.9)
LYMPHOCYTES # BLD AUTO: 1.02 K/UL — SIGNIFICANT CHANGE UP (ref 1–3.3)
LYMPHOCYTES # BLD AUTO: 20.7 % — SIGNIFICANT CHANGE UP (ref 13–44)
MAGNESIUM SERPL-MCNC: 2.3 MG/DL — SIGNIFICANT CHANGE UP (ref 1.6–2.6)
MCHC RBC-ENTMCNC: 28.6 PG — SIGNIFICANT CHANGE UP (ref 27–34)
MCHC RBC-ENTMCNC: 30.5 GM/DL — LOW (ref 32–36)
MCV RBC AUTO: 93.7 FL — SIGNIFICANT CHANGE UP (ref 80–100)
MONOCYTES # BLD AUTO: 0.44 K/UL — SIGNIFICANT CHANGE UP (ref 0–0.9)
MONOCYTES NFR BLD AUTO: 8.9 % — SIGNIFICANT CHANGE UP (ref 2–14)
NEUTROPHILS # BLD AUTO: 3.26 K/UL — SIGNIFICANT CHANGE UP (ref 1.8–7.4)
NEUTROPHILS NFR BLD AUTO: 66.4 % — SIGNIFICANT CHANGE UP (ref 43–77)
NRBC # BLD: 0 /100 WBCS — SIGNIFICANT CHANGE UP (ref 0–0)
NRBC # FLD: 0 K/UL — SIGNIFICANT CHANGE UP (ref 0–0)
PHOSPHATE SERPL-MCNC: 3.1 MG/DL — SIGNIFICANT CHANGE UP (ref 2.5–4.5)
PLATELET # BLD AUTO: 117 K/UL — LOW (ref 150–400)
POTASSIUM SERPL-MCNC: 4.3 MMOL/L — SIGNIFICANT CHANGE UP (ref 3.5–5.3)
POTASSIUM SERPL-SCNC: 4.3 MMOL/L — SIGNIFICANT CHANGE UP (ref 3.5–5.3)
RBC # BLD: 2.52 M/UL — LOW (ref 4.2–5.8)
RBC # FLD: 15 % — HIGH (ref 10.3–14.5)
RH IG SCN BLD-IMP: POSITIVE — SIGNIFICANT CHANGE UP
SODIUM SERPL-SCNC: 141 MMOL/L — SIGNIFICANT CHANGE UP (ref 135–145)
WBC # BLD: 4.92 K/UL — SIGNIFICANT CHANGE UP (ref 3.8–10.5)
WBC # FLD AUTO: 4.92 K/UL — SIGNIFICANT CHANGE UP (ref 3.8–10.5)

## 2024-08-30 PROCEDURE — 99239 HOSP IP/OBS DSCHRG MGMT >30: CPT

## 2024-08-30 RX ORDER — POLYETHYLENE GLYCOL 3350 17 G/17G
17 POWDER, FOR SOLUTION ORAL
Qty: 0 | Refills: 0 | DISCHARGE
Start: 2024-08-30

## 2024-08-30 RX ORDER — HYDRALAZINE HCL 50 MG
1 TABLET ORAL
Refills: 0 | DISCHARGE

## 2024-08-30 RX ORDER — APIXABAN 5 MG/1
1 TABLET, FILM COATED ORAL
Refills: 0 | DISCHARGE

## 2024-08-30 RX ORDER — APIXABAN 5 MG/1
1 TABLET, FILM COATED ORAL
Qty: 0 | Refills: 0 | DISCHARGE
Start: 2024-08-30

## 2024-08-30 RX ORDER — PREDNISONE 10 MG
1 TABLET, DOSE PACK ORAL
Qty: 0 | Refills: 0 | DISCHARGE
Start: 2024-08-30

## 2024-08-30 RX ORDER — TAMSULOSIN HYDROCHLORIDE 0.4 MG/1
2 CAPSULE ORAL
Qty: 0 | Refills: 0 | DISCHARGE
Start: 2024-08-30

## 2024-08-30 RX ORDER — FOLIC ACID/MULTIVIT,IRON,MINER 0.4MG-18MG
2000 TABLET,CHEWABLE ORAL
Qty: 0 | Refills: 0 | DISCHARGE
Start: 2024-08-30

## 2024-08-30 RX ORDER — AMLODIPINE BESYLATE 10 MG/1
1 TABLET ORAL
Refills: 0 | DISCHARGE

## 2024-08-30 RX ORDER — CYANOCOBALAMIN (VITAMIN B-12) 500MCG/0.1
1 GEL (ML) NASAL
Qty: 0 | Refills: 0 | DISCHARGE
Start: 2024-08-30

## 2024-08-30 RX ORDER — TAMSULOSIN HYDROCHLORIDE 0.4 MG/1
1 CAPSULE ORAL
Refills: 0 | DISCHARGE

## 2024-08-30 RX ORDER — PREDNISONE 10 MG
1 TABLET, DOSE PACK ORAL
Refills: 0 | DISCHARGE

## 2024-08-30 RX ORDER — CALCIUM CARBONATE/VITAMIN D3 500MG-5MCG
1 TABLET ORAL
Refills: 0 | DISCHARGE

## 2024-08-30 RX ORDER — FOLIC ACID 1 MG
1 TABLET ORAL
Qty: 0 | Refills: 0 | DISCHARGE
Start: 2024-08-30

## 2024-08-30 RX ORDER — MYCOPHENOLATE MOFETIL 250 MG/1
500 CAPSULE ORAL
Qty: 0 | Refills: 0 | DISCHARGE
Start: 2024-08-30

## 2024-08-30 RX ORDER — FINASTERIDE 1 MG/1
1 TABLET, FILM COATED ORAL
Qty: 0 | Refills: 0 | DISCHARGE
Start: 2024-08-30

## 2024-08-30 RX ORDER — MYCOPHENOLATE MOFETIL 250 MG/1
1 CAPSULE ORAL
Refills: 0 | DISCHARGE

## 2024-08-30 RX ORDER — SACUBITRIL AND VALSARTAN 49; 51 MG/1; MG/1
1 TABLET, FILM COATED ORAL
Refills: 0 | DISCHARGE

## 2024-08-30 RX ORDER — METOPROLOL TARTRATE 100 MG/1
1 TABLET ORAL
Refills: 0 | DISCHARGE

## 2024-08-30 RX ADMIN — FINASTERIDE 5 MILLIGRAM(S): 1 TABLET, FILM COATED ORAL at 12:26

## 2024-08-30 RX ADMIN — Medication 5 MILLIGRAM(S): at 04:56

## 2024-08-30 RX ADMIN — MYCOPHENOLATE MOFETIL 500 MILLIGRAM(S): 250 CAPSULE ORAL at 12:26

## 2024-08-30 RX ADMIN — POLYETHYLENE GLYCOL 3350 17 GRAM(S): 17 POWDER, FOR SOLUTION ORAL at 04:55

## 2024-08-30 RX ADMIN — Medication 2: at 12:24

## 2024-08-30 RX ADMIN — Medication 2000 UNIT(S): at 12:27

## 2024-08-30 RX ADMIN — Medication 1000 MICROGRAM(S): at 12:26

## 2024-08-30 RX ADMIN — CHLORHEXIDINE GLUCONATE 1 APPLICATION(S): 40 SOLUTION TOPICAL at 12:26

## 2024-08-30 RX ADMIN — APIXABAN 5 MILLIGRAM(S): 5 TABLET, FILM COATED ORAL at 04:55

## 2024-08-30 RX ADMIN — Medication 81 MILLIGRAM(S): at 12:26

## 2024-08-30 RX ADMIN — Medication 1 MILLIGRAM(S): at 12:26

## 2024-08-30 NOTE — PROGRESS NOTE ADULT - PROBLEM SELECTOR PLAN 2
- possibly d/t valvular disease   - Troponin = 54 --> 52.  Pro-BNP = 3229  - EKG with A-fib with slow ventricular rate down to 36 bpm. Low concern for ACS with low/downtrending trops and normal CKMB.  - beta blocker on hold due to slow a-fib  - TTE w/ EF 60-65%, no wall motion abnormalities, severe LVH, normal RV, mild-mod MS, significant AS, severe AR  - Cardiology following, needs further work-up of his renal masses prior to consideration of possible valve interventions.
- possibly d/t valvular disease   - Troponin = 54 --> 52.  Pro-BNP = 3229  - EKG with A-fib with slow ventricular rate down to 36 bpm. Low concern for ACS with low/downtrending trops and normal CKMB.  - beta blocker on hold due to slow a-fib  - TTE w/ EF 60-65%, no wall motion abnormalities, severe LVH, normal RV, mild-mod MS, significant AS, severe AR  - Cardiology following, needs further work-up of his renal masses prior to consideration of possible valve interventions.
- possibly d/t valvular disease   - Troponin = 54 --> 52.  Pro-BNP = 3229  - EKG with A-fib with slow ventricular rate down to 36 bpm. Low concern for ACS with low/downtrending trops and normal CKMB.  - beta blocker on hold due to slow a-fib  - TTE w/ EF 60-65%, no wall motion abnormalities, severe LVH, normal RV, mild-mod MS, significant AS, severe AR  - Patient is not a candidate for valve interventions d/t renal masses
- possibly d/t valvular disease   - Troponin = 54 --> 52.  Pro-BNP = 3229  - EKG with A-fib with slow ventricular rate down to 36 bpm. Low concern for ACS with low/downtrending trops and normal CKMB.  - beta blocker on hold due to slow a-fib  - TTE w/ EF 60-65%, no wall motion abnormalities, severe LVH, normal RV, mild-mod MS, significant AS, severe AR  - Cardiology following, needs further work-up of his renal masses prior to consideration of possible valve interventions.
EKG with A-fib with slow ventricular rate down to 36 bpm. Low concern for ACS with low/downtrending trops and normal CKMB.  beta blocker discontinued due to slow a-fib  TTE w/ EF 60-65%, no wall motion abnormalities, severe LVH, normal RV, mild-mod MS, significant AS, severe AR  Patient likely is not a candidate for surgical valvular interventions due to age and comorbidities.  Family declines surgical options.
- possibly d/t valvular disease   - Troponin = 54 --> 52.  Pro-BNP = 3229  - EKG with A-fib with slow ventricular rate down to 36 bpm. Low concern for ACS with low/downtrending trops and normal CKMB.  - beta blocker on hold due to slow a-fib  - TTE w/ EF 60-65%, no wall motion abnormalities, severe LVH, normal RV, mild-mod MS, significant AS, severe AR  - Patient pending structural heart valve    Likely is not a candidate for valve interventions d/t renal masses
EKG with A-fib with slow ventricular rate down to 36 bpm. Low concern for ACS with low/downtrending trops and normal CKMB.  beta blocker discontinued due to slow a-fib  TTE w/ EF 60-65%, no wall motion abnormalities, severe LVH, normal RV, mild-mod MS, significant AS, severe AR  Patient likely is not a candidate for surgical valvular interventions due to age and comorbidities.  Family declines surgical options.  Continues to have asymptomatic ginny cardia
Described as acute onset, crushing and substernal without radiation.  No associated shortness of breath or diaphoresis.    - Troponin = 54 --> 52.  Pro-BNP = 3229  - EKG with A-fib with slow ventricular rate down to 36 bpm. Low concern for ACS with low/downtrending trops and normal CKMB.  - beta blocker on hold due to slow a-fib  - continues on Telemetry  - check TTE
- possibly d/t valvular disease   - Troponin = 54 --> 52.  Pro-BNP = 3229  - EKG with A-fib with slow ventricular rate down to 36 bpm. Low concern for ACS with low/downtrending trops and normal CKMB.  - beta blocker on hold due to slow a-fib  - TTE w/ EF 60-65%, no wall motion abnormalities, severe LVH, normal RV, mild-mod MS, significant AS, severe AR  - Cardiology consulted
- possibly d/t valvular disease   - Troponin = 54 --> 52.  Pro-BNP = 3229  - EKG with A-fib with slow ventricular rate down to 36 bpm. Low concern for ACS with low/downtrending trops and normal CKMB.  - beta blocker on hold due to slow a-fib  - TTE w/ EF 60-65%, no wall motion abnormalities, severe LVH, normal RV, mild-mod MS, significant AS, severe AR  - Cardiology to review case w/ structural team
- possibly d/t valvular disease   - Troponin = 54 --> 52.  Pro-BNP = 3229  - EKG with A-fib with slow ventricular rate down to 36 bpm. Low concern for ACS with low/downtrending trops and normal CKMB.  - beta blocker on hold due to slow a-fib  - TTE w/ EF 60-65%, no wall motion abnormalities, severe LVH, normal RV, mild-mod MS, significant AS, severe AR  - Patient is not a candidate for valve interventions d/t renal masses
EKG with A-fib with slow ventricular rate down to 36 bpm. Low concern for ACS with low/downtrending trops and normal CKMB.  beta blocker discontinued due to slow a-fib  TTE w/ EF 60-65%, no wall motion abnormalities, severe LVH, normal RV, mild-mod MS, significant AS, severe AR  Patient likely is not a candidate for surgical valvular interventions due to age and comorbidities.  Family declines surgical options.  e interventions d/t renal masses

## 2024-08-30 NOTE — PROGRESS NOTE ADULT - PROBLEM SELECTOR PLAN 12
Chronic Elliott, failed TOV   c/w Flomax and Finasteride
Chronic Elliott, failed TOV   c/w Flomax and Finasteride
DVT ppx: hold Eliquis   Diet: Regular with moderately thick liquids.   Dispo: pending clinical course. PT recommends rehab   Code Status: FULL CODE    POC d/w daughter
DVT ppx: hold Eliquis   Diet: Regular with moderately thick liquids.   Dispo: pending clinical course. PT recommends rehab   Code Status: FULL CODE    POC d/w daughter
Chronic Elliott, failed TOV   c/w Flomax and Finasteride
DVT ppx: hold Eliquis   Diet: Regular with moderately thick liquids.   Dispo: pending clinical course. PT recommends rehab   Code Status: FULL CODE    POC d/w daughter
Chronic Elliott, failed TOV   c/w Flomax and Finasteride

## 2024-08-30 NOTE — PROGRESS NOTE ADULT - PROBLEM SELECTOR PROBLEM 9
Myasthenia gravis
T2DM (type 2 diabetes mellitus)
Myasthenia gravis
Myasthenia gravis
T2DM (type 2 diabetes mellitus)
T2DM (type 2 diabetes mellitus)
Myasthenia gravis
T2DM (type 2 diabetes mellitus)
HTN (hypertension)
Myasthenia gravis
Myasthenia gravis
T2DM (type 2 diabetes mellitus)

## 2024-08-30 NOTE — PROGRESS NOTE ADULT - PROBLEM SELECTOR PROBLEM 11
Low vitamin B12 level
Prophylactic measure
Low vitamin B12 level
Urinary retention
Low vitamin B12 level
Urinary retention
Prophylactic measure
Low vitamin B12 level
Low vitamin B12 level
Urinary retention
Low vitamin B12 level
Prophylactic measure

## 2024-08-30 NOTE — PROGRESS NOTE ADULT - PROBLEM SELECTOR PROBLEM 5
Normocytic anemia
Bilateral renal masses
Normocytic anemia
Bilateral renal masses
Embolic stroke
Bilateral renal masses
Bilateral renal masses

## 2024-08-30 NOTE — PHARMACOTHERAPY INTERVENTION NOTE - COMMENTS
Discharge medications reviewed with the patient's daughter over the phone. Current medication schedule was discussed in detail including: medication name, indication, dose, administration times, treatment duration, side effects, and special instructions. Patient's daughter was also informed of a dose increase of tamsulosin from 0.4mg to 0.8mg. Although not a new medication, the patient's daughter was educated on apixaban including the purpose and administration. Discussed adverse effects in detail and when to seek medical attention (blood in stool, vomit, urine, fall on head etc.). Informed patient's daughter to notify all providers her father is on this and before any planned procedures. Encouraged patient's daughter to avoid the use of NSAIDs for her father such as ibuprofen or naproxen, and to consult a healthcare professional before starting any new medications. Any questions and concerns were answered and addressed and the patient's daughter demonstrated understanding. Educational handouts were left on the table at bedside and the daughter was made aware.     New medications:   Farxiga 10mg oral tablet once daily  Finasteride 5mg oral tablet once daily

## 2024-08-30 NOTE — PROGRESS NOTE ADULT - PROBLEM SELECTOR PROBLEM 7
Pseudoaneurysm
Normocytic anemia
Pseudoaneurysm
Pseudoaneurysm
Embolic stroke
Normocytic anemia
Pseudoaneurysm
Normocytic anemia
Pseudoaneurysm
Urinary retention
Urinary retention
Pseudoaneurysm

## 2024-08-30 NOTE — PROGRESS NOTE ADULT - PROBLEM SELECTOR PLAN 9
- continue home Cellcept 500mg qd and Prednisone 5mg qd
Home meds: Metformin 500mg BID  - hold home Metformin while inpatient  - Hgb A1c 5.2   - LDSS
- continue home Cellcept 500mg qd and Prednisone 5mg qd
Home meds: Amlodipine 5mg qd, Entresto 49-51mg BID, Metoprolol succinate 100mg qd, and Hydralazine 25mg q8h  - hold home meds   - diuresis as above   - monitor BP
Home meds: Metformin 500mg BID  - hold home Metformin while inpatient  - Hgb A1c 5.2   - LDSS
- continue home Cellcept 500mg qd and Prednisone 5mg qd
- continue home Cellcept 500mg qd and Prednisone 5mg qd
Home meds: Metformin 500mg BID  - hold home Metformin while inpatient  - Hgb A1c 5.2   - LDSS

## 2024-08-30 NOTE — PROGRESS NOTE ADULT - PROBLEM SELECTOR PLAN 11
Chronic Elliott, failed TOV   c/w Flomax and Finasteride
Vitamin B12 level at lower end of normal  MMA level elevated   Started B12 supplementation
DVT ppx: Eliquis   Diet: Pureed with moderately thick liquids.   Dispo: pending clinical course. PT recommends rehab   Code Status: FULL CODE
Vitamin B12 level at lower end of normal  MMA level elevated   Started B12 supplementation
Chronic Elliott, failed TOV   c/w Flomax and Finasteride
DVT ppx: hold Eliquis   Diet: Regular with moderately thick liquids.   Dispo: pending clinical course. PT recommends rehab   Code Status: FULL CODE    POC d/w daughter
Chronic Elliott, failed TOV   c/w Flomax and Finasteride
DVT ppx: Eliquis   Diet: Pureed with moderately thick liquids.   Dispo: pending clinical course. PT consult  Code Status: FULL CODE    POC d/w wife
Vitamin B12 level at lower end of normal  MMA level elevated   Started B12 supplementation
Vitamin B12 level at lower end of normal  MMA level elevated   Started B12 supplementation

## 2024-08-30 NOTE — PROGRESS NOTE ADULT - PROBLEM SELECTOR PROBLEM 8
Embolic stroke
HTN (hypertension)
Embolic stroke
Embolic stroke
HTN (hypertension)
HTN (hypertension)
Myasthenia gravis
HTN (hypertension)
HTN (hypertension)

## 2024-08-30 NOTE — PROGRESS NOTE ADULT - ASSESSMENT
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 Also w/ significant AS and AI on echo. Course c/b hematuria and incidentally found renal masses.

## 2024-08-30 NOTE — PROGRESS NOTE ADULT - PROBLEM SELECTOR PROBLEM 3
Acute on chronic diastolic heart failure
Embolic stroke
Acute on chronic diastolic heart failure
Embolic stroke
Acute on chronic diastolic heart failure

## 2024-08-30 NOTE — PROGRESS NOTE ADULT - PROBLEM SELECTOR PROBLEM 12
Urinary retention
Prophylactic measure
Urinary retention
Urinary retention
Prophylactic measure
Prophylactic measure
Urinary retention

## 2024-08-30 NOTE — PROGRESS NOTE ADULT - PROBLEM SELECTOR PLAN 6
Likely anemia of chronic disease and acute blood loss anemia d/t hematuria   - Transfuse 1/2 a unit of PRBC today   - monitor CBC
Likely anemia of chronic disease and acute blood loss anemia d/t hematuria   - s/p 1/2 a unit of PRBC 8/24 w/ appropriate response   - monitor CBC
- continue home Cellcept 500mg qd and Prednisone 5mg qd
- continue home Cellcept 500mg qd and Prednisone 5mg qd
Likely anemia of chronic disease and acute blood loss anemia d/t hematuria   - s/p 1/2 a unit of PRBC 8/24 w/ appropriate response   - monitor CBC
- continue home Cellcept 500mg qd and Prednisone 5mg qd
Chronic HFpEF   Home meds: Entresto 49-51mg BID, Metoprolol succinate 100mg qd  TTE 9/2022: EF 55% with mod MS and AR  - hold home metoprolol and Entresto in setting of bradycardia and soft BP  - evaluate for restart of the above cardioprotective meds
Chronic HFpEF   Home meds: Entresto 49-51mg BID, Metoprolol succinate 100mg qd  TTE 9/2022: EF 55% with mod MS and AR  - hold home metoprolol and Entresto in setting of bradycardia and soft BP  - evaluate for restart of the above cardioprotective meds

## 2024-08-30 NOTE — PROGRESS NOTE ADULT - SUBJECTIVE AND OBJECTIVE BOX
[facility] Division of Hospital Medicine  Sadiq Farah MD  Available via MS Teams  Pager: [phone]    SUBJECTIVE / OVERNIGHT EVENTS:  Patient seen and examined at the bedside.  No new complaints  Patient to be DC this afternoon    ADDITIONAL REVIEW OF SYSTEMS:    MEDICATIONS  (STANDING):  apixaban 5 milliGRAM(s) Oral every 12 hours  aspirin  chewable 81 milliGRAM(s) Oral daily  atorvastatin 40 milliGRAM(s) Oral at bedtime  chlorhexidine 2% Cloths 1 Application(s) Topical daily  cholecalciferol 2000 Unit(s) Oral daily  cyanocobalamin 1000 MICROGram(s) Oral daily  dextrose 5%. 1000 milliLiter(s) (50 mL/Hr) IV Continuous <Continuous>  dextrose 5%. 1000 milliLiter(s) (100 mL/Hr) IV Continuous <Continuous>  dextrose 50% Injectable 25 Gram(s) IV Push once  dextrose 50% Injectable 25 Gram(s) IV Push once  dextrose 50% Injectable 12.5 Gram(s) IV Push once  finasteride 5 milliGRAM(s) Oral daily  folic acid 1 milliGRAM(s) Oral daily  glucagon  Injectable 1 milliGRAM(s) IntraMuscular once  insulin lispro (ADMELOG) corrective regimen sliding scale   SubCutaneous at bedtime  insulin lispro (ADMELOG) corrective regimen sliding scale   SubCutaneous three times a day before meals  mycophenolate mofetil 500 milliGRAM(s) Oral daily  polyethylene glycol 3350 17 Gram(s) Oral two times a day  predniSONE   Tablet 5 milliGRAM(s) Oral daily  tamsulosin 0.8 milliGRAM(s) Oral at bedtime    MEDICATIONS  (PRN):  AQUAPHOR (petrolatum Ointment) 1 Application(s) Topical every 4 hours PRN rash  dextrose Oral Gel 15 Gram(s) Oral once PRN Blood Glucose LESS THAN 70 milliGRAM(s)/deciliter      I&O's Summary    29 Aug 2024 07:01  -  30 Aug 2024 07:00  --------------------------------------------------------  IN: 720 mL / OUT: 1200 mL / NET: -480 mL    30 Aug 2024 07:01  -  30 Aug 2024 11:50  --------------------------------------------------------  IN: 200 mL / OUT: 150 mL / NET: 50 mL        PHYSICAL EXAM:  Vital Signs Last 24 Hrs  T(C): 36.5 (30 Aug 2024 05:06), Max: 36.7 (29 Aug 2024 20:34)  T(F): 97.7 (30 Aug 2024 05:06), Max: 98 (29 Aug 2024 20:34)  HR: 38 (30 Aug 2024 05:06) (38 - 45)  BP: 141/34 (30 Aug 2024 05:06) (139/56 - 141/34)  BP(mean): --  RR: 18 (30 Aug 2024 05:06) (18 - 18)  SpO2: 99% (30 Aug 2024 05:06) (99% - 99%)    Parameters below as of 30 Aug 2024 05:06  Patient On (Oxygen Delivery Method): room air      CONSTITUTIONAL: elderly male with signs of cachexia, temporal wasting  EYES: PERRLA  ENMT: Moist oral mucosa, normal dentition  NECK: Supple, no thyromegaly  RESPIRATORY: Normal respiratory effort; lungs are clear to auscultation bilaterally  CARDIOVASCULAR: S1 and S2 +ve, significant systolic murmur noted  ABDOMEN: Nontender to palpation, normoactive bowel sounds, no rebound/guarding;  : butt in place, no hematuria noted  EXTREMITIES: no significant pedal edema.    NEUROLOGY: CN 2-12 are intact and symmetric; no gross motor or sensory deficits   PSYCH: A+O to person, place, and time, mild confusion noted  SKIN: No rashes; no palpable lesions      LABS:                        7.2    4.92  )-----------( 117      ( 30 Aug 2024 05:59 )             23.6     08-30    141  |  103  |  37<H>  ----------------------------<  93  4.3   |  27  |  0.88    Ca    8.7      30 Aug 2024 05:59  Phos  3.1     08-30  Mg     2.30     08-30            Urinalysis Basic - ( 30 Aug 2024 05:59 )    Color: x / Appearance: x / SG: x / pH: x  Gluc: 93 mg/dL / Ketone: x  / Bili: x / Urobili: x   Blood: x / Protein: x / Nitrite: x   Leuk Esterase: x / RBC: x / WBC x   Sq Epi: x / Non Sq Epi: x / Bacteria: x

## 2024-08-30 NOTE — DISCHARGE NOTE NURSING/CASE MANAGEMENT/SOCIAL WORK - PATIENT PORTAL LINK FT
You can access the FollowMyHealth Patient Portal offered by [facility] by registering at the following website: [email or web address]. By joining [facility]’s FollowMyHealth portal, you will also be able to view your health information using other applications (apps) compatible with our system.

## 2024-08-30 NOTE — PROGRESS NOTE ADULT - PROBLEM SELECTOR PLAN 3
Home meds: Entresto 49-51mg BID, Metoprolol succinate 100mg qd  - TTE w/ EF 60-65%, no wall motion abnormalities, severe LVH  - hold home metoprolol and Entresto in setting of bradycardia and soft BP  - c/w Lasix 40mg IV daily   - strict I/O's, daily weights
Home meds: Entresto 49-51mg BID, Metoprolol succinate 100mg qd  - TTE w/ EF 60-65%, no wall motion abnormalities, severe LVH  - hold home metoprolol and Entresto in setting of bradycardia and soft BP  - se/p IV Lasix, redose bumetanide 4 mg IV x 1 today   - Plan to start SGLT2 near discharge   - Strict I/Os and daily weights  - Cardiology following
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 Currently at rehab. On a pureed diet with moderately thick-liquids at rehab.  - continue ASA 81 and atorvastatin 40mg qhs  - PT/OT: rehab
Home meds: Entresto 49-51mg BID, Metoprolol succinate 100mg qd  - TTE w/ EF 60-65%, no wall motion abnormalities, severe LVH  - hold home metoprolol and Entresto in setting of bradycardia and soft BP  - s/p IV Lasix, increase diuresis to bumetanide 4 mg IV BID  - Plan to start SGLT2 near discharge   - Strict I/Os and daily weights  - Cardiology following
Home meds: Entresto 49-51mg BID, Metoprolol succinate 100mg qd  - TTE w/ EF 60-65%, no wall motion abnormalities, severe LVH  - hold home metoprolol and Entresto in setting of bradycardia and soft BP  - se/p IV Lasix, redose bumetanide 4 mg IV x 1 today   - Plan to start SGLT2 near discharge   - Strict I/Os and daily weights  - Cardiology following
Home meds: Entresto 49-51mg BID, Metoprolol succinate 100mg qd  - TTE w/ EF 60-65%, no wall motion abnormalities, severe LVH  - hold home metoprolol and Entresto in setting of bradycardia and soft BP  - c/w Lasix 40mg IV daily   - strict I/O's, daily weights
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 Currently at rehab. On a pureed diet with moderately thick-liquids at rehab.  - continue ASA81 and atorvastatin 40mg qhs  - PT/OT
Home meds: Entresto 49-51mg BID, Metoprolol succinate 100mg qd  - TTE w/ EF 60-65%, no wall motion abnormalities, severe LVH  - hold home metoprolol and Entresto in setting of bradycardia and soft BP  - increased diuresis to bumetanide 4 mg IV BID  - Plan to start SGLT2 near discharge   - Strict I/Os and daily weights  - Cardiology following
Home meds: Entresto 49-51mg BID, Metoprolol succinate 100mg qd  TTE w/ EF 60-65%, no wall motion abnormalities, severe LVH  DC metoprolol and Entresto in setting of bradycardia and soft BP  s/p IV Lasix, redose bumetanide 4 mg IV x 1 today   Plan to start SGLT2 near discharge   Strict I/Os and daily weights  Cardiology following
Home meds: Entresto 49-51mg BID, Metoprolol succinate 100mg qd  - TTE w/ EF 60-65%, no wall motion abnormalities, severe LVH  - hold home metoprolol and Entresto in setting of bradycardia and soft BP  - se/p IV Lasix, redose bumetanide 4 mg IV x 1 today   - Plan to start SGLT2 near discharge   - Strict I/Os and daily weights  - Cardiology following
Home meds: Entresto 49-51mg BID, Metoprolol succinate 100mg qd  TTE w/ EF 60-65%, no wall motion abnormalities, severe LVH  DC metoprolol and Entresto in setting of bradycardia and soft BP  s/p IV Lasix, redose bumetanide 4 mg IV x 1 today   Plan to start SGLT2 near discharge   Strict I/Os and daily weights  Cardiology following
Home meds: Entresto 49-51mg BID, Metoprolol succinate 100mg qd  TTE w/ EF 60-65%, no wall motion abnormalities, severe LVH  DC metoprolol and Entresto in setting of bradycardia and soft BP  s/p IV Lasix, redose bumetanide 4 mg IV x 1 today   Strict I/Os and daily weights  Cardiology following

## 2024-08-30 NOTE — PROGRESS NOTE ADULT - PROVIDER SPECIALTY LIST ADULT
Cardiology
Cardiology
Hospitalist
Urology
Cardiology
Cardiology
Urology
Cardiology
Electrophysiology
Hospitalist
Hospitalist
Cardiology
Electrophysiology
Hospitalist

## 2024-08-30 NOTE — PROGRESS NOTE ADULT - PROBLEM SELECTOR PROBLEM 6
Normocytic anemia
Normocytic anemia
Heart failure with preserved left ventricular function (HFpEF)
Heart failure with preserved left ventricular function (HFpEF)
Myasthenia gravis
Normocytic anemia
Normocytic anemia
Myasthenia gravis
Normocytic anemia
Myasthenia gravis
Normocytic anemia
Normocytic anemia

## 2024-08-30 NOTE — PROGRESS NOTE ADULT - PROBLEM SELECTOR PROBLEM 1
Atrial fibrillation with slow ventricular response

## 2024-08-30 NOTE — PROGRESS NOTE ADULT - PROBLEM SELECTOR PLAN 1
ECG demonstrated AFib with slow ventricular rate in 30-40s.   CHADSVASc: 7  - EP following  - Hold all AV marion blocking agents  - continue Eliquis 5mg BID  - TTE
ECG demonstrated AFib with slow ventricular rate in 30-40s.   CHADSVASc: 7  - EP following  - Hold all AV marion blocking agents  - holding Eliquis d/t hematuria   - No indication for PPM at this time
ECG demonstrated AFib with slow ventricular rate in 30-40s.   CHADSVASc: 7  EP following - Hold AV marion blocking agents    Will DC marion blockers as patient with bradycardia even off meds  Eliquis was held d/t hematuria, was restarted on 8/26    Patient has had no further hematuria to date.         Per EP - No indication for PPM at this time  Case discussed with daughter Aretha  Patient for return back to [facility] on DC
ECG demonstrated AFib with slow ventricular rate in 30-40s.   CHADSVASc: 7  - EP following  - Hold all AV marion blocking agents  - continue Eliquis 5mg BID  - No indication for PPM at this time
ECG demonstrated AFib with slow ventricular rate in 30-40s.   CHADSVASc: 7  - EP following  - Hold AV marion blocking agents  - holding Eliquis d/t hematuria, will resume if Hgb remains stable    - No indication for PPM at this time
ECG demonstrated AFib with slow ventricular rate in 30-40s.   CHADSVASc: 7  EP following - Hold AV marion blocking agents    Will DC marion blockers as patient with bradycardia even off meds  Eliquis was held d/t hematuria, was restarted on 8/26    Patient has had no further hematuria to date.         Per EP - No indication for PPM at this time  Case discussed with daughter Aretha  Patient for return back to [facility] on DC
ECG demonstrated AFib with slow ventricular rate in 30-40s.   CHADSVASc: 7  - EP following  - Hold all AV marion blocking agents  - holding Eliquis d/t hematuria   - No indication for PPM at this time
ECG demonstrated AFib with slow ventricular rate in 30-40s.   CHADSVASc: 7  EP following - Hold AV marion blocking agents    Will DC marion blockers as patient with bradycardia even off meds  Eliquis was held d/t hematuria, was restarted on 8/26    Patient has had no further hematuria to date.         Per EP - No indication for PPM at this time  Case discussed with daughter Aretha  Patient for return back to [facility] on DC this afternoon
ECG demonstrated AFib with slow ventricular rate in 30-40s.   CHADSVASc: 7  - EP following - Hold AV marion blocking agents  Eliquis was held d/t hematuria, was restarted on 8/26    Patient has had further hematuria to date.         Per EP - No indication for PPM at this time  Case discussed with daughter Aretha  Patient for return back to [facility] on DC
ECG demonstrated AFib with slow ventricular rate in 30-40s.   CHADSVASc: 7  - EP following  - Hold AV marion blocking agents  - holding Eliquis d/t hematuria, will resume if Hgb remains stable    - No indication for PPM at this time
ECG demonstrated AFib with slow ventricular rate in 30-40s.   CHADSVASc: 7  - EP following  - Hold AV marion blocking agents  - holding Eliquis d/t hematuria, will resume in AM if Hgb remains stable    - No indication for PPM at this time
ECG demonstrated AFib with slow ventricular rate in 30-40s.   CHADSVASc: 7  - EP following - Hold AV marion blocking agents  Eliquis was held d/t hematuria, will restart today  8/26    Monitor for anemia, hematuria       Per EP - No indication for PPM at this time  Case discussed with daughter Aretha  Patient for return back to [facility] on DC

## 2024-08-30 NOTE — PROGRESS NOTE ADULT - PROBLEM SELECTOR PLAN 7
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   US RLE 8/26:  1. Avascular, complex collection measuring 2.5 x 1.6 cm noted within the right groin, likely representing a thrombosed pseudoaneursym.    Vascular surgery consulted - no acute intervention
Since admission for stroke at [facility]. Has chronic Elliott since 8/4.  - continue Tamsulosin 0.4mg qhs   - continue Elliott and evaluate for TOV
Recent embolic CVA s/p thrombectomy (8/4/24 at [facility]) with residual R facial droop and L sided weakness. Currently at rehab.  - continue ASA 81 and atorvastatin 40mg qhs  - speech and swallow recommends regular diet w/ moderate thick liquids   - PT/OT: rehab
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   US RLE 8/26:  1. Avascular, complex collection measuring 2.5 x 1.6 cm noted within the right groin, likely representing a thrombosed pseudoaneursym.    Vascular surgery consulted - no acute intervention
Has chronic Elliott   - continue Tamsulosin 0.4mg qhs   - Failed TOV, now w/ hematuria post straight cath   - urology called to replace Elliott
Hg 8.1 on admission, previously 11.8 in 10/2023.   Now w/ acute blood loss d/t hematuria   - Maintain active T&S  - Transfuse for Hg <7  - monitor CBC
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   US RLE 8/26:  1. Avascular, complex collection measuring 2.5 x 1.6 cm noted within the right groin, likely representing a thrombosed pseudoaneursym.    Vascular surgery consulted - no acute intervention
Likely anemia of chronic disease and acute blood loss anemia d/t hematuria   - Maintain active T&S  - Transfuse for Hg <7  - monitor CBC
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   US RLE 8/26:  1. Avascular, complex collection measuring 2.5 x 1.6 cm noted within the right groin, likely representing a thrombosed pseudoaneursym.    Vascular surgery consulted - no acute intervention
Hg 8.1 on admission, previously 11.8 in 10/2023.   Now w/ acute blood loss d/t hematuria   - Maintain active T&S  - Transfuse for Hg <7  - monitor CBC
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   US RLE 8/26:  1. Avascular, complex collection measuring 2.5 x 1.6 cm noted within the right groin, likely representing a thrombosed pseudoaneursym.    Vascular surgery consulted - no acute intervention
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   Vascular surgery consult

## 2024-08-30 NOTE — DISCHARGE NOTE NURSING/CASE MANAGEMENT/SOCIAL WORK - NSDPFAC_GEN_ALL_CORE
[facility] [employer] - [address] [place] [id number]

## 2024-08-30 NOTE — PROGRESS NOTE ADULT - PROBLEM SELECTOR PLAN 8
- continue home Cellcept 500mg qd and Prednisone 5mg qd
Home meds: Amlodipine 5mg qd, Entresto 49-51mg BID, Metoprolol succinate 100mg qd, and Hydralazine 25mg q8h  - hold home meds in setting of bradycardia and risk of hemodynamic instability
Home meds: Amlodipine 5mg qd, Entresto 49-51mg BID, Metoprolol succinate 100mg qd, and Hydralazine 25mg q8h  - hold home meds in setting of bradycardia and risk of hemodynamic instability
Recent embolic CVA s/p thrombectomy (8/4/24 at [facility]) with residual R facial droop and L sided weakness. Currently at rehab.  - continue ASA 81 and atorvastatin 40mg qhs  - speech and swallow recommends regular diet w/ moderate thick liquids   - PT/OT: rehab
Home meds: Amlodipine 5mg qd, Entresto 49-51mg BID, Metoprolol succinate 100mg qd, and Hydralazine 25mg q8h  - hold home meds   - diuresis as above   - monitor BP
Recent embolic CVA s/p thrombectomy (8/4/24 at [facility]) with residual R facial droop and L sided weakness. Currently at rehab.  - continue ASA 81 and atorvastatin 40mg qhs  - speech and swallow recommends regular diet w/ moderate thick liquids   - PT/OT: rehab
Recent embolic CVA s/p thrombectomy (8/4/24 at [facility]) with residual R facial droop and L sided weakness. Currently at rehab.  - continue ASA 81 and atorvastatin 40mg qhs  - speech and swallow recommends regular diet w/ moderate thick liquids   - PT/OT: rehab
Recent embolic CVA s/p thrombectomy (8/4/24 at [facility]) with residual R facial droop and L sided weakness. Currently at rehab.  - continue ASA 81 and atorvastatin 40mg qhs  - speech and swallow recommends regular diet w/ moderate thick liquids   - PT/OT: rehab
Recent embolic CVA s/p thrombectomy (8/4/24 at [facility]) with residual R facial droop and L sided weakness. Currently at rehab.  - continue ASA 81 and atorvastatin 40mg qhs  - speech and swallow recommends regular diet w/ moderate thick liquids   - PT/OT: rehab
Recent embolic CVA s/p thrombectomy (8/4/24 at [facility]) with residual R facial droop and L sided weakness. Currently at rehab.  - continue ASA 81 and atorvastatin 40mg qhs  - speech and swallow recommends regular diet w/ moderate thick liquids   - PT/OT: rehab
Home meds: Amlodipine 5mg qd, Entresto 49-51mg BID, Metoprolol succinate 100mg qd, and Hydralazine 25mg q8h  - hold home meds in setting of bradycardia and risk of hemodynamic instability
Home meds: Amlodipine 5mg qd, Entresto 49-51mg BID, Metoprolol succinate 100mg qd, and Hydralazine 25mg q8h  - hold home meds in setting of bradycardia and risk of hemodynamic instability

## 2024-08-30 NOTE — PROGRESS NOTE ADULT - PROBLEM SELECTOR PROBLEM 4
Hematuria
Hematuria
Myasthenia gravis
Hematuria
Myasthenia gravis
Hematuria

## 2024-08-30 NOTE — PROGRESS NOTE ADULT - PROBLEM SELECTOR PLAN 5
Hg 8.1 on admission, previously 11.8 in 10/2023. No overt bleeding.   - Maintain active T&S  - Transfuse for Hg <7  - monitor CBC
- renal US w/ b/l renal lesions  - CT A/P w/ 6.5 cm left upper pole heterogenous mass compatible with renal cell   carcinoma. And a 2.1 cm right upper pole cystic-solid mass compatible with renal cell carcinoma.  - as per urology, patient is not a surgical candidate and will need f/up with imaging
Recent embolic CVA s/p thrombectomy (8/4/24 at [facility]) with residual R facial droop and L sided weakness. Currently at rehab.  - continue ASA 81 and atorvastatin 40mg qhs  - speech and swallow recommends regular diet w/ moderate thick liquids   - PT/OT: rehab
- renal US w/ b/l renal lesions  - CT A/P w/ 6.5 cm left upper pole heterogenous mass compatible with renal cell   carcinoma. And a 2.1 cm right upper pole cystic-solid mass compatible with renal cell carcinoma.  - as per urology, patient is not a surgical candidate and will need f/up with imaging
- renal US w/ b/l renal lesions  - CT A/P w/ 6.5 cm left upper pole heterogenous mass compatible with renal cell   carcinoma. And a 2.1 cm right upper pole cystic-solid mass compatible with renal cell carcinoma.  - as per urology, patient is not a surgical candidate and will need f/up with imaging
Hg 8.1 on admission, previously 11.8 in 10/2023. No overt bleeding.   - Maintain active T&S  - Transfuse for Hg <7  - monitor CBC
Recent embolic CVA s/p thrombectomy (8/4/24 at [facility]) with residual R facial droop and L sided weakness. Currently at rehab.  - continue ASA 81 and atorvastatin 40mg qhs  - speech and swallow recommends regular diet w/ moderate thick liquids   - PT/OT: rehab
- renal US w/ b/l renal lesions  - awaiting CT A/P for further characterization   - as per urology, patient is not a surgical candidate
Recent embolic CVA s/p thrombectomy (8/4/24 at [facility]) with residual R facial droop and L sided weakness. Currently at rehab.  - continue ASA 81 and atorvastatin 40mg qhs  - speech and swallow recommends regular diet w/ moderate thick liquids   - PT/OT: rehab

## 2024-09-26 PROBLEM — I50.30 UNSPECIFIED DIASTOLIC (CONGESTIVE) HEART FAILURE: Chronic | Status: ACTIVE | Noted: 2024-08-19

## 2024-09-26 PROBLEM — G70.00 MYASTHENIA GRAVIS WITHOUT (ACUTE) EXACERBATION: Chronic | Status: ACTIVE | Noted: 2024-08-19

## 2024-09-26 PROBLEM — I63.9 CEREBRAL INFARCTION, UNSPECIFIED: Chronic | Status: ACTIVE | Noted: 2024-08-19

## 2024-09-26 PROBLEM — I10 ESSENTIAL (PRIMARY) HYPERTENSION: Chronic | Status: ACTIVE | Noted: 2024-08-19

## 2024-09-26 PROBLEM — I48.91 UNSPECIFIED ATRIAL FIBRILLATION: Chronic | Status: ACTIVE | Noted: 2024-08-19

## 2024-09-26 PROBLEM — E11.9 TYPE 2 DIABETES MELLITUS WITHOUT COMPLICATIONS: Chronic | Status: ACTIVE | Noted: 2024-08-19

## 2024-09-30 ENCOUNTER — APPOINTMENT (OUTPATIENT)
Dept: CARDIOLOGY | Facility: CLINIC | Age: 84
End: 2024-09-30
Payer: MEDICARE

## 2024-09-30 ENCOUNTER — NON-APPOINTMENT (OUTPATIENT)
Age: 84
End: 2024-09-30

## 2024-09-30 VITALS — OXYGEN SATURATION: 100 % | SYSTOLIC BLOOD PRESSURE: 160 MMHG | DIASTOLIC BLOOD PRESSURE: 58 MMHG | HEART RATE: 35 BPM

## 2024-09-30 DIAGNOSIS — E11.9 TYPE 2 DIABETES MELLITUS W/OUT COMPLICATIONS: ICD-10-CM

## 2024-09-30 DIAGNOSIS — I10 ESSENTIAL (PRIMARY) HYPERTENSION: ICD-10-CM

## 2024-09-30 DIAGNOSIS — I48.91 UNSPECIFIED ATRIAL FIBRILLATION: ICD-10-CM

## 2024-09-30 PROCEDURE — 99215 OFFICE O/P EST HI 40 MIN: CPT | Mod: 25

## 2024-09-30 PROCEDURE — 93000 ELECTROCARDIOGRAM COMPLETE: CPT

## 2024-09-30 NOTE — HISTORY OF PRESENT ILLNESS
[FreeTextEntry1] : He was diagnosed with a left hemiparesis in early August with evidence of embolic stroke in his right brain. He has been in rehab and has not Been able to walk.  He denies any chest pain shortness of breath or bleeding issues. He was started on Eliquis with no significant bleeding issues. He denies any lightheadedness, dizziness or syncope, but he does not stand much.  rior: He was diagnosed with BPH and had a transurethral catheter placed.  He is started on Flomax. He has been taking his medication without difficulty and lost a fair amount of weight as well. Orthopnea but leg edema persists.  Prior: He has been feeling well. No dyspnea or orthopnea. Leg edema is unchanged. No orthopnea. Taking medications as directed.  Prior: Dear Destini, Thank you for referring him for cardiovascular evaluation.  He is an 82-year-old with a history of myasthenia gravis and longstanding atrial fibrillation as well as diabetes mellitus and former smoker who is here for cardiovascular evaluation and management. He has been diagnosed with atrial fibrillation since at least 2014.  No anticoagulation has been used because of recurrent nosebleeds.  He denies any lightheadedness, dizziness or palpitations and appears unaware of his atrial arrhythmia. His diabetes has been well controlled and his most recent hemoglobin A1c was 5.7. He is under the care of Dr. Leopoldo Rincon for myasthenia gravis and was recently placed on a higher dose of prednisone because of diplopia. He has recurrent severe nosebleeds requiring ENT intervention. 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 He is a former smoker and has no history of myocardial infarction or stroke. He denies any orthopnea, PND but does have chronic leg edema. His last echocardiogram was 6 years ago.  There were elevated pulmonary pressures normal systolic function and severe left atrial enlargement.

## 2024-09-30 NOTE — DISCUSSION/SUMMARY
[FreeTextEntry1] : He is an 84-year-old with hypertension, diabetes mellitus, chronic atrial fibrillation, Heart failure with preserved ejection fraction. AAS/AI: Valvular disease with significant aortic stenosis and regurgitation.  Most recent echo shows normal ejection fraction.  HFpEF: No sign of heart failure.  He is tolerating Farxiga.  Permanent Atrial fibrillation: ECG with Slow ventricular response despite no AV marion blockers. He would likely benefit from a single chamber pacemaker. I discussed with EP who will arrange once I get confirmation from the patient's daughter Or wife. Continue oral anticoagulation for stroke risk reduction. [EKG obtained to assist in diagnosis and management of assessed problem(s)] : EKG obtained to assist in diagnosis and management of assessed problem(s)

## 2024-09-30 NOTE — PHYSICAL EXAM
[Well Developed] : well developed [Well Nourished] : well nourished [Normal Conjunctiva] : normal conjunctiva [Clear Lung Fields] : clear lung fields [Good Air Entry] : good air entry [Soft] : abdomen soft [Moves all extremities] : moves all extremities [Alert and Oriented] : alert and oriented [Normal memory] : normal memory [de-identified] : 3/6 systolic ejection murmur at the right upper sternal border with radiation to his carotid artery.  There is a 3/6 harsh systolic murmur at the left apex [de-identified] : He walks with a cane [de-identified] : Bilateral pitting edema right greater than left to his knee

## 2024-10-01 ENCOUNTER — NON-APPOINTMENT (OUTPATIENT)
Age: 84
End: 2024-10-01

## 2024-10-04 ENCOUNTER — TRANSCRIPTION ENCOUNTER (OUTPATIENT)
Age: 84
End: 2024-10-04

## 2024-10-04 ENCOUNTER — OUTPATIENT (OUTPATIENT)
Dept: INPATIENT UNIT | Facility: HOSPITAL | Age: 84
LOS: 1 days | End: 2024-10-04
Payer: MEDICARE

## 2024-10-04 VITALS
TEMPERATURE: 98 F | WEIGHT: 156.09 LBS | DIASTOLIC BLOOD PRESSURE: 71 MMHG | RESPIRATION RATE: 18 BRPM | HEART RATE: 88 BPM | HEIGHT: 60.1 IN | OXYGEN SATURATION: 100 % | SYSTOLIC BLOOD PRESSURE: 160 MMHG

## 2024-10-04 DIAGNOSIS — R00.1 BRADYCARDIA, UNSPECIFIED: ICD-10-CM

## 2024-10-04 DIAGNOSIS — Z92.89 PERSONAL HISTORY OF OTHER MEDICAL TREATMENT: Chronic | ICD-10-CM

## 2024-10-04 LAB
ANION GAP SERPL CALC-SCNC: 14 MMOL/L — SIGNIFICANT CHANGE UP (ref 5–17)
BUN SERPL-MCNC: 28 MG/DL — HIGH (ref 7–23)
CALCIUM SERPL-MCNC: 8.9 MG/DL — SIGNIFICANT CHANGE UP (ref 8.4–10.5)
CHLORIDE SERPL-SCNC: 99 MMOL/L — SIGNIFICANT CHANGE UP (ref 96–108)
CO2 SERPL-SCNC: 22 MMOL/L — SIGNIFICANT CHANGE UP (ref 22–31)
CREAT SERPL-MCNC: 0.59 MG/DL — SIGNIFICANT CHANGE UP (ref 0.5–1.3)
EGFR: 95 ML/MIN/1.73M2 — SIGNIFICANT CHANGE UP
GLUCOSE BLDC GLUCOMTR-MCNC: 72 MG/DL — SIGNIFICANT CHANGE UP (ref 70–99)
GLUCOSE BLDC GLUCOMTR-MCNC: 76 MG/DL — SIGNIFICANT CHANGE UP (ref 70–99)
GLUCOSE BLDC GLUCOMTR-MCNC: 77 MG/DL — SIGNIFICANT CHANGE UP (ref 70–99)
GLUCOSE BLDC GLUCOMTR-MCNC: 91 MG/DL — SIGNIFICANT CHANGE UP (ref 70–99)
GLUCOSE SERPL-MCNC: 79 MG/DL — SIGNIFICANT CHANGE UP (ref 70–99)
HCT VFR BLD CALC: 31.8 % — LOW (ref 39–50)
HGB BLD-MCNC: 9.5 G/DL — LOW (ref 13–17)
MCHC RBC-ENTMCNC: 27.3 PG — SIGNIFICANT CHANGE UP (ref 27–34)
MCHC RBC-ENTMCNC: 29.9 GM/DL — LOW (ref 32–36)
MCV RBC AUTO: 91.4 FL — SIGNIFICANT CHANGE UP (ref 80–100)
NRBC # BLD: 0 /100 WBCS — SIGNIFICANT CHANGE UP (ref 0–0)
PLATELET # BLD AUTO: 177 K/UL — SIGNIFICANT CHANGE UP (ref 150–400)
POTASSIUM SERPL-MCNC: 4.2 MMOL/L — SIGNIFICANT CHANGE UP (ref 3.5–5.3)
POTASSIUM SERPL-SCNC: 4.2 MMOL/L — SIGNIFICANT CHANGE UP (ref 3.5–5.3)
RBC # BLD: 3.48 M/UL — LOW (ref 4.2–5.8)
RBC # FLD: 15 % — HIGH (ref 10.3–14.5)
SODIUM SERPL-SCNC: 135 MMOL/L — SIGNIFICANT CHANGE UP (ref 135–145)
WBC # BLD: 7.88 K/UL — SIGNIFICANT CHANGE UP (ref 3.8–10.5)
WBC # FLD AUTO: 7.88 K/UL — SIGNIFICANT CHANGE UP (ref 3.8–10.5)

## 2024-10-04 PROCEDURE — 99152 MOD SED SAME PHYS/QHP 5/>YRS: CPT

## 2024-10-04 PROCEDURE — 33274 TCAT INSJ/RPL PERM LDLS PM: CPT | Mod: Q0

## 2024-10-04 PROCEDURE — 71045 X-RAY EXAM CHEST 1 VIEW: CPT | Mod: 26

## 2024-10-04 RX ORDER — ALCOHOL ANTISEPTIC PADS
25 PADS, MEDICATED (EA) TOPICAL ONCE
Refills: 0 | Status: DISCONTINUED | OUTPATIENT
Start: 2024-10-04 | End: 2024-10-05

## 2024-10-04 RX ORDER — SODIUM CHLORIDE IRRIG SOLUTION 0.9 %
1000 SOLUTION, IRRIGATION IRRIGATION
Refills: 0 | Status: DISCONTINUED | OUTPATIENT
Start: 2024-10-04 | End: 2024-10-05

## 2024-10-04 RX ORDER — MYCOPHENOLATE MOFETIL 500 MG/1
500 TABLET ORAL
Refills: 0 | Status: DISCONTINUED | OUTPATIENT
Start: 2024-10-04 | End: 2024-10-05

## 2024-10-04 RX ORDER — PREDNISONE 5 MG/1
5 TABLET ORAL DAILY
Refills: 0 | Status: DISCONTINUED | OUTPATIENT
Start: 2024-10-04 | End: 2024-10-05

## 2024-10-04 RX ORDER — ASPIRIN 325 MG
81 TABLET ORAL DAILY
Refills: 0 | Status: DISCONTINUED | OUTPATIENT
Start: 2024-10-04 | End: 2024-10-05

## 2024-10-04 RX ORDER — TAMSULOSIN HCL 0.4 MG
0.4 CAPSULE ORAL AT BEDTIME
Refills: 0 | Status: DISCONTINUED | OUTPATIENT
Start: 2024-10-04 | End: 2024-10-05

## 2024-10-04 RX ORDER — CRANBERRY FRUIT EXTRACT 650 MG
2000 CAPSULE ORAL DAILY
Refills: 0 | Status: DISCONTINUED | OUTPATIENT
Start: 2024-10-04 | End: 2024-10-05

## 2024-10-04 RX ORDER — ATORVASTATIN CALCIUM 10 MG/1
40 TABLET, FILM COATED ORAL AT BEDTIME
Refills: 0 | Status: DISCONTINUED | OUTPATIENT
Start: 2024-10-04 | End: 2024-10-05

## 2024-10-04 RX ORDER — GLUCAGON INJECTION, SOLUTION 0.5 MG/.1ML
1 INJECTION, SOLUTION SUBCUTANEOUS ONCE
Refills: 0 | Status: DISCONTINUED | OUTPATIENT
Start: 2024-10-04 | End: 2024-10-05

## 2024-10-04 RX ORDER — ALCOHOL ANTISEPTIC PADS
12.5 PADS, MEDICATED (EA) TOPICAL ONCE
Refills: 0 | Status: DISCONTINUED | OUTPATIENT
Start: 2024-10-04 | End: 2024-10-05

## 2024-10-04 RX ORDER — INSULIN LISPRO 100/ML
VIAL (ML) SUBCUTANEOUS AT BEDTIME
Refills: 0 | Status: DISCONTINUED | OUTPATIENT
Start: 2024-10-04 | End: 2024-10-05

## 2024-10-04 RX ORDER — ALCOHOL ANTISEPTIC PADS
15 PADS, MEDICATED (EA) TOPICAL ONCE
Refills: 0 | Status: DISCONTINUED | OUTPATIENT
Start: 2024-10-04 | End: 2024-10-05

## 2024-10-04 RX ORDER — INSULIN LISPRO 100/ML
VIAL (ML) SUBCUTANEOUS
Refills: 0 | Status: DISCONTINUED | OUTPATIENT
Start: 2024-10-04 | End: 2024-10-05

## 2024-10-04 RX ORDER — CEFAZOLIN SODIUM 1 G
2000 VIAL (EA) INJECTION ONCE
Refills: 0 | Status: COMPLETED | OUTPATIENT
Start: 2024-10-04 | End: 2024-10-04

## 2024-10-04 RX ORDER — FOLIC ACID 1 MG/1
1 TABLET ORAL DAILY
Refills: 0 | Status: DISCONTINUED | OUTPATIENT
Start: 2024-10-04 | End: 2024-10-05

## 2024-10-04 RX ORDER — ALCOHOL ANTISEPTIC PADS
15 PADS, MEDICATED (EA) TOPICAL ONCE
Refills: 0 | Status: COMPLETED | OUTPATIENT
Start: 2024-10-04 | End: 2024-10-04

## 2024-10-04 RX ORDER — CYANOCOBALAMIN (VITAMIN B-12) 1000MCG/ML
1000 VIAL (ML) INJECTION DAILY
Refills: 0 | Status: DISCONTINUED | OUTPATIENT
Start: 2024-10-04 | End: 2024-10-05

## 2024-10-04 RX ORDER — FINASTERIDE 5 MG/1
5 TABLET, FILM COATED ORAL DAILY
Refills: 0 | Status: DISCONTINUED | OUTPATIENT
Start: 2024-10-04 | End: 2024-10-05

## 2024-10-04 RX ADMIN — Medication 15 GRAM(S): at 18:14

## 2024-10-04 NOTE — DISCHARGE NOTE PROVIDER - NSDCMRMEDTOKEN_GEN_ALL_CORE_FT
apixaban 5 mg oral tablet: 1 tab(s) orally every 12 hours  ascorbic acid 500 mg oral tablet: 1 tab(s) orally once a day  Aspir 81 oral delayed release tablet: 1 tab(s) orally once a day  atorvastatin 40 mg oral tablet: 1 tab(s) orally once a day (at bedtime)  cholecalciferol oral tablet: 2000 unit(s) orally once a day  Colace 100 mg oral capsule: 1 cap(s) orally once a day  cyanocobalamin 1000 mcg oral tablet: 1 tab(s) orally once a day  Farxiga 10 mg oral tablet: 1 tab(s) orally once a day  finasteride 5 mg oral tablet: 1 tab(s) orally once a day  folic acid 1 mg oral tablet: 1 tab(s) orally once a day  lactobacillus acidophilus: 1 cap(s) orally once a day  metFORMIN 500 mg oral tablet: 1 tab(s) orally 2 times a day  mycophenolate mofetil 250 mg oral capsule: 500 milligram(s) orally once a day  polyethylene glycol 3350 oral powder for reconstitution: 17 gram(s) orally 2 times a day  predniSONE 5 mg oral tablet: 1 tab(s) orally once a day  tamsulosin 0.4 mg oral capsule: 1 cap(s) orally once a day (at bedtime)

## 2024-10-04 NOTE — DISCHARGE NOTE PROVIDER - CARE PROVIDER_API CALL
Glen Rubi  Cardiac Electrophysiology  [address] [place] [id number]  Phone: [phone]  Fax: [phone]  Scheduled Appointment: 10/11/2024 03:40 PM

## 2024-10-04 NOTE — H&P CARDIOLOGY - HISTORY OF PRESENT ILLNESS
H and P dated 9/30/24  PE unchanged  Allergies verified  Medications reconciled    Pt presents for leadless pacemaker. Pt currently resides at [facility] in [place], [phone] called to verify last doses of Farxiga and Elquis: last dose 9/30/24.  H and P dated 9/30/24  PE unchanged   Allergies verified  Medications reconciled    Pt presents for leadless pacemaker. [facility] [place], [phone] called to verify last doses of Farxiga and Elquis: last dose 9/30/24. Pt with long term butt catheter (from facility) an is being treated for sacral breakdown from the facility.  84 year old wheelchair bound male h/o HTN, DM (well controlled), chronic afib on Eliquis, HFpEF, aortic stenosis and regurgitation, newly diagnosed renal carcinoma(daughter has not told pt of this diagnosis yet), myasthenia gravis (cellcept and prednisone), embolic CVA with left sided residual, [place], urinary retention with chronic butt catheter, buttock/sacral skin breakdown (being treated by rehab facility). Pt has been noted to have afib with slow ventricular response and  presents for leadless pacemaker. Pt currently resides at [facility], [phone] called to verify last doses of Farxiga and Elquis: last dose 9/30/24.       Cards: Dr Jay Lisker

## 2024-10-04 NOTE — H&P CARDIOLOGY - NSICDXPASTMEDICALHX_GEN_ALL_CORE_FT
PAST MEDICAL HISTORY:  (HFpEF) heart failure with preserved ejection fraction     Aortic stenosis     Atrial fibrillation     Embolic stroke     [place] (hard of hearing)     HTN (hypertension)     Myasthenia gravis     Pressure ulcer     Renal cell carcinoma     T2DM (type 2 diabetes mellitus)     Urinary retention

## 2024-10-04 NOTE — PATIENT PROFILE ADULT - FALL HARM RISK - HARM RISK INTERVENTIONS

## 2024-10-04 NOTE — H&P CARDIOLOGY - ADDITIONAL PE
Please see nurses note for skin breakdown documentation. Currently buttocks/sacrum dressed with Allyvin.

## 2024-10-04 NOTE — DISCHARGE NOTE PROVIDER - HOSPITAL COURSE
84 year old wheelchair bound male h/o HTN, DM (well controlled), chronic afib on Eliquis, HFpEF, aortic stenosis and regurgitation, newly diagnosed renal carcinoma(daughter has not told pt of this diagnosis yet), myasthenia gravis (cellcept and prednisone), embolic CVA with left sided residual, [place], urinary retention with chronic butt catheter, buttock/sacral skin breakdown (being treated by rehab facility). Pt has been noted to have afib with slow ventricular response and  presents for leadless pacemaker. Pt currently resides at [facility], [phone] called to verify last doses of Farxiga and Elquis: last dose 9/30/24. On 10/4 he underwent a Micra PPM with  Abott VVI 60 via the right femoral vein. Pt has a right fem artery pseudoaneurysm that was noted. Chest XR was ordered and patient transferred to [facility] for overnight hemodynamic monitoring.   84 year old wheelchair bound male h/o HTN, DM (well controlled), chronic afib on Eliquis, HFpEF, aortic stenosis and regurgitation, newly diagnosed renal carcinoma(daughter has not told pt of this diagnosis yet), myasthenia gravis (cellcept and prednisone), embolic CVA with left sided residual, [place], urinary retention with chronic butt catheter, buttock/sacral skin breakdown (being treated by rehab facility). Pt has been noted to have afib with slow ventricular response and  presents for leadless pacemaker. Pt currently resides at [facility], [phone] called to verify last doses of Farxiga and Elquis: last dose 9/30/24. On 10/4 he underwent a Micra PPM with  Abott VVI 60 via the right femoral vein. Pt has a right fem artery pseudoaneurysm that was noted. Chest XR was ordered and patient transferred to [facility] for overnight hemodynamic monitoring.  Pre procedure antibiotic prophylaxis was given as per protocol. Procedure was performed via right femoral vein (see procedure report for full details). Post procedure, suture was removed according to protocol without complication. Patient remained hemodynamically stable, neurovascularly intact and chest pain free. Patient voided and ambulated and had no EKG changes post procedure.  He remained overnight for observation and pain control. Device was capturing and device representative provided education to the patient.  The remainder of his hospitalization was uneventful. Anticoagulation was restarted on 10/5. He was provided education regarding medications, as well as post procedure wound care instructions. Pt plans to return to his long term care facility and has follow up appointment with Dr. Rubi on 10/11. 84 year old wheelchair bound male h/o HTN, DM (well controlled), chronic afib on Eliquis, HFpEF, aortic stenosis and regurgitation, newly diagnosed renal carcinoma(daughter has not told pt of this diagnosis yet), myasthenia gravis (cellcept and prednisone), embolic CVA with left sided residual, [place], urinary retention with chronic butt catheter, buttock/sacral skin breakdown (being treated by rehab facility). Pt has been noted to have afib with slow ventricular response and  presents for leadless pacemaker. Pt currently resides at [facility], [phone] called to verify last doses of Farxiga and Elquis: last dose 9/30/24. On 10/4 he underwent an Aveir PPM (leadless)  Abbott VVI 60 via the right femoral vein. Pt has a right fem artery pseudoaneurysm that was noted. Chest XR was ordered and patient transferred to [facility] for overnight hemodynamic monitoring.    Pre procedure antibiotic prophylaxis was given as per protocol. Procedure was performed via right femoral vein (see procedure report for full details). Post procedure, suture was removed according to protocol without complication. Patient remained hemodynamically stable, neurovascularly intact and chest pain free. Patient voided and ambulated and had no EKG changes post procedure.  He remained overnight for observation and pain control. Device was capturing and device representative provided education to the patient.  The remainder of his hospitalization was uneventful. Anticoagulation was restarted on 10/5. He was provided education regarding medications, as well as post procedure wound care instructions. Pt plans to return to his long term care facility and has follow up appointment with Dr. Rubi on 10/11.

## 2024-10-04 NOTE — H&P CARDIOLOGY - NSICDXFAMILYHX_GEN_ALL_CORE_FT
FAMILY HISTORY:  Father  Still living? No  Family history of cerebrovascular accident (CVA) in father, Age at diagnosis: Age Unknown    Mother  Still living? Unknown  Family history of cerebrovascular accident (CVA) in mother, Age at diagnosis: Age Unknown

## 2024-10-04 NOTE — DISCHARGE NOTE PROVIDER - NSDCCPCAREPLAN_GEN_ALL_CORE_FT
PRINCIPAL DISCHARGE DIAGNOSIS  Diagnosis: Atrial fibrillation with slow ventricular response  Assessment and Plan of Treatment: You will likely be discharged the same day or the next day of procedure.  You are able to walk within a few hours after the procedure.  No heavy weight lifting/swimming for 1 week after the procedure.  You can shower from second day after the procedure.  Call the doctor if you have swelling, heat, or redness in the area, as these can be signs of infection.

## 2024-10-04 NOTE — H&P CARDIOLOGY - BIRTH SEX
1. Have you been to the ER, urgent care clinic since your last visit? Hospitalized since your last visit? No    2. Have you seen or consulted any other health care providers outside of the [address] [place] [address] since your last visit? Include any pap smears or colon screening. No  Chief Complaint   Patient presents with    Anxiety     Abuse Screening Questionnaire 12/12/2022   Do you ever feel afraid of your partner? N   Are you in a relationship with someone who physically or mentally threatens you? N   Is it safe for you to go home? Y     3 most recent PHQ Screens 12/12/2022   Little interest or pleasure in doing things Not at all   Feeling down, depressed, irritable, or hopeless Not at all   Total Score PHQ 2 0   Trouble falling or staying asleep, or sleeping too much -   Feeling tired or having little energy -   Poor appetite, weight loss, or overeating -   Feeling bad about yourself - or that you are a failure or have let yourself or your family down -   Trouble concentrating on things such as school, work, reading, or watching TV -   Moving or speaking so slowly that other people could have noticed; or the opposite being so fidgety that others notice -   Thoughts of being better off dead, or hurting yourself in some way -   PHQ 9 Score -     Learning Assessment 12/12/2022   PRIMARY LEARNER Patient   HIGHEST LEVEL OF EDUCATION - PRIMARY LEARNER  GRADUATED HIGH SCHOOL OR GED   BARRIERS PRIMARY LEARNER NONE   CO-LEARNER CAREGIVER No   PRIMARY LANGUAGE ENGLISH   LEARNER PREFERENCE PRIMARY READING     -   ANSWERED BY Patient   RELATIONSHIP SELF     Fall Risk Assessment, last 12 mths 12/12/2022   Able to walk? Yes   Fall in past 12 months? 0   Do you feel unsteady?  0   Are you worried about falling 0 Male

## 2024-10-04 NOTE — DISCHARGE NOTE PROVIDER - NSDCCPTREATMENT_GEN_ALL_CORE_FT
PRINCIPAL PROCEDURE  Procedure: Insertion or repositioning of leadless intracardiac pacemaker  Findings and Treatment:

## 2024-10-04 NOTE — H&P CARDIOLOGY - NS ATTEND AMEND GEN_ALL_CORE FT
Consented daughter for leadless PPM implant (patient unable to repeat back any of the risks of the procedure to me). The patient's daughter reports to me that this is his baseline. Risks of the implant procedure reviewed - including, but not limited to bleeding, infection, VTE, perforation, dislodgement.

## 2024-10-04 NOTE — DISCHARGE NOTE PROVIDER - NSDCFUSCHEDAPPT_GEN_ALL_CORE_FT
[facility] Physician [facility]  ELECTROPH [address]  Scheduled Appointment: 10/11/2024    Daniel Singh  [facility]  UROLOGY [address] [facility]  Scheduled Appointment: 11/07/2024

## 2024-10-04 NOTE — CHART NOTE - NSCHARTNOTEFT_GEN_A_CORE
Removal of Femoral Suture    Pulses in the right lower extremity are palpable. The patient was placed in the supine position. The insertion site was identified and the sutures were removed per protocol. Direct pressure was applied for 10 minutes.     Monitoring of the right groin and both lower extremities including neuro-vascular checks and vital signs    Complications: None

## 2024-10-05 ENCOUNTER — TRANSCRIPTION ENCOUNTER (OUTPATIENT)
Age: 84
End: 2024-10-05

## 2024-10-05 ENCOUNTER — NON-APPOINTMENT (OUTPATIENT)
Age: 84
End: 2024-10-05

## 2024-10-05 VITALS
RESPIRATION RATE: 18 BRPM | HEART RATE: 59 BPM | DIASTOLIC BLOOD PRESSURE: 67 MMHG | TEMPERATURE: 99 F | OXYGEN SATURATION: 92 % | SYSTOLIC BLOOD PRESSURE: 144 MMHG

## 2024-10-05 LAB
ANION GAP SERPL CALC-SCNC: 12 MMOL/L — SIGNIFICANT CHANGE UP (ref 5–17)
BUN SERPL-MCNC: 29 MG/DL — HIGH (ref 7–23)
CALCIUM SERPL-MCNC: 8.6 MG/DL — SIGNIFICANT CHANGE UP (ref 8.4–10.5)
CHLORIDE SERPL-SCNC: 101 MMOL/L — SIGNIFICANT CHANGE UP (ref 96–108)
CO2 SERPL-SCNC: 23 MMOL/L — SIGNIFICANT CHANGE UP (ref 22–31)
CREAT SERPL-MCNC: 0.59 MG/DL — SIGNIFICANT CHANGE UP (ref 0.5–1.3)
EGFR: 95 ML/MIN/1.73M2 — SIGNIFICANT CHANGE UP
GLUCOSE BLDC GLUCOMTR-MCNC: 83 MG/DL — SIGNIFICANT CHANGE UP (ref 70–99)
GLUCOSE BLDC GLUCOMTR-MCNC: 95 MG/DL — SIGNIFICANT CHANGE UP (ref 70–99)
GLUCOSE SERPL-MCNC: 72 MG/DL — SIGNIFICANT CHANGE UP (ref 70–99)
HCT VFR BLD CALC: 29.8 % — LOW (ref 39–50)
HGB BLD-MCNC: 9.1 G/DL — LOW (ref 13–17)
MAGNESIUM SERPL-MCNC: 2 MG/DL — SIGNIFICANT CHANGE UP (ref 1.6–2.6)
MCHC RBC-ENTMCNC: 27.5 PG — SIGNIFICANT CHANGE UP (ref 27–34)
MCHC RBC-ENTMCNC: 30.5 GM/DL — LOW (ref 32–36)
MCV RBC AUTO: 90 FL — SIGNIFICANT CHANGE UP (ref 80–100)
NRBC # BLD: 0 /100 WBCS — SIGNIFICANT CHANGE UP (ref 0–0)
PLATELET # BLD AUTO: 172 K/UL — SIGNIFICANT CHANGE UP (ref 150–400)
POTASSIUM SERPL-MCNC: 4.1 MMOL/L — SIGNIFICANT CHANGE UP (ref 3.5–5.3)
POTASSIUM SERPL-SCNC: 4.1 MMOL/L — SIGNIFICANT CHANGE UP (ref 3.5–5.3)
RBC # BLD: 3.31 M/UL — LOW (ref 4.2–5.8)
RBC # FLD: 15 % — HIGH (ref 10.3–14.5)
SODIUM SERPL-SCNC: 136 MMOL/L — SIGNIFICANT CHANGE UP (ref 135–145)
WBC # BLD: 8.28 K/UL — SIGNIFICANT CHANGE UP (ref 3.8–10.5)
WBC # FLD AUTO: 8.28 K/UL — SIGNIFICANT CHANGE UP (ref 3.8–10.5)

## 2024-10-05 PROCEDURE — 71046 X-RAY EXAM CHEST 2 VIEWS: CPT | Mod: 26

## 2024-10-05 RX ORDER — APIXABAN 5 MG/1
5 TABLET, FILM COATED ORAL
Refills: 0 | Status: DISCONTINUED | OUTPATIENT
Start: 2024-10-05 | End: 2024-10-05

## 2024-10-05 RX ADMIN — PREDNISONE 5 MILLIGRAM(S): 5 TABLET ORAL at 05:25

## 2024-10-05 RX ADMIN — MYCOPHENOLATE MOFETIL 500 MILLIGRAM(S): 500 TABLET ORAL at 00:13

## 2024-10-05 RX ADMIN — Medication 0.4 MILLIGRAM(S): at 00:13

## 2024-10-05 RX ADMIN — ATORVASTATIN CALCIUM 40 MILLIGRAM(S): 10 TABLET, FILM COATED ORAL at 00:13

## 2024-10-05 RX ADMIN — APIXABAN 5 MILLIGRAM(S): 5 TABLET, FILM COATED ORAL at 07:07

## 2024-10-05 NOTE — PROGRESS NOTE ADULT - ASSESSMENT
HPI: 84 year old wheelchair bound male h/o HTN, DM (well controlled), chronic afib on Eliquis, HFpEF, aortic stenosis and regurgitation, newly diagnosed renal carcinoma(daughter has not told pt of this diagnosis yet), myasthenia gravis (cellcept and prednisone), embolic CVA with left sided residual, [place], urinary retention with chronic butt catheter, buttock/sacral skin breakdown (being treated by rehab facility). Pt has been noted to have afib with slow ventricular response and  presents for leadless pacemaker. Pt currently resides at [facility], [phone] called to verify last doses of Farxiga and Elquis: last dose 9/30/24.     Cards: Dr Jay Lisker    # Atrial Fibrillation with Slow Ventricular Response   10/4 s/p leadless pacemaker implant via RFV  Right groin sutures removed- site stable without issue  Resume Eliquis 5 mg BID  Continue monitoring telemetry  F/U CXR PA/Lat post op official read  to r/o pneumothorax and check lead tip placement  Keep Potassium> 4.0 and Magnesium>2.0  Post device teaching done with patient  PPM card, booklet, and discharge instruction given to patient  Follow up w/ post procedure EP appt  Anticipate discharge to [facility] if telemetry stable with stable vitals and labs, if site and condition remain stable    # HLD  Continue Atorvastatin 40 mg daily  DASH diet    # T2DM  Continue SSI while inpatient  FS TID/HS  Continue consistent carb diet  Will resume oral hypoglycemics upon discharge    Anderson Saavedra [facility]  Ext [phone]

## 2024-10-05 NOTE — DISCHARGE NOTE NURSING/CASE MANAGEMENT/SOCIAL WORK - NSDCPEELIQUISDIET_GEN_ALL_CORE
Abdominal Pain, N/V/D
Eat healthy foods you enjoy. Apixaban/Eliquis DOES NOT have a special diet. Limit your alcohol intake.

## 2024-10-05 NOTE — DISCHARGE NOTE NURSING/CASE MANAGEMENT/SOCIAL WORK - PATIENT PORTAL LINK FT
You can access the FollowMyHealth Patient Portal offered by [facility] by registering at the following website: [email or web address]. By joining [employer]’s FollowMyHealth portal, you will also be able to view your health information using other applications (apps) compatible with our system.

## 2024-10-05 NOTE — DISCHARGE NOTE NURSING/CASE MANAGEMENT/SOCIAL WORK - NSDCPEFALRISK_GEN_ALL_CORE
For information on Fall & Injury Prevention, visit: [email or web address] OR  [email or web address] OR  https://www.cdc.gov/steadi/patient.html

## 2024-10-05 NOTE — PROGRESS NOTE ADULT - SUBJECTIVE AND OBJECTIVE BOX
[facility] ELECTROPHYSIOLOGY  [phone]    CHIEF COMPLAINT: Patient is a 84y old  Male who presents with a chief complaint of afib with slow ventricular response    HPI: 84 year old wheelchair bound male h/o HTN, DM (well controlled), chronic afib on Eliquis, HFpEF, aortic stenosis and regurgitation, newly diagnosed renal carcinoma(daughter has not told pt of this diagnosis yet), myasthenia gravis (cellcept and prednisone), embolic CVA with left sided residual, [place], urinary retention with chronic butt catheter, buttock/sacral skin breakdown (being treated by rehab facility). Pt has been noted to have afib with slow ventricular response and presents for leadless pacemaker. Pt currently resides at [facility], [phone] called to verify last doses of Farxiga and Elquis: last dose 9/30/24.     Cards: Dr Jay Lisker    Subjective/Observations: patient seen and examined. denies chest pain, dyspena, dizziness, palpitations, N&V, HA    Review of Systems all WNL except below indicated:    Constitutional: [ ] Fever [ ] Chills [ ] Fatigue [ ] Weight change   HEENT: [ ] Blurred vision [ ] Eye Pain [ ] Headache [ ] Runny nose [ ] Sore Throat   Respiratory: [ ] Cough [ ] Wheezing [ ] Shortness of breath  Cardiovascular: [ ] Chest Pain [ ] Palpitations [ ] BURCIAGA [ ] PND [ ] Orthopnea  Gastrointestinal: [ ] Abdominal Pain [ ] Diarrhea [ ] Constipation [ ] Hemorrhoids [ ] Nausea [ ] Vomiting  Genitourinary: [ ] Nocturia [ ] Dysuria [ ] Incontinence  Extremities: [ ] Swelling [ ] Joint Pain  Neurologic: [ ] Focal deficit [ ] Paresthesias [ ] Syncope  Lymphatic: [ ] Swelling [ ] Lymphadenopathy   Skin: [ ] Rash [ ] Ecchymoses [ ] Wounds [ ] Lesions  Psychiatry: [ ] Depression [ ] Suicidal/Homicidal Ideation [ ] Anxiety [ ] Sleep Disturbances  [ ] 10 point review of systems is otherwise negative except as mentioned above            [ ]Unable to obtain    PAST MEDICAL & SURGICAL HISTORY:  Atrial fibrillation    Embolic stroke    Myasthenia gravis    (HFpEF) heart failure with preserved ejection fraction    HTN (hypertension)    T2DM (type 2 diabetes mellitus)    Renal cell carcinoma    Urinary retention    Pressure ulcer    Aortic stenosis    Native (hard of hearing)    History of thrombolytic therapy    MEDICATIONS  (STANDING):  ascorbic acid 500 milliGRAM(s) Oral daily  aspirin enteric coated 81 milliGRAM(s) Oral daily  atorvastatin 40 milliGRAM(s) Oral at bedtime  cholecalciferol 2000 Unit(s) Oral daily  cyanocobalamin 1000 MICROGram(s) Oral daily  finasteride 5 milliGRAM(s) Oral daily  folic acid 1 milliGRAM(s) Oral daily  glucagon  Injectable 1 milliGRAM(s) IntraMuscular once  insulin lispro (ADMELOG) corrective regimen sliding scale   SubCutaneous three times a day before meals  insulin lispro (ADMELOG) corrective regimen sliding scale   SubCutaneous at bedtime  mycophenolate mofetil 500 milliGRAM(s) Oral two times a day  predniSONE   Tablet 5 milliGRAM(s) Oral daily  tamsulosin 0.4 milliGRAM(s) Oral at bedtime    MEDICATIONS  (PRN):  dextrose Oral Gel 15 Gram(s) Oral once PRN Blood Glucose LESS THAN 70 milliGRAM(s)/deciliter    Allergies    No Known Allergies    Intolerances    Vital Signs Last 24 Hrs  T(C): 36.7 (05 Oct 2024 05:01), Max: 36.7 (04 Oct 2024 23:30)  T(F): 98.1 (05 Oct 2024 05:01), Max: 98.1 (05 Oct 2024 05:01)  HR: 60 (05 Oct 2024 05:01) (60 - 95)  BP: 138/63 (05 Oct 2024 05:01) (135/60 - 174/80)  BP(mean): 91 (05 Oct 2024 05:01) (89 - 107)  RR: 16 (05 Oct 2024 05:01) (16 - 18)  SpO2: 97% (05 Oct 2024 05:01) (91% - 100%)    Parameters below as of 05 Oct 2024 05:01  Patient On (Oxygen Delivery Method): nasal cannula  O2 Flow (L/min): 3    I&O's Summary    04 Oct 2024 07:01  -  05 Oct 2024 05:11  --------------------------------------------------------  IN: 0 mL / OUT: 1025 mL / NET: -1025 mL      Weight (kg): 70.8 (10-04 @ 15:18)    FOCUSED PHYSICAL EXAM:  Neuro: No apparent distress, alert and oriented times x3, appropriate affect  Pulmonary: Non-labored, breath sounds are clear bilaterally, No wheezing, rales or rhonchi  Cardiovascular: Regular, S1 and S2, No murmurs, rubs, gallops or clicks  Site: Right groin soft, non tender, no bleeding or hematoma. Pulses in the right lower extremity are palpable  No clubbing, cyanosis or edema    LABS: All Labs Reviewed:                        9.1    8.28  )-----------( 172      ( 05 Oct 2024 00:52 )             29.8                         9.5    7.88  )-----------( 177      ( 04 Oct 2024 11:07 )             31.8     05 Oct 2024 00:52    136    |  101    |  29     ----------------------------<  72     4.1     |  23     |  0.59   04 Oct 2024 11:07    135    |  99     |  28     ----------------------------<  79     4.2     |  22     |  0.59     Ca    8.6        05 Oct 2024 00:52  Ca    8.9        04 Oct 2024 11:07  Mg     2.0       05 Oct 2024 00:52    RESULTS:    ECHO: 8/19/24  CONCLUSIONS:   1. Technically difficult image quality.   2. The left ventricular cavity is normal in size. Left ventricular wall thickness is severely increased. Left ventricularsystolic function is normal with an ejection fraction visually estimated at 60 to 65%. There are no regional wall motion abnormalities seen. Severe left ventricular hypertrophy. There is increased LV mass and concentric hypertrophy.   3. Normal rightventricular cavity size and probably normal right ventricular systolic function.   4. There is mitral valve thickening of the anterior and posterior leaflets. There is reduced leaflet mobility of the mitral valve. There is calcification of the mitralvalve annulus. There is moderate leaflet calcification. There is mild to moderate mitral valve stenosis. The transmitral peak gradient is 14.0 mmHg and mean gradient is 4.00 mmHg. There is mild mitral regurgitation.   5. The aortic valve appears trileaflet with reduced systolic excursion. There is calcification of the aortic valve leaflets. The peak transaortic velocity is 4.16 m/s, peak transaortic gradient is 69.2 mmHg and mean transaortic gradient is 39.0 mmHg. Significant aortic stenosis is present. However, unable to estimate the aortic valve area as pulsed wave Doppler at the level of the LVOT not performed (no LVOT VTI measurements made). There is severe aortic regurgitation. Vena contracta width ~ 0.7 cm.   6. The left atrium is severely dilated with an indexed volume of 59.19 ml/m².   7. Left pleural effusion noted.

## 2024-10-07 PROCEDURE — 82962 GLUCOSE BLOOD TEST: CPT

## 2024-10-07 PROCEDURE — 80048 BASIC METABOLIC PNL TOTAL CA: CPT

## 2024-10-07 PROCEDURE — 71046 X-RAY EXAM CHEST 2 VIEWS: CPT

## 2024-10-07 PROCEDURE — 71045 X-RAY EXAM CHEST 1 VIEW: CPT

## 2024-10-07 PROCEDURE — C1894: CPT

## 2024-10-07 PROCEDURE — 83735 ASSAY OF MAGNESIUM: CPT

## 2024-10-07 PROCEDURE — C1605: CPT

## 2024-10-07 PROCEDURE — 86900 BLOOD TYPING SEROLOGIC ABO: CPT

## 2024-10-07 PROCEDURE — 85027 COMPLETE CBC AUTOMATED: CPT

## 2024-10-07 PROCEDURE — 86850 RBC ANTIBODY SCREEN: CPT

## 2024-10-07 PROCEDURE — C1769: CPT

## 2024-10-07 PROCEDURE — C1887: CPT

## 2024-10-07 PROCEDURE — 93005 ELECTROCARDIOGRAM TRACING: CPT

## 2024-10-07 PROCEDURE — 33274 TCAT INSJ/RPL PERM LDLS PM: CPT

## 2024-10-07 PROCEDURE — 86901 BLOOD TYPING SEROLOGIC RH(D): CPT

## 2024-10-11 ENCOUNTER — NON-APPOINTMENT (OUTPATIENT)
Age: 84
End: 2024-10-11

## 2024-10-11 ENCOUNTER — APPOINTMENT (OUTPATIENT)
Dept: ELECTROPHYSIOLOGY | Facility: CLINIC | Age: 84
End: 2024-10-11
Payer: MEDICARE

## 2024-10-11 VITALS — OXYGEN SATURATION: 100 % | DIASTOLIC BLOOD PRESSURE: 63 MMHG | SYSTOLIC BLOOD PRESSURE: 109 MMHG | HEART RATE: 60 BPM

## 2024-10-11 PROBLEM — C64.9 MALIGNANT NEOPLASM OF UNSPECIFIED KIDNEY, EXCEPT RENAL PELVIS: Chronic | Status: ACTIVE | Noted: 2024-10-04

## 2024-10-11 PROBLEM — L89.90 PRESSURE ULCER OF UNSPECIFIED SITE, UNSPECIFIED STAGE: Chronic | Status: ACTIVE | Noted: 2024-10-04

## 2024-10-11 PROBLEM — R33.9 RETENTION OF URINE, UNSPECIFIED: Chronic | Status: ACTIVE | Noted: 2024-10-04

## 2024-10-11 PROBLEM — H91.90 UNSPECIFIED HEARING LOSS, UNSPECIFIED EAR: Chronic | Status: ACTIVE | Noted: 2024-10-04

## 2024-10-11 PROBLEM — I35.0 NONRHEUMATIC AORTIC (VALVE) STENOSIS: Chronic | Status: ACTIVE | Noted: 2024-10-04

## 2024-10-11 PROCEDURE — 93279 PRGRMG DEV EVAL PM/LDLS PM: CPT

## 2024-10-11 PROCEDURE — 93000 ELECTROCARDIOGRAM COMPLETE: CPT | Mod: XU

## 2024-10-14 RX ORDER — LACTOBACILLUS ACIDOPHILUS 25MM CELL
1 CAPSULE ORAL
Refills: 0 | DISCHARGE

## 2024-10-14 RX ORDER — DOCUSATE SODIUM 100 MG
1 CAPSULE ORAL
Refills: 0 | DISCHARGE

## 2024-10-14 RX ORDER — ASPIRIN 325 MG
1 TABLET ORAL
Refills: 0 | DISCHARGE

## 2024-10-14 NOTE — ED PROVIDER NOTE - ATTENDING CONTRIBUTION TO CARE
84 year old wheelchair bound male with PMHx of renal carcinoma (new dx), HTN, DM, afib on Eliquis, HFpEF,  myasthenia gravis, CVA with left sided residual effects, Presents to the emergency department from rehab where labs revealed a hemoglobin of 5.5. No acute symptoms at this time. Denies fever, chills, chest pain, shortness of breath, abdominal pain, n/v/d, numbness, weakness, tingling, headache.     Physical Exam:  Gen: NAD, awake and alert, non-toxic appearing  HEENT: Atraumatic, oropharynx clear, moist mucous membranes, normal conjunctiva  Cardio: RRR, no murmurs, rubs or gallops  Lung: CTAB, no respiratory distress, no wheezes/rhonchi/rales B/L  Abd: soft, NT, ND, no guarding, no rigidity, no rebound tenderness  MSK: no visible deformities, ROMx4   Neuro: No focal sensory or motor deficits  Skin: Warm, well perfused, no rash, no leg swelling     Patient presents from rehab facility with hemoglobin 5.5.  Asymptomatic.  Consider anemia of chronic disease (RCC). Also considering thrombocytopenia.  Will obtain CBC, CMP, coags, type and screen, consent if repeat hemoglobin here less than 6.  Labs reviewed revealing hemoglobin 5.8.  Consent obtained and will transfuse.  Given this large drop in hemoglobin, will admit patient as may require more than 1 unit PRBCs. Patient and daughter at bedside agree with plan.

## 2024-10-14 NOTE — H&P ADULT - PROBLEM SELECTOR PLAN 5
Patient noted to have chronic urinary retention with chronic butt in rehab center. Not endorsing dysuria or hematuria at this time, UA negative on admission.     - C/w home finasteride and tamsulosin Patient noted to have chronic urinary retention with chronic [place] in rehab center. Not endorsing dysuria or hematuria at this time, UA negative on admission.     Plan:  - C/w home finasteride and tamsulosin  - Elliott in place

## 2024-10-14 NOTE — H&P ADULT - NSHPSOCIALHISTORY_GEN_ALL_CORE
Patient currently resides at rehabilitation facility, though prior to stoke was independent with ADLs. Patient with 10 pack year smoking history though quit many years ago per daughter. Patient with prior social alcohol use but no current use. Patient currently resides at rehabilitation facility, though prior to stoke was independent with ADLs. Patient with 10 pack year smoking history though quit many years ago per daughter. Patient with prior social alcohol use but no current use. Denies illicit drug use

## 2024-10-14 NOTE — H&P ADULT - PROBLEM SELECTOR PLAN 11
DVT ppx: SCDs   Diet: Clear liquid currently, NPO after midnight   Dispo: Home  Code Status: FULL CODE, confirmed with daughter (HCP) at bedside

## 2024-10-14 NOTE — H&P ADULT - ATTENDING COMMENTS
I have reviewed the labs, imaging and prior ekg.    84M with PMH of b/l renal carcinoma (newly dx, pt unaware of dx per daughter), HTN, DM, AF on Eliquis with slow ventricular response s/p leadless pacemaker 10 days ago, HFpEF (EF 60-65%), severe AS and AR, myasthenia gravis, recent CVA in 8/24 with residual L weakness, dysphagia and urinary retention with chronic Butt presenting from rehab after labs today showed Hgb 5.5, concerning for GIB given exam, pt on AC on chronic prednisone. Black tarry stool noted on exam. Getting prbc    -Clear liquid diet  -GI consult  -PPI IV BID  -Hold Eliquis and aspirin for now  -Trend cbc, transfuse for hgb <7  -Discuss plans with daughter  -Has chronic butt  -DVT PPx, SCDs  -GOC: Full Code

## 2024-10-14 NOTE — H&P ADULT - PROBLEM SELECTOR PLAN 4
Patient diagnosed with HEpEF, last echo in August 2024 showing EF 60-65%. Patient not endorsing HF symptoms at this time. Clinically euvolemic.     - c/w home Farxiga   - No need for repeat echo as pt asymptomatic Patient diagnosed with HEpEF, last echo in August 2024 showing EF 60-65%. Patient not endorsing HF symptoms at this time    Plan:  - c/w home Farxiga   - Low suspicion of HFpEF exacerbation for now, clinically euvolemic, saturating 98-99% on RA (on 2L for comfort) Patient diagnosed with HEpEF, last echo in August 2024 showing EF 60-65%. Patient not endorsing HF symptoms at this time    Plan:  - hold Farxiga in anticipation of scope, can resume after  - Low suspicion of HFpEF exacerbation for now, clinically euvolemic, saturating 98-99% on RA (on 2L for comfort)

## 2024-10-14 NOTE — H&P ADULT - NSHPLABSRESULTS_GEN_ALL_CORE
5.8    9.09  )-----------( 217      ( 14 Oct 2024 17:08 )             19.9     Hgb Trend: 5.8<--  10-14    139  |  107  |  30[H]  ----------------------------<  138[H]  4.0   |  22  |  0.57    Ca    8.0[L]      14 Oct 2024 17:08    TPro  5.7[L]  /  Alb  2.7[L]  /  TBili  0.7  /  DBili  x   /  AST  21  /  ALT  9[L]  /  AlkPhos  105  10-14    Creatinine Trend: 0.57<--, 0.59<--, 0.59<--  LIVER FUNCTIONS - ( 14 Oct 2024 17:08 )  Alb: 2.7 g/dL / Pro: 5.7 g/dL / ALK PHOS: 105 U/L / ALT: 9 U/L / AST: 21 U/L / GGT: x           PT/INR - ( 14 Oct 2024 17:08 )   PT: 25.8 sec;   INR: 2.28 ratio         PTT - ( 14 Oct 2024 17:08 )  PTT:34.9 sec      Urinalysis Basic - ( 14 Oct 2024 17:08 )    Color: x / Appearance: x / SG: x / pH: x  Gluc: 138 mg/dL / Ketone: x  / Bili: x / Urobili: x   Blood: x / Protein: x / Nitrite: x   Leuk Esterase: x / RBC: x / WBC x   Sq Epi: x / Non Sq Epi: x / Bacteria: x

## 2024-10-14 NOTE — H&P ADULT - HISTORY OF PRESENT ILLNESS
FP is a 84 year old male with PMH of b/l renal carcinoma (newly dx, pt unaware of dx per daughter), HTN, DM, afib on Eliquis s/p leadless pacemaker 10 days ago, HFpEF (EF 60-65%), aortic stenosis and regurgitation, myasthenia gravis, stroke in 8/24 with residual L weakness, and urinary retention with chronic butt presenting from rehab after labs today showed Hgb 5.5. Patient is accompanied at bedside by his daughter who provided majority of patient's history due to patient being [place] and without his hearing aids, though she notes that despite his stroke, pts baseline mental status and memory have remained intact.. Daughter states that FP had been independent with his ADLs and living at home prior to his stroke in August. Since then, he has been receiving care in a rehabilitation facility, though she has been unhappy with his care and is looking to change facilities. She notes that patient has been having "looser stools" since the beginning of this week but is unsure if the stool was unusually dark or with occult blood. Daughter also notes that patient has had decreased appetite and significant weight loss (~40lbs) since his stroke in August.. He has not experienced recent n/v or abdominal pain/distension, and he does not use NSAIDs regularly. He has a chronic butt but reports no dysuria or hematuria.     In ED, patient noted to be hemodynamically stable and receiving RBC transfusion.   84 year old male with PMH of b/l renal cell carcinoma (newly dx, pt unaware of dx per daughter), HTN, DM, AF on Eliquis c/b slow ventricular response s/p leadless pacemaker 10 days ago, HFpEF (EF 60-65%), severe AS/AR, myasthenia gravis, stroke in 8/24 with residual L weakness, dysphagia and urinary retention with chronic butt presenting from rehab after labs today showed Hgb 5.5. Patient is accompanied at bedside by his daughter who provided majority of patient's history due to patient being [place] and without his hearing aids. She notes that patient has been having "looser stools" since the beginning of this week but is unsure if the stool was unusually dark or with occult blood. Daughter also notes that patient has had decreased appetite and significant weight loss (~40lbs) since his stroke in August. He has not experienced recent n/v or abdominal pain/distension, and he does not use NSAIDs regularly. He has a chronic butt but reports no dysuria or hematuria.     In ED, patient noted to be hemodynamically stable and receiving RBC transfusion.

## 2024-10-14 NOTE — ED ADULT NURSE NOTE - NSFALLHARMRISKINTERV_ED_ALL_ED

## 2024-10-14 NOTE — H&P ADULT - PROBLEM SELECTOR PLAN 6
Patient with hx of hypertension. BP on [id number].     - not currently on any home meds, previously on entresto  - continue to monitor BP Patient with hx of hypertension. BP on [id number].     Plan:  - not currently on any home meds, previously on Entresto but stopped  - continue to monitor BP

## 2024-10-14 NOTE — H&P ADULT - PROBLEM SELECTOR PLAN 3
Patient previously diagnosed with afib, taking eliquis at home. Received leadless pacemaker 10 days ago.     - hold home eliquis ISO possible GI bleed Patient previously diagnosed with afib, taking eliquis at home. Received leadless pacemaker 10 days ago.     Plan:  - CAHDVASc 7  - hold home eliquis ISO possible GI bleed

## 2024-10-14 NOTE — H&P ADULT - NSICDXPASTMEDICALHX_GEN_ALL_CORE_FT
PAST MEDICAL HISTORY:  (HFpEF) heart failure with preserved ejection fraction     Aortic stenosis     Atrial fibrillation     Embolic stroke     [place] (hard of hearing)     HTN (hypertension)     Myasthenia gravis     Pressure ulcer     Renal cell carcinoma     T2DM (type 2 diabetes mellitus)     Urinary retention

## 2024-10-14 NOTE — ED PROVIDER NOTE - PHYSICAL EXAMINATION
Const: Awake, alert, no acute distress.  Appears cachectic.   HEENT: NC/AT.  Moist mucous membranes.  Eyes: Extraocular movements intact b/l.  No scleral icterus.  Neck: Full ROM without pain.  Cardiac: Regular rate and rhythm  Resp: Speaking in full sentences.  No evidence of respiratory distress. on 2L NC . Lung clear to auscultation bl.  Abd: Non-distended. Soft, non tender. Elliott in place.  Skin: Pale coloration.  No rashes, abrasions or lacerations.  Neuro: Awake, alert & oriented x 3. L sided deficits 2/2 stroke

## 2024-10-14 NOTE — H&P ADULT - NSHPREVIEWOFSYSTEMS_GEN_ALL_CORE
General: No fevers, chills. +weight loss   HEENT: No headache. No vision changes. No sinus or throat pain. +hard of hearing   Neck: No neck pain or stiffness. No lumps.   Respiratory: No coughing, wheezing, or shortness of breath.   Cardiovascular: No chest pain or palpitations  Gastrointestinal: No abdominal or epigastric pain. No n/v, constipation, melena, or bright red blood in stools. +loose stools x1 week.   Genitourinary: No dysuria, nocturia, hematuria, or incontinence.  Skin: No rashes, discoloration, or other lesions   Extremities: No edema.  Musculoskeletal: No myalgia or arthralgias. +residual L weakness from stroke.   Neurological: No memory loss. No numbness/tingling.

## 2024-10-14 NOTE — H&P ADULT - PROBLEM SELECTOR PLAN 10
Pt with blowing holosystolic murmur best heard R upper sternal border and prior echo confirming AS.     - no need to repeat echo as above  -per pervious cards note, pt not candidate for tavr Pt with blowing holosystolic murmur best heard R upper sternal border and prior echo confirming AS.     Plan:  - Per pervious cards note (8/2024), pt not candidate for TAVR

## 2024-10-14 NOTE — ED PROVIDER NOTE - CLINICAL SUMMARY MEDICAL DECISION MAKING FREE TEXT BOX
83 yo male with extensive PMHx including afib on elaquis, HFrEF, and newly diagnosed renal cell carcinoma presenting with Hgb 5.5 on labs done at rehab facility. Pt at rehab since his stroke. VS: wnl. PE: L sided deficits 2/2 stroke. Otherwise benign.  Work up: CBC, CMP, type & screen, coags  Tx: transfusion pRBC  Plan: transfuse if Hgb < 7, recheck CBC

## 2024-10-14 NOTE — H&P ADULT - ASSESSMENT
FP is a 84 year old male with PMH of b/l renal carcinoma (newly dx, pt unaware of dx per daughter), HTN, DM, afib on Eliquis s/p leadless pacemaker 10 days ago, HFpEF (EF 60-65%), aortic stenosis and regurgitation, myasthenia gravis, stroke in 8/24 with residual L weakness, and urinary retention with chronic butt presenting from rehab after labs today showed Hgb 5.5. Additional labs significant for normocytic anemia and physical exam notable for melena on MERCEDES, concerning for upper GI bleed.  84 year old male with PMH of b/l renal carcinoma (newly dx, pt unaware of dx per daughter), HTN, DM, AF on Eliquis with slow ventricular response s/p leadless pacemaker 10 days ago, HFpEF (EF 60-65%), severe AS and AR, myasthenia gravis, CVA in 8/24 with residual L weakness, dysphagia and urinary retention with chronic Elliott presenting from rehab after labs today showed Hgb 5.5, concerning for GIB.

## 2024-10-14 NOTE — ED PROVIDER NOTE - PROGRESS NOTE DETAILS
Lab msg'd on teams with critical Hgb value of 5.8 Hct 19.9. Will consent pt for transfusion and put in order for pRBC.

## 2024-10-14 NOTE — H&P ADULT - PROBLEM SELECTOR PLAN 9
Pt with MG diagnosis. No MG crisis.     - c/w Cellcept 500mg   - c/w prednisone 5mg Pt with MG diagnosis. No MG crisis.     Plan:  - c/w Cellcept 500mg  QD  - c/w prednisone 5mg QD

## 2024-10-14 NOTE — H&P ADULT - NSHPPHYSICALEXAM_GEN_ALL_CORE
General: Cachetic.    HEENT: Normocephalic and atraumatic. PERRLA. EOMI. Sclera non-icteric.  Neck: No JVD. No cervical lymphadenopathy. Neck is supple with full range of motion  Respiratory: Clear to auscultation bilaterally; No rales, rhonchi, wheezing.   Cardiovascular: Regular rate and rhythm; Blowing 4/6 holosystolic murmur best heard R upper sternal border   Gastrointestinal: Abdomen mild rigidity, non-distended. Pain to palpation in right lower quadrant. No masses. +bowel sounds. +melena on MERCEDES   Genitourinary: No suprapubic tenderness.   Skin: Warm and dry. No rashes, ecchymosis, pallor, jaundice, or lesions.   Extremities: No cyanosis, clubbing, or edema.  Neurologic: Residual L weakness. Alert and able to engage in General: Cachetic, temporal wasting.  HEENT: Normocephalic and atraumatic  Neck: No JVD. Neck is supple  Respiratory: CTABL anteriorly  Cardiovascular: Regular rate and rhythm; Blowing holosystolic murmur best heard R upper sternal border   Gastrointestinal: Abdomen mild rigidity, non-distended. Pain to palpation in right lower quadrant. No masses. +bowel sounds. +black stool in diaper  Genitourinary: No suprapubic tenderness.   Skin: Warm and dry. No rashes, ecchymosis, pallor, jaundice, or lesions.   Extremities: No cyanosis, clubbing, or edema.  Neurologic: Residual L weakness

## 2024-10-14 NOTE — H&P ADULT - PROBLEM SELECTOR PLAN 1
Patient noted to have Hgb 5.8, Hct 19.9, MCV 91.7 on admission. Pt endorses hx of loose stools this week and +melena on MERCEDES. Ddx GI bleed, Heyde's syndrome given severe AS, ACD given extensive medical history     Plan:   - Hold home Eliquis   - s/p 1 unit RBC, repeat CBC   - GI consult for possible GI bleed   - draw iron studies, reticulocyte count, B12, folate  - clear liquid diet for now, NPO after midnight Patient noted to have Hgb 5.8, Hct 19.9, MCV 91.7 on admission. Pt endorses hx of loose stools this week. On exam has black stool in diaper  - Reticulocyte index 0.45 (10/14)  - Transfusion count (10/14): 1u pRBC    Plan:  - Likely multifactorial. GIB (Heyde's syndrome iso AS vs. UGIB given melena) + ACD given hypoproliferative RI  - Hold home Eliquis   - GI consult for possible GI bleed   - clear liquid diet for now, NPO after midnight  - Active T&S, Transfuse for goal > 7 Patient noted to have Hgb 5.8, Hct 19.9, MCV 91.7 on admission. Pt endorses hx of loose stools this week. On exam has black stool in diaper  - Reticulocyte index 0.45 (10/14)  - Transfusion count (10/14): 1u pRBC    Plan:  - Likely multifactorial. 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

## 2024-10-14 NOTE — H&P ADULT - PROBLEM SELECTOR PLAN 8
Pt had stroke August 2024 with residual L weakness. Patient on aspirin 81 and atorvastatin 40.     - c/w home aspirin and atorvastatin 40 Pt had stroke August 2024 with residual L weakness. w/ residual dysphagia. Patient on aspirin 81 and atorvastatin 40.   Per daughter, patient at baseline mental status and memory have remained intact. Daughter states that the patient had been independent with his ADLs and living at home prior to his stroke in August    Plan:  - c/w home aspirin and atorvastatin 40 Pt had stroke August 2024 with residual L weakness. w/ residual dysphagia. Patient on aspirin 81 and atorvastatin 40.   Per daughter, patient at baseline mental status and memory have remained intact. Daughter states that the patient had been independent with his ADLs and living at home prior to his stroke in August    Plan:  - c/w atorvastatin 40  - hold aspirin 81 for now

## 2024-10-14 NOTE — H&P ADULT - PROBLEM SELECTOR PLAN 2
Patient dx with b/l renal cell carcinoma earlier this year.     - per daughter (HCP), patient unaware of this diagnosis, does not want to discuss treatment at this time Patient dx with b/l renal cell carcinoma earlier this year.     Plan:  - per daughter (HCP), patient unaware of this diagnosis, does not want to discuss treatment at this time

## 2024-10-14 NOTE — ED PROVIDER NOTE - OBJECTIVE STATEMENT
84 year old wheelchair bound male with PMHx of renal carcinoma (new dx), HTN, DM, afib on Eliquis, HFpEF, aortic stenosis and regurg, myasthenia gravis, stroke affecting L side, urinary retention with chronic butt presenting s/p rehab labs showing Hgb 5.5. Daughter at bedside, states she got a call from [facility] (rehab center) saying dad is being taken to ED. Denies hx of anemia but notes pt got a transfusion one month, unsure why. Of note, pt had pacemaker placed 10 days ago for afib with slow ventricular response. Pt states he is on NC O2 at rehab where he was placed after his stroke, unsure how much O2 he's on there, daughter guesses 3L. Denies fever, CP, SOB, abd pain, n/v, d/c, dysuria, hematuria, HA.

## 2024-10-14 NOTE — ED ADULT NURSE NOTE - OBJECTIVE STATEMENT
85 y/o M bibems from [facility] complaining of low H&H. Pt and daughter at bedside, both unsure why blood work was done and what the exact lab results are. Denies sob, chest pain, dizziness, abd pain , N/V/D. AAOx4, nonambulatory at baseline. Breathing spontaneous and unlabored. Abd soft, nontender, and nondistended. Elliott catheter in place, draining clear, yellow urine into bag. Skin warm, dry, and pale. PMH of stroke with residual left side deficit.

## 2024-10-14 NOTE — H&P ADULT - PROBLEM SELECTOR PLAN 7
Pt with hx of DM2     - hold home metformin  - c/w farxiga   - ROSE Pt with hx of DM2     Plan:  - hold home metformin  - c/w Farxiga given HFpEF  - ISS Pt with hx of DM2     Plan:  - hold home metformin  - hold Farxiga in anticipation of scope  - ISS

## 2024-10-15 NOTE — PROGRESS NOTE ADULT - PROBLEM SELECTOR PLAN 2
Patient dx with b/l renal cell carcinoma earlier this year.     Plan:  - per daughter (HCP), patient unaware of this diagnosis, does not want to discuss treatment at this time

## 2024-10-15 NOTE — ADVANCED PRACTICE NURSE CONSULT - ASSESSMENT
arrived on unit, patient was found lying in a low air loss pressure redistribution support surface style bed. The patient  is unable to turn independently and staff assistance x 2 was provided. Once turned, incontinence of stool was apparent and helen care was provided and, able to view his skin.  Helen rectal area with erythema and indurated with scattered area of dunned epidermis consistent with incontinence assocated  dermatitis.  bilateral sacrum/coccyx /buttocks non-blanchable deep red, and  purple discoloration and hyperpigmentation with an areas of partial - thickness  and tissue loss   with full -thickness skin and tissue loss obscured by  tan and black eschar covering approximately 30 % consistent with a deep tissue an unstageable pressure injury size approximately  8 cm x 6 cm x unknown cm,  present  on admission.  patient  was educated  on the importance  for turning  and positioning  every 2 hours. The use of waffle cushion when out of bed  to chair,  and to shift his Weight every 2 hours while in chair. The importance a keeping his skin clean and dry and  to offload feet/heels , and optimal nutrition. arrived on unit, patient was found lying in a low air loss pressure redistribution support surface style bed. The patient  is unable to turn independently and staff assistance x 2 was provided. Once turned, incontinence of stool was apparent and helen care was provided and, able to view his skin. indwelling urethral cathter present.   Helen rectal area with erythema and indurated with scattered area of dunned epidermis consistent with incontinence assocated  dermatitis.  bilateral sacrum/coccyx /buttocks non-blanchable deep red, and maroon discoloration and hyperpigmentation with an areas of partial - thickness  and tissue loss and  with area of  full -thickness skin and tissue loss obscured by  tan and black eschar covering approximately 60% wound base  consistent with a deep tissue an unstageable pressure injury size approximately  12 cm x 12 cm x unknown cm,  present  on admission.  left lateral calf with non blanchable deep red discoloration with an area of tan and brown eschar obscuring wound base size approximately 2 cm x 2 cm x unknown cm, consistent with unstageable pressure injury, present on admission.   thoracic spine there is non- blanchable purple discoloration  noted to measure approximately 0.5 cm x 0.5 cm x 0cm, consistent with a deep tissue injury,  present on admission.   left malleolus there is non- blanchable purple discoloration  noted to measure approximately 1 cm x 1.5 cm x 0cm, consistent with a deep tissue injury,  present on admission.   left lateral heel there is non- blanchable purple discoloration  noted to measure approximately 1 cm x 1 cm x 0cm, consistent with a deep tissue injury,  present on admission.   right heel there is non- blanchable purple discoloration  noted to measure approximately 2 cm x 2 cm x 0cm, consistent with a deep tissue injury,  present on admission.   patient  was educated  on the importance  for turning  and positioning  every 2 hours. The use of waffle cushion when out of bed  to chair,  and to shift his Weight every 2 hours while in chair. The importance a keeping his skin clean and dry and  to offload feet/heels , and optimal nutrition.

## 2024-10-15 NOTE — PATIENT PROFILE ADULT - FALL HARM RISK - HARM RISK INTERVENTIONS
Assistance OOB with selected safe patient handling equipment/Communicate Risk of Fall with Harm to all staff/Monitor for mental status changes/Monitor gait and stability/Reorient to person, place and time as needed/Tailored Fall Risk Interventions/Use of alarms - bed, chair and/or voice tab/Bed in lowest position, wheels locked, appropriate side rails in place/Call bell, personal items and telephone in reach/Instruct patient to call for assistance before getting out of bed or chair/Non-slip footwear when patient is out of bed/[place] to call system/Physically safe environment - no spills, clutter or unnecessary equipment/Purposeful Proactive Rounding/Room/bathroom lighting operational, light cord in reach

## 2024-10-15 NOTE — CONSULT NOTE ADULT - ATTENDING COMMENTS
Agree with above. D/w patient's family, they do not wish for patient to pursue any invasive testing or any endoscopic evaluation, as patient has expressed his wishes of not having any invasive endoscopic testing with them previously. Made wife at bedside aware of potential missed lesions such as malignancies. Would give empiric PPI, monitor H/H. Can consider CT A/P to evaluate for any large lesions in GI tract that can contribute to anemia. Otherwise, advance diet as tolerated. No additional GI intervention planned at this time per family/patient's wishes.

## 2024-10-15 NOTE — CONSULT NOTE ADULT - SUBJECTIVE AND OBJECTIVE BOX
Chief Complaint:  Patient is a 84y old  Male who presents with a chief complaint of Anemia (14 Oct 2024 20:23)      HPI:  ALVARO TRAORE is a 84year old Male with history of  b/l renal cell carcinoma (newly dx, pt unaware of dx per daughter), HTN, DM, AF on Eliquis c/b slow ventricular response s/p leadless pacemaker 10 days ago, HFpEF (EF 60-65%), severe AS/AR, myasthenia gravis, stroke in 8/24 with residual L weakness, dysphagia presenting after outpatient labs demonstrating Hgb 5.5. History mostly obtained from the patient's daughter as patient is hard of hearing and unable to provide a good history.  Per patient's daughter, ever since the patient went back to his rehabilitation facility following his leadless pacemaker plantation he had been more lethargic with poor appetite and not really moving and interacting as much.  At that time they reported that there were no notes of melena, hematochezia.  Due to the patient's progressive lethargy they obtained labs at the facility which were significant for hemoglobin of 5.5 prompting them to send the patient to the hospital for further evaluation.  The patient denies any associate abdominal pain, nausea, vomiting, fevers, chills, weakness.  No prior NSAID use, though patient on aspirin + Plavix since 2017. Per his daughter she he has been getting having a progressive weight loss but they had thought that may have in the setting of the patient's untreated right renal cell carcinoma.    The patient has never had an upper endoscopy or colonoscopy before.  The patient was recently evaluated by a gastroenterologist in 2023 in the setting of rectal bleeding, thought to be in the setting of hemorrhoids.  At that time the patient was offered a colonoscopy however the patient declined stating he did not want any procedures done on him at that time.    In regards to his renal cell carcinoma, the family was interested in potential treatment in the future however they wanted to make sure he got stronger before he underwent any diagnostics for this.  The plan was for the patient to go to rehab get stronger and then undergo evaluation and treatment with an oncologist.    In the emergency room vital signs were otherwise stable with hemoglobin on admission 5.8, decreased from 9.1 during recent previous admission on October 5.  Rectal exam was performed at ED with evidence of black stool in his diaper.  The patient subsequently received 2 units of packed red blood cells with improvement in his hemoglobin to 7.7.    Otherwise, patient denies fevers, chills, weight loss, dysphagia, odynophagia, early satiety, poor oral intake, abdominal pain, nausea, vomiting, diarrhea, melena, hematemesis, hematochezia, change in stool caliber, or family history of GI-related cancers.    ROS:   General:  No fevers, chills, night sweats  Eyes:  Good vision, no reported pain  ENT:  No sore throat, pain, runny nose  CV:  No pain, palpitations  Pulm:  No dyspnea, cough  GI:  See HPI, otherwise negative  :  No incontinence, nocturia  Muscle:  No reported pain, weakness  Neuro:  No memory problems  Psych:  No insomnia, psychosis  Endocrine:  No polyuria, polydipsia  Heme:  No petechiae, ecchymosis, easy bruisability  Skin:  No reported rash    PMHX/PSHX:    No pertinent past medical history    Atrial fibrillation    Embolic stroke    Myasthenia gravis    (HFpEF) heart failure with preserved ejection fraction    HTN (hypertension)    T2DM (type 2 diabetes mellitus)    Renal cell carcinoma    Urinary retention    Pressure ulcer    Aortic stenosis    [place] (hard of hearing)    No significant past surgical history    History of thrombolytic therapy    Pacemaker      Allergies:  No Known Allergies      Home Medications: reviewed  Hospital Medications:  acetaminophen     Tablet .. 650 milliGRAM(s) Oral every 6 hours PRN  atorvastatin 40 milliGRAM(s) Oral at bedtime  dextrose 5%. 1000 milliLiter(s) IV Continuous <Continuous>  dextrose 5%. 1000 milliLiter(s) IV Continuous <Continuous>  dextrose 50% Injectable 25 Gram(s) IV Push once  dextrose 50% Injectable 25 Gram(s) IV Push once  dextrose 50% Injectable 12.5 Gram(s) IV Push once  dextrose Oral Gel 15 Gram(s) Oral once  finasteride 5 milliGRAM(s) Oral daily  glucagon  Injectable 1 milliGRAM(s) IntraMuscular once  insulin lispro (ADMELOG) corrective regimen sliding scale   SubCutaneous every 6 hours  multivitamin 1 Tablet(s) Oral daily  mycophenolate mofetil 500 milliGRAM(s) Oral daily  pantoprazole  Injectable 40 milliGRAM(s) IV Push two times a day  polyethylene glycol 3350 17 Gram(s) Oral two times a day  predniSONE   Tablet 5 milliGRAM(s) Oral daily  tamsulosin 0.4 milliGRAM(s) Oral at bedtime      Social History:   Tobacco: Denies  EtOH: Denies  Illicit Drugs: Denies    Family history:    No family history of cardiac disease (Father, Mother, Sibling)    Family history of cerebrovascular accident (CVA) in mother (Mother)    Family history of cerebrovascular accident (CVA) in father (Father)      Denies family history of colon cancer/polyps, stomach cancer/polyps, pancreatic cancer/masses, liver cancer/disease, ovarian cancer and endometrial cancer.    PHYSICAL EXAM:   Vital Signs:  Vital Signs Last 24 Hrs  T(C): 36.4 (15 Oct 2024 04:12), Max: 37 (14 Oct 2024 19:51)  T(F): 97.6 (15 Oct 2024 04:12), Max: 98.6 (14 Oct 2024 19:51)  HR: 60 (15 Oct 2024 04:12) (60 - 62)  BP: 139/72 (15 Oct 2024 04:12) (124/66 - 145/66)  BP(mean): --  RR: 18 (15 Oct 2024 04:12) (17 - 18)  SpO2: 100% (15 Oct 2024 04:12) (99% - 100%)    Parameters below as of 15 Oct 2024 04:12  Patient On (Oxygen Delivery Method): nasal cannula  O2 Flow (L/min): 2    Daily Height in cm: 152.4 (14 Oct 2024 16:13)    Daily     GENERAL: no acute distress, thin appearing   NEURO: alert  HEENT: anicteric sclera, no conjunctival pallor appreciated  CHEST: no respiratory distress, no accessory muscle use  CARDIAC: regular rate, +S1/S2  ABDOMEN: scaphoid abdomen, soft, nontender  EXTREMITIES: warm, well perfused, no edema  SKIN: no lesions noted    LABS: reviewed                        7.7    7.17  )-----------( 215      ( 15 Oct 2024 07:15 )             25.3     10-15    140  |  106  |  28[H]  ----------------------------<  103[H]  4.0   |  23  |  0.55    Ca    8.0[L]      15 Oct 2024 07:09  Phos  2.5     10-15  Mg     2.3     10-15    TPro  5.6[L]  /  Alb  2.6[L]  /  TBili  1.0  /  DBili  x   /  AST  20  /  ALT  8[L]  /  AlkPhos  104  10-15    LIVER FUNCTIONS - ( 15 Oct 2024 07:09 )  Alb: 2.6 g/dL / Pro: 5.6 g/dL / ALK PHOS: 104 U/L / ALT: 8 U/L / AST: 20 U/L / GGT: x               Diagnostic Studies: see sunrise for full report

## 2024-10-15 NOTE — PROGRESS NOTE ADULT - SUBJECTIVE AND OBJECTIVE BOX
[facility] Division of Hospital Medicine  Alena Gonzales MD  Available via MS Teams      SUBJECTIVE / OVERNIGHT EVENTS: No acute changes overnight. Pt denies abdominal pain    ADDITIONAL REVIEW OF SYSTEMS: ROS negative other than above    MEDICATIONS  (STANDING):  atorvastatin 40 milliGRAM(s) Oral at bedtime  dextrose 5%. 1000 milliLiter(s) (100 mL/Hr) IV Continuous <Continuous>  dextrose 5%. 1000 milliLiter(s) (50 mL/Hr) IV Continuous <Continuous>  dextrose 50% Injectable 25 Gram(s) IV Push once  dextrose 50% Injectable 25 Gram(s) IV Push once  dextrose 50% Injectable 12.5 Gram(s) IV Push once  dextrose Oral Gel 15 Gram(s) Oral once  finasteride 5 milliGRAM(s) Oral daily  glucagon  Injectable 1 milliGRAM(s) IntraMuscular once  insulin lispro (ADMELOG) corrective regimen sliding scale   SubCutaneous every 6 hours  multivitamin 1 Tablet(s) Oral daily  mycophenolate mofetil 500 milliGRAM(s) Oral daily  pantoprazole  Injectable 40 milliGRAM(s) IV Push two times a day  polyethylene glycol 3350 17 Gram(s) Oral two times a day  predniSONE   Tablet 5 milliGRAM(s) Oral daily  tamsulosin 0.4 milliGRAM(s) Oral at bedtime    MEDICATIONS  (PRN):  acetaminophen     Tablet .. 650 milliGRAM(s) Oral every 6 hours PRN Temp greater or equal to 38C (100.4F), Mild Pain (1 - 3)      I&O's Summary    14 Oct 2024 07:01  -  15 Oct 2024 07:00  --------------------------------------------------------  IN: 0 mL / OUT: 650 mL / NET: -650 mL        PHYSICAL EXAM:  Vital Signs Last 24 Hrs  T(C): 36.4 (15 Oct 2024 11:13), Max: 37 (14 Oct 2024 19:51)  T(F): 97.6 (15 Oct 2024 11:13), Max: 98.6 (14 Oct 2024 19:51)  HR: 60 (15 Oct 2024 11:13) (60 - 62)  BP: 148/59 (15 Oct 2024 11:13) (124/66 - 148/59)  BP(mean): --  RR: 18 (15 Oct 2024 11:13) (17 - 18)  SpO2: 100% (15 Oct 2024 11:13) (99% - 100%)    Parameters below as of 15 Oct 2024 11:13  Patient On (Oxygen Delivery Method): nasal cannula  O2 Flow (L/min): 2    CONSTITUTIONAL: NAD, thin   EYES: conjunctiva and sclera clear  NECK: Supple  RESPIRATORY: Normal respiratory effort; lungs are clear to auscultation bilaterally  CARDIOVASCULAR: Regular rate and rhythm, normal S1 and S2  ABDOMEN: Nontender to palpation, normoactive bowel sounds  PSYCH: A+O to person, place, and time  SKIN: No rashes    LABS:                        7.1    5.72  )-----------( 193      ( 15 Oct 2024 12:57 )             23.4     10-15    140  |  106  |  28[H]  ----------------------------<  103[H]  4.0   |  23  |  0.55    Ca    8.0[L]      15 Oct 2024 07:09  Phos  2.5     10-15  Mg     2.3     10-15    TPro  5.6[L]  /  Alb  2.6[L]  /  TBili  1.0  /  DBili  x   /  AST  20  /  ALT  8[L]  /  AlkPhos  104  10-15    PT/INR - ( 15 Oct 2024 07:15 )   PT: 21.1 sec;   INR: 1.85 ratio         PTT - ( 15 Oct 2024 07:15 )  PTT:33.4 sec      Urinalysis Basic - ( 15 Oct 2024 07:09 )    Color: x / Appearance: x / SG: x / pH: x  Gluc: 103 mg/dL / Ketone: x  / Bili: x / Urobili: x   Blood: x / Protein: x / Nitrite: x   Leuk Esterase: x / RBC: x / WBC x   Sq Epi: x / Non Sq Epi: x / Bacteria: x            RADIOLOGY & ADDITIONAL TESTS:  New Imaging Personally Reviewed Today:  New Electrocardiogram Personally Reviewed Today:  Other Results Reviewed Today:   Prior or Outpatient Records Reviewed Today with Summary:    COORDINATION OF CARE:  Consultant Communication and Details of Discussion (where applicable):

## 2024-10-15 NOTE — PROGRESS NOTE ADULT - PROBLEM SELECTOR PLAN 11
DVT ppx: SCDs   Diet: Clear liquid currently, NPO after midnight   Dispo: Home  Code Status: FULL CODE  Discussed plan for CT scan, holding off on scope and Eliquis with daughter at via phone Aretha [phone]

## 2024-10-15 NOTE — CONSULT NOTE ADULT - ASSESSMENT
ALVRAO TRAORE is a 84year old Male with history of  b/l renal cell carcinoma (newly dx, pt unaware of dx per daughter), HTN, DM, AF on Eliquis c/b slow ventricular response s/p leadless pacemaker 10 days ago, HFpEF (EF 60-65%), severe AS/AR, myasthenia gravis, stroke in 8/24 with residual L weakness, dysphagia presenting after outpatient labs demonstrating Hgb 5.5, w/ evidence of melena in diaper.    #Severe Acute on Chronic Normocytic Anemia   #Melena  #Atrial fibrillation on Eliquis  #B/l Renal Cell Carcinoma   Patient w/ acute on chronic normocytic anemia, w/ Hgb decrease to 5.5 from 9.1 1 week ago, associated w/ weakness, lethargy, and 1 episode of melena. Patient w/ chronic anemia likely in the setting of AOCD given normal iron labs from 8/24, and low reticulocyte count. Suspect acute bleeding to be UGIB in source, w/ differential including metastatic malignancy vs PUD, vs gastritis vs esophagitis vs AVM (2/2 severe AS) vs Dieulafoy or Ammon lesions.   Spoke extensively w/ patient's daughter Aretha Del Rosario regarding the risks/benefits of endoscopic intervention in the setting of known severe AS/AR, and multiple cardiac comorbidities. She states that she is unsure if her father would even want an upper endoscopy at all as he has refused colonoscopy last year. She would prefer to obtain diagnostic evaluation and attempt conservative management prior to readdressing need for upper endoscopy.    Recommendations:  - Please obtain CT A/P w/ IV contrast to evaluate for possible GI metastasis to explain pt's new anemia + bleeding.   - Continue protonix 40 mg IV BID  - CBC Q8, transfuse Hgb < 7.   - Based on CT and trend of patient's anemia, will readdress endoscopic evaluation w/ patient and family.     Note incomplete until finalized by attending signature/attestation.    Paco Boyer  GI/Hepatology Fellow, PGY-4    MONDAY-FRIDAY 8AM-5PM:  Please message via [employer] or email [email or web address]     On Weekends/Holidays (All day) and Weekdays after 5 PM to 8 AM  For nonurgent consults please email:  Please email [email or web address]  For urgent consults:  Please contact on call GI team. See Amion schedule ([facility]), [employer] paging system ([facility]), or call hospital  ([facility])  ALVAOR TRAORE is a 84year old Male with history of  b/l renal cell carcinoma (newly dx, pt unaware of dx per daughter), HTN, DM, AF on Eliquis c/b slow ventricular response s/p leadless pacemaker 10 days ago, HFpEF (EF 60-65%), severe AS/AR, myasthenia gravis, stroke in 8/24 with residual L weakness, dysphagia presenting after outpatient labs demonstrating Hgb 5.5, w/ evidence of melena in diaper.    #Severe Acute on Chronic Normocytic Anemia   #Melena  #Atrial fibrillation on Eliquis  #B/l Renal Cell Carcinoma   Patient w/ acute on chronic normocytic anemia, w/ Hgb decrease to 5.5 from 9.1 1 week ago, associated w/ weakness, lethargy, and 1 episode of melena. Patient w/ chronic anemia likely in the setting of AOCD given normal iron labs from 8/24, and low reticulocyte count. Suspect acute bleeding to be UGIB in source, w/ differential including metastatic malignancy vs PUD, vs gastritis vs esophagitis vs AVM (2/2 severe AS) vs Dieulafoy or Ammon lesions.   Spoke extensively w/ patient's daughter Aretha Del Rosario regarding the risks/benefits of endoscopic intervention in the setting of known severe AS/AR, and multiple cardiac comorbidities. She states that she is unsure if her father would even want an upper endoscopy at all as he has refused colonoscopy last year. She would prefer to obtain diagnostic evaluation and attempt conservative management prior to readdressing need for upper endoscopy.    Recommendations:  - Please obtain CT A/P w/ IV contrast to evaluate for possible GI metastasis to explain pt's new anemia + bleeding.   - Continue protonix 40 mg IV BID  - Clear liquid diet ok   - CBC Q8, transfuse Hgb < 7.   - No endoscopic interventions at this time per family's wishes and attempt conservative management. Please call back if patient w/ worsening anemia and if pt/family is interested in endoscopic eval.     Note incomplete until finalized by attending signature/attestation.    Paco Boyer  GI/Hepatology Fellow, PGY-4    MONDAY-FRIDAY 8AM-5PM:  Please message via [employer] or email [email or web address]     On Weekends/Holidays (All day) and Weekdays after 5 PM to 8 AM  For nonurgent consults please email:  Please email [email or web address]  For urgent consults:  Please contact on call GI team. See Amion schedule ([facility]), [employer] paging system ([facility]), or call hospital  ([facility])  ALVARO TRAORE is a 84year old Male with history of  b/l renal cell carcinoma (newly dx, pt unaware of dx per daughter), HTN, DM, AF on Eliquis c/b slow ventricular response s/p leadless pacemaker 10 days ago, HFpEF (EF 60-65%), severe AS/AR, myasthenia gravis, stroke in 8/24 with residual L weakness, dysphagia presenting after outpatient labs demonstrating Hgb 5.5, w/ evidence of melena in diaper.    #Severe Acute on Chronic Normocytic Anemia   #Melena  #Atrial fibrillation on Eliquis  #B/l Renal Cell Carcinoma   Patient w/ acute on chronic normocytic anemia, w/ Hgb decrease to 5.5 from 9.1 1 week ago, associated w/ weakness, lethargy, and 1 episode of melena. Patient w/ chronic anemia likely in the setting of AOCD given normal iron labs from 8/24, and low reticulocyte count. Suspect acute bleeding to be UGIB in source, w/ differential including metastatic malignancy vs PUD, vs gastritis vs esophagitis vs AVM (2/2 severe AS) vs Dieulafoy or Ammon lesions.   Spoke extensively w/ patient's daughter Aretha Del Rosario regarding the risks/benefits of endoscopic intervention in the setting of known severe AS/AR, and multiple cardiac comorbidities. She states that she is unsure if her father would even want an upper endoscopy at all as he has refused colonoscopy last year. She would prefer to obtain diagnostic evaluation and attempt conservative management prior to readdressing need for upper endoscopy. Spoke w/ patient's wife at bedside who expressed that the patient had explicit wishes for no endoscopy or colonoscopy.     Recommendations:  - Please obtain CT A/P w/ IV contrast to evaluate for possible GI metastasis to explain pt's new anemia + bleeding.   - Continue protonix 40 mg IV BID  - Clear liquid diet ok, if no further bleeding can advance as tolerated.   - CBC Q8, transfuse Hgb < 7.   - No endoscopic interventions at this time per family's wishes and attempt conservative management. Please call back if patient w/ worsening anemia and if pt/family is interested in endoscopic eval.     Note incomplete until finalized by attending signature/attestation.    Paco Boyer  GI/Hepatology Fellow, PGY-4    MONDAY-FRIDAY 8AM-5PM:  Please message via [employer] or email [email or web address]     On Weekends/Holidays (All day) and Weekdays after 5 PM to 8 AM  For nonurgent consults please email:  Please email [email or web address]  For urgent consults:  Please contact on call GI team. See Amion schedule ([facility]), [employer] paging system ([facility]), or call hospital  ([facility])  ALVARO TRAORE is a 84year old Male with history of  b/l renal cell carcinoma (newly dx, pt unaware of dx per daughter), HTN, DM, AF on Eliquis c/b slow ventricular response s/p leadless pacemaker 10 days ago, HFpEF (EF 60-65%), severe AS/AR, myasthenia gravis, stroke in 8/24 with residual L weakness, dysphagia presenting after outpatient labs demonstrating Hgb 5.5, w/ evidence of melena in diaper.    #Severe Acute on Chronic Normocytic Anemia   #Melena  #Atrial fibrillation on Eliquis  #B/l Renal Cell Carcinoma   Patient w/ acute on chronic normocytic anemia, w/ Hgb decrease to 5.5 from 9.1 1 week ago, associated w/ weakness, lethargy, and 1 episode of melena. Patient w/ chronic anemia likely in the setting of AOCD given normal iron labs from 8/24, and low reticulocyte count. Suspect acute bleeding to be UGIB in source, w/ differential including metastatic malignancy vs PUD, vs gastritis vs esophagitis vs AVM (2/2 severe AS) vs Dieulafoy or Ammon lesions.   Spoke extensively w/ patient's daughter Aretha Del Rosario regarding the risks/benefits of endoscopic intervention in the setting of known severe AS/AR, and multiple cardiac comorbidities. She states that she is unsure if her father would even want an upper endoscopy at all as he has refused colonoscopy last year. She would prefer to obtain diagnostic evaluation and attempt conservative management prior to readdressing need for upper endoscopy. Spoke w/ patient's wife at bedside who expressed that the patient had explicit wishes for no endoscopy or colonoscopy.     Recommendations:  - Please obtain CT A/P w/ IV contrast to evaluate for possible GI metastasis to explain pt's new anemia + bleeding.   - Continue protonix 40 mg IV BID  - Check iron studies, ferritin  - Clear liquid diet ok, if no further bleeding can advance as tolerated.   - CBC Q8, transfuse Hgb < 7.   - No endoscopic interventions at this time per family's wishes and attempt conservative management. Please call back if patient w/ worsening anemia and if pt/family is interested in endoscopic eval.     Note incomplete until finalized by attending signature/attestation.    Paco Boyer  GI/Hepatology Fellow, PGY-4    MONDAY-FRIDAY 8AM-5PM:  Please message via [employer] or email [email or web address]     On Weekends/Holidays (All day) and Weekdays after 5 PM to 8 AM  For nonurgent consults please email:  Please email [email or web address]  For urgent consults:  Please contact on call GI team. See Amion schedule ([facility]), [employer] paging system ([facility]), or call hospital  ([facility])

## 2024-10-15 NOTE — PATIENT PROFILE ADULT - ABUSE/NEGLECT DETAILS
Per daughter at bedside, daughter does not want patient going back to rehab facility patient was transferred from

## 2024-10-15 NOTE — PROGRESS NOTE ADULT - ASSESSMENT
84 year old male with PMH of b/l renal carcinoma (newly dx, pt unaware of dx per daughter), HTN, DM, AF on Eliquis with slow ventricular response s/p leadless pacemaker 10 days ago, HFpEF (EF 60-65%), severe AS and AR, myasthenia gravis, CVA in 8/24 with residual L weakness, dysphagia and urinary retention with chronic Elliott presenting from rehab after labs today showed Hgb 5.5, concerning for GIB.

## 2024-10-16 NOTE — CHART NOTE - NSCHARTNOTEFT_GEN_A_CORE
Patient is a 84y old  Male who presents with a chief complaint of Anemia (16 Oct 2024 13:51). Called by radiology for CTA/P findings - small pneumoperiteneum. Discussed with attending - Ashley ordered, Surgical consult ordered. Discussed with daughter Aretha.       Vital Signs Last 24 Hrs  T(C): 38.7 (16 Oct 2024 18:23), Max: 38.7 (16 Oct 2024 18:23)  T(F): 101.7 (16 Oct 2024 18:23), Max: 101.7 (16 Oct 2024 18:23)  HR: 75 (16 Oct 2024 18:23) (60 - 75)  BP: 138/47 (16 Oct 2024 18:23) (132/56 - 176/57)  BP(mean): --  RR: 19 (16 Oct 2024 18:23) (17 - 19)  SpO2: 98% (16 Oct 2024 18:23) (95% - 100%)    Parameters below as of 16 Oct 2024 18:23  Patient On (Oxygen Delivery Method): nasal cannula  O2 Flow (L/min): 2      Physical Exam:  General: alert, NAD  Neurology: A&Ox3, nonfocal  Head:  Normocephalic, atraumatic  Respiratory: CTA B/L  CV: RRR, S1S2, no murmur  Abdominal: Soft, NT, ND no palpable mass  MSK: No edema,    Labs:                          8.1    12.11 )-----------( 268      ( 16 Oct 2024 17:27 )             26.9     10-16    138  |  104  |  25[H]  ----------------------------<  133[H]  4.4   |  23  |  0.61    Ca    8.2[L]      16 Oct 2024 17:27  Phos  2.5     10-16  Mg     2.1     10-16    TPro  5.6[L]  /  Alb  2.6[L]  /  TBili  1.0  /  DBili  x   /  AST  20  /  ALT  8[L]  /  AlkPhos  104  10-15            Radiology:    HPI:  84 year old male with PMH of b/l renal cell carcinoma (newly dx, pt unaware of dx per daughter), HTN, DM, AF on Eliquis c/b slow ventricular response s/p leadless pacemaker 10 days ago, HFpEF (EF 60-65%), severe AS/AR, myasthenia gravis, stroke in 8/24 with residual L weakness, dysphagia and urinary retention with chronic butt presenting from rehab after labs today showed Hgb 5.5. Patient is accompanied at bedside by his daughter who provided majority of patient's history due to patient being [place] and without his hearing aids. She notes that patient has been having "looser stools" since the beginning of this week but is unsure if the stool was unusually dark or with occult blood. Daughter also notes that patient has had decreased appetite and significant weight loss (~40lbs) since his stroke in August. He has not experienced recent n/v or abdominal pain/distension, and he does not use NSAIDs regularly. He has a chronic butt but reports no dysuria or hematuria.     In ED, patient noted to be hemodynamically stable and receiving RBC transfusion.   (14 Oct 2024 20:23)    Assessment & Plan:  >  >  >  >  Maya SKELTON Patient is a 84y old  Male who presents with a chief complaint of Anemia (16 Oct 2024 13:51). Called by radiology for CT A/P findings - small pneumoperiteneum. Discussed with attending - Ashley ordered, Surgical consult ordered. Discussed with daughter Aretha. Will discuss with surgery. Patient developed fever 101.7, Tylenol IV ordered. RN reports patient with new productive cough, family reports patient coughing while eating.       Vital Signs Last 24 Hrs  T(C): 38.7 (16 Oct 2024 18:23), Max: 38.7 (16 Oct 2024 18:23)  T(F): 101.7 (16 Oct 2024 18:23), Max: 101.7 (16 Oct 2024 18:23)  HR: 75 (16 Oct 2024 18:23) (60 - 75)  BP: 138/47 (16 Oct 2024 18:23) (132/56 - 176/57)  BP(mean): --  RR: 19 (16 Oct 2024 18:23) (17 - 19)  SpO2: 98% (16 Oct 2024 18:23) (95% - 100%)    Parameters below as of 16 Oct 2024 18:23  Patient On (Oxygen Delivery Method): nasal cannula  O2 Flow (L/min): 2      Physical Exam:  General: alert, NAD  Neurology: A&Ox3, nonfocal  Head:  Normocephalic, atraumatic  Respiratory: CTA B/L  CV: RRR, S1S2, no murmur  Abdominal: Soft, NT, ND no palpable mass  MSK: No edema,    Labs:                          8.1    12.11 )-----------( 268      ( 16 Oct 2024 17:27 )             26.9     10-16    138  |  104  |  25[H]  ----------------------------<  133[H]  4.4   |  23  |  0.61    Ca    8.2[L]      16 Oct 2024 17:27  Phos  2.5     10-16  Mg     2.1     10-16    TPro  5.6[L]  /  Alb  2.6[L]  /  TBili  1.0  /  DBili  x   /  AST  20  /  ALT  8[L]  /  AlkPhos  104  10-15      A/P:    #Pneumoperitoneum    - STAT CBC, BMP, T&S  - IV Zosyn  - Start IVF NS  - Vital signs q 4 hrs  - CXR STAT  - NPO  - IV Tylenol for fever  - Surgery consult      COSTA Stevens on TEAMS          Radiology:    HPI:  84 year old male with PMH of b/l renal cell carcinoma (newly dx, pt unaware of dx per daughter), HTN, DM, AF on Eliquis c/b slow ventricular response s/p leadless pacemaker 10 days ago, HFpEF (EF 60-65%), severe AS/AR, myasthenia gravis, stroke in 8/24 with residual L weakness, dysphagia and urinary retention with chronic butt presenting from rehab after labs today showed Hgb 5.5. Patient is accompanied at bedside by his daughter who provided majority of patient's history due to patient being [place] and without his hearing aids. She notes that patient has been having "looser stools" since the beginning of this week but is unsure if the stool was unusually dark or with occult blood. Daughter also notes that patient has had decreased appetite and significant weight loss (~40lbs) since his stroke in August. He has not experienced recent n/v or abdominal pain/distension, and he does not use NSAIDs regularly. He has a chronic butt but reports no dysuria or hematuria.     In ED, patient noted to be hemodynamically stable and receiving RBC transfusion.   (14 Oct 2024 20:23)    Assessment & Plan:  >  >  >  >  Maya SKELTON

## 2024-10-16 NOTE — PROGRESS NOTE ADULT - PROBLEM SELECTOR PLAN 11
DVT ppx: SCDs   Diet: Clear liquid currently, NPO after midnight   Dispo: Home  Code Status: FULL CODE  Discussed plan for CT scan, holding off on scope and diet with daughter at via phone Aretha [phone]. also discussed with wife at bedside

## 2024-10-16 NOTE — CONSULT NOTE ADULT - ATTENDING COMMENTS
Pt seen and examined. Agree with A/P. Pneumoperitoneum, likely perforated diverticulitis or stercoral colitis. NPO, IVF, abx, plan for ex lap tonight.

## 2024-10-16 NOTE — CHART NOTE - NSCHARTNOTEFT_GEN_A_CORE
MEDICINE NP    Patient with HgB 6.8 and blood glucose of 63. Patient is alert, asymptomatic NAD. Denies HA, CP, SOB, cough, N/V, or abd pain. Patient treatment is detailed below.    VITAL SIGNS:  T(C): 38.7 (10-16-24 @ 18:23), Max: 38.7 (10-16-24 @ 18:23)  HR: 75 (10-16-24 @ 18:23) (60 - 75)  BP: 138/47 (10-16-24 @ 18:23) (132/56 - 176/57)  RR: 19 (10-16-24 @ 18:23) (17 - 19)  SpO2: 98% (10-16-24 @ 18:23) (95% - 100%)  Wt(kg): --      LABORATORY:                          6.8    12.99 )-----------( 223      ( 16 Oct 2024 20:51 )             22.7       10-16    138  |  104  |  25[H]  ----------------------------<  133[H]  4.4   |  23  |  0.61    Ca    8.2[L]      16 Oct 2024 17:27  Phos  2.5     10-16  Mg     2.1     10-16    TPro  5.6[L]  /  Alb  2.6[L]  /  TBili  1.0  /  DBili  x   /  AST  20  /  ALT  8[L]  /  AlkPhos  104  10-15          MICROBIOLOGY:           RADIOLOGY:          PHYSICAL EXAM:    Constitutional: AOx4. NAD.    Respiratory: clear lungs bilaterally. No wheezing, rhonchi, or crackles.    Cardiovascular: S1 S2. No murmurs.    Gastrointestinal: BS X4 active. soft. nontender.    Extremities/Vascular: +2 pulses bilaterally. No BLE edema.      ASSESSMENT/PLAN:   HPI:  84 year old male with PMH of b/l renal cell carcinoma (newly dx, pt unaware of dx per daughter), HTN, DM, AF on Eliquis c/b slow ventricular response s/p leadless pacemaker 10 days ago, HFpEF (EF 60-65%), severe AS/AR, myasthenia gravis, stroke in 8/24 with residual L weakness, dysphagia and urinary retention with chronic butt presenting from rehab after labs today showed Hgb 5.5. Patient is accompanied at bedside by his daughter who provided majority of patient's history due to patient being [place] and without his hearing aids. She notes that patient has been having "looser stools" since the beginning of this week but is unsure if the stool was unusually dark or with occult blood. Daughter also notes that patient has had decreased appetite and significant weight loss (~40lbs) since his stroke in August. He has not experienced recent n/v or abdominal pain/distension, and he does not use NSAIDs regularly. He has a chronic butt but reports no dysuria or hematuria. In ED, patient noted to be hemodynamically stable and receiving RBC transfusion.   Treated for Hgb 6.8 on night shift and hypoglycemia.        1) Anemia/GIB  -HgB 6.9 repeated new result of 6.8  - 1 unit PRBC   -Continue with current plan of care  -Clear liquid diet  -per GI - No endoscopic interventions at this time per family's wishes and attempt conservative management  -PPI IV BID  -Hold Eliquis and aspirin   -CBC q8 , transfuse for hgb <7  -Hypoglycemia  -FS q6  -50 ml dextrose 50  -F/U primary team in AM

## 2024-10-16 NOTE — PROGRESS NOTE ADULT - PROBLEM SELECTOR PLAN 2
Patient dx with b/l renal cell carcinoma earlier this year.     Plan:  - per daughter (HCP), patient unaware of this diagnosis, does not want to discuss treatment at this time  - CT abd pel pending

## 2024-10-16 NOTE — CONSULT NOTE ADULT - ASSESSMENT
Mr. Santos is an 84 year old man with a complex past medical history most notable for newly diagnosed bilateral renal cell carcinoma and myasthenia gravis on prednisone and mycophenolate, now found to have pneumoperitoneum with descending colitis concerning for perforation.     Recommendations  Patient and family still weighing options at this time. Surgical management offered, discussed with chief resident and attending. Plan pending final decision from patient/family.     Levi Finley, PG-2  Acute Care Surgery ([id number])

## 2024-10-16 NOTE — PROGRESS NOTE ADULT - SUBJECTIVE AND OBJECTIVE BOX
Podiatry pager #: [phone]/ [phone]    Patient is a 84y old  Male who presents with a chief complaint of Anemia (15 Oct 2024 15:05)Podiatry consultation for ration of bilateral lower extremity wounds      HPI:  84 year old male with PMH of b/l renal cell carcinoma (newly dx, pt unaware of dx per daughter), HTN, DM, AF on Eliquis c/b slow ventricular response s/p leadless pacemaker 10 days ago, HFpEF (EF 60-65%), severe AS/AR, myasthenia gravis, stroke in 8/24 with residual L weakness, dysphagia and urinary retention with chronic butt presenting from rehab after labs today showed Hgb 5.5. Patient is accompanied at bedside by his daughter who provided majority of patient's history due to patient being [place] and without his hearing aids. She notes that patient has been having "looser stools" since the beginning of this week but is unsure if the stool was unusually dark or with occult blood. Daughter also notes that patient has had decreased appetite and significant weight loss (~40lbs) since his stroke in August. He has not experienced recent n/v or abdominal pain/distension, and he does not use NSAIDs regularly. He has a chronic butt but reports no dysuria or hematuria.     In ED, patient noted to be hemodynamically stable and receiving RBC transfusion.   (14 Oct 2024 20:23)      PAST MEDICAL & SURGICAL HISTORY:  Atrial fibrillation      Embolic stroke      Myasthenia gravis      (HFpEF) heart failure with preserved ejection fraction      HTN (hypertension)      T2DM (type 2 diabetes mellitus)      Renal cell carcinoma      Urinary retention      Pressure ulcer      Aortic stenosis      [place] (hard of hearing)      History of thrombolytic therapy      Pacemaker          MEDICATIONS  (STANDING):  atorvastatin 40 milliGRAM(s) Oral at bedtime  dextrose 5%. 1000 milliLiter(s) (100 mL/Hr) IV Continuous <Continuous>  dextrose 5%. 1000 milliLiter(s) (50 mL/Hr) IV Continuous <Continuous>  dextrose 50% Injectable 25 Gram(s) IV Push once  dextrose 50% Injectable 25 Gram(s) IV Push once  dextrose 50% Injectable 12.5 Gram(s) IV Push once  dextrose Oral Gel 15 Gram(s) Oral once  finasteride 5 milliGRAM(s) Oral daily  glucagon  Injectable 1 milliGRAM(s) IntraMuscular once  insulin lispro (ADMELOG) corrective regimen sliding scale   SubCutaneous three times a day before meals  insulin lispro (ADMELOG) corrective regimen sliding scale   SubCutaneous at bedtime  multivitamin 1 Tablet(s) Oral daily  mycophenolate mofetil 500 milliGRAM(s) Oral daily  pantoprazole  Injectable 40 milliGRAM(s) IV Push two times a day  polyethylene glycol 3350 17 Gram(s) Oral two times a day  predniSONE   Tablet 5 milliGRAM(s) Oral daily  tamsulosin 0.4 milliGRAM(s) Oral at bedtime    MEDICATIONS  (PRN):  acetaminophen     Tablet .. 650 milliGRAM(s) Oral every 6 hours PRN Temp greater or equal to 38C (100.4F), Mild Pain (1 - 3)      Allergies    No Known Allergies    Intolerances        VITALS:    Vital Signs Last 24 Hrs  T(C): 36.8 (16 Oct 2024 04:35), Max: 36.8 (16 Oct 2024 00:13)  T(F): 98.2 (16 Oct 2024 04:35), Max: 98.3 (16 Oct 2024 00:13)  HR: 60 (16 Oct 2024 04:35) (60 - 60)  BP: 147/60 (16 Oct 2024 04:35) (147/60 - 153/57)  BP(mean): --  RR: 18 (16 Oct 2024 04:35) (18 - 18)  SpO2: 97% (16 Oct 2024 04:35) (95% - 100%)    Parameters below as of 16 Oct 2024 04:35  Patient On (Oxygen Delivery Method): nasal cannula  O2 Flow (L/min): 2      LABS:                          7.7    7.61  )-----------( 196      ( 16 Oct 2024 07:07 )             26.6       10-16    139  |  107  |  24[H]  ----------------------------<  85  3.9   |  23  |  0.59    Ca    8.2[L]      16 Oct 2024 07:07  Phos  2.5     10-16  Mg     2.1     10-16    TPro  5.6[L]  /  Alb  2.6[L]  /  TBili  1.0  /  DBili  x   /  AST  20  /  ALT  8[L]  /  AlkPhos  104  10-15      CAPILLARY BLOOD GLUCOSE      POCT Blood Glucose.: 154 mg/dL (16 Oct 2024 12:16)  POCT Blood Glucose.: 125 mg/dL (16 Oct 2024 08:07)  POCT Blood Glucose.: 144 mg/dL (15 Oct 2024 21:50)  POCT Blood Glucose.: 163 mg/dL (15 Oct 2024 16:25)      PT/INR - ( 15 Oct 2024 07:15 )   PT: 21.1 sec;   INR: 1.85 ratio         PTT - ( 15 Oct 2024 07:15 )  PTT:33.4 sec    LOWER EXTREMITY PHYSICAL EXAM:    Vasular: DP/PT _1/4, B/L, CFT <_3 seconds B/L, Temperature gradient _wnl, B/L.   Neuro: Epicritic sensation _diminished  to the level of _toes, B/L.  Skin: Left lateral malleolus 0.5 cm DTI: Bruising blanchable erythema no open ulcerations bulla fluctuance malodor or clinical signs of infection.  Right lateral heel 0.5 cm DTI blanchable erythema negative bulla fluctuance malodor or clinical signs of infection.     RADIOLOGY & ADDITIONAL STUDIES:

## 2024-10-16 NOTE — PROGRESS NOTE ADULT - ASSESSMENT
Assessment/plan:    Left lateral ankle DTI: Stable, noninfected, present on admission  Right lateral heel DTI: Stable, noninfected, present on admission  Diabetes mellitus    Recommend decubitus precautions and continue use of Z flow boots while in house.  Recommend Cavilon to bilateral DTI sites every other day.  Reconsult podiatry as needed.

## 2024-10-16 NOTE — PROGRESS NOTE ADULT - SUBJECTIVE AND OBJECTIVE BOX
[facility] Division of Hospital Medicine  Alena Gonzales MD  Available via MS Teams      SUBJECTIVE / OVERNIGHT EVENTS: No acute events overnight     ADDITIONAL REVIEW OF SYSTEMS: ROS negative    MEDICATIONS  (STANDING):  atorvastatin 40 milliGRAM(s) Oral at bedtime  dextrose 5%. 1000 milliLiter(s) (100 mL/Hr) IV Continuous <Continuous>  dextrose 5%. 1000 milliLiter(s) (50 mL/Hr) IV Continuous <Continuous>  dextrose 50% Injectable 25 Gram(s) IV Push once  dextrose 50% Injectable 12.5 Gram(s) IV Push once  dextrose 50% Injectable 25 Gram(s) IV Push once  dextrose Oral Gel 15 Gram(s) Oral once  finasteride 5 milliGRAM(s) Oral daily  glucagon  Injectable 1 milliGRAM(s) IntraMuscular once  insulin lispro (ADMELOG) corrective regimen sliding scale   SubCutaneous at bedtime  insulin lispro (ADMELOG) corrective regimen sliding scale   SubCutaneous three times a day before meals  multivitamin 1 Tablet(s) Oral daily  mycophenolate mofetil 500 milliGRAM(s) Oral daily  pantoprazole  Injectable 40 milliGRAM(s) IV Push two times a day  polyethylene glycol 3350 17 Gram(s) Oral two times a day  predniSONE   Tablet 5 milliGRAM(s) Oral daily  tamsulosin 0.4 milliGRAM(s) Oral at bedtime    MEDICATIONS  (PRN):  acetaminophen     Tablet .. 650 milliGRAM(s) Oral every 6 hours PRN Temp greater or equal to 38C (100.4F), Mild Pain (1 - 3)      I&O's Summary    15 Oct 2024 07:01  -  16 Oct 2024 07:00  --------------------------------------------------------  IN: 240 mL / OUT: 1000 mL / NET: -760 mL        PHYSICAL EXAM:  Vital Signs Last 24 Hrs  T(C): 36.4 (16 Oct 2024 12:30), Max: 36.8 (16 Oct 2024 00:13)  T(F): 97.6 (16 Oct 2024 12:30), Max: 98.3 (16 Oct 2024 00:13)  HR: 60 (16 Oct 2024 12:30) (60 - 60)  BP: 132/56 (16 Oct 2024 12:30) (132/56 - 153/57)  BP(mean): --  RR: 17 (16 Oct 2024 12:30) (17 - 18)  SpO2: 99% (16 Oct 2024 12:30) (95% - 100%)    Parameters below as of 16 Oct 2024 12:30  Patient On (Oxygen Delivery Method): nasal cannula  O2 Flow (L/min): 2    CONSTITUTIONAL: NAD, thin   EYES: conjunctiva and sclera clear  NECK: Supple  RESPIRATORY: Normal respiratory effort; lungs are clear to auscultation bilaterally  CARDIOVASCULAR: Regular rate and rhythm, normal S1 and S2  ABDOMEN: Nontender to palpation, normoactive bowel sounds  PSYCH: A+O to person, place, and time  SKIN: No rashes    LABS:                        7.7    7.61  )-----------( 196      ( 16 Oct 2024 07:07 )             26.6     10-16    139  |  107  |  24[H]  ----------------------------<  85  3.9   |  23  |  0.59    Ca    8.2[L]      16 Oct 2024 07:07  Phos  2.5     10-16  Mg     2.1     10-16    TPro  5.6[L]  /  Alb  2.6[L]  /  TBili  1.0  /  DBili  x   /  AST  20  /  ALT  8[L]  /  AlkPhos  104  10-15    PT/INR - ( 15 Oct 2024 07:15 )   PT: 21.1 sec;   INR: 1.85 ratio         PTT - ( 15 Oct 2024 07:15 )  PTT:33.4 sec      Urinalysis Basic - ( 16 Oct 2024 07:07 )    Color: x / Appearance: x / SG: x / pH: x  Gluc: 85 mg/dL / Ketone: x  / Bili: x / Urobili: x   Blood: x / Protein: x / Nitrite: x   Leuk Esterase: x / RBC: x / WBC x   Sq Epi: x / Non Sq Epi: x / Bacteria: x            RADIOLOGY & ADDITIONAL TESTS:  New Imaging Personally Reviewed Today:  New Electrocardiogram Personally Reviewed Today:  Other Results Reviewed Today:   Prior or Outpatient Records Reviewed Today with Summary:    COORDINATION OF CARE:  Consultant Communication and Details of Discussion (where applicable):

## 2024-10-16 NOTE — CONSULT NOTE ADULT - SUBJECTIVE AND OBJECTIVE BOX
Surgery Consultation    History of Present Illness  Mr. Santos is an 84 year old man with recently diagnosed bilateral renal cell carcinoma and a history of myasthenia gravis on prednisone and mycophenolate, atrial fibrillation on apixaban status post recent pacemaker placement for slow ventricular response, heart failure with preserved ejection fraction, severe aortic stenosis, and recent stroke with dysphagia and left sided weakness. He is presently admitted for management of acute severe anemia found on outpatient laboratory studies. Though he has been tolerating a regular diet without nausea, vomiting, or abdominal pain, and has been having bowel movements, he was found to have pneumoperitoneum on a study obtained today 10/16 to identify the progression of his disease and specifically look for metastases. 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 Acute care surgery is consulted for operative management of presumed colonic perforation.   ___________________________________________  Vital Signs  T(C): 38.7 (10-16-24 @ 18:23), Max: 38.7 (10-16-24 @ 18:23)  HR: 75 (10-16-24 @ 18:23) (60 - 75)  BP: 138/47 (10-16-24 @ 18:23) (132/56 - 176/57)  RR: 19 (10-16-24 @ 18:23) (17 - 19)  SpO2: 98% (10-16-24 @ 18:23) (95% - 100%)    Height/Weight  Height (cm): 152.4 (10-14)  Weight (kg): 77.1 (10-14)  BMI (kg/m2): 33.2 (10-14)  BSA (m2): 1.74 (10-14)  ____________________________________________  Physical Exam  General: Resting comfortably in bed in no acute distress.  Cardiovascular: Warm and well-perfused.   Respiratory: Equal chest wall expansion bilaterally with no accessory muscle use, no tachypnea, and no grossly increased work of breathing.   Abdomen: Soft, nontender and nondistended. No rebound tenderness or guarding is elicited.   ____________________________________________  Laboratory Studies  CBC Basic (10-16 @ 20:51)  WBC: 12.99 Hgb: 6.8 Hct: 22.7 Plt: 223    Metabolic Panel (10-16 @ 17:27)  138  |  104  |  25  ----------------------------<  133  4.4   |  23  |  0.61  Ca: 8.2/Phos: x/Magnesium: x  ____________________________________________  Imaging

## 2024-10-17 ENCOUNTER — RESULT REVIEW (OUTPATIENT)
Age: 84
End: 2024-10-17

## 2024-10-17 NOTE — DIETITIAN INITIAL EVALUATION ADULT - REASON FOR ADMISSION
"85 y/o M PmHx recently diagnosed bilateral renal cell carcinoma, myasthenia gravis on prednisone and mycophenolate, atrial fibrillation on apixaban status post recent pacemaker placement for slow ventricular response, heart failure with preserved ejection fraction, severe aortic stenosis, and recent stroke with dysphagia and left sided weakness presenting to [facility] for severe anemia requiring blood transfusion. Patient was found to have pneumoperitoneum on a study obtained today 10/16 to identify the progression of his disease and specifically look for metastases. 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 Patient was taken to OR for Chopra's procedure with end colostomy. Transferred to SICU d/t hypotension requiring vasopressors during the case."

## 2024-10-17 NOTE — PROGRESS NOTE ADULT - ASSESSMENT
85 y/o M PmHx recently diagnosed bilateral renal cell carcinoma, myasthenia gravis on prednisone and mycophenolate, atrial fibrillation on apixaban status post recent pacemaker placement for slow ventricular response, heart failure with preserved ejection fraction, severe aortic stenosis, and recent stroke with dysphagia and left sided weakness presenting to [facility] for severe anemia requiring blood transfusion. Patient was found to have pneumoperitoneum on a study obtained today 10/16 to identify the progression of his disease and specifically look for metastases. 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 Patient was taken to OR for Chopra's procedure with end colostomy. Transferred to SICU d/t hypotension requiring vasopressors during the case.    PLAN:  - wean to extubate  - pain control as needed   - NGT  - Zosyn  - monitor ostomy output   - appreciate excellent SICU care     ACS  [id number]

## 2024-10-17 NOTE — DIETITIAN INITIAL EVALUATION ADULT - PROBLEM SELECTOR PLAN 10
Pt with blowing holosystolic murmur best heard R upper sternal border and prior echo confirming AS.     Plan:  - Per pervious cards note (8/2024), pt not candidate for TAVR

## 2024-10-17 NOTE — DIETITIAN INITIAL EVALUATION ADULT - PROBLEM SELECTOR PLAN 3
Patient previously diagnosed with afib, taking eliquis at home. Received leadless pacemaker 10 days ago.     Plan:  - CAHDVASc 7  - hold home eliquis ISO possible GI bleed

## 2024-10-17 NOTE — DIETITIAN INITIAL EVALUATION ADULT - PROBLEM SELECTOR PLAN 6
Patient with hx of hypertension. BP on [id number].     Plan:  - not currently on any home meds, previously on Entresto but stopped  - continue to monitor BP

## 2024-10-17 NOTE — DIETITIAN INITIAL EVALUATION ADULT - PROBLEM SELECTOR PLAN 5
Patient noted to have chronic urinary retention with chronic Elliott in rehab center. Not endorsing dysuria or hematuria at this time, UA negative on admission.     Plan:  - C/w home finasteride and tamsulosin  - Elliott in place

## 2024-10-17 NOTE — PROGRESS NOTE ADULT - CRITICAL CARE ATTENDING COMMENT
Dr. Cheng (Attending Physician)  Juju samuel Dr. Cheng (Attending Physician)  85 y/o recently diagnosed bilateral renal cell carcinoma, myasthenia gravis on prednisone and mycophenolate, atrial fibrillation on apixaban s/p PPM for bradyarrhythmia heart failure with preserved ejection fraction, severe aortic stenosis, and recent stroke with dysphagia and left sided weakness presenting to [facility] for severe anemia requiring blood transfusion. Patient was found to have pneumoperitoneum descending colitis and possible rectal pneumatosis s/p  Chopra's procedure with end colostomy. Transferred to SICU d/t hypotension requiring vasopressors during the case.    N - Off sedation, following commands ho myasthenia gravis not on cholinergic medications.  P - acute resp failure doing well on SBT, will extubate  C - On phenylephrine, AS, AI will wean vasopressor for SBP >110  GI - sigmoid perforation/ischemia s/p Chopra's end colostomy   - indwelling butt was placed by urology  H - monitor cbc, start dvt ppx  ID - on zosyn  E - ssi  PPx - lovenox   Dispo - full code

## 2024-10-17 NOTE — PROVIDER CONTACT NOTE (HYPOGLYCEMIA EVENT) - NS PROVIDER CONTACT BACKGROUND-HYPO
Age: 84y    Gender: Male    POCT Blood Glucose:  145 mg/dL (10-16-24 @ 22:41)  63 mg/dL (10-16-24 @ 21:19)  58 mg/dL (10-16-24 @ 21:17)  169 mg/dL (10-16-24 @ 16:53)  154 mg/dL (10-16-24 @ 12:16)  125 mg/dL (10-16-24 @ 08:07)      eMAR:  dextrose 50% Injectable   50 milliLiter(s) IV Push (10-16-24 @ 22:15)      insulin lispro (ADMELOG) corrective regimen sliding scale   1 Unit(s) SubCutaneous (10-16-24 @ 17:32)   1 Unit(s) SubCutaneous (10-16-24 @ 13:01)

## 2024-10-17 NOTE — PROGRESS NOTE ADULT - ASSESSMENT
85 y/o recently diagnosed bilateral renal cell carcinoma, myasthenia gravis on prednisone and mycophenolate, atrial fibrillation on apixaban s/p PPM for bradyarrhythmia heart failure with preserved ejection fraction, severe aortic stenosis, and recent stroke with dysphagia and left sided weakness presenting to [facility] for severe anemia requiring blood transfusion. Patient was found to have pneumoperitoneum descending colitis and possible rectal pneumatosis s/p  Chopra's procedure with end colostomy. Transferred to SICU d/t hypotension requiring vasopressors during the case.    N - Off sedation, ho myasthenia gravis  P - acute resp failure  C - On phenylephrine  GI - sigmoid perforation likely from ischemia   - indwelling butt  H - monitor cbc, start dvt ppx  ID - on zosyn  E - ssi  PPx - lovenox   Dispo - full code

## 2024-10-17 NOTE — DIETITIAN INITIAL EVALUATION ADULT - WEIGHT (LBS)
Patient : Benny Richard Age: 23 year old Sex: female   MRN: [id number] Encounter Date: 3/5/2020      History     Chief Complaint   Patient presents with   •  Symptoms     HPI  Benny Richard is a 23-year-old female with past medical history significant for borderline personality, history of foreign body-chin and ingestion and insertion, diabetes mellitus type 2, depression, remote history of suicidal gestures, prior chapter 51 hold, that presents for concerns of a retained foreign body to urethra.  The historian is the patient.  Patient states that approximately 6 weeks prior to arrival she inserted the plastic cap of a ballpoint pen to her urethra.  She states she sought evaluation at the  initially after this happened and that “nobody did anything about it.”  She states she was discharged.  Patient presents today due to progressive worsening of pain in urethral area, dysuria, blood in urine.  She denies any overt abdominal pain or flank pain.  No fever, sweats, chills.  Patient stating she is able to urinate around the pen but due to increased pain she presents for evaluation. She denies SI/HI. She denies foreign body elsewhere.     Allergies   Allergen Reactions   • Codeine ANAPHYLAXIS   • Cucumbers [Cucumber] ANAPHYLAXIS   • Fish ANAPHYLAXIS   • Seafood   (Food) ANAPHYLAXIS   • Shellfish Allergy   (Food Or Med) ANAPHYLAXIS   • Toradol SEIZURES and ANAPHYLAXIS   • Tramadol SEIZURES   • Contrast Media RASH   • Etodolac Other (See Comments)     unknown   • Iodine   (Environmental Or Med) RASH       Current Discharge Medication List      Prior to Admission Medications    Details   balsalazide (COLAZAL) 750 MG capsule Take 3 capsules by mouth 2 times daily.  Qty: 180 capsule, Refills: 11      mesalamine (CANASA) 1000 MG suppository Place 1 suppository rectally 2 times daily. Insert rectally every night and hold it in as long as possible  Qty: 60 suppository, Refills: 1      fludrocortisone (FLORINEF) 0.1  MG tablet Take 2 tablets by mouth daily.      hydroCORTisone (ANUSOL-HC) 25 MG suppository Place 1 suppository rectally 2 times daily as needed for Hemorrhoids.      sucralfate (CARAFATE) 1 GM/10ML suspension Take 1 g by mouth 4 times daily (before meals and nightly).      albuterol 108 (90 Base) MCG/ACT inhaler Inhale 2 puffs into the lungs every 6 hours as needed for Shortness of Breath or Wheezing (or asthma).       potassium CHLORIDE (KLOR-CON M) 20 MEQ ken ER tablet Take 1 tablet by mouth 2 times daily.  Qty: 10 tablet, Refills: 0      FLUoxetine (PROZAC) 20 MG capsule Take 60 mg by mouth daily.       sodium chloride 1 g tablet Take 1 g by mouth 2 times daily.      ciclesonide (ALVESCO) 160 MCG/ACT inhaler Inhale 1 puff into the lungs 2 times daily.      metformin (GLUCOPHAGE) 1000 MG tablet Take 1,000 mg by mouth 2 times daily (with meals).      cloZAPine (CLOZARIL) 100 MG tablet Take 100 mg by mouth nightly.      atropine (ISOPTO ATROPINE) 1 % ophthalmic solution Place 3 drops under the tongue at bedtime as needed (drooling).       omeprazole (PRILOSEC) 40 MG capsule Take 40 mg by mouth daily.      loratadine (CLARITIN) 10 MG tablet Take 10 mg by mouth daily.      acetaminophen (TYLENOL) 325 MG tablet Take 650 mg by mouth every 6 hours as needed for Pain.       midodrine (PROAMATINE) 5 MG tablet Take 15 mg by mouth 3 times daily.       docusate sodium (COLACE) 100 MG capsule Take 100 mg by mouth at bedtime.       medroxyPROGESTERone Acetate 150 MG/ML Suspension Prefilled Syringe Inject 150 mg into the muscle every 3 months.      albuterol (VENTOLIN) (2.5 MG/3ML) 0.083% nebulizer solution Take 2.5 mg by nebulization every 4 hours as needed for Shortness of Breath.      bisacodyl (DULCOLAX) 10 MG suppository Place 1 suppository rectally daily as needed for Constipation.  Qty: 10 suppository, Refills: 0      sodium chloride (OCEAN NASAL SPRAY) 0.65 % nasal spray Spray 2 sprays in each nostril 4 times daily as  needed (Dryness).              Current Discharge Medication List      New Prescriptions    Details   sulfamethoxazole-trimethoprim (BACTRIM DS) 800-160 MG per tablet Take 1 tablet by mouth 2 times daily for 5 days.  Qty: 10 tablet, Refills: 0             Past Medical History:   Diagnosis Date   • Asthma    • Depression    • Diabetes mellitus (CMS/HCC)    • Essential (primary) hypertension    • Gastroesophageal reflux disease    • H/O suicide attempt     multiple   • Iron deficiency anemia    • Postural orthostatic tachycardia syndrome     caused by autonomic dysfunction    • Seizures (CMS/HCC)    • Wrist fracture        Past Surgical History:   Procedure Laterality Date   • ANAL EXAMINATION UNDER ANESTHESIA  02/06/2020    Flexible sigmoidoscopy with cold biopsy and examination under anesthesia   • COLONOSCOPY  01/30/2020   • COLONOSCOPY DIAGNOSTIC  04/25/2012   • COLONOSCOPY DIAGNOSTIC  04/25/2012   • CYSTOSCOPY  10/22/2019    cystoscopy with foreign body removal    • EGD  01/30/2020   • ESOPHAGOGASTRODUODENOSCOPY      foreign body removal   • ESOPHAGOGASTRODUODENOSCOPY TRANSORAL FLEX W/BX SINGLE OR MULT  07/20/2013    has had multiple   • FLEXIBLE SIGMOIDOSCOPY  02/06/2020    Flexible sigmoidoscopy with cold biopsy and examination under anesthesia   • FOREIGN BODY REMOVAL  06/30/2017    rectal foreign body   • FRACTURE SURGERY     • KNEE SURGERY  09/14/2012    left   • KNEE SURGERY Left 08/12/2019    with patellofemoral reconstruction   • NASAL HEMORRHAGE CONTROL  07/05/2013   • REMOVAL OF FOREIGN BODY      multiple   • REMOVAL OF FOREIGN BODY  03/14/2017   • WRIST FRACTURE SURGERY      left       Family History   Problem Relation Age of Onset   • Diabetes Mother    • Heart disease Mother    • Asthma Mother    • Diabetes Father    • Heart disease Father        Social History     Tobacco Use   • Smoking status: Never Smoker   • Smokeless tobacco: Never Used   Substance Use Topics   • Alcohol use: No     Frequency:  Never     Drinks per session: 1 or 2     Binge frequency: Never   • Drug use: No       Review of Systems  13 point review systems was obtained and negative with exception what is listed in history of present illness.    Physical Exam     ED Triage Vitals [03/05/20 1256]   ED Triage Vitals Group      Temp 98.1 °F (36.7 °C)      Heart Rate (!) 110      Resp 20      BP 97/55      SpO2 97 %      EtCO2 mmHg       Height 5' 4\" (1.626 m)      Weight 254 lb 3.1 oz (115.3 kg)      Weight Scale Used ED Actual      BMI (Calculated) 43.63      IBW/kg (Calculated) 54.7       Physical Exam  Vital signs and nursing notes reviewed.   General: WD/WN obese F.  No acute distress.  Head: NC/AT.  Eyes: Sclerae anicteric. Conjunctivae not injected.   Mouth: Oropharynx is moist, Uvula rises symmetrically to the midline with phonation.  Neck: Neck supple. No lymphadenopathy. No cervical bruits.  Heart: RRR, Normal S1 and S2.  Lungs: CTAB.    Abdomen: Normoactive bowel sounds. Soft. Not tender, no rebound, rigidity, guarding. No CVA tenderness bilaterally. No mass, no pulsatile midline mass.  : Labia without rash, lesion, excoriation. Urethra normal in appearance. Pain with insertion of speculum, no visualized foreign body. Retained firm elongated foreign body in urethra palpable to the anterior wall of the vaginal canal.   Neurologic: CN 2-12 grossly intact. GCS Score 15. Gait normal. Not ataxic.  Psychology: Affect pleasant and appropriate. Hygiene appropriate. No SI/HI.   Extremities: No C/C/E.  Skin: Warm and dry without lesion.  Pulses: Equal bilateral radial and PT pulses.    ED Course     Procedures    Lab Results     Results for orders placed or performed during the hospital encounter of 03/05/20   URINALYSIS WITH MICRO & CULTURE IF INDICATED   Result Value Ref Range    COLOR, URINALYSIS Yellow     APPEARANCE, URINALYSIS Cloudy     GLUCOSE, URINALYSIS Negative Negative mg/dL    BILIRUBIN, URINALYSIS Negative Negative    KETONES,  URINALYSIS Negative Negative mg/dL    SPECIFIC GRAVITY, URINALYSIS 1.025 1.005 - 1.030    OCCULT BLOOD, URINALYSIS Large (A) Negative    PH, URINALYSIS 6.0 5.0 - 7.0    PROTEIN, URINALYSIS Negative Negative mg/dL    UROBILINOGEN, URINALYSIS 0.2 0.2, 1.0 mg/dL    NITRITE, URINALYSIS Negative Negative    LEUKOCYTE ESTERASE, URINALYSIS Small (A) Negative    SQUAMOUS EPITHELIAL, URINALYSIS 6 to 10 (A) None Seen, 1 to 5 /hpf    ERYTHROCYTES, URINALYSIS 11 to 25 (A) None Seen, 1 to 2 /hpf    LEUKOCYTES, URINALYSIS 6 to 10 (A) None Seen, 1 to 5 /hpf    BACTERIA, URINALYSIS Large (A) None Seen /hpf    HYALINE CASTS, URINALYSIS 1 to 5 None Seen, 1 to 5 /lpf    MUCUS Present    Pregnancy Test, Urine   Result Value Ref Range    Pregnancy, Urine Negative Negative   Basic Metabolic Panel   Result Value Ref Range    Sodium 141 135 - 145 mmol/L    Potassium 3.8 3.4 - 5.1 mmol/L    Chloride 106 98 - 107 mmol/L    Carbon Dioxide 26 21 - 32 mmol/L    Anion Gap 13 10 - 20 mmol/L    Glucose 93 65 - 99 mg/dL    BUN 9 6 - 20 mg/dL    Creatinine 0.60 0.51 - 0.95 mg/dL    Glomerular Filtration Rate >90 >90    BUN/ Creatinine Ratio 15 7 - 25    Calcium 9.2 8.4 - 10.2 mg/dL   Lactic Acid, Venous   Result Value Ref Range    Lactate, Venous 1.5 0.0 - 2.0 mmol/L   CBC with Automated Differential (performable only)   Result Value Ref Range    WBC 12.3 (H) 4.2 - 11.0 K/mcL    RBC 4.83 4.00 - 5.20 mil/mcL    HGB 12.6 12.0 - 15.5 g/dL    HCT 39.7 36.0 - 46.5 %    MCV 82.2 78.0 - 100.0 fl    MCH 26.1 26.0 - 34.0 pg    MCHC 31.7 (L) 32.0 - 36.5 g/dL    RDW-SD 48.8 39.0 - 50.0 fL    RDW-CV 16.3 (H) 11.0 - 15.0 %     (H) 140 - 450 K/mcL    NRBC 0 <=0 /100 WBC    Neutrophil, Percent 71 %    Lymphocytes, Percent 22 %    Mono, Percent 6 %    Eosinophils, Percent 0 %    Basophils, Percent 0 %    Immature Granulocytes 1 %    Absolute Neutrophils 8.8 (H) 1.8 - 7.7 K/mcL    Absolute Lymphocytes 2.7 1.0 - 4.8 K/mcL    Absolute Monocytes 0.7 0.3 - 0.9  K/mcL    Absolute Eosinophils  0.0 (L) 0.1 - 0.5 K/mcL    Absolute Basophils 0.0 0.0 - 0.3 K/mcL    Absolute Immmature Granulocytes 0.1 0.0 - 0.2 K/mcL   GLUCOSE, BEDSIDE - POINT OF CARE   Result Value Ref Range    GLUCOSE, BEDSIDE - POINT OF CARE 102 (H) 70 - 99 mg/dL   GLUCOSE, BEDSIDE - POINT OF CARE   Result Value Ref Range    GLUCOSE, BEDSIDE - POINT OF CARE 86 70 - 99 mg/dL       Radiology Results     Imaging Results          XR Abdomen AP Kub (Final result)  Result time 03/05/20 14:22:24    Final result                 Impression:    IMPRESSION:  No evidence for radiopaque foreign body                 Narrative:    XR ABDOMEN 1 VW KUB SUPINE    INDICATION: ? foreign body, patient reports inserting vaginal foreign body  6 weeks ago    COMPARISON:  None.    FINDINGS:  Examination of the abdomen and AP view obtained patient supine  bowel gas pattern appears unremarkable no evidence of a radiopaque foreign  body                                ED Medication Orders (From admission, onward)    Ordered Start     Status Ordering Provider    03/05/20 1528 03/05/20 1529  [MAR Hold]  cefTRIAXone (ROCEPHIN) syringe 2,000 mg  ONCE     (MAR Hold since Thu 3/5/2020 at 1609. Reason: Unreviewed Transfer Orders.)    Last MAR action:  Given ANISHA LINDQUIST          ED Course as of Mar 05 1801   Thu Mar 05, 2020   1443 Spoke with patient's  who confirms patient has plan with crisis and is under court order but if patient endorses self-harm and seeks treatment afterward on left patient feels as though she is suicidal she is to be discharged home otherwise if suicidal to go to [facility] in [place].  Patient denies any passive or active homicidal or suicidal ideation.  Patient's caregiver notes patient has time with crisis and they are to seek and crisis.   sought guidance from crisis worker Aries, who notes that patient has longstanding history of self-harm and as long as she seeks help  afterward there is no need for patient to go to [facility] per plan with crisis.      1523 Urology paged multiple times, note that Dr. Yang is in procedure and will return call once he is finished.      1605 Being wheeled to OR.          MDM  23 year old female with history and physical examination findings as documented. Patient is afebrile. She is not toxic. She has palpable foreign body in urethra. She has no SI nor HI. She denies foreign body elsewhere.     Discussed with patient's . Despite that patient has \"self harmed\" she sought evaluation afterward and as she sought evaluation, is not aggressive, and denies SI/HI, patient does not need to go back to [facility].    Lactic acid is 1.5, and reassuring.     CBC with mild leukocytosis and wbc of 12.3, anc is 8.8. No leukocytosis, no left shift.     BMP with CR. 0.60 and egfr of 90, reassuring. No significant  Metabolic derangement.     Urine pregnancy is negative.     UA shows yellow cloudy urine, large occult blood, negative nitrite. Small urine leukocyte esterase 6-10 squam epis, 11-25 rbc, 6-10 wbc, large bacteria. No CVA tenderness. No suprapubic tenderness, doubt pyelonephritis, but with large bacteria concern for infection. Urine culture is pending.     KUB does not show foreign body per radiology,  but retained foreign body is palpable to urethra.     Case was discussed with urologist Dr. Yang. Please see timeline above for additional details. Due to UA showing signs concerning for infection, urologist agreeable to take patient to OR. Patient last ate 0600 hours.     Patient given rocephin and counseled on recommendations. She is agreeable to go to the OR for extraction of urethral foreign body.     Case discussed with ED attending who agrees with disposition.     Ddx considered but not limited to: SI, retained foreign body, UTI, urinary retention, pyelonephritis, renal colic. Multiple diagnoses were considered.    Clinical Impression     ED Diagnosis   1. Foreign body in urethra, initial encounter         Disposition        Admit 3/5/2020  3:35 PM  Telemetry Bed?: No  Level of Care: Acute [1]  Attending Physician: NEWTON CHANDLER [[id number]]    Closure  The patient was further counseled that this is a provisional diagnosis. There is always the potential for new or worsening of symptoms and the patient was educated to return to the ED, call 911, or seek medical attention in nearest ED if new or acute worsening of symptoms develop.       Nat Clevelnad PA-C  03/05/20 0223      Case and plan was discussed with PA. Please refer to her note sweetie for details. I remained actively involved in decision making and spoke with patient's  myself to confirm possible placement issues. Indication was if she had sought eval and once medically cleared she felt safe to return to home environment she may. However, if patient felt her behavior was escalating or not safe in home environment she may go on a voluntary basis  to [place]. She would not need to be under an acute  mental health hold if cleared and she felt safe, nonSI or out of control with her sx. It was felt per  this would be an ongoing issue and not readily solved.      Amalia Mcnamara MD  03/06/20 0018     138

## 2024-10-17 NOTE — DIETITIAN INITIAL EVALUATION ADULT - PROBLEM/PLAN-8
***************************************************************  Joshua Agustin, PGY1  Internal Medicine   TEAMS Preferred  [facility] Pager [phone]  [facility] Pager [phone]  ***************************************************************    SCOOTER MAZARIEGOS  55y  MRN: [id number]  [birth year] (51d)    Patient is a 55y old  Female who presents with a chief complaint of abdominal pain & nausea/vomiting (03 Oct 2022 06:34)      SUBJECTIVE / OVERNIGHT EVENTS:   No acute overnight events. Pt seen and examined at bedside. Denies fevers, chills, CP, SOB, Abdominal pain, N/V, Constipation, Diarrhea. Last BM     12 Point ROS negative with the exception of the above    MEDICATIONS  (STANDING):  Biotene Dry Mouth Oral Rinse 5 milliLiter(s) Swish and Spit two times a day  HYDROmorphone Infusion 0.5 mG/Hr (0.5 mL/Hr) IV Continuous <Continuous>  lidocaine   4% Patch 1 Patch Transdermal daily  pantoprazole  Injectable 40 milliGRAM(s) IV Push daily    MEDICATIONS  (PRN):  bisacodyl Suppository 10 milliGRAM(s) Rectal daily PRN Constipation  HYDROmorphone  Injectable 0.5 milliGRAM(s) IV Push every 2 hours PRN Respiratory distress  HYDROmorphone  Injectable 1.5 milliGRAM(s) IV Push every 2 hours PRN Breakthrough  LORazepam   Injectable 0.5 milliGRAM(s) IV Push every 6 hours PRN restlessness/agitation/anxiety  ondansetron Injectable 8 milliGRAM(s) IV Push every 8 hours PRN Nausea and/or Vomiting  polyethylene glycol 3350 17 Gram(s) Oral daily PRN Constipation      OBJECTIVE:  Vital Signs Last 24 Hrs  T(C): 36.7 (03 Oct 2022 20:56), Max: 36.7 (03 Oct 2022 16:11)  T(F): 98.1 (03 Oct 2022 20:56), Max: 98.1 (03 Oct 2022 20:56)  HR: 93 (03 Oct 2022 20:56) (93 - 95)  BP: 93/57 (03 Oct 2022 20:56) (93/57 - 95/63)  BP(mean): --  RR: 19 (03 Oct 2022 20:56) (19 - 19)  SpO2: 100% (03 Oct 2022 20:56) (99% - 100%)    Parameters below as of 03 Oct 2022 20:56  Patient On (Oxygen Delivery Method): nasal cannula  O2 Flow (L/min): 2      I&O's Summary      PHYSICAL EXAM:  GENERAL: Laying comfortably, NAD  HEENT: NCAT, PERRLA, EOMI, no scleral icterus, no LAD  NECK: No JVD  LUNG: CTABL; No wheezes, crackles, or rhonchi  HEART: RRR; normal S1/S2; No murmurs, rubs, or gallops  ABDOMEN: +BS, soft, nontender, nondistended, no HSM; No rebound, guarding, or rigidity  EXTREMITIES:  No LE edema b/l, 2+ Peripheral Pulses, No clubbing or cyanosis  NEUROLOGY: AOx3, non-focal, strength 5/5 in all extremities, sensation intact  PSYCH: calm and cooperative  SKIN: No rashes or lesions    LABS:                      CAPILLARY BLOOD GLUCOSE            RADIOLOGY & ADDITIONAL TESTS:    
***************************************************************  Joshua Agustin, PGY1  Internal Medicine   TEAMS Preferred  [facility] Pager [phone]  [facility] Pager [phone]  ***************************************************************    SCOOTER MAZARIEGOS  55y  MRN: [id number]  [birth year] (51d)    Patient is a 55y old  Female who presents with a chief complaint of abdominal pain & nausea/vomiting (03 Oct 2022 06:34)      SUBJECTIVE / OVERNIGHT EVENTS:   No acute overnight events. Pt seen and examined at bedside. Patient was able to blink twice when asked if she could hear and understand me, was unable to respond to further questions appropriately. Patient appeared to be mildly uncomfortable this am, will give pain maeds for comfort. Additionally, per nurse, concern that there is a smell coming from her J-tube.     12 Point ROS negative with the exception of the above    MEDICATIONS  (STANDING):  Biotene Dry Mouth Oral Rinse 5 milliLiter(s) Swish and Spit two times a day  HYDROmorphone Infusion 0.5 mG/Hr (0.5 mL/Hr) IV Continuous <Continuous>  lidocaine   4% Patch 1 Patch Transdermal daily  pantoprazole  Injectable 40 milliGRAM(s) IV Push daily    MEDICATIONS  (PRN):  bisacodyl Suppository 10 milliGRAM(s) Rectal daily PRN Constipation  HYDROmorphone  Injectable 0.5 milliGRAM(s) IV Push every 2 hours PRN Respiratory distress  HYDROmorphone  Injectable 1.5 milliGRAM(s) IV Push every 2 hours PRN Breakthrough  LORazepam   Injectable 0.5 milliGRAM(s) IV Push every 6 hours PRN restlessness/agitation/anxiety  ondansetron Injectable 8 milliGRAM(s) IV Push every 8 hours PRN Nausea and/or Vomiting  polyethylene glycol 3350 17 Gram(s) Oral daily PRN Constipation      OBJECTIVE:  Vital Signs Last 24 Hrs  T(C): 36.7 (03 Oct 2022 20:56), Max: 36.7 (03 Oct 2022 16:11)  T(F): 98.1 (03 Oct 2022 20:56), Max: 98.1 (03 Oct 2022 20:56)  HR: 93 (03 Oct 2022 20:56) (93 - 95)  BP: 93/57 (03 Oct 2022 20:56) (93/57 - 95/63)  BP(mean): --  RR: 19 (03 Oct 2022 20:56) (19 - 19)  SpO2: 100% (03 Oct 2022 20:56) (99% - 100%)    Parameters below as of 03 Oct 2022 20:56  Patient On (Oxygen Delivery Method): nasal cannula  O2 Flow (L/min): 2      I&O's Summary      PHYSICAL EXAM:  GENERAL: uncomfortable appearing, laying in bed with mouth open   HEENT: NCAT, no scleral icterus  LUNG: CTABL; short shallow breaths  HEART: RRR; normal S1/S2; No murmurs, rubs, or gallops  ABDOMEN: +BS, soft, nontender, nondistended  EXTREMITIES:  No LE edema b/l, 2+ Peripheral Pulses, No clubbing or cyanosis  NEUROLOGY: AOx0, opens eyes to verbal stimuli, blinked twice appropriately when asked if she could hear and understand me, but was unable to do this same with further questions  PSYCH: calm   SKIN: No rashes or lesions
DISPLAY PLAN FREE TEXT
Admitted

## 2024-10-17 NOTE — DIETITIAN INITIAL EVALUATION ADULT - REASON INDICATOR FOR ASSESSMENT
Consult: nutrition assessment; pressure injuries >/2   Sources: Electronic Medical Record, RN, Patient - intubated

## 2024-10-17 NOTE — DIETITIAN INITIAL EVALUATION ADULT - ETIOLOGY
related to inadequate protein-energy intake in the setting of multiple myeloma, hx stroke  related to increased physiological demand  related to inadequate protein-energy intake

## 2024-10-17 NOTE — DIETITIAN INITIAL EVALUATION ADULT - ORAL INTAKE PTA/DIET HISTORY
Unable to obtain subjective information.     Per chart:   -NKFA   -40 pound weight loss since August 2024

## 2024-10-17 NOTE — BRIEF OPERATIVE NOTE - OPERATION/FINDINGS
exploratory laparotomy, upon entry into the abdomen patient with noted fibrinous exudate in pelvis. stomach and proximal SB inspected, SB run from LOT to cecum, R, transverse and L colon inspected without noted issues.  upon inspection of sigmoid colon small noted perforation with assc abscess cavity and fibrinous exudate. Portion of perforated sigmoid colon excised with ASIA 60 blue loads x2, assc mesentery resected with lig-a-sure device; Colostomy created using proximal end of sigmoid colon through rectus on L side of abdomen. Abdomen thoroughly irrigated, hemostasis confirmed.  Midline fascia closed with 1-0 looped PDS and colostomy matured.

## 2024-10-17 NOTE — PROGRESS NOTE ADULT - SUBJECTIVE AND OBJECTIVE BOX
SURGERY DAILY PROGRESS NOTE:       SUBJECTIVE/ROS: Patient seen and evaluated on AM rounds.          OBJECTIVE:    Vital Signs Last 24 Hrs  T(C): 36.6 (17 Oct 2024 11:00), Max: 38.7 (16 Oct 2024 18:23)  T(F): 97.8 (17 Oct 2024 11:00), Max: 101.7 (16 Oct 2024 18:23)  HR: 60 (17 Oct 2024 12:30) (59 - 75)  BP: 117/57 (17 Oct 2024 08:30) (117/57 - 176/57)  BP(mean): 82 (17 Oct 2024 08:30) (79 - 88)  RR: 22 (17 Oct 2024 12:30) (13 - 30)  SpO2: 99% (17 Oct 2024 12:30) (95% - 100%)    Parameters below as of 17 Oct 2024 11:34      O2 Concentration (%): 30  I&O's Detail    16 Oct 2024 07:01  -  17 Oct 2024 07:00  --------------------------------------------------------  IN:    Dexmedetomidine: 61.6 mL    IV PiggyBack: 100 mL    IV PiggyBack: 100 mL    Lactated Ringers: 300 mL    Lactated Ringers Bolus: 1000 mL    Phenylephrine: 11.6 mL  Total IN: 1573.2 mL    OUT:    Indwelling Catheter - Urethral (mL): 845 mL  Total OUT: 845 mL    Total NET: 728.2 mL      17 Oct 2024 07:01  -  17 Oct 2024 13:34  --------------------------------------------------------  IN:    Lactated Ringers: 500 mL    Phenylephrine: 33.4 mL    Phenylephrine: 23.2 mL  Total IN: 556.6 mL    OUT:    Colostomy (mL): 0 mL    Dexmedetomidine: 0 mL    Indwelling Catheter - Urethral (mL): 160 mL    Nasogastric/Oral tube (mL): 0 mL  Total OUT: 160 mL    Total NET: 396.6 mL        Daily     Daily Weight in k.9 (17 Oct 2024 05:19)  MEDICATIONS  (STANDING):  acetaminophen   IVPB .. 1000 milliGRAM(s) IV Intermittent every 6 hours  chlorhexidine 2% Cloths 1 Application(s) Topical <User Schedule>  enoxaparin Injectable 40 milliGRAM(s) SubCutaneous every 24 hours  folic acid Injectable 1 milliGRAM(s) IV Push daily  hydrocortisone sodium succinate Injectable 50 milliGRAM(s) IV Push every 6 hours  lactated ringers. 1000 milliLiter(s) (100 mL/Hr) IV Continuous <Continuous>  pantoprazole  Injectable 40 milliGRAM(s) IV Push every 24 hours  phenylephrine    Infusion 0.1 MICROgram(s)/kG/Min (2.89 mL/Hr) IV Continuous <Continuous>  piperacillin/tazobactam IVPB.. 3.375 Gram(s) IV Intermittent every 8 hours    MEDICATIONS  (PRN):  HYDROmorphone  Injectable 0.5 milliGRAM(s) IV Push every 3 hours PRN Severe Pain (7 - 10)      LABS:                        10.1   6.25  )-----------( 173      ( 17 Oct 2024 04:29 )             32.2     10-17    136  |  106  |  24[H]  ----------------------------<  127[H]  4.0   |  21[L]  |  0.65    Ca    7.3[L]      17 Oct 2024 04:30  Phos  3.2     10-17  Mg     2.0     10-    TPro  4.9[L]  /  Alb  2.2[L]  /  TBili  1.2  /  DBili  x   /  AST  15  /  ALT  <5[L]  /  AlkPhos  99  10-17    PT/INR - ( 17 Oct 2024 04:29 )   PT: 18.5 sec;   INR: 1.63 ratio         PTT - ( 17 Oct 2024 04:29 )  PTT:33.9 sec  Urinalysis Basic - ( 17 Oct 2024 04:30 )    Color: x / Appearance: x / SG: x / pH: x  Gluc: 127 mg/dL / Ketone: x  / Bili: x / Urobili: x   Blood: x / Protein: x / Nitrite: x   Leuk Esterase: x / RBC: x / WBC x   Sq Epi: x / Non Sq Epi: x / Bacteria: x                PHYSICAL EXAM:  General: Resting comfortably in bed in no acute distress.  Cardiovascular: Warm and well-perfused.   Respiratory: intubated   Abdomen: colostomy stoma with some bloody output

## 2024-10-17 NOTE — BRIEF OPERATIVE NOTE - NSICDXBRIEFPOSTOP_GEN_ALL_CORE_FT
POST-OP DIAGNOSIS:  Perforation of sigmoid colon due to diverticulitis 17-Oct-2024 04:12:42  Margie David

## 2024-10-17 NOTE — SWALLOW BEDSIDE ASSESSMENT ADULT - COMMENTS
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 w/ residual dysphagia.    10/16: Pneumoperitoneum found 10/16; +descending colitis and possible rectal pneumatosis-->OR for Chopra's procedure with end colostomy. Transferred to SICU d/t hypotension requiring vasopressors.  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 w/ residual dysphagia. MBS 08/21/24 at [facility] found oropharyngeal dysphagia; aspiration of thin liquids; penetration of mildly thick. Recommended Regular/moderately thick.    10/16: Pneumoperitoneum found 10/16; +descending colitis and possible rectal pneumatosis-->OR for Chopra's procedure with end colostomy. Transferred to SICU d/t hypotension requiring vasopressors.  10/17: transferred to the SICU for HD monitoring following Chopra's procedure w/ end colostomy for perforated colitis

## 2024-10-17 NOTE — AIRWAY REMOVAL NOTE  ADULT & PEDS - ARTIFICAL AIRWAY REMOVAL COMMENTS
Written order for extubation verified. The patient was identified by full name and birth date compared to the identification band.  Present during the procedure was Soren RUSH

## 2024-10-17 NOTE — DIETITIAN INITIAL EVALUATION ADULT - OTHER INFO
GI/Intake:   -NPO with NGT to suction --> no output documented thus far  -Last BM documented 10/16; No bowel regimen ordered   -S/p ex-lap with Alec's (10/17) 2/2 perf sigmoid colon in setting of diverticulitis   -No output via ostomy recorded thus far     Endo:   -Latest A1c: 5.2% - ? controlled DM; Pt noted on Metformin PTA   -Sliding scale insulin ordered   -Solu-cortef ordered     Renal:   -Per chart, newly dx renal cell carcinoma   -Lactated ringers ordered     Neuro:   -Precedex for sedation     Resp:   -Pt remains intubated     CV:   -Phylephrine for pressor support     Weight Hx:   -Current dosin pounds   -Per flowsheet: 138 pounds (10/17) --> appears more accurate   -Per previous RD note: 167 pounds (24)   -Reported with unintentional weight loss PTA

## 2024-10-17 NOTE — DIETITIAN INITIAL EVALUATION ADULT - SIGNS/SYMPTOMS
as evidenced by 17% weight loss x 2 months; severe signs of muscle/fat loss as evidenced by pressure injury >/2 as evidenced by 17% weight loss x 2 months; severe signs of muscle/fat loss; BMI <19

## 2024-10-17 NOTE — CONSULT NOTE ADULT - SUBJECTIVE AND OBJECTIVE BOX
SUMMARY: 84y male in hospital for 3d transferred to the SICU for HD monitoring following Chopra's procedure w/ end colostomy for perforated colitis.    HPI:  85 y/o M PmHx recently diagnosed bilateral renal cell carcinoma, myasthenia gravis on prednisone and mycophenolate, atrial fibrillation on apixaban status post recent pacemaker placement for slow ventricular response, heart failure with preserved ejection fraction, severe aortic stenosis, and recent stroke with dysphagia and left sided weakness initially presented for management of acute severe anemia found on outpatient laboratory studies. On admission patient was initially anemic requiring 2U PRBC. GI was c/s initially, however, patient denied endoscopy/colonoscopy. Patient was found to have pneumoperitoneum on a study obtained today 10/16 to identify the progression of his disease and specifically look for metastases. 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 Patient was taken to OR for Chopra's procedure with end colostomy. Transferred to SICU d/t hypotension requiring vasopressors during the case.    1L crystalloid  3U PRBC  EBL: 20cc  UOP: 300cc    VITAL SIGNS:  T(F): 98.2 (10-16-24 @ 23:30), Max: 101.7 (10-16-24 @ 18:23)  HR: 60 (10-16-24 @ 23:30) (60 - 75)  BP: 128/58 (10-16-24 @ 23:30) (123/47 - 176/57)  RR: 18 (10-16-24 @ 23:30) (17 - 19)  SpO2: 99% (10-16-24 @ 23:30) (95% - 99%)    INTAKE/OUTPUT  10-15 @ 07:01  -  10-16 @ 07:00  --------------------------------------------------------  IN: 240 mL / OUT: 1000 mL / NET: -760 mL    FINGERSTICKS:  145 (10-16 @ 22:41), 63 (10-16 @ 21:19), 58 (10-16 @ 21:17), 169 (10-16 @ 16:53), 154 (10-16 @ 12:16), 125 (10-16 @ 08:07)    INSULIN REQUIREMENT:  insulin lispro (ADMELOG) corrective regimen sliding scale: 1 (10-16 @ 17:32),  1 (10-16 @ 13:01)    PHYSICAL EXAM:  General: cachetic appearing, sedated  HEENT: normocephalic/atraumatic; pupils equally round and reactive to light; clear conjunctiva  Neck: supple, no jugular venous distension  Respiratory: intubated PRVC 60/5.0/14/500  Cardiovascular: skin is warm and well perfused; S1 and S2 auscultated regular rate and rhythm  Abdomen: soft, nontender, nondistended  Extremities: 2+ peripheral pulses bilaterally; no lower extremity edema  Skin: normal color and turgor; no rashes or ulcers identified  Neurological: sedated  Lines: R radial A line, PIV    MEDICATIONS:  None    ALLERGIES:  No Known Allergies    LABS/IMAGING:  CBC:             6.8    12.99 )-----------( 223      ( 10-16 @ 20:51 )             22.7     Seema %: x     / Lym %: x     / Eos %: x     / Band %: x        WBC 72h Trend: 12.99 (10-16), 11.06 (10-16), 12.11 (10-16), 7.61 (10-16), 6.05 (10-15), 5.72 (10-15)  ---------------------------------------------------------------------------  Hemoglobin 24h Trend: 6.8 (20:51), 6.9 (19:53), 8.1 (17:27), 7.7 (07:07)  ---------------------------------------------------------------------------  CMP:  138  |  104  |  25[H]  ----------------------------( 133[H]     10-16 @ 17:27  4.4   |  23  |  0.61    Ca: 8.2[L]   Phos: x    Mg: x     TPro: x  / Alb: x  /  TBili x  / DBili x  /  AST x  /  ALT x  /  AlkPhos  x   ---------------------------------------------------------------------------  Creatinine 48h Trend:  0.61 (10-16), 0.59 (10-16), 0.55 (10-15)  ---------------------------------------------------------------------------  ABG/VBG:  (10-17 @ 02:11) pH: 7.28 / pCO2: 50 /  pO2: 262 / HCO3: 24 / SaO2: 98.0  (10-17 @ 00:34) pH: 7.36 / pCO2: 34 /  pO2: 181 / HCO3: 19 / SaO2: 98.0   VBG:   Lactate 24h Trend:  0.9 (10-17 @ 02:11),  0.5 (10-17 @ 00:34)  ---------------------------------------------------------------------------  UA:  (10-16 @ 17:27)  Color: x / Appearance: x / SG: x / pH: x / Gluc: 133[H] / Ketone: x  / Bili: x / Ubili: x / Blood: x / Prot: x / Nitrite: x / Leuk Kaity: x / RBC: x / WBC x / Sq Epi: x / Non Sq Epi: x / Bacteria: x  ---------------------------------------------------------------------------  CTAP w/ IV Contrast:  IMPRESSION:  - Stool-filled distended rectum. Mild rectal wall thickening may reflect stercoral proctitis. Question rectal pneumatosis, raising the possibility for ischemia, versus intraluminal gas entrapped between the wall and stool.  - Additional wall thickening noted in the descending and sigmoid colon, which may reflect colitis.  - New small amount of pneumoperitoneum mostly in the anterior upper abdomen, concerning for hollow viscus perforation. Small volume of peritoneal fluid and diffuse peritoneal edema. The source of pneumoperitoneum is not certain, potentially related to proctocolitis or perforated diverticular disease, with other etiologies not excluded. Consider CT imaging with oral contrast for further evaluation.  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  - Bilateral renal lesions, previously characterized as solid masses and compatible with renal cell carcinoma. Lesions are similar to mildly increased in size 8/24/2024.  - Scattered subcentimeter hypodense foci in the liver that are too small to characterize and remain indeterminate, but appear similar to the previous exam.  - Distended gallbladder.  - Motion degraded lung bases. Suggestion of small patchy opacities in the right lower lobe, potentially infectious/inflammatory in etiology.  - Small bilateral pleural effusions.  - Anasarca. SUMMARY: 84y male in hospital for 3d transferred to the SICU for HD monitoring following Chopra's procedure w/ end colostomy for perforated colitis (POD 0).    HPI:  85 y/o M PmHx recently diagnosed bilateral renal cell carcinoma, myasthenia gravis on prednisone and mycophenolate, atrial fibrillation on apixaban status post recent pacemaker placement for slow ventricular response, heart failure with preserved ejection fraction, severe aortic stenosis, and recent stroke with dysphagia and left sided weakness initially presented for management of acute severe anemia found on outpatient laboratory studies. On admission patient was initially anemic requiring 2U PRBC. GI was c/s initially, however, patient denied endoscopy/colonoscopy. Patient was found to have pneumoperitoneum on a study obtained today 10/16 to identify the progression of his disease and specifically look for metastases. 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 Patient was taken to OR for Chopra's procedure with end colostomy. Transferred to SICU d/t hypotension requiring vasopressors during the case.    1L crystalloid  3U PRBC  EBL: 20cc  UOP: 300cc    VITAL SIGNS:  T(F): 98.2 (10-16-24 @ 23:30), Max: 101.7 (10-16-24 @ 18:23)  HR: 60 (10-16-24 @ 23:30) (60 - 75)  BP: 128/58 (10-16-24 @ 23:30) (123/47 - 176/57)  RR: 18 (10-16-24 @ 23:30) (17 - 19)  SpO2: 99% (10-16-24 @ 23:30) (95% - 99%)    INTAKE/OUTPUT  10-15 @ 07:01  -  10-16 @ 07:00  --------------------------------------------------------  IN: 240 mL / OUT: 1000 mL / NET: -760 mL    FINGERSTICKS:  145 (10-16 @ 22:41), 63 (10-16 @ 21:19), 58 (10-16 @ 21:17), 169 (10-16 @ 16:53), 154 (10-16 @ 12:16), 125 (10-16 @ 08:07)    INSULIN REQUIREMENT:  insulin lispro (ADMELOG) corrective regimen sliding scale: 1 (10-16 @ 17:32),  1 (10-16 @ 13:01)    PHYSICAL EXAM:  General: cachetic appearing, sedated  HEENT: normocephalic/atraumatic; pupils equally round and reactive to light; clear conjunctiva  Neck: supple, no jugular venous distension  Respiratory: intubated PRVC 60/5.0/14/500  Cardiovascular: skin is warm and well perfused; S1 and S2 auscultated regular rate and rhythm  Abdomen: soft, nontender, nondistended  Extremities: 2+ peripheral pulses bilaterally; no lower extremity edema  Skin: normal color and turgor; no rashes or ulcers identified  Neurological: sedated  Lines: R radial A line, PIV    MEDICATIONS:  None    ALLERGIES:  No Known Allergies    LABS/IMAGING:  CBC:             6.8    12.99 )-----------( 223      ( 10-16 @ 20:51 )             22.7     Seema %: x     / Lym %: x     / Eos %: x     / Band %: x        WBC 72h Trend: 12.99 (10-16), 11.06 (10-16), 12.11 (10-16), 7.61 (10-16), 6.05 (10-15), 5.72 (10-15)  ---------------------------------------------------------------------------  Hemoglobin 24h Trend: 6.8 (20:51), 6.9 (19:53), 8.1 (17:27), 7.7 (07:07)  ---------------------------------------------------------------------------  CMP:  138  |  104  |  25[H]  ----------------------------( 133[H]     10-16 @ 17:27  4.4   |  23  |  0.61    Ca: 8.2[L]   Phos: x    Mg: x     TPro: x  / Alb: x  /  TBili x  / DBili x  /  AST x  /  ALT x  /  AlkPhos  x   ---------------------------------------------------------------------------  Creatinine 48h Trend:  0.61 (10-16), 0.59 (10-16), 0.55 (10-15)  ---------------------------------------------------------------------------  ABG/VBG:  (10-17 @ 02:11) pH: 7.28 / pCO2: 50 /  pO2: 262 / HCO3: 24 / SaO2: 98.0  (10-17 @ 00:34) pH: 7.36 / pCO2: 34 /  pO2: 181 / HCO3: 19 / SaO2: 98.0   VBG:   Lactate 24h Trend:  0.9 (10-17 @ 02:11),  0.5 (10-17 @ 00:34)  ---------------------------------------------------------------------------  UA:  (10-16 @ 17:27)  Color: x / Appearance: x / SG: x / pH: x / Gluc: 133[H] / Ketone: x  / Bili: x / Ubili: x / Blood: x / Prot: x / Nitrite: x / Leuk Kaity: x / RBC: x / WBC x / Sq Epi: x / Non Sq Epi: x / Bacteria: x  ---------------------------------------------------------------------------  CTAP w/ IV Contrast:  IMPRESSION:  - Stool-filled distended rectum. Mild rectal wall thickening may reflect stercoral proctitis. Question rectal pneumatosis, raising the possibility for ischemia, versus intraluminal gas entrapped between the wall and stool.  - Additional wall thickening noted in the descending and sigmoid colon, which may reflect colitis.  - New small amount of pneumoperitoneum mostly in the anterior upper abdomen, concerning for hollow viscus perforation. Small volume of peritoneal fluid and diffuse peritoneal edema. The source of pneumoperitoneum is not certain, potentially related to proctocolitis or perforated diverticular disease, with other etiologies not excluded. Consider CT imaging with oral contrast for further evaluation.  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  - Bilateral renal lesions, previously characterized as solid masses and compatible with renal cell carcinoma. Lesions are similar to mildly increased in size 8/24/2024.  - Scattered subcentimeter hypodense foci in the liver that are too small to characterize and remain indeterminate, but appear similar to the previous exam.  - Distended gallbladder.  - Motion degraded lung bases. Suggestion of small patchy opacities in the right lower lobe, potentially infectious/inflammatory in etiology.  - Small bilateral pleural effusions.  - Anasarca.

## 2024-10-17 NOTE — DIETITIAN INITIAL EVALUATION ADULT - NSFNSPHYEXAMSKINFT_GEN_A_CORE
Pressure Injury 1: Left:, lateral, calf, Unstageable  Pressure Injury 2: Bilateral:, sacrum, coccyx, buttocks, Unstageable  Pressure Injury 3: Bilateral:, heel, Suspected deep tissue injury  Pressure Injury 4: Left:, malleolus, Suspected deep tissue injury

## 2024-10-17 NOTE — CONSULT NOTE ADULT - ATTENDING COMMENTS
Evaluated in SICU post op Chopra  85 y/o recently diagnosed bilateral renal cell carcinoma, myasthenia gravis on prednisone and mycophenolate, atrial fibrillation on apixaban status post recent pacemaker placement for slow ventricular response, heart failure with preserved ejection fraction, severe aortic stenosis, and recent stroke with dysphagia and left sided weakness presenting to [facility] for severe anemia requiring blood transfusion. Patient was found to have pneumoperitoneum on a study obtained today 10/16 to identify the progression of his disease and specifically look for metastases. 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 Patient was taken to OR for Chopra's procedure with end colostomy. Transferred to SICU d/t hypotension requiring vasopressors during the case. Evaluated in SICU post op Chopra  83 y/o recently diagnosed bilateral renal cell carcinoma, myasthenia gravis on prednisone and mycophenolate, atrial fibrillation on apixaban s/p PPM for bradyarrhythmia heart failure with preserved ejection fraction, severe aortic stenosis, and recent stroke with dysphagia and left sided weakness presenting to [facility] for severe anemia requiring blood transfusion. Patient was found to have pneumoperitoneum descending colitis and possible rectal pneumatosis s/p  Chopra's procedure with end colostomy. Transferred to SICU d/t hypotension requiring vasopressors during the case.    Precedex drip gtt  Acute resp insufficiency post op, vent adj for resp acidosis, CXR with no acute finding  Hypotensive, hypovolemic septic shock: titrate Jim, IVF bolus to optimize volume status  Stress dose steroid  NPO PPI  IVF LR  Empiric abx Zosyn for bowel ischemia, follow cultures  ISS  Start pharm DVT ppx with Lovenox    Pt critically ill    Discussed with Dr Webb

## 2024-10-17 NOTE — CONSULT NOTE ADULT - ASSESSMENT
ASSESSMENT:  85 y/o M PmHx recently diagnosed bilateral renal cell carcinoma, myasthenia gravis on prednisone and mycophenolate, atrial fibrillation on apixaban status post recent pacemaker placement for slow ventricular response, heart failure with preserved ejection fraction, severe aortic stenosis, and recent stroke with dysphagia and left sided weakness presenting to [facility] for severe anemia requiring blood transfusion. Patient was found to have pneumoperitoneum on a study obtained today 10/16 to identify the progression of his disease and specifically look for metastases. 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 Patient was taken to OR for Chopra's procedure with end colostomy. Transferred to SICU d/t hypotension requiring vasopressors during the case.    PLAN:  NEURO     ASSESSMENT:  83 y/o M PmHx recently diagnosed bilateral renal cell carcinoma, myasthenia gravis on prednisone and mycophenolate, atrial fibrillation on apixaban status post recent pacemaker placement for slow ventricular response, heart failure with preserved ejection fraction, severe aortic stenosis, and recent stroke with dysphagia and left sided weakness presenting to [facility] for severe anemia requiring blood transfusion. Patient was found to have pneumoperitoneum on a study obtained today 10/16 to identify the progression of his disease and specifically look for metastases. 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 Patient was taken to OR for Chopra's procedure with end colostomy. Transferred to SICU d/t hypotension requiring vasopressors during the case.    PLAN:  NEURO  - Sedation: Precedex  - Pain control: Dilaudid     RESPIRATORY  - Intubated: PRVC 60/5.0/14/500    CARDIOVASCULAR:  - phenylephrine (wean as tolerated)  - TTE: EF 60-65%, severe AS, MR, TR and LVH    GI  - NPO, ADAT  - Concern for severe malnutrion, f/u nutrition reccs    /RENAL  - LR @100cc/hr  - Monitor SCr, replete electrolytes as needed     HEMATOLOGIC:   ASSESSMENT:  85 y/o M PmHx recently diagnosed bilateral renal cell carcinoma, myasthenia gravis on prednisone and mycophenolate, atrial fibrillation on apixaban status post recent pacemaker placement for slow ventricular response, heart failure with preserved ejection fraction, severe aortic stenosis, and recent stroke with dysphagia and left sided weakness presenting to [facility] for severe anemia requiring blood transfusion. Patient was found to have pneumoperitoneum on a study obtained today 10/16 to identify the progression of his disease and specifically look for metastases. 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 Patient was taken to OR for Chopra's procedure with end colostomy. Transferred to SICU d/t hypotension requiring vasopressors during the case.    PLAN:  NEURO  - Sedation: Precedex  - Pain control: Dilaudid     RESPIRATORY  - Intubated: PRVC 60/5.0/14/500    CARDIOVASCULAR:  - Required phenylephrine during case, off since arrival to unit   - TTE: EF 60-65%, severe AS, MR, TR and LVH    GI  - NPO, ADAT  - NGT  - Concern for severe malnutrition f/u nutrition recommendations   - Protonix 40mg QD    /RENAL  - LR @100cc/hr  - Monitor SCr, replete electrolytes as needed     HEMATOLOGIC:  - s/p 2U PRBC 10/14 and 3U PRBC 10/17  - Monitor H/H    INFECTIOUS DISEASE  - Zosyn (10/16-  - Monitor for signs of infection    ENDOCRINE  - Trend BG  - Hydrocorisone 50mg q8hr

## 2024-10-17 NOTE — DIETITIAN INITIAL EVALUATION ADULT - ADD RECOMMEND
1) Defer diet advancement to Team  2) Monitor GI output function   3) multivitamin, vitamin C   4) Monitor diet tolerance, weight trends, labs, GI function, and skin integrity  5) Malnutrition sticker placed     Bruna Stewart MS, RDN, CDN (Teams)

## 2024-10-17 NOTE — PHYSICAL THERAPY INITIAL EVALUATION ADULT - GENERAL OBSERVATIONS, REHAB EVAL
received semisupine in bed, A&Ox4, following simple commands, willing to participate, s/p exp lap, hartmans procedure (10/17), extubated (10/17), h/o CVA with left weakness, +MAYRA, +butt, +ICU monitoring, BS reviewed

## 2024-10-17 NOTE — DIETITIAN INITIAL EVALUATION ADULT - PROBLEM SELECTOR PLAN 8
Pt had stroke August 2024 with residual L weakness. w/ residual dysphagia. Patient on aspirin 81 and atorvastatin 40.   Per daughter, patient at baseline mental status and memory have remained intact. Daughter states that the patient had been independent with his ADLs and living at home prior to his stroke in August    Plan:  - c/w atorvastatin 40  - hold aspirin 81 for now

## 2024-10-17 NOTE — DIETITIAN INITIAL EVALUATION ADULT - PROBLEM SELECTOR PLAN 1
Patient noted to have Hgb 5.8, Hct 19.9, MCV 91.7 on admission. Pt endorses hx of loose stools this week. On exam has black stool in diaper  - Reticulocyte index 0.45 (10/14)  - Transfusion count (10/14): 1u pRBC    Plan:  - Likely multifactorial. 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

## 2024-10-17 NOTE — DIETITIAN INITIAL EVALUATION ADULT - NSICDXPASTMEDICALHX_GEN_ALL_CORE_FT
PAST MEDICAL HISTORY:  (HFpEF) heart failure with preserved ejection fraction     Aortic stenosis     Atrial fibrillation     Embolic stroke     [place] (hard of hearing)     HTN (hypertension)     Myasthenia gravis     Pressure ulcer     Renal cell carcinoma     T2DM (type 2 diabetes mellitus)     Urinary retention

## 2024-10-17 NOTE — DIETITIAN INITIAL EVALUATION ADULT - PROBLEM SELECTOR PLAN 4
Patient diagnosed with HEpEF, last echo in August 2024 showing EF 60-65%. Patient not endorsing HF symptoms at this time    Plan:  - hold Farxiga in anticipation of scope, can resume after  - Low suspicion of HFpEF exacerbation for now, clinically euvolemic, saturating 98-99% on RA (on 2L for comfort)

## 2024-10-17 NOTE — PHYSICAL THERAPY INITIAL EVALUATION ADULT - PERTINENT HX OF CURRENT PROBLEM, REHAB EVAL
Pt is 84M admitted 10/14/24 PMHx b/l renal cell carcinoma (newly dx, pt unaware of dx per daughter), HTN, DM, AF on Eliquis c/b slow ventricular response s/p leadless pacemaker 10 days ago, HFpEF (EF 60-65%), severe AS/AR, myasthenia gravis, stroke in 8/24 with residual L weakness, dysphagia and urinary retention with chronic butt presenting from rehab after labs today showed Hgb 5.5. Patient is accompanied at bedside by his daughter who provided majority of patient's history due to patient being [place] and without his hearing aids. She notes that patient has been having "looser stools" since the beginning of this week but is unsure if the stool was unusually dark or with occult blood. Daughter also notes that patient has had decreased appetite and significant weight loss (~40lbs) since his stroke in August. He has not experienced recent n/v or abdominal pain/distension, and he does not use NSAIDs regularly. He has a chronic butt but reports no dysuria or hematuria. In ED, patient noted to be hemodynamically stable and receiving RBC transfusion.      Patient was found to have pneumoperitoneum on a study obtained today 10/16 to identify the progression of his disease and specifically look for metastases. 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 Patient was taken to OR for Chopra's procedure with end colostomy. Transferred to SICU d/t hypotension requiring vasopressors during the case.           CT ABDOMEN/PELVIS: Stool-filled distended rectum. Mild rectal wall thickening may reflect stercoral proctitis. Question rectal pneumatosis, raising the possibility for ischemia, versus intraluminal gas entrapped between the wall and stool. Additional wall thickening noted in the descending and sigmoid colon, which may reflect colitis.  New small amount of pneumoperitoneum mostly in the anterior upper abdomen, concerning for hollow viscus perforation. Small volume of peritoneal fluid and diffuse peritoneal edema. The source of pneumoperitoneum is not certain, potentially related to proctocolitis or perforated diverticular disease, with other etiologies not excluded. Consider CT imaging with oral contrast for further evaluation.  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 Bilateral renal lesions, previously characterized as solid masses and compatible with renal cell carcinoma. Lesions are similar to mildly increased in size 8/24/2024. Scattered subcentimeter hypodense foci in the liver that are too small to characterize and remain indeterminate, but appear similar to the previous exam.Distended gallbladder. Motion degraded lung bases. Suggestion of small patchy opacities in the right lower lobe, potentially infectious/inflammatory in etiology.Small bilateral pleural effusions.Anasarca.

## 2024-10-17 NOTE — DIETITIAN INITIAL EVALUATION ADULT - PERTINENT MEDS FT
MEDICATIONS  (STANDING):  acetaminophen   IVPB .. 1000 milliGRAM(s) IV Intermittent every 6 hours  chlorhexidine 0.12% Liquid 15 milliLiter(s) Oral Mucosa every 12 hours  chlorhexidine 2% Cloths 1 Application(s) Topical <User Schedule>  dexMEDEtomidine Infusion 0.5 MICROgram(s)/kG/Hr (9.64 mL/Hr) IV Continuous <Continuous>  folic acid Injectable 1 milliGRAM(s) IV Push daily  hydrocortisone sodium succinate Injectable 50 milliGRAM(s) IV Push every 6 hours  insulin lispro (ADMELOG) corrective regimen sliding scale   SubCutaneous every 6 hours  lactated ringers. 1000 milliLiter(s) (100 mL/Hr) IV Continuous <Continuous>  pantoprazole  Injectable 40 milliGRAM(s) IV Push every 24 hours  phenylephrine    Infusion 0.1 MICROgram(s)/kG/Min (2.89 mL/Hr) IV Continuous <Continuous>  piperacillin/tazobactam IVPB.- 3.375 Gram(s) IV Intermittent once  piperacillin/tazobactam IVPB.. 3.375 Gram(s) IV Intermittent every 8 hours    MEDICATIONS  (PRN):  HYDROmorphone  Injectable 0.5 milliGRAM(s) IV Push every 3 hours PRN Severe Pain (7 - 10)

## 2024-10-17 NOTE — DIETITIAN INITIAL EVALUATION ADULT - PERTINENT LABORATORY DATA
10-17    136  |  106  |  24[H]  ----------------------------<  127[H]  4.0   |  21[L]  |  0.65    Ca    7.3[L]      17 Oct 2024 04:30  Phos  3.2     10-17  Mg     2.0     10-17    TPro  4.9[L]  /  Alb  2.2[L]  /  TBili  1.2  /  DBili  x   /  AST  15  /  ALT  <5[L]  /  AlkPhos  99  10-17  POCT Blood Glucose.: 137 mg/dL (10-17-24 @ 06:45)  A1C with Estimated Average Glucose Result: 5.2 % (08-19-24 @ 05:10)

## 2024-10-18 NOTE — PROGRESS NOTE ADULT - ASSESSMENT
85 y/o M PmHx recently diagnosed bilateral renal cell carcinoma, myasthenia gravis on prednisone and mycophenolate, atrial fibrillation on apixaban status post recent pacemaker placement for slow ventricular response, heart failure with preserved ejection fraction, severe aortic stenosis, and recent stroke with dysphagia and left sided weakness presenting to [facility] for severe anemia requiring blood transfusion. Patient was found to have pneumoperitoneum on a study obtained today 10/16 to identify the progression of his disease and specifically look for metastases. 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 Patient was taken to OR for Chopra's procedure with end colostomy. Transferred to SICU d/t hypotension requiring vasopressors during the case.    PLAN:  - pain control as needed   - Zosyn  - monitor ostomy output  - listed to floor  - appreciate excellent SICU care

## 2024-10-18 NOTE — OCCUPATIONAL THERAPY INITIAL EVALUATION ADULT - PERTINENT HX OF CURRENT PROBLEM, REHAB EVAL
Pt is 84M admitted 10/14/24 PMHx b/l renal cell carcinoma (newly dx, pt unaware of dx per daughter), HTN, DM, AF on Eliquis c/b slow ventricular response s/p leadless pacemaker 10 days ago, HFpEF (EF 60-65%), severe AS/AR, myasthenia gravis, stroke in 8/24 with residual L weakness, dysphagia and urinary retention with chronic butt presenting from rehab after labs today showed Hgb 5.5. Patient is accompanied at bedside by his daughter who provided majority of patient's history due to patient being [place] and without his hearing aids. She notes that patient has been having "looser stools" since the beginning of this week but is unsure if the stool was unusually dark or with occult blood. Daughter also notes that patient has had decreased appetite and significant weight loss (~40lbs) since his stroke in August. He has not experienced recent n/v or abdominal pain/distension, and he does not use NSAIDs regularly. He has a chronic butt but reports no dysuria or hematuria. In ED, patient noted to be hemodynamically stable and receiving RBC transfusion.  Patient was found to have pneumoperitoneum on a study obtained today 10/16 to identify the progression of his disease and specifically look for metastases. 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 Patient was taken to OR for Chopra's procedure with end colostomy. Transferred to SICU d/t hypotension requiring vasopressors during the case. CT ABDOMEN/PELVIS: Stool-filled distended rectum. Mild rectal wall thickening may reflect stercoral proctitis. Question rectal pneumatosis, raising the possibility for ischemia, versus intraluminal gas entrapped between the wall and stool. Additional wall thickening noted in the descending and sigmoid colon, which may reflect colitis.  New small amount of pneumoperitoneum mostly in the anterior upper abdomen, concerning for hollow viscus perforation. Small volume of peritoneal fluid and diffuse peritoneal edema. The source of pneumoperitoneum is not certain, potentially related to proctocolitis or perforated diverticular disease, with other etiologies not excluded. Consider CT imaging with oral contrast for further evaluation.  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 Bilateral renal lesions, previously characterized as solid masses and compatible with renal cell carcinoma. Lesions are similar to mildly increased in size 8/24/2024. Scattered subcentimeter hypodense foci in the liver that are too small to characterize and remain indeterminate, but appear similar to the previous exam. Distended gallbladder. Motion degraded lung bases. Suggestion of small patchy opacities in the right lower lobe, potentially infectious/inflammatory in etiology. Small bilateral pleural effusions. Anasarca.

## 2024-10-18 NOTE — OCCUPATIONAL THERAPY INITIAL EVALUATION ADULT - LEVEL OF INDEPENDENCE: STAND/SIT, REHAB EVAL
[place]  [facility], [address]      Brief Procedure Note    Stephan Fears  1956  [id number]    Dr. Alyssa Haas MD    Assistant(s): @[facility]@    Type 
maximum assist (25% patients effort)

## 2024-10-18 NOTE — PROCEDURE NOTE - NSFINDINGS_GEN_A_CORE
positive return of urine in the collection bag

## 2024-10-18 NOTE — SWALLOW BEDSIDE ASSESSMENT ADULT - ASR SWALLOW ASPIRATION MONITOR
Monitor for s/s aspiration/laryngeal penetration. If noted:  D/C p.o. intake, provide non-oral nutrition/hydration/meds, and contact this service @ [phone]/change of breathing pattern/cough/gurgly voice/fever/pneumonia/throat clearing/upper respiratory infection

## 2024-10-18 NOTE — CHART NOTE - NSCHARTNOTEFT_GEN_A_CORE
SICU Transfer Note    Transfer from: SICU  Transfer to:   Accepting physican:      SICU COURSE: Patient was admitted to SICU after Alec's procedure. He was brought to SICU for hemodynamic instability. The patient had a butt on admission, which was not draining. Urology changed the butt that he had outpatient w/ cystoscopy. Patient had some bloody output from his ostomy, but it remains pink.        ASSESMENT AND PLAN:   83 y/o M PmHx recently diagnosed bilateral renal cell carcinoma, myasthenia gravis on prednisone and mycophenolate, atrial fibrillation on apixaban status post recent pacemaker placement for slow ventricular response, heart failure with preserved ejection fraction, severe aortic stenosis, and recent stroke with dysphagia and left sided weakness presenting to [facility] for severe anemia requiring blood transfusion. Patient was found to have pneumoperitoneum on a study obtained today 10/16 to identify the progression of his disease and specifically look for metastases. 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 Patient was taken to OR for Chopra's procedure with end colostomy.      For Follow-Up:  Monitor urine output: if low and patient has change in vitals, get bladder scan, labs, and call urology  If bladder is empty, can give 500cc bolus of LR  steroid taper: on 25 q12 today, 12.5 q12 tomorrow  follow up steroid dose at home        Vital Signs Last 24 Hrs  T(C): 36.8 (18 Oct 2024 15:00), Max: 37 (18 Oct 2024 11:00)  T(F): 98.2 (18 Oct 2024 15:00), Max: 98.6 (18 Oct 2024 11:00)  HR: 60 (18 Oct 2024 15:00) (59 - 62)  BP: 167/72 (18 Oct 2024 09:00) (125/58 - 167/72)  BP(mean): 104 (18 Oct 2024 09:00) (83 - 104)  RR: 21 (18 Oct 2024 15:00) (18 - 25)  SpO2: 98% (18 Oct 2024 15:00) (89% - 100%)    Parameters below as of 18 Oct 2024 11:11  Patient On (Oxygen Delivery Method): room air      I&O's Summary    17 Oct 2024 07:01  -  18 Oct 2024 07:00  --------------------------------------------------------  IN: 2874.5 mL / OUT: 630 mL / NET: 2244.5 mL    18 Oct 2024 07:01  -  18 Oct 2024 17:06  --------------------------------------------------------  IN: 400 mL / OUT: 185 mL / NET: 215 mL          MEDICATIONS  (STANDING):  acetaminophen   IVPB .. 1000 milliGRAM(s) IV Intermittent every 6 hours  albuterol/ipratropium for Nebulization 3 milliLiter(s) Nebulizer every 12 hours  chlorhexidine 2% Cloths 1 Application(s) Topical <User Schedule>  enoxaparin Injectable 40 milliGRAM(s) SubCutaneous every 24 hours  folic acid Injectable 1 milliGRAM(s) IV Push daily  hydrocortisone sodium succinate Injectable 25 milliGRAM(s) IV Push every 12 hours  pantoprazole  Injectable 40 milliGRAM(s) IV Push every 24 hours  phenylephrine    Infusion 0.1 MICROgram(s)/kG/Min (2.89 mL/Hr) IV Continuous <Continuous>  piperacillin/tazobactam IVPB.. 3.375 Gram(s) IV Intermittent every 8 hours    MEDICATIONS  (PRN):  HYDROmorphone  Injectable 0.5 milliGRAM(s) IV Push every 3 hours PRN Severe Pain (7 - 10)        LABS                                            9.7                   Neurophils% (auto):   x      (10-18 @ 06:59):    7.66 )-----------(168          Lymphocytes% (auto):  x                                             31.3                   Eosinphils% (auto):   x        Manual%: Neutrophils x    ; Lymphocytes x    ; Eosinophils x    ; Bands%: x    ; Blasts x                                    137    |  105    |  34                  Calcium: 8.0   / iCa: x      (10-18 @ 06:59)    ----------------------------<  168       Magnesium: 2.1                              4.3     |  18     |  0.87             Phosphorous: 4.9      TPro  5.5    /  Alb  2.3    /  TBili  1.1    /  DBili  x      /  AST  15     /  ALT  10     /  AlkPhos  91     18 Oct 2024 06:59    ( 10-18 @ 06:59 )   PT: 18.2 sec;   INR: 1.60 ratio  aPTT: 33.8 sec

## 2024-10-18 NOTE — PROCEDURE NOTE - NSURITECHNIQUE_GU_A_CORE
Proper hand hygiene was performed/Sterile gloves were worn for the duration of the procedure/A sterile drape was used to cover all adjacent areas/The site was cleaned with soap/water and sterile solution (betadine)/The catheter was appropriately lubricated/All applicable medical record documentation is completed
Proper hand hygiene was performed/Sterile gloves were worn for the duration of the procedure/A sterile drape was used to cover all adjacent areas/The site was cleaned with soap/water and sterile solution (betadine)/The catheter was appropriately lubricated/The catheter was secured with a securement device (e.g. StatLock)/The urinary drainage system is closed at the end of the procedure/The collection bag is below the level of the patient and urinary bladder/All applicable medical record documentation is completed
Proper hand hygiene was performed/Sterile gloves were worn for the duration of the procedure/A sterile drape was used to cover all adjacent areas/The site was cleaned with soap/water and sterile solution (betadine)/The catheter was appropriately lubricated/The catheter was secured with a securement device (e.g. StatLock)/The urinary drainage system is closed at the end of the procedure/The collection bag is below the level of the patient and urinary bladder/All applicable medical record documentation is completed

## 2024-10-18 NOTE — SWALLOW BEDSIDE ASSESSMENT ADULT - COMMENTS
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 w/ residual dysphagia. MBS 08/21/24 at [facility] found oropharyngeal dysphagia; aspiration of thin liquids; penetration of mildly thick. Recommended Regular/moderately thick.    10/16: Pneumoperitoneum found 10/16; +descending colitis and possible rectal pneumatosis-->OR for Chopra's procedure with end colostomy. Transferred to SICU d/t hypotension requiring vasopressors.  10/17: transferred to the SICU for HD monitoring following Chopra's procedure w/ end colostomy for perforated colitis  10/17: Extubated

## 2024-10-18 NOTE — PROGRESS NOTE ADULT - SUBJECTIVE AND OBJECTIVE BOX
Surgery Progress Note     Overnight events: extubated    Interval/Subjective:  Patient seen and examined. Off pressors. on abx. CLD. listed for floor  __________________________________________________________________  Vitals:  T(C): 37 (11:00), Max: 37 (11:00)  HR: 60 (13:00) (59 - 62)  BP: 167/72 (09:00) (125/58 - 167/72)  RR: 20 (13:00) (17 - 25)  SpO2: 95% (13:00) (89% - 100%)    I & O's:  IN:    Instillation (mL): 60 mL    IV PiggyBack: 300 mL    IV PiggyBack: 300 mL    Lactated Ringers: 2100 mL    Phenylephrine: 33.4 mL    Phenylephrine: 81.2 mL  Total IN: 2874.5 mL    OUT:    Colostomy (mL): 0 mL    Dexmedetomidine: 0 mL    Indwelling Catheter - Urethral (mL): 570 mL    Instillation (mL): 60 mL    Nasogastric/Oral tube (mL): 0 mL  Total OUT: 630 mL        Physical Exam:  PHYSICAL EXAM:  General: Resting comfortably in bed in no acute distress.  Cardiovascular: Warm and well-perfused.   Respiratory: intubated   Abdomen: colostomy stoma, pink, no blood output      __________________________________________________________________

## 2024-10-18 NOTE — PROCEDURE NOTE - ADDITIONAL PROCEDURE DETAILS
Patient with nondistended bladder on cystoscopy, no false passage noted. Catheter irrigates well, patient however does spasm significantly around it during irrigation. Proper placement of catheter seen on bedside ultrasound.
84yoM In SICU for hemodynamic control s/p Alec's procedure with end colostomy. Urology called for butt catheter not draining, bladder scan showing ~300cc of urine, pt uncomfortable and feels like he has to urinate.   Pt with original 20F coude, not draining much urine. 2nd call for low urine output and elevated volume on bladder scan. Decision made to take out old catheter and replace with new catheter.   Patient was prepped and draped with sterile technique. 18F catheter inserted down to the hub with immediate return of urine. Balloon inflated with 10cc sterile water. Patient tolerated procedure well. When catheter was loosened in order to be placed in stat lock, catheter uncoiled, concern for catheter buckling in urethra. Balloon was deflated and catheter was attempted to be advanced to the hub again, unsuccessful. Catheter was removed for concern of butt malfunction.   Patient was prepped and draped with sterile technique. 16F catheter was attempted to be inserted down to the hub, unsuccessful.  Decision to cystoscopically place a butt catheter signed out to day team.  Will follow-up catheter placement.    [facility]  [address] [place] [address], [address]  [place] [address] [phone]
Under sterile condition Elliott catheter was exchanged successfully using 20Fr coude. Pt tolerated procedure well w/o immediate complications

## 2024-10-18 NOTE — PROGRESS NOTE ADULT - ASSESSMENT
ASSESSMENT:  85 y/o M PmHx recently diagnosed bilateral renal cell carcinoma, myasthenia gravis on prednisone and mycophenolate, atrial fibrillation on apixaban status post recent pacemaker placement for slow ventricular response, heart failure with preserved ejection fraction, severe aortic stenosis, and recent stroke with dysphagia and left sided weakness presenting to [facility] for severe anemia requiring blood transfusion. Patient was found to have pneumoperitoneum on a study obtained today 10/16 to identify the progression of his disease and specifically look for metastases. 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 Patient was taken to OR for Chopra's procedure with end colostomy. Transferred to SICU d/t hypotension requiring vasopressors during the case.    PLAN:  NEURO: Hx myasthenia gravis, CVA   - Pain control: Tylenol, PRN Dilaudid   - Hx Myasthenia gravis- on stress dose steroids (on home Prednisone/Cellcept)     RESPIRATORY  - Extubated to aerosol mask 10/17  - Monitor CXR, blood gas     CARDIOVASCULAR: Hx AF (on home Eliquis) with leadless pacemaker, HFpEF with severe AS/AR  - On flavio  - Wean as tolerated, goal SBP >110   - TTE: EF 60-65%, severe AS, MR, TR and LVH    GI  - NPO with NGT, ADAT  - Concern for severe malnutrition f/u nutrition recommendations   - Protonix 40mg QD    /RENAL  - LR @100cc/hr  - Hx chronic Elliott for urinary retention > urology exchanged Coude catheter   - Monitor SCr, replete electrolytes as needed     HEMATOLOGIC:  - s/p 2U PRBC 10/14 and 3U PRBC 10/17  - Monitor H/H  - Chemical VTE ppx: Lovenox  - Mechanical DVT ppx; SCDs    INFECTIOUS DISEASE  - Zosyn (10/16-)   - Monitor for signs of infection    ENDOCRINE  - Trend glucose   - Hydrocorisone 50mg q8hr     DISPO  -SICU ASSESSMENT:  85 y/o M PmHx recently diagnosed bilateral renal cell carcinoma, myasthenia gravis on prednisone and mycophenolate, atrial fibrillation on apixaban status post recent pacemaker placement for slow ventricular response, heart failure with preserved ejection fraction, severe aortic stenosis, and recent stroke with dysphagia and left sided weakness presenting to [facility] for severe anemia requiring blood transfusion. Patient was found to have pneumoperitoneum on a study obtained today 10/16 to identify the progression of his disease and specifically look for metastases. 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 Patient was taken to OR for Chopra's procedure with end colostomy. Transferred to SICU d/t hypotension requiring vasopressors during the case.    PLAN:  NEURO: Hx myasthenia gravis, CVA   - Pain control: Tylenol, PRN Dilaudid   - Hx Myasthenia gravis- on stress dose steroids (on home Prednisone/Cellcept)     RESPIRATORY  - Extubated to aerosol mask 10/17  - Monitor CXR, blood gas     CARDIOVASCULAR: Hx AF (on home Eliquis) with leadless pacemaker, HFpEF with severe AS/AR  - On flavio  - Wean as tolerated, goal SBP >110   - TTE: EF 60-65%, severe AS, MR, TR and LVH    GI  - NPO with NGT, ADAT  - Concern for severe malnutrition f/u nutrition recommendations   - Protonix 40mg QD    /RENAL  - LR @100cc/hr  - Hx chronic Elliott for urinary retention > urology exchanged Coude catheter > urology to preform cystoscopy today d/t concern for false tract  - Monitor SCr, replete electrolytes as needed     HEMATOLOGIC:  - s/p 2U PRBC 10/14 and 3U PRBC 10/17  - Monitor H/H  - Chemical VTE ppx: Lovenox  - Mechanical DVT ppx; SCDs    INFECTIOUS DISEASE  - Zosyn (10/16-)   - Monitor for signs of infection    ENDOCRINE  - Trend glucose   - Hydrocorisone 50mg q8hr     DISPO  -SICU

## 2024-10-18 NOTE — PROGRESS NOTE ADULT - SUBJECTIVE AND OBJECTIVE BOX
SUMMARY: 84y male in hospital for 4d transferred to the SICU for HD monitoring, currently POD 1 for Chopra's procedure with end colostomy.     OVERNIGHT EVENTS / SUBJECTIVE:   - Extubated to aerosol mask   - 1L IVF bolus   - SBP goal  >110  - added Lovenox   - urology exchanged Coude catheter     VITAL SIGNS:  T(F): 97.8 (10-17-24 @ 19:45), Max: 97.8 (10-17-24 @ 07:00)  HR: 60 (10-18-24 @ 00:27) (59 - 61)  BP: 125/58 (10-17-24 @ 19:45) (117/57 - 138/61)  RR: 21 (10-18-24 @ 00:00) (13 - 30)  SpO2: 94% (10-18-24 @ 00:27) (89% - 100%)  Mode: CPAP with PS, FiO2: 40, PEEP: 5, PS: 5, MAP: 6.5, PIP: 13    INTAKE/OUTPUT  10-16 @ 07:01  -  10-17 @ 07:00  --------------------------------------------------------  IN: 1573.2 mL / OUT: 845 mL / NET: 728.2 mL    FINGERSTICKS:  162 (10-17 @ 22:28), 137 (10-17 @ 06:45)    PHYSICAL EXAM:  General: cachetic appearing, sedated  HEENT: normocephalic/atraumatic; pupils equally round and reactive to light; clear conjunctiva  Neck: supple, no jugular venous distension  Respiratory: equal breath sounds b/l, no wheezing/rhonchi/rales  Cardiovascular: skin is warm and well perfused; S1 and S2 auscultated regular rate and rhythm  Abdomen: colostomy and stoma, no bloody output   Extremities: 2+ peripheral pulses bilaterally; no lower extremity edema  Skin: normal color and turgor; no rashes or ulcers identified  Neurological: sedated    MEDICATIONS:  albuterol/ipratropium for Nebulization 3 milliLiter(s) Nebulizer every 6 hours  chlorhexidine 2% Cloths 1 Application(s) Topical <User Schedule>  enoxaparin Injectable 40 milliGRAM(s) SubCutaneous every 24 hours  folic acid Injectable 1 milliGRAM(s) IV Push daily  hydrocortisone sodium succinate Injectable 50 milliGRAM(s) IV Push every 6 hours  lactated ringers. 1000 milliLiter(s) IV Continuous <Continuous>  pantoprazole  Injectable 40 milliGRAM(s) IV Push every 24 hours  phenylephrine    Infusion 0.1 MICROgram(s)/kG/Min IV Continuous <Continuous>  piperacillin/tazobactam IVPB.. 3.375 Gram(s) IV Intermittent every 8 hours  sodium chloride 3%  Inhalation 4 milliLiter(s) Inhalation every 6 hours    ALLERGIES:  No Known Allergies    LABS/IMAGING:  CBC:             11.0   10.55 )-----------( 218      ( 10-17 @ 16:51 )             34.9     Seema %: x     / Lym %: x     / Eos %: x     / Band %: x        WBC 72h Trend: 10.55 (10-17), 6.25 (10-17), 12.99 (10-16), 11.06 (10-16), 12.11 (10-16), 7.61 (10-16)  ---------------------------------------------------------------------------  Hemoglobin 24h Trend: 11.0 (16:51), 10.1 (04:29)  ---------------------------------------------------------------------------  CMP:  136  |  105  |  25[H]  ----------------------------( 134[H]     10-17 @ 16:51  4.2   |  19[L]  |  0.62    Ca: 7.8[L]   Phos: 3.7   M.0    TPro: 5.3[L] / Alb: 2.3[L] /  TBili 1.4[H] / DBili x  /  AST 15 /  ALT 8[L] /  AlkPhos  99  ---------------------------------------------------------------------------  Creatinine 48h Trend:  0.62 (10-17), 0.65 (10-17), 0.61 (10-16), 0.59 (10-16)  ---------------------------------------------------------------------------  Coags:   18.8  ------( 1.64     10-17   35.3  ---------------------------------------------------------------------------  ABG/VBG:  (10-17 @ 16:46) pH: 7.36 / pCO2: 40 /  pO2: 96 / HCO3: 23 / SaO2: 99.0  (10-17 @ 03:54) pH: 7.30 / pCO2: 47 /  pO2: 179 / HCO3: 23 / SaO2: 98.1  (10-17 @ 02:11) pH: 7.28 / pCO2: 50 /  pO2: 262 / HCO3: 24 / SaO2: 98.0   VBG:   Lactate 24h Trend:  0.9 (10-17 @ 16:46),  0.7 (10-17 @ 03:54),  0.9 (10-17 @ 02:11),  0.5 (10-17 @ 00:34)  ---------------------------------------------------------------------------  UA:  (10-17 @ 16:51)  Color: Dark Yellow / Appearance: Turbid[!] / SG: >1.030[H] / pH: x / Gluc: 134[H] / Ketone: Negative  / Bili: Negative / Ubili: 1.0 / Blood: x / Prot: 100[!] / Nitrite: Negative / Leuk Kaity: Negative / RBC: 9[H] / WBC 16[H] / Sq Epi: x / Non Sq Epi: 5 / Bacteria: Negative  ---------------------------------------------------------------------------

## 2024-10-19 NOTE — PROGRESS NOTE ADULT - SUBJECTIVE AND OBJECTIVE BOX
Surgery Progress Note     Overnight events:     Interval/Subjective:  Patient seen and examined.   __________________________________________________________________  Vitals:  T(C): 36.8 (09:52), Max: 36.8 (15:00)  HR: 60 (09:52) (60 - 60)  BP: 163/55 (09:52) (144/71 - 163/55)  RR: 19 (09:52) (19 - 23)  SpO2: 99% (09:52) (93% - 99%)    I & O's:  IN:    IV PiggyBack: 100 mL    IV PiggyBack: 300 mL    Lactated Ringers: 300 mL  Total IN: 700 mL    OUT:    Colostomy (mL): 0 mL    Indwelling Catheter - Urethral (mL): 560 mL    Oral Fluid: 0 mL    Phenylephrine: 0 mL  Total OUT: 560 mL        Physical Exam:  General: NAD, resting comfortably in bed  HEENT: Normocephalic atraumatic  Respiratory: Nonlabored respirations  Cardio: pulse present  Abdomen: soft, nontender, nondistended; Stoma is pink and producing stool.   Vascular: extremities are warm and well perfused.       __________________________________________________________________

## 2024-10-19 NOTE — PROGRESS NOTE ADULT - ASSESSMENT
83 y/o M PmHx recently diagnosed bilateral renal cell carcinoma, myasthenia gravis on prednisone and mycophenolate, atrial fibrillation on apixaban status post recent pacemaker placement for slow ventricular response, heart failure with preserved ejection fraction, severe aortic stenosis, and recent stroke with dysphagia and left sided weakness presenting to [facility] for severe anemia requiring blood transfusion. Patient was found to have pneumoperitoneum on a study obtained today 10/16 to identify the progression of his disease and specifically look for metastases. 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 Patient was taken to OR for Chopra's procedure with end colostomy. Transferred to SICU d/t hypotension requiring vasopressors during the case. Now downgraded to the floor.    PLAN:    - reassess diet  - pain control as needed   - Zosyn  - monitor ostomy output    ACS/Trauma surgery  p [phone]

## 2024-10-20 NOTE — PROGRESS NOTE ADULT - ASSESSMENT
85 y/o M PmHx recently diagnosed bilateral renal cell carcinoma, myasthenia gravis on prednisone and mycophenolate, atrial fibrillation on apixaban status post recent pacemaker placement for slow ventricular response, heart failure with preserved ejection fraction, severe aortic stenosis, and recent stroke with dysphagia and left sided weakness presenting to [facility] for severe anemia requiring blood transfusion. Patient was found to have pneumoperitoneum on a study obtained today 10/16 to identify the progression of his disease and specifically look for metastases. 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 Patient was taken to OR for Chopra's procedure with end colostomy. Transferred to SICU d/t hypotension requiring vasopressors during the case. Now downgraded to the floor.    PLAN:  - reassess diet after modified barium swallow 10/21  - pain control as needed   - Zosyn  - monitor ostomy output    Armaan Cartagena, PGY1  ACS/Trauma surgery  p [phone]

## 2024-10-20 NOTE — PROGRESS NOTE ADULT - SUBJECTIVE AND OBJECTIVE BOX
Subjective:  Patient seen at bedside this AM. Reports feeling well, without complaints. Denies chest pain, SOB. Tolerating CLD without N/V. Plan for modified barium swallow tomorrow (10/21).    24h Events:   - Overnight, team contacted due to pulsating in R AC next to IV insertion site. IV removed and replaced, no other intervention.     Objective:  Vital Signs  T(C): 36.3 (10-20 @ 05:37), Max: 36.8 (10-19 @ 09:52)  HR: 60 (10-20 @ 05:37) (60 - 60)  BP: 158/63 (10-20 @ 05:37) (130/54 - 163/55)  RR: 18 (10-20 @ 05:37) (18 - 19)  SpO2: 100% (10-20 @ 05:37) (97% - 100%)  10-19-24 @ 07:01  -  10-20-24 @ 07:00  --------------------------------------------------------  IN:  Total IN: 0 mL    OUT:    Colostomy (mL): 500 mL    Indwelling Catheter - Urethral (mL): 750 mL  Total OUT: 1250 mL    Total NET: -1250 mL          Physical Exam:  GEN: resting in bed comfortably in NAD  RESP: no increased WOB, no tachypnea  ABD: soft, non-distended, non-tender to palpation. Stoma pink, patent and productive with gas and stool in ostomy bag.   EXTR: warm, well-perfused without gross deformities; spontaneous movement in b/l U/L extrem  NEURO: awake, alert    Labs:                        9.3    6.97  )-----------( 138      ( 20 Oct 2024 07:13 )             30.0   10-20    139  |  107  |  36[H]  ----------------------------<  116[H]  3.4[L]   |  24  |  0.73    Ca    7.5[L]      20 Oct 2024 07:15  Phos  2.6     10-20  Mg     2.2     10-20      CAPILLARY BLOOD GLUCOSE      POCT Blood Glucose.: 155 mg/dL (20 Oct 2024 09:08)  POCT Blood Glucose.: 178 mg/dL (19 Oct 2024 17:04)  POCT Blood Glucose.: 138 mg/dL (19 Oct 2024 12:36)      Medications:  MEDICATIONS  (STANDING):  albuterol/ipratropium for Nebulization 3 milliLiter(s) Nebulizer every 12 hours  dextrose 5% + lactated ringers. 1000 milliLiter(s) (40 mL/Hr) IV Continuous <Continuous>  enoxaparin Injectable 40 milliGRAM(s) SubCutaneous every 24 hours  folic acid Injectable 1 milliGRAM(s) IV Push daily  hydrocortisone sodium succinate Injectable 12.5 milliGRAM(s) IV Push every 12 hours  mycophenolate mofetil 500 milliGRAM(s) Oral daily  pantoprazole  Injectable 40 milliGRAM(s) IV Push every 24 hours  piperacillin/tazobactam IVPB.. 3.375 Gram(s) IV Intermittent every 8 hours  potassium chloride  10 mEq/100 mL IVPB 10 milliEquivalent(s) IV Intermittent every 1 hour  predniSONE   Tablet 5 milliGRAM(s) Oral daily  sodium phosphate 15 milliMole(s)/250 mL IVPB 15 milliMole(s) IV Intermittent once    MEDICATIONS  (PRN):  HYDROmorphone  Injectable 0.5 milliGRAM(s) IV Push every 3 hours PRN Severe Pain (7 - 10)      Imaging:

## 2024-10-21 NOTE — CONSULT NOTE ADULT - PROBLEM SELECTOR RECOMMENDATION 5
Patient diagnosed with HEpEF, last echo in August 2024 showing EF 60-65%. Patient not endorsing HF symptoms at this time  - Farxiga held on admission in anticipation of scope, would resume  - Low suspicion of HFpEF exacerbation for now, clinically euvolemic, saturating 98-99% on RA

## 2024-10-21 NOTE — CONSULT NOTE ADULT - SUBJECTIVE AND OBJECTIVE BOX
TRAUMA/ACUTE CARE SURGERY - HOSPITAL MEDICINE CO-MANAGEMENT INITIAL VISIT NOTE    CHIEF COMPLAINT: Patient is a 84y old  Male who presents with a chief complaint of Anemia (21 Oct 2024 08:04)      HPI: 84M with PMH of b/l renal cell carcinoma (newly dx, pt unaware of dx per daughter), HTN, DMT2, AFib on Eliquis c/b slow ventricular response s/p leadless pacemaker 10 days ago, HFpEF (EF 60-65%), severe AS/AR, myasthenia gravis, stroke in 8/24 with residual L weakness, dysphagia and urinary retention with chronic butt presenting from rehab after labs today showed Hgb 5.5. Pt 's daughter notes that patient has been having "looser stools" since the beginning of this week but is unsure if the stool was unusually dark or with occult blood. Daughter also notes that patient has had decreased appetite and significant weight loss (~40lbs) since his stroke in August. He has not experienced recent n/v or abdominal pain/distension, and he does not use NSAIDs regularly. He has a chronic butt but reports no dysuria or hematuria.  CT Abd/pelvis showed descending colitis and possible rectal pneumatosis. Patient was taken to OR on 10/17 ; was found to have sigmoid perforation and is s/p Chopra's procedure with end colostomy.  He was transferred to SICU d/t hypotension requiring vasopressors during the case. Now downgraded to the floor. He is planned for MBS to further evaluate dysphagia. Currently on CLD.  Pt currently denies abd pain , CP, SOB.      History limited due to: [x ] Condition ; pt is [place]    Allergies    No Known Allergies    Intolerances        HOME MEDICATIONS: [X ] Reviewed  Home Medications:  apixaban 5 mg oral tablet: 1 tab(s) orally every 12 hours (14 Oct 2024 19:33)  ascorbic acid 500 mg oral tablet: 1 tab(s) orally once a day (14 Oct 2024 19:33)  aspirin 81 mg oral tablet, chewable: 1 tab(s) chewed once a day (14 Oct 2024 19:37)  atorvastatin 40 mg oral tablet: 1 tab(s) orally once a day (at bedtime) (14 Oct 2024 19:33)  cholecalciferol 25 mcg (1000 intl units) oral tablet: 2 tab(s) orally once a day (14 Oct 2024 19:37)  Colace 100 mg oral capsule: 1 cap(s) orally once a day (14 Oct 2024 19:33)  cyanocobalamin 1000 mcg oral tablet: 1 tab(s) orally once a day (14 Oct 2024 19:33)  finasteride 5 mg oral tablet: 1 tab(s) orally once a day (14 Oct 2024 19:33)  folic acid 1 mg oral tablet: 1 tab(s) orally once a day (14 Oct 2024 19:33)  metFORMIN 500 mg oral tablet: 1 tab(s) orally 2 times a day (14 Oct 2024 19:33)  Multiple Vitamins oral tablet: 1 tab(s) orally once a day (14 Oct 2024 19:37)  mycophenolate mofetil 500 mg oral tablet: 1 tab(s) orally once a day for Myasthenia Gravis (14 Oct 2024 19:37)  polyethylene glycol 3350 oral powder for reconstitution: 17 gram(s) orally 2 times a day (14 Oct 2024 19:33)  predniSONE 5 mg oral tablet: 1 tab(s) orally once a day (14 Oct 2024 19:33)  tamsulosin 0.4 mg oral capsule: 1 cap(s) orally once a day (at bedtime) (14 Oct 2024 19:33)  Tylenol 325 mg oral tablet: 2 tab(s) orally 3 times a day (14 Oct 2024 19:37)      MEDICATIONS  (STANDING):  albuterol/ipratropium for Nebulization 3 milliLiter(s) Nebulizer every 12 hours  apixaban 5 milliGRAM(s) Oral two times a day  dextrose 5% + lactated ringers. 1000 milliLiter(s) (40 mL/Hr) IV Continuous <Continuous>  dextrose 5%. 1000 milliLiter(s) (50 mL/Hr) IV Continuous <Continuous>  dextrose 5%. 1000 milliLiter(s) (100 mL/Hr) IV Continuous <Continuous>  dextrose 50% Injectable 25 Gram(s) IV Push once  dextrose 50% Injectable 25 Gram(s) IV Push once  dextrose 50% Injectable 12.5 Gram(s) IV Push once  folic acid Injectable 1 milliGRAM(s) IV Push daily  glucagon  Injectable 1 milliGRAM(s) IntraMuscular once  hydrocortisone sodium succinate Injectable 12.5 milliGRAM(s) IV Push every 12 hours  insulin lispro (ADMELOG) corrective regimen sliding scale   SubCutaneous three times a day before meals  mycophenolate mofetil 500 milliGRAM(s) Oral daily  pantoprazole  Injectable 40 milliGRAM(s) IV Push every 24 hours  potassium chloride  10 mEq/100 mL IVPB 10 milliEquivalent(s) IV Intermittent every 1 hour  predniSONE   Tablet 5 milliGRAM(s) Oral daily  sodium phosphate 30 milliMole(s)/500 mL IVPB 30 milliMole(s) IV Intermittent once    MEDICATIONS  (PRN):  dextrose Oral Gel 15 Gram(s) Oral once PRN Blood Glucose LESS THAN 70 milliGRAM(s)/deciliter  HYDROmorphone  Injectable 0.5 milliGRAM(s) IV Push every 3 hours PRN Severe Pain (7 - 10)      PAST MEDICAL & SURGICAL HISTORY:  Atrial fibrillation      Embolic stroke      Myasthenia gravis      (HFpEF) heart failure with preserved ejection fraction      HTN (hypertension)      T2DM (type 2 diabetes mellitus)      Renal cell carcinoma      Urinary retention      Pressure ulcer      Aortic stenosis      [place] (hard of hearing)      History of thrombolytic therapy      Pacemaker      [ ] Reviewed     Functional Assessment: [ ] Independent  [ X] Assistance  [ ] Total care  [ ] Non-ambulatory    SOCIAL HISTORY:  Residence: [ ] [facility]  [ ] SNF  [X ] Community  [ ] Substance abuse: no  [ ] Tobacco: no  [ ] Alcohol use: no    FAMILY HISTORY:  Family history of cerebrovascular accident (CVA) in mother (Mother)    Family history of cerebrovascular accident (CVA) in father (Father)    REVIEW OF SYSTEMS:    CONSTITUTIONAL: No fever, +weight loss, +fatigue  RESPIRATORY: No cough, wheezing, chills or hemoptysis; No shortness of breath  CARDIOVASCULAR: No chest pain, palpitations, dizziness, or leg swelling  GASTROINTESTINAL: No abdominal or epigastric pain. No nausea, vomiting  GENITOURINARY: No dysuria, frequency, hematuria, or incontinence  NEUROLOGICAL: No headaches, memory loss, loss of strength, numbness, or tremors  SKIN: No itching, burning, rashes, or lesions   MUSCULOSKELETAL: No muscle or back pain  PSYCHIATRIC: No depression, anxiety, mood swings, or difficulty sleeping  HEME/LYMPH: No easy bruising, or bleeding gums      [x  ] All other ROS negative      PHYSICAL EXAM:    Vital Signs Last 24 Hrs  T(C): 36.4 (21 Oct 2024 13:30), Max: 36.9 (20 Oct 2024 17:12)  T(F): 97.6 (21 Oct 2024 13:30), Max: 98.4 (20 Oct 2024 17:12)  HR: 60 (21 Oct 2024 13:30) (57 - 83)  BP: 149/62 (21 Oct 2024 13:30) (146/62 - 159/60)  BP(mean): --  RR: 18 (21 Oct 2024 13:30) (18 - 18)  SpO2: 97% (21 Oct 2024 13:30) (93% - 97%)    Parameters below as of 21 Oct 2024 13:30  Patient On (Oxygen Delivery Method): room air        GENERAL: NAD, frail, thin built  HEAD:  Atraumatic, Normocephalic  EYES: EOMI, PERRLA, conjunctiva and sclera clear  ENMT: Moist mucous membranes  NECK: Supple, No JVD  RESPIRATORY: Clear to auscultation bilaterally; No rales, rhonchi, wheezing, or rubs  CARDIOVASCULAR: Regular rate and rhythm; + 3/3 systolic ejection murmurs  GASTROINTESTINAL: Soft, Nontender, Nondistended; Bowel sounds present; +ostomy with formed brown stool  EXTREMITIES:  2+ Peripheral Pulses, No clubbing, cyanosis, or edema  NERVOUS SYSTEM:  Alert & Oriented X3; Moving all 4 extremities; No gross sensory deficits  SKIN: No rashes or lesions; Incisions C/D/I    LABS:                        9.9    6.12  )-----------( 120      ( 21 Oct 2024 08:21 )             32.4     Hemoglobin: 9.9 g/dL (10-21 @ 08:21)  Hemoglobin: 9.3 g/dL (10-20 @ 07:13)  Hemoglobin: 9.4 g/dL (10-19 @ 09:07)  Hemoglobin: 9.7 g/dL (10-18 @ 06:59)  Hemoglobin: 11.0 g/dL (10-17 @ 16:51)  Hemoglobin: 10.1 g/dL (10-17 @ 04:29)  Hemoglobin: 6.8 g/dL (10-16 @ 20:51)  Hemoglobin: 6.9 g/dL (10-16 @ 19:53)  Hemoglobin: 8.1 g/dL (10-16 @ 17:27)    10-21    137  |  105  |  27[H]  ----------------------------<  132[H]  3.6   |  24  |  0.53    Ca    7.5[L]      21 Oct 2024 08:20  Phos  1.9     10-21  Mg     2.2     10-21        Urinalysis Basic - ( 21 Oct 2024 08:20 )    Color: x / Appearance: x / SG: x / pH: x  Gluc: 132 mg/dL / Ketone: x  / Bili: x / Urobili: x   Blood: x / Protein: x / Nitrite: x   Leuk Esterase: x / RBC: x / WBC x   Sq Epi: x / Non Sq Epi: x / Bacteria: x      CAPILLARY BLOOD GLUCOSE      POCT Blood Glucose.: 218 mg/dL (21 Oct 2024 12:18)        RADIOLOGY & ADDITIONAL STUDIES:  Imaging:   Personally Reviewed:  [ x] YES     CT ABD/PELVIS 10/16  IMPRESSION:  *  Stool-filled distended rectum. Mild rectal wall thickening may reflect   stercoral proctitis. Question rectal pneumatosis, raising the possibility   for ischemia, versus intraluminal gas entrapped between the wall and   stool.  *  Additional wall thickening noted in the descending and sigmoid colon,   which may reflect colitis.  *  New small amount of pneumoperitoneum mostly in the anterior upper   abdomen, concerning for hollow viscus perforation. Small volume of   peritoneal fluid and diffuse peritoneal edema. The source of   pneumoperitoneum is not certain, potentially related to proctocolitis or   perforated diverticular disease, with other etiologies not excluded.   Consider CT imaging with oral contrast for further evaluation.  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  *  Bilateral renal lesions, previously characterized as solid masses and   compatible with renal cell carcinoma. Lesions are similar to mildly   increased in size 8/24/2024.  *  Scattered subcentimeter hypodense foci in the liver that are too small   to characterize and remain indeterminate, but appear similar to the   previous exam.  *  Distended gallbladder.  *  Motion degraded lung bases. Suggestion of small patchy opacities in   the right lower lobe, potentially infectious/inflammatory in etiology.  *  Small bilateral pleural effusions.  *  Anasarca.                  [x] Care Discussed with Consultants/Other Providers: Trauma Team    [ X] Fall risks identified:      [X ] Increased delirium risk    [ X] Delirium and other risks can be reduced by:          -early ambulation          -minimizing "tethers" - IV, oxygen, catheters, etc          -avoiding hypnotics and sedatives          -maintaining hydration/nutrition          -avoid anticholinergics - diphenhydramine, etc          -pain control          -supportive environment

## 2024-10-21 NOTE — CONSULT NOTE ADULT - ASSESSMENT
84M with PMH of b/l renal cell carcinoma (newly dx, pt unaware of dx per daughter), HTN, DMT2, AFib on Eliquis c/b slow ventricular response s/p leadless pacemaker 10 days ago, HFpEF (EF 60-65%), severe AS/AR, myasthenia gravis, stroke in 8/24 with residual L weakness, dysphagia and urinary retention with chronic butt presenting from rehab after labs showed Hgb 5.5. CTAP showed descending colitis and possible rectal pneumatosis. Patient was taken to OR ; was found to have sigmoid perforation and is s/p Chopra's procedure with end colostomy.

## 2024-10-21 NOTE — CONSULT NOTE ADULT - PROBLEM SELECTOR RECOMMENDATION 2
- p/w Hb 5.5  - s/p  2u prbc   - Hb stable at 9.9  - Continue protonix 40 mg IV QD  - Iron studies, ferritin WNL  - CBC QD, transfuse Hgb < 7.   - Per GI ; no endoscopic interventions at this time per family's wishes and attempt conservative management.

## 2024-10-21 NOTE — CONSULT NOTE ADULT - PROBLEM SELECTOR RECOMMENDATION 9
s/p Chopra's procedure with end colostomy. 10/17  mgt per primary team Patient noted to have chronic urinary retention with chronic Elliott in rehab center. Not endorsing dysuria or hematuria at this time, UA negative on admission.   - C/w home finasteride and tamsulosin  - Elliott in place.

## 2024-10-21 NOTE — CONSULT NOTE ADULT - PROBLEM SELECTOR RECOMMENDATION 4
- stroke in 8/24 with residual L weakness, dysphagia and urinary retention with chronic butt   - restart Eliquis  - SLP following  - for MBS Ambulatory

## 2024-10-21 NOTE — CONSULT NOTE ADULT - PROBLEM SELECTOR RECOMMENDATION 6
- not currently on any home meds, previously on Entresto but stopped  - continue to monitor BP.  - currently well - controlled

## 2024-10-21 NOTE — CONSULT NOTE ADULT - PROBLEM SELECTOR RECOMMENDATION 8
Patient dx with b/l renal cell carcinoma earlier this year.   - per daughter (HCP), patient unaware of this diagnosis, does not want to discuss treatment at this time.

## 2024-10-21 NOTE — PROGRESS NOTE ADULT - SUBJECTIVE AND OBJECTIVE BOX
ACS SURGERY DAILY PROGRESS NOTE:     SUBJECTIVE/ROS: Patient seen and examined. He is resting comfortably, no new events/complaints. pain is controlled.      MEDICATIONS  (STANDING):  acetaminophen   IVPB .. 1000 milliGRAM(s) IV Intermittent every 6 hours  albuterol/ipratropium for Nebulization 3 milliLiter(s) Nebulizer every 12 hours  dextrose 5% + lactated ringers. 1000 milliLiter(s) (40 mL/Hr) IV Continuous <Continuous>  enoxaparin Injectable 40 milliGRAM(s) SubCutaneous every 24 hours  folic acid Injectable 1 milliGRAM(s) IV Push daily  hydrocortisone sodium succinate Injectable 12.5 milliGRAM(s) IV Push every 12 hours  mycophenolate mofetil 500 milliGRAM(s) Oral daily  pantoprazole  Injectable 40 milliGRAM(s) IV Push every 24 hours  piperacillin/tazobactam IVPB.. 3.375 Gram(s) IV Intermittent every 8 hours  predniSONE   Tablet 5 milliGRAM(s) Oral daily    MEDICATIONS  (PRN):  HYDROmorphone  Injectable 0.5 milliGRAM(s) IV Push every 3 hours PRN Severe Pain (7 - 10)      OBJECTIVE:    Vital Signs Last 24 Hrs  T(C): 36.4 (21 Oct 2024 05:17), Max: 36.9 (20 Oct 2024 17:12)  T(F): 97.5 (21 Oct 2024 05:17), Max: 98.4 (20 Oct 2024 17:12)  HR: 83 (21 Oct 2024 05:17) (57 - 83)  BP: 159/60 (21 Oct 2024 05:17) (141/58 - 159/60)  BP(mean): --  RR: 18 (21 Oct 2024 05:17) (18 - 18)  SpO2: 97% (21 Oct 2024 05:17) (93% - 97%)    Parameters below as of 21 Oct 2024 05:17  Patient On (Oxygen Delivery Method): room air            I&O's Detail    20 Oct 2024 07:01  -  21 Oct 2024 07:00  --------------------------------------------------------  IN:    Oral Fluid: 740 mL  Total IN: 740 mL    OUT:    Colostomy (mL): 0 mL    Indwelling Catheter - Urethral (mL): 975 mL  Total OUT: 975 mL    Total NET: -235 mL      21 Oct 2024 07:01  -  21 Oct 2024 08:05  --------------------------------------------------------  IN:    Oral Fluid: 420 mL  Total IN: 420 mL    OUT:  Total OUT: 0 mL    Total NET: 420 mL          Daily     Daily     LABS:                        9.3    6.97  )-----------( 138      ( 20 Oct 2024 07:13 )             30.0     10-20    139  |  107  |  36[H]  ----------------------------<  116[H]  3.4[L]   |  24  |  0.73    Ca    7.5[L]      20 Oct 2024 07:15  Phos  2.6     10-20  Mg     2.2     10-20        Urinalysis Basic - ( 20 Oct 2024 07:15 )    Color: x / Appearance: x / SG: x / pH: x  Gluc: 116 mg/dL / Ketone: x  / Bili: x / Urobili: x   Blood: x / Protein: x / Nitrite: x   Leuk Esterase: x / RBC: x / WBC x   Sq Epi: x / Non Sq Epi: x / Bacteria: x                PHYSICAL EXAM:  GEN: resting in bed comfortably in NAD  RESP: no increased WOB, no tachypnea  ABD: soft, non-distended, non-tender to palpation. Stoma pink, patent and productive with gas and stool in ostomy bag.   EXTR: warm, well-perfused without gross deformities; spontaneous movement in b/l U/L extrem  NEURO: awake, alert

## 2024-10-21 NOTE — CONSULT NOTE ADULT - PROBLEM SELECTOR RECOMMENDATION 3
- on Eliquis 5mg po bid at home ; held for anemia, OR  - CHADVASC score 7  - s/p leadless pacemaker ~2 weeks days ago .   - would restart Eliquis

## 2024-10-21 NOTE — CONSULT NOTE ADULT - NSCONSULTADDITIONALINFOA_GEN_ALL_CORE
time spent reviewing prior charts, meds, discussing plan with patient and wife at bedside = 55 minutes    d/w Trauma team

## 2024-10-21 NOTE — ADVANCED PRACTICE NURSE CONSULT - ASSESSMENT
The pt was encountered on [facility]- Mr Santos was awake and alert, engaging in conversation. he has a thin frail appearance and has been diagnosed with malnutrition as per a nutrition consult.  he is on a [employer] P 500 support surface and is being assisted with T&P using the sarah-form positioner  staff have applied Complete cAir boots to off-load his heels  He ha sa butt and a colostomy.  upon assessment, there was an Allevyn foam noted on the sacrum. it was removed and the wound cleansed with NS. the DTI noted upon admission has continued to evolve and is now unstageable ,  measuring 9.5cm x 11cmx 0.2cm. there is 90% soft eschar that appears to be lifting and 10% moist red tissue. there was scant drainage, no odor, the periwound skin with blanchable erythema. medihoney paste was applied to promote autolytic debridement.   education was provided to the pt re: wound status, tx plan and PI prevention

## 2024-10-21 NOTE — PROGRESS NOTE ADULT - ASSESSMENT
85 y/o M PmHx recently diagnosed bilateral renal cell carcinoma, myasthenia gravis on prednisone and mycophenolate, atrial fibrillation on apixaban status post recent pacemaker placement for slow ventricular response, heart failure with preserved ejection fraction, severe aortic stenosis, and recent stroke with dysphagia and left sided weakness presenting to [facility] for severe anemia requiring blood transfusion. Patient was found to have pneumoperitoneum on a study obtained today 10/16 to identify the progression of his disease and specifically look for metastases. 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 Patient was taken to OR for Chopra's procedure with end colostomy. Transferred to SICU d/t hypotension requiring vasopressors during the case. Now downgraded to the floor.    PLAN:  - reassess diet after modified barium swallow 10/21  - pain control as needed   - Zosyn  - monitor ostomy output    ACS/Trauma surgery  p [id number]

## 2024-10-22 NOTE — SWALLOW VFSS/MBS ASSESSMENT ADULT - SUCCESSFUL STRATEGIES TRIALED DURING PROCEDURE
Pt instructed to perform bolus hold for 1 tsp thin liquids. Pt w/ mod spillover to the pyriform sinuses with trace laryngeal penetration during the swallow with material completely retrieved. Strategy successful in reducing amount of premature spillage, amount and severity of laryngeal penetration.

## 2024-10-22 NOTE — SWALLOW VFSS/MBS ASSESSMENT ADULT - SLP GENERAL OBSERVATIONS
Fair Pt seen in radiology for Modified Barium Swallow Study, repositioned to optimize position in LARON chair, on 2L NC, A&Ox3. Pt severely [place] requiring repetition and simplification of direction. SLP called to floor for upper/lower dentures; noted to be ill-fitting during administration of chewables. Pt seen in radiology for Modified Barium Swallow Study, repositioned to optimize position in LARON chair, on 2L NC, A&Ox3, chronic L-sided facial weakness. Pt severely [place] requiring repetition and simplification of direction. SLP called to floor for upper/lower dentures; noted to be ill-fitting during administration of chewables.

## 2024-10-22 NOTE — SWALLOW VFSS/MBS ASSESSMENT ADULT - ORAL PHASE
Delayed oral transit with mod-max bolus spillover to the valleculae. Trace residue remaining in the oral cavity. Reduced A-P transport with mod bolus spillover to the pyriform sinuses. Trace residue remaining in the oral cavity. Reduced A-P transport with mod spillover to the pyriform sinuses. Trace residue remaining after the swallow. Reduced anterior-posterior transport with max spillover to the pyriform sinuses. Trace residue remaining after the swallow. Reduced anterior-posterior transport with mod bolus spillover to the valleculae. Trace oral residue remaining after the swallow

## 2024-10-22 NOTE — SWALLOW VFSS/MBS ASSESSMENT ADULT - ORAL PHASE COMMENTS
Reduced anterior-poster transport with mod bolus spillover to the pyriform sinuses. Mild-mod oral residue remaining in left-lateral sulci after the swallow which needed to be manually removed by SLP.

## 2024-10-22 NOTE — SWALLOW VFSS/MBS ASSESSMENT ADULT - COMMENTS
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 w/ residual dysphagia. MBS 08/21/24 at [facility] found oropharyngeal dysphagia; aspiration of thin liquids; penetration of mildly thick. Recommended Regular/moderately thick.    10/16: Pneumoperitoneum found 10/16; +descending colitis and possible rectal pneumatosis-->OR for Chopra's procedure with end colostomy. Transferred to SICU d/t hypotension requiring vasopressors.  10/17: transferred to the    Swallow Hx: Pt seen for initial bedside swallow evaluation 10/18/24 at which time pt p/w evidence of an mabel-pharyngeal dysphagia. RX for puree and moderately thick as pt exhibited overt s/s of laryngeal penetration/aspiration on mildly thick liquids and edentulous. Family to bring in upper/lower dentures.

## 2024-10-22 NOTE — SWALLOW VFSS/MBS ASSESSMENT ADULT - LARYNGEAL PENETRATION DURING THE SWALLOW - SILENT
TSP: Trace shallow penetration over the arytenoids with complete retrieval. SINGLE CUP: Trace shallow laryngeal penetration over the arytenoids w/ complete retreiveal. 1x instance of deep laryngeal penetration over the arytenoids retrieved with re-swallow. SEQUENTIAL SIPS: Mild deep laryngeal penetration over the arytenoids not completely retrieved. Shallow laryngeal penetration over the arytenoids with complete retrieval./Trace Deep laryngeal penetration over the arytenoids to the level of the vocal cords. Clinician cue for re-swallow successful in clearing laryngeal vestibule./Trace TSP: Trace shallow penetration over the arytenoids with complete retrieval. SINGLE CUP SIP: Trace shallow laryngeal penetration over the arytenoids w/ complete retreiveal. 1x instance of deep laryngeal penetration over the arytenoids retrieved with re-swallow. SEQUENTIAL SIPS: Mild deep laryngeal penetration over the arytenoids not completely retrieved.

## 2024-10-22 NOTE — SWALLOW VFSS/MBS ASSESSMENT ADULT - ROSENBEK'S PENETRATION ASPIRATION SCALE
(1) no aspiration, contrast does not enter airway PAS 2: contrast enters airway, remains above the vocal cords, no residue remains (penetration) for mildly thick via tsp and single cup sips./(8) contrast passes glottis, visible subglottic residue remains, absent patient response (aspiration) (2) contrast enters airway, remains above the vocal cords, no residue remains (penetration) PAS 2 for thin via tsp w/ bolus hold./(5) contrast contacts vocal cords, visible residue remains (penetration) PAS 8: contrast passess glottis, visible subglottic residue remains, absent patient response (aspiration) via sequential cup sips. PAS 2: contrast enters airway, remains above the vocal cords, no residue remains (penetration) for mildly thick via tsp and single cup sips.

## 2024-10-22 NOTE — SWALLOW VFSS/MBS ASSESSMENT ADULT - ASPIRATION AFTER SWALLOW - SILENT
Penetrated material descending below the cords after the swallow on sequential cup sips. Clinician prompt for cough unsuccessful in clearing all sub-glottic material./Trace On sequential swallows, penetrated material descending below the cords after the swallow. Clinician prompt for cough unsuccessful in clearing all sub-glottic material./Trace Silent aspiration on sequential cup sips. Material descending below the cords after the swallow. Clinician prompt for cough unsuccessful in clearing all sub-glottic material./Trace

## 2024-10-22 NOTE — SWALLOW VFSS/MBS ASSESSMENT ADULT - ADDITIONAL RECOMMENDATIONS
Swallow:   1. Pt/family/caregiver will demonstrate understanding and carryover of dysphagia management (safe swallow guidelines, compensatory strategies, dysphagia diet).  2. Pt will tolerate recommended diet with no overt, clinical s/s of aspiration. Swallow:   1. Pt/family/caregiver will demonstrate understanding and carryover of dysphagia management (safe swallow guidelines, compensatory strategies, dysphagia diet).  2. Pt will tolerate recommended diet with no overt, clinical s/s of aspiration.  3. Pt will tolerate therapeutic trials of thin liquids via tsp w/ bolus hold- WITH SLP ONLY

## 2024-10-22 NOTE — ADVANCED PRACTICE NURSE CONSULT - ASSESSMENT
Chart reviewed and events noted.  In to see patient with education material for purpose to initiate ostomy teaching and readiness to learn.  In to see patient with a colleague, introduced self and the role of the ostomy RN.  Patient alert and awake.  Ostomy RN explained the reason of visit.  Patient unable to follow instructions reviewed secondary to h/o CVA with residual weakness.  Patient states, " I can't do it.  I had stroke and my wife cannot help me.  She is sick herself.  I would have to go to rehab".  On assessment:  Pouch system intact. Stoma pink and viable. Stoma 1 3/8" retracted.  OS at 9 o'clock flushed below the skin level.  Crease noted to 3 o'clock with indent from 6-9 o'clock.   Pouch with soft pasty yellow stool moderate amount.  Peristomal skin and mucocutaneous junction intact.  Skin hygiene provided with warm water gauze and pat dried.  Re pouched with [place] 2 1/4" soft convex skin barrier.  Peristomal skin dusted with stoma powder.  Excess removed.  Sealed the site with no sting barrier liquid film wipe to heal and protect.  Stoma paste applied to the back of the skin barrier and creases/ indent @ 3 and  6-9 o'clock to surface and to caulk.  Re attached with [place] 2 1/4" drainable pouch.  Patient observed all the steps and asked appropriate questions during the change.  Midsternal surgical incision with surgical glue.  C/D/I.  Education material provided and encouraged to review at the later time.  Demonstration of Skin barrier application/snapping and applying pouch reviewed with patient.  Ostomy RN demonstrated the opening and closing the drainable pouch.  Patient verbalized understanding while he observed the process.  Patient educated to practice emptying and closing the pouch going forward with staff RN.  Supplies with pattern left at the bedside. Emotional support and active listening maintained during the entire visit.   Safety maintained.  Will follow up.

## 2024-10-22 NOTE — SWALLOW VFSS/MBS ASSESSMENT ADULT - DELAYED INITIATION OF THE PHARYNGEAL SWALLOW (IN SECONDS)
Pharyngeal swallow triggered with bolus at the pyriform sinuses. Pharyngeal swallow triggered at the pyriform sinuses. Pharyngeal swallow triggered at the valleculae.

## 2024-10-22 NOTE — SWALLOW VFSS/MBS ASSESSMENT ADULT - SLP PERTINENT HISTORY OF CURRENT PROBLEM
84 year old male with PMH of b/l renal cell carcinoma (newly dx, pt unaware of dx per daughter), HTN, DM, AF on Eliquis c/b slow ventricular response s/p leadless pacemaker 10 days ago, HFpEF (EF 60-65%), severe AS/AR, myasthenia gravis, stroke in 8/24 with residual L weakness, dysphagia and urinary retention with chronic butt presenting from rehab after labs today showed Hgb 5.5. Patient is accompanied at bedside by his daughter who provided majority of patient's history due to patient being [place] and without his hearing aids. She notes that patient has been having "looser stools" since the beginning of this week but is unsure if the stool was unusually dark or with occult blood. Daughter also notes that patient has had decreased appetite and significant weight loss (~40lbs) since his stroke in August. He has not experienced recent n/v or abdominal pain/distension, and he does not use NSAIDs regularly. He has a chronic butt but reports no dysuria or hematuria.

## 2024-10-22 NOTE — SWALLOW VFSS/MBS ASSESSMENT ADULT - DIAGNOSTIC IMPRESSIONS
84Y M w/ PMHX CVA with residual left weakness, dysphagia, now s/p Chopra's procedure w/ end colostomy. Pt extubated 10/17. Patient presenting with an mabel-pharyngeal dysphagia with oral impairment 2/2 ill-fitting dentures. Oral phase characterized by inefficient mastication, reduced bolus formation with spillover to the pyriform sinuses. Pharyngeal phase notable for delayed initiation of pharyngeal swallow trigger and reduced pharyngeal contractility resulting in trace-mild poster swallow residue.     Disorders: delay in trigger of the swallow reflex, reduced hyo-laryngeal excursion, reduced laryngeal closure, reduced pharyngeal contractility, reduced supraglottic sensation, reduced subglottic sensation. 84Y M w/ PMHX CVA with residual left weakness, dysphagia, now s/p Chopra's procedure w/ end colostomy. Pt extubated 10/17. Patient presenting with an mabel-pharyngeal dysphagia with oral impairment 2/2 ill-fitting dentures. Oral phase characterized by inefficient mastication, reduced bolus formation with spillover to the pyriform sinuses. Pharyngeal phase notable for delayed initiation of pharyngeal swallow trigger, reduced hyo-laryngeal excursion, reduced pharyngeal contractility with trace-mild post swallow residue. Laryngeal penetration with retrieval on thickened liquids via tsp and single cup sips, soft-bite sized, and easy to chew textures. Pt noted with silent aspiration of mildly thick liquids via sequential cup sips with clinician cued cough unsuccessful in completely clearing subglottic material. Laryngeal penetration without retrieval on thin liquids via tsp. Bolus hold strategy successful in reducing amount of premature spillover and amount/severity of laryngeal penetration occurring during the swallow. No aspiration or penetration noted on puree trials. Diet upgrade clinically indicated at this time. This service will continue to follow for diet tolerance and therapeutic trials of thin liquids w/ bolus hold (with SLP only). Textures can be upgraded subjectively once patient obtains adhesive to optimize fit of upper/lower dentures. 84Y M w/ PMHX CVA with residual left weakness, dysphagia, now s/p Chopra's procedure w/ end colostomy. Pt extubated 10/17. Patient presenting with an mabel-pharyngeal dysphagia with oral impairment 2/2 ill-fitting dentures. Oral phase characterized by inefficient mastication, reduced bolus formation with spillover to the pyriform sinuses. Pharyngeal phase notable for delayed initiation of pharyngeal swallow trigger, reduced hyo-laryngeal excursion, reduced pharyngeal contractility with trace-mild post swallow residue. Laryngeal penetration with retrieval on thickened liquids via tsp and single cup sips, soft-bite sized, and easy to chew textures. Pt noted with silent aspiration of mildly thick liquids via sequential cup sips with clinician cued cough unsuccessful in completely clearing subglottic material. Laryngeal penetration without retrieval on thin liquids via tsp. Bolus hold strategy successful in reducing amount of premature spillover and amount/severity of laryngeal penetration occurring during the swallow and to be utilized therapeutically. No aspiration or penetration noted on puree trials. Diet upgrade clinically indicated at this time. This service will continue to follow for diet tolerance and therapeutic trials of thin liquids w/ bolus hold (with SLP only). Textures can be upgraded subjectively pending patient obtains adhesives to optimize upper/lower denture fit. 84Y M w/ PMHX CVA with residual left weakness, dysphagia, now s/p Chopra's procedure w/ end colostomy. Pt extubated 10/17. Patient presenting with an mabel-pharyngeal dysphagia. Oral phase characterized by inefficient mastication i/s/o ill-fitting dentures, reduced bolus formation with spillover to the pyriform sinuses. Pharyngeal phase notable for delayed initiation of pharyngeal swallow trigger, reduced hyo-laryngeal excursion and pharyngeal contractility with trace-mild post swallow residue. Laryngeal penetration with retrieval on thickened liquids via tsp and single cup sips, soft-bite sized, and easy to chew textures. Silent aspiration of mildly thick liquids via sequential cup sips. Clinician cued cough unsuccessful in clearing subglottic material. Laryngeal penetration without retrieval on thin liquids via tsp. Bolus hold strategy successful in reducing amount of premature spillover and amount/severity of laryngeal penetration and can be utilized therapeutically. No aspiration or penetration noted on puree trials. Diet upgrade clinically indicated at this time. This service will continue to follow for diet tolerance and therapeutic trials of thin liquids w/ bolus hold (with SLP only). Textures can be upgraded subjectively pending patient obtains adhesives to optimize upper/lower denture fit.    Disorders: reduced lingual strength/ROM/Rate of motion, reduced BOT to posterior pharyngeal wall contact, delay in trigger of the swallow reflex, reduced hyo-laryngeal excursion, reduced laryngeal closure, reduced pharyngeal contractility, reduced supraglottic sensation, reduced subglottic sensation.

## 2024-10-22 NOTE — PROGRESS NOTE ADULT - ASSESSMENT
3 y/o M PmHx recently diagnosed bilateral renal cell carcinoma, myasthenia gravis on prednisone and mycophenolate, atrial fibrillation on apixaban status post recent pacemaker placement for slow ventricular response, heart failure with preserved ejection fraction, severe aortic stenosis, and recent stroke with dysphagia and left sided weakness presenting to [facility] for severe anemia requiring blood transfusion. Patient was found to have pneumoperitoneum on a study obtained today 10/16 to identify the progression of his disease and specifically look for metastases. 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 Patient was taken to OR for Chopra's procedure with end colostomy. Transferred to SICU d/t hypotension requiring vasopressors during the case. Now downgraded to the floor.    PLAN:  - SLP recs; follow up barium swallow  today   - pain control as needed   - Zosyn  - monitor ostomy output    ACS/Trauma surgery  p [id number]    83 y/o M PmHx recently diagnosed bilateral renal cell carcinoma, myasthenia gravis on prednisone and mycophenolate, atrial fibrillation on apixaban status post recent pacemaker placement for slow ventricular response, heart failure with preserved ejection fraction, severe aortic stenosis, and recent stroke with dysphagia and left sided weakness presenting to [facility] for severe anemia requiring blood transfusion. Patient was found to have pneumoperitoneum on a study obtained today 10/16 to identify the progression of his disease and specifically look for metastases. 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 Patient was taken to OR for Chopra's procedure with end colostomy. Transferred to SICU d/t hypotension requiring vasopressors during the case. Now downgraded to the floor.    PLAN:  - SLP recs; follow up barium swallow  today   - pain control as needed   - Zosyn  - monitor ostomy output    ACS/Trauma surgery  p [id number]

## 2024-10-22 NOTE — PROGRESS NOTE ADULT - SUBJECTIVE AND OBJECTIVE BOX
SURGERY DAILY PROGRESS NOTE:       Subjective / Overnight events:    Pt seen and examined on rounds, no overnight events      Objective:      MEDICATIONS  (STANDING):  albuterol/ipratropium for Nebulization 3 milliLiter(s) Nebulizer every 12 hours  apixaban 5 milliGRAM(s) Oral two times a day  dextrose 5% + lactated ringers. 1000 milliLiter(s) (40 mL/Hr) IV Continuous <Continuous>  dextrose 5%. 1000 milliLiter(s) (50 mL/Hr) IV Continuous <Continuous>  dextrose 5%. 1000 milliLiter(s) (100 mL/Hr) IV Continuous <Continuous>  dextrose 50% Injectable 25 Gram(s) IV Push once  dextrose 50% Injectable 25 Gram(s) IV Push once  dextrose 50% Injectable 12.5 Gram(s) IV Push once  finasteride 5 milliGRAM(s) Oral daily  folic acid Injectable 1 milliGRAM(s) IV Push daily  glucagon  Injectable 1 milliGRAM(s) IntraMuscular once  hydrocortisone sodium succinate Injectable 12.5 milliGRAM(s) IV Push every 12 hours  insulin lispro (ADMELOG) corrective regimen sliding scale   SubCutaneous at bedtime  insulin lispro (ADMELOG) corrective regimen sliding scale   SubCutaneous three times a day before meals  mycophenolate mofetil 500 milliGRAM(s) Oral daily  pantoprazole  Injectable 40 milliGRAM(s) IV Push every 24 hours  predniSONE   Tablet 5 milliGRAM(s) Oral daily  tamsulosin 0.8 milliGRAM(s) Oral at bedtime    MEDICATIONS  (PRN):  dextrose Oral Gel 15 Gram(s) Oral once PRN Blood Glucose LESS THAN 70 milliGRAM(s)/deciliter      Vital Signs Last 24 Hrs  T(C): 36.5 (22 Oct 2024 04:47), Max: 36.6 (21 Oct 2024 17:00)  T(F): 97.7 (22 Oct 2024 04:47), Max: 97.8 (21 Oct 2024 17:00)  HR: 65 (22 Oct 2024 04:47) (59 - 65)  BP: 158/58 (22 Oct 2024 04:47) (148/74 - 158/58)  BP(mean): --  RR: 18 (22 Oct 2024 04:47) (18 - 18)  SpO2: 100% (22 Oct 2024 04:47) (95% - 100%)    Parameters below as of 22 Oct 2024 04:47  Patient On (Oxygen Delivery Method): nasal cannula  O2 Flow (L/min): 1      I&O's Detail    21 Oct 2024 07:01  -  22 Oct 2024 07:00  --------------------------------------------------------  IN:    dextrose 5% + lactated ringers: 960 mL    Oral Fluid: 660 mL  Total IN: 1620 mL    OUT:    Colostomy (mL): 130 mL    Indwelling Catheter - Urethral (mL): 750 mL  Total OUT: 880 mL    Total NET: 740 mL          Daily     Daily     LABS:                        9.9    6.12  )-----------( 120      ( 21 Oct 2024 08:21 )             32.4     10-21    137  |  105  |  27[H]  ----------------------------<  132[H]  3.6   |  24  |  0.53    Ca    7.5[L]      21 Oct 2024 08:20  Phos  1.9     10-21  Mg     2.2     10-21        Urinalysis Basic - ( 21 Oct 2024 08:20 )    Color: x / Appearance: x / SG: x / pH: x  Gluc: 132 mg/dL / Ketone: x  / Bili: x / Urobili: x   Blood: x / Protein: x / Nitrite: x   Leuk Esterase: x / RBC: x / WBC x   Sq Epi: x / Non Sq Epi: x / Bacteria: x          PHYSICAL EXAM  GEN: resting in bed comfortably in NAD  RESP: no increased WOB, no tachypnea  ABD: soft, non-distended, non-tender to palpation. Stoma pink, patent and productive with gas and stool in ostomy bag.   EXTR: warm, well-perfused without gross deformities; spontaneous movement in b/l U/L extrem  NEURO: awake, alert

## 2024-10-22 NOTE — SWALLOW VFSS/MBS ASSESSMENT ADULT - LARYNGEAL PENETRATION AFTER THE SWALLOW - SILENT
SEQUENTIAL CUP SIPS: Mild deep laryngeal penetration of residue passing over the arytenoids; material not completely retrieved.

## 2024-10-23 NOTE — PROGRESS NOTE ADULT - SUBJECTIVE AND OBJECTIVE BOX
Patient is a 84y old  Male who presents with a chief complaint of Anemia (23 Oct 2024 13:56)      SUBJECTIVE / OVERNIGHT EVENTS:  Pt seen and examined with family at bedside. No acute events overnight. Limited ROS on account of somnolence. Per RN, pt ate breakfast this morning  and tolerated it. Reported mild cough with eating.    MEDICATIONS  (STANDING):  albuterol/ipratropium for Nebulization 3 milliLiter(s) Nebulizer every 6 hours  apixaban 5 milliGRAM(s) Oral two times a day  chlorhexidine 2% Cloths 1 Application(s) Topical daily  chlorhexidine 4% Liquid 1 Application(s) Topical <User Schedule>  folic acid Injectable 1 milliGRAM(s) IV Push daily  hydrocortisone sodium succinate Injectable 50 milliGRAM(s) IV Push every 6 hours  lactated ringers. 1000 milliLiter(s) (100 mL/Hr) IV Continuous <Continuous>  mycophenolate mofetil 500 milliGRAM(s) Oral daily  norepinephrine Infusion 0.05 MICROgram(s)/kG/Min (7.23 mL/Hr) IV Continuous <Continuous>  pantoprazole  Injectable 40 milliGRAM(s) IV Push every 24 hours  piperacillin/tazobactam IVPB.. 3.375 Gram(s) IV Intermittent every 8 hours    MEDICATIONS  (PRN):  acetaminophen     Tablet .. 650 milliGRAM(s) Oral every 6 hours PRN Mild Pain (1 - 3)      Vital Signs Last 24 Hrs  T(C): 36.4 (23 Oct 2024 19:00), Max: 37.7 (23 Oct 2024 13:25)  T(F): 97.6 (23 Oct 2024 19:00), Max: 99.8 (23 Oct 2024 13:25)  HR: 60 (23 Oct 2024 20:15) (58 - 62)  BP: 111/56 (23 Oct 2024 20:15) (83/39 - 151/63)  BP(mean): 80 (23 Oct 2024 20:15) (57 - 90)  RR: 23 (23 Oct 2024 20:15) (18 - 43)  SpO2: 98% (23 Oct 2024 20:15) (90% - 100%)    Parameters below as of 23 Oct 2024 20:00  Patient On (Oxygen Delivery Method): nasal cannula  O2 Flow (L/min): 3    CAPILLARY BLOOD GLUCOSE      POCT Blood Glucose.: 89 mg/dL (23 Oct 2024 12:53)  POCT Blood Glucose.: 88 mg/dL (23 Oct 2024 12:23)  POCT Blood Glucose.: 66 mg/dL (23 Oct 2024 12:17)  POCT Blood Glucose.: 85 mg/dL (23 Oct 2024 09:07)  POCT Blood Glucose.: 205 mg/dL (22 Oct 2024 21:19)    I&O's Summary    22 Oct 2024 07:01  -  23 Oct 2024 07:00  --------------------------------------------------------  IN: 0 mL / OUT: 1125 mL / NET: -1125 mL    23 Oct 2024 07:01  -  23 Oct 2024 20:49  --------------------------------------------------------  IN: 2045.5 mL / OUT: 310 mL / NET: 1735.5 mL        PHYSICAL EXAM:  GENERAL: NAD, frail  HEAD:  Atraumatic, Normocephalic  EYES: conjunctiva and sclera clear  NECK: Supple, No JVD  CHEST/LUNG: Clear to auscultation bilaterally; No wheeze; +transmitted sounds  HEART: Regular rate and rhythm; No murmurs, rubs, or gallops  ABDOMEN: Soft, Nontender, Nondistended; Bowel sounds present; +ostomy with formed stool  EXTREMITIES:  left UE swelling , nontender  PSYCH: somnolent but arousable  NEUROLOGY: non-focal  SKIN: No rashes or lesions    LABS:                        10.2   3.53  )-----------( 104      ( 23 Oct 2024 14:45 )             33.6     10-23    137  |  106  |  22  ----------------------------<  100[H]  4.1   |  22  |  0.73    Ca    7.2[L]      23 Oct 2024 14:45  Phos  2.5     10-23  Mg     2.0     10-23    TPro  4.1[L]  /  Alb  1.8[L]  /  TBili  0.9  /  DBili  x   /  AST  14  /  ALT  8[L]  /  AlkPhos  79  10-23          Urinalysis Basic - ( 23 Oct 2024 14:45 )    Color: x / Appearance: x / SG: x / pH: x  Gluc: 100 mg/dL / Ketone: x  / Bili: x / Urobili: x   Blood: x / Protein: x / Nitrite: x   Leuk Esterase: x / RBC: x / WBC x   Sq Epi: x / Non Sq Epi: x / Bacteria: x

## 2024-10-23 NOTE — PROCEDURE NOTE - NSINFORMCONSENT_GEN_A_CORE
This was an emergent procedure.
Benefits, risks, and possible complications of procedure explained to patient/caregiver who verbalized understanding and gave verbal consent.
Benefits, risks, and possible complications of procedure explained to patient/caregiver who verbalized understanding and gave written consent.

## 2024-10-23 NOTE — PROCEDURE NOTE - NSPROCDETAILS_GEN_ALL_CORE
guidewire recovered/lumen(s) aspirated and flushed/sterile dressing applied
sterile technique, non-indwelling urinary device inserted
sterile technique, indwelling urinary device inserted
sterile technique, indwelling urinary device inserted

## 2024-10-23 NOTE — PROGRESS NOTE ADULT - SUBJECTIVE AND OBJECTIVE BOX
HPI:  83 y/o M PmHx recently diagnosed bilateral renal cell carcinoma, myasthenia gravis on prednisone and mycophenolate, atrial fibrillation on apixaban status post recent pacemaker placement for slow ventricular response, heart failure with preserved ejection fraction, severe aortic stenosis, and recent stroke with dysphagia and left sided weakness presenting to [facility] on 10/14 for severe anemia requiring blood transfusion. Patient was found to have pneumoperitoneum on a study obtained on 10/16 to identify the progression of his disease and specifically look for metastases. 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 Patient was taken to OR on 10/17 for Alec's procedure with end colostomy. Transferred to SICU due to hypotension requiring vasopressors during the case.  Patient was transferred out of the SICU and recovering on the floor.      SICU reconsulted today for hypotension.   An RRT was called on the floor for hypotension.  Cultures drawn, empiric antibiotics started.  Patient transferred to SICU for further management.  After discussion with family, patient made DNR/DNI with trial of non-invasive ventilation.  R IJ CVL placed for Norepinephrine.          VITAL SIGNS:  T(F): 97.8 (10-17-24 @ 19:45), Max: 97.8 (10-17-24 @ 07:00)  HR: 60 (10-18-24 @ 00:27) (59 - 61)  BP: 125/58 (10-17-24 @ 19:45) (117/57 - 138/61)  RR: 21 (10-18-24 @ 00:00) (13 - 30)  SpO2: 94% (10-18-24 @ 00:27) (89% - 100%)  Mode: CPAP with PS, FiO2: 40, PEEP: 5, PS: 5, MAP: 6.5, PIP: 13    INTAKE/OUTPUT  10-16 @ 07:01  -  10-17 @ 07:00  --------------------------------------------------------  IN: 1573.2 mL / OUT: 845 mL / NET: 728.2 mL    FINGERSTICKS:  162 (10-17 @ 22:28), 137 (10-17 @ 06:45)    PHYSICAL EXAM:  General: cachetic appearing, sedated  HEENT: normocephalic/atraumatic; pupils equally round and reactive to light; clear conjunctiva  Neck: supple, no jugular venous distension  Respiratory: equal breath sounds b/l, no wheezing/rhonchi/rales  Cardiovascular: skin is warm and well perfused; S1 and S2 auscultated regular rate and rhythm  Abdomen: colostomy and stoma, no bloody output   Extremities: 2+ peripheral pulses bilaterally; no lower extremity edema  Skin: normal color and turgor; no rashes or ulcers identified  Neurological: sedated    MEDICATIONS:  albuterol/ipratropium for Nebulization 3 milliLiter(s) Nebulizer every 6 hours  chlorhexidine 2% Cloths 1 Application(s) Topical <User Schedule>  enoxaparin Injectable 40 milliGRAM(s) SubCutaneous every 24 hours  folic acid Injectable 1 milliGRAM(s) IV Push daily  hydrocortisone sodium succinate Injectable 50 milliGRAM(s) IV Push every 6 hours  lactated ringers. 1000 milliLiter(s) IV Continuous <Continuous>  pantoprazole  Injectable 40 milliGRAM(s) IV Push every 24 hours  phenylephrine    Infusion 0.1 MICROgram(s)/kG/Min IV Continuous <Continuous>  piperacillin/tazobactam IVPB.. 3.375 Gram(s) IV Intermittent every 8 hours  sodium chloride 3%  Inhalation 4 milliLiter(s) Inhalation every 6 hours    ALLERGIES:  No Known Allergies    LABS/IMAGING:  CBC:             11.0   10.55 )-----------( 218      ( 10-17 @ 16:51 )             34.9     Seema %: x     / Lym %: x     / Eos %: x     / Band %: x        WBC 72h Trend: 10.55 (10-17), 6.25 (10-17), 12.99 (10-16), 11.06 (10-16), 12.11 (10-), 7.61 (10-16)  ---------------------------------------------------------------------------  Hemoglobin 24h Trend: 11.0 (16:51), 10.1 (04:29)  ---------------------------------------------------------------------------  CMP:  136  |  105  |  25[H]  ----------------------------( 134[H]     1017 @ 16:51  4.2   |  19[L]  |  0.62    Ca: 7.8[L]   Phos: 3.7   M.0    TPro: 5.3[L] / Alb: 2.3[L] /  TBili 1.4[H] / DBili x  /  AST 15 /  ALT 8[L] /  AlkPhos  99  ---------------------------------------------------------------------------  Creatinine 48h Trend:  0.62 (10-17), 0.65 (10-17), 0.61 (10-16), 0.59 (10-16)  ---------------------------------------------------------------------------  Coags:   18.8  ------( 1.64     10-17   35.3  ---------------------------------------------------------------------------  ABG/VBG:  (10-17 @ 16:46) pH: 7.36 / pCO2: 40 /  pO2: 96 / HCO3: 23 / SaO2: 99.0  (10-17 @ 03:54) pH: 7.30 / pCO2: 47 /  pO2: 179 / HCO3: 23 / SaO2: 98.1  (10-17 @ 02:11) pH: 7.28 / pCO2: 50 /  pO2: 262 / HCO3: 24 / SaO2: 98.0   VBG:   Lactate 24h Trend:  0.9 (10-17 @ 16:46),  0.7 (10-17 @ 03:54),  0.9 (10-17 @ 02:11),  0.5 (10-17 @ 00:34)  ---------------------------------------------------------------------------  UA:  (10-17 @ 16:51)  Color: Dark Yellow / Appearance: Turbid[!] / SG: >1.030[H] / pH: x / Gluc: 134[H] / Ketone: Negative  / Bili: Negative / Ubili: 1.0 / Blood: x / Prot: 100[!] / Nitrite: Negative / Leuk Kaity: Negative / RBC: 9[H] / WBC 16[H] / Sq Epi: x / Non Sq Epi: 5 / Bacteria: Negative  ---------------------------------------------------------------------------   HISTORY  83 y/o M PmHx recently diagnosed bilateral renal cell carcinoma, myasthenia gravis on prednisone and mycophenolate, atrial fibrillation on apixaban status post recent pacemaker placement for slow ventricular response, heart failure with preserved ejection fraction, severe aortic stenosis, and recent stroke with dysphagia and left sided weakness presenting to [facility] on 10/14 for severe anemia requiring blood transfusion. Patient was found to have pneumoperitoneum on a study obtained on 10/16 to identify the progression of his disease and specifically look for metastases. 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 Patient was taken to OR on 10/17 for Alec's procedure with end colostomy. Transferred to SICU due to hypotension requiring vasopressors during the case.  Patient was transferred out of the SICU and recovering on the floor.      SICU reconsulted today for hypotension.   An RRT was called on the floor for hypotension.  Cultures drawn, empiric antibiotics started.  Patient transferred to SICU for further management.  After discussion with family, patient made DNR/DNI with trial of non-invasive ventilation.  R IJ CVL placed for Norepinephrine.        SUBJECTIVE/ROS:  [ ] A ten-point review of systems was otherwise negative except as noted.  [ ] Due to altered mental status/intubation, subjective information were not able to be obtained from the patient. History was obtained, to the extent possible, from review of the chart and collateral sources of information.      NEURO  RASS:     GCS:     CAM ICU:  Exam: Lethargic, but arousable to voice.  A&O x 2. Weak and deconditioned.    Meds: acetaminophen     Tablet .. 650 milliGRAM(s) Oral every 6 hours PRN Mild Pain (1 - 3)    [x] Adequacy of sedation and pain control has been assessed and adjusted      RESPIRATORY  RR: 23 (10-23-24 @ 20:15) (18 - 43)  SpO2: 98% (10-23-24 @ 20:15) (90% - 100%)  Exam: Tachypneic, CTA B/L.   Mechanical Ventilation: N/A  ABG - ( 23 Oct 2024 12:56 )  pH: 7.40  /  pCO2: 33    /  pO2: 139   / HCO3: 20    / Base Excess: -3.7  /  SaO2: 98.0    Lactate: x      [ ] Extubation Readiness Assessed  Meds: albuterol/ipratropium for Nebulization 3 milliLiter(s) Nebulizer every 6 hours        CARDIOVASCULAR  HR: 60 (10-23-24 @ 20:15) (58 - 62)  BP: 111/56 (10-23-24 @ 20:15) (83/39 - 151/63)  BP(mean): 80 (10-23-24 @ 20:15) (57 - 90)  VBG - ( 23 Oct 2024 20:54 )  pH: 7.33  /  pCO2: 45    /  pO2: 37    / HCO3: 24    / Base Excess: -2.3  /  SaO2: 62.3   Lactate: 3.0      Exam: RRR. +S1, +S2.   Cardiac Rhythm: Sinus  Perfusion     [ ]Adequate   [ x] ]Inadequate  Mentation   [ ]Normal       [x ]Reduced  Extremities  [x ]Warm         [ ]Cool  Volume Status [ ]Hypervolemic [ ]Euvolemic [x ]Hypovolemic  Meds: norepinephrine Infusion 0.05 MICROgram(s)/kG/Min IV Continuous <Continuous>      GI/NUTRITION  Exam: + healing abdominal incision with no drainage. Ileostomy pink in color with stool in bag.   Diet: NPO  Meds: pantoprazole  Injectable 40 milliGRAM(s) IV Push every 24 hours      GENITOURINARY  I&O's Detail    10-22 @ 07:01  -  10-23 @ 07:00  --------------------------------------------------------  IN:  Total IN: 0 mL    OUT:    Colostomy (mL): 275 mL    Indwelling Catheter - Urethral (mL): 850 mL  Total OUT: 1125 mL    Total NET: -1125 mL      10-23 @ 07:01  -  10-23 @ 21:07  --------------------------------------------------------  IN:    IV PiggyBack: 250 mL    Lactated Ringers: 600 mL    Lactated Ringers Bolus: 1000 mL    Norepinephrine: 15.5 mL    Oral Fluid: 180 mL  Total IN: 2045.5 mL    OUT:    Colostomy (mL): 300 mL    Indwelling Catheter - Urethral (mL): 10 mL  Total OUT: 310 mL    Total NET: 1735.5 mL      10-23    137  |  106  |  22  ----------------------------<  100[H]  4.1   |  22  |  0.73    Ca    7.2[L]      23 Oct 2024 14:45  Phos  2.5     10-23  Mg     2.0     10-23    TPro  4.1[L]  /  Alb  1.8[L]  /  TBili  0.9  /  DBili  x   /  AST  14  /  ALT  8[L]  /  AlkPhos  79  10-23    [x ] Elliott catheter, indication: Retention, strict I's and O's   Meds: calcium gluconate IVPB 2 Gram(s) IV Intermittent once  folic acid Injectable 1 milliGRAM(s) IV Push daily  lactated ringers. 1000 milliLiter(s) IV Continuous <Continuous>      HEMATOLOGIC  Meds: apixaban 5 milliGRAM(s) Oral two times a day    [x] VTE Prophylaxis                        10.2   3.53  )-----------( 104      ( 23 Oct 2024 14:45 )             33.6       Transfusion     [ ] PRBC   [ ] Platelets   [ ] FFP   [ ] Cryoprecipitate      INFECTIOUS DISEASES  T(C): 36.4 (10-23-24 @ 19:00), Max: 37.7 (10-23-24 @ 13:25)  WBC Count: 3.53 K/uL (10-23 @ 14:45)  WBC Count: 3.48 K/uL (10-23 @ 07:32)    Recent Cultures:    Meds: mycophenolate mofetil 500 milliGRAM(s) Oral daily  piperacillin/tazobactam IVPB.. 3.375 Gram(s) IV Intermittent every 8 hours        ENDOCRINE  Capillary Blood Glucose    Meds: hydrocortisone sodium succinate Injectable 50 milliGRAM(s) IV Push every 6 hours      ACCESS DEVICES:  [x ] Peripheral IV  [x ] Central Venous Line	[x ] R	[ ] L	[x ] IJ	[ ] Fem	[ ] SC	Placed: 10/23  [ ] Arterial Line		[ ] R	[ ] L	[ ] Fem	[ ] Rad	[ ] Ax	Placed:   [ ] PICC:					[ ] Mediport  [x ] Urinary Catheter, Date Placed:   [ ] Necessity of urinary, arterial, and venous catheters discussed    OTHER MEDICATIONS:  chlorhexidine 2% Cloths 1 Application(s) Topical daily  chlorhexidine 4% Liquid 1 Application(s) Topical <User Schedule>      CODE STATUS: DNI: Trial NIV        IMAGING:

## 2024-10-23 NOTE — PROCEDURE NOTE - NSSITEPREP_SKIN_A_CORE
povidone iodine (if allergic to chlorhexidine)/Adherence to aseptic technique: hand hygiene prior to donning barriers (gown, gloves), don cap and mask, sterile drape over patient
povidone iodine (if allergic to chlorhexidine)
chlorhexidine
povidone iodine (if allergic to chlorhexidine)

## 2024-10-23 NOTE — PROGRESS NOTE ADULT - ASSESSMENT
ASSESSMENT:  85 y/o M PmHx recently diagnosed bilateral renal cell carcinoma, myasthenia gravis on prednisone and mycophenolate, atrial fibrillation on apixaban status post recent pacemaker placement for slow ventricular response, heart failure with preserved ejection fraction, severe aortic stenosis, and recent stroke with dysphagia and left sided weakness presenting to [facility] on 10/14 for severe anemia requiring blood transfusion. Patient was found to have pneumoperitoneum on a study obtained on 10/16 to identify the progression of his disease and specifically look for metastases. 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 Patient was taken to OR on 10/17 for Alec's procedure with end colostomy. Transferred to SICU due to hypotension requiring vasopressors during the case.  Patient was transferred out of the SICU and recovering on the floor.      SICU reconsulted today for hypotension.   An RRT was called on the floor for hypotension.  Cultures drawn, empiric antibiotics started.  Patient transferred to SICU for further management.  After discussion with family, patient made DNR/DNI with trial of non-invasive ventilation.  R IJ CVL placed for Norepinephrine.      PLAN:  NEURO: Hx myasthenia gravis, CVA   - Pain control: Tylenol, PRN Dilaudid   - Hx Myasthenia gravis- on stress dose steroids (on home Prednisone/Cellcept)     RESPIRATORY  - Extubated to aerosol mask 10/17  - Monitor CXR, blood gas     CARDIOVASCULAR: Hx AF (on home Eliquis) with leadless pacemaker, HFpEF with severe AS/AR  - Hemodynamic monitoring  - Norepinephrine for MAP>65  - Bolused 500mL of LR after appearing hypovolemic on ultrasound   - TTE: EF 60-65%, severe AS, MR, TR and LVH    GI: s/p Alec's on 10/17   -     /RENAL  - LR @100cc/hr  - Hx chronic Elliott for urinary retention > urology exchanged Coude catheter > urology to preform cystoscopy today d/t concern for false tract  - Monitor SCr, replete electrolytes as needed     HEMATOLOGIC:  - s/p 2U PRBC 10/14 and 3U PRBC 10/17  - Monitor H/H  - Chemical VTE ppx: Lovenox  - Mechanical DVT ppx; SCDs    INFECTIOUS DISEASE  - s/p Vanco and Zosyn x 1, continue empiric zosyn  - f/u blood cultures     ENDOCRINE  - Start stress dose steroids     DISPO  -SICU ASSESSMENT:  83 y/o M PmHx recently diagnosed bilateral renal cell carcinoma, myasthenia gravis on prednisone and mycophenolate, atrial fibrillation on apixaban status post recent pacemaker placement for slow ventricular response, heart failure with preserved ejection fraction, severe aortic stenosis, and recent stroke with dysphagia and left sided weakness presenting to [facility] on 10/14 for severe anemia requiring blood transfusion. Patient was found to have pneumoperitoneum on a study obtained on 10/16 to identify the progression of his disease and specifically look for metastases. 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 Patient was taken to OR on 10/17 for Alec's procedure with end colostomy. Transferred to SICU due to hypotension requiring vasopressors during the case.  Patient was transferred out of the SICU and recovering on the floor.      SICU reconsulted today for hypotension.   An RRT was called on the floor for hypotension.  Cultures drawn, empiric antibiotics started.  Patient transferred to SICU for further management.  After discussion with family, patient made DNR/DNI with trial of non-invasive ventilation.  R IJ CVL placed for Norepinephrine.      PLAN:  NEURO: Hx myasthenia gravis, CVA   - Pain control: Tylenol, PRN Dilaudid   - Hx Myasthenia gravis- on stress dose steroids (on home Prednisone/Cellcept)     RESPIRATORY  - Extubated to aerosol mask 10/17  - Monitor CXR, blood gas   - large left pleural effusion    CARDIOVASCULAR: Hx AF (on home Eliquis) with leadless pacemaker, HFpEF with severe AS/AR  - Hemodynamic monitoring  - Norepinephrine for MAP>65  - Bolused 500mL x2 of LR after appearing hypovolemic on ultrasound   - TTE: EF 60-65%, severe AS, MR, TR and LVH  - central line placed    GI: s/p Alec's on 10/17   - NPO  - s/p MBS study 10/22  - cleared for minced-moist and mildly thick liquids via single small sips  - protonix daily    /RENAL  - LR @100cc/hr  - Hx chronic Elliott for urinary retention > urology exchanged Coude catheter > urology to preform cystoscopy today d/t concern for false tract  - Monitor SCr, replete electrolytes as needed     HEMATOLOGIC:  - s/p 2U PRBC 10/14 and 3U PRBC 10/17  - Monitor H/H  - eliquis BID  - Mechanical DVT ppx; SCDs    INFECTIOUS DISEASE  - s/p Vanco and Zosyn x 1, continue empiric zosyn  - f/u blood cultures sent from RRT    ENDOCRINE  - Start stress dose steroids q6    DISPO  -SICU ASSESSMENT:  85 y/o M PmHx recently diagnosed bilateral renal cell carcinoma, myasthenia gravis on prednisone and mycophenolate, atrial fibrillation on apixaban status post recent pacemaker placement for slow ventricular response, heart failure with preserved ejection fraction, severe aortic stenosis, and recent stroke with dysphagia and left sided weakness presenting to [facility] on 10/14 for severe anemia requiring blood transfusion. Patient was found to have pneumoperitoneum on a study obtained on 10/16 to identify the progression of his disease and specifically look for metastases. 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 Patient was taken to OR on 10/17 for Alec's procedure with end colostomy. Transferred to SICU due to hypotension requiring vasopressors during the case.  Patient was transferred out of the SICU and recovering on the floor.      SICU reconsulted today for hypotension.   An RRT was called on the floor for hypotension.  Cultures drawn, empiric antibiotics started.  Patient transferred to SICU for further management.  After discussion with family, patient made DNR/DNI with trial of non-invasive ventilation.  R IJ CVL placed for Norepinephrine.      PLAN:  NEURO: Hx myasthenia gravis, CVA   - Tylenol prn   - Continue Myasthenia gravis meds - Cellcept and steroids     RESPIRATORY  - Large left pleural effusion on CXR - f/u with lung ultrasound  - Tachypneic but saturating well on 2L NC  - DNI, but with trial of non-invasive ventilation     CARDIOVASCULAR: Hypotension; Hx AF (on home Eliquis) with leadless pacemaker, HFpEF with severe AS/AR  - Hemodynamic monitoring  - Norepinephrine for MAP>65  - Bolused 500mL x2 of LR after appearing hypovolemic on ultrasound   - Repeat POCUS   - TTE: EF 60-65%, severe AS, MR, TR and LVH    GI: s/p Alec's on 10/17   - NPO  - Being followed by S/S, cleared for mince and moist diet but high aspiration risk.  Aspiration as possible source of hypotension and tachypnea?  - protonix daily    /RENAL  - LR @100cc/hr, s/p total of 1L LR bolus   - Elliott (chronic with urology following this hospital stay)   - Monitor SCr, replete electrolytes as needed     HEMATOLOGIC:  - Eliquis BID   - Monitor H/H  - b/l SCDs    INFECTIOUS DISEASE  - s/p Vanco and Zosyn x 1, continue empiric zosyn  - f/u blood cultures sent from RRT  - Trend WBC   - Consider CT chest/AP to eval source     ENDOCRINE  - Start stress dose steroids 50mg q6 hrs     LINES  - R IJ CVL (10/23)     DISPO: SICU care.  DNR/DNI with trial of non-invasive ventilation.  Case discussed with Dr. Cadet.    Jayne Elizabeth, PA-C #[id number]

## 2024-10-23 NOTE — PROGRESS NOTE ADULT - PROBLEM SELECTOR PLAN 12
DVT ppx: SCDs   Diet: Clear liquid currently, NPO after midnight   Dispo: Home  Code Status: FULL CODE  Discussed plan for CT scan, holding off on scope and diet with daughter at via phone Aretha [phone]. also discussed with wife at bedside DVT ppx: SCDs   Code Status: FULL CODE

## 2024-10-23 NOTE — PROGRESS NOTE ADULT - PROBLEM SELECTOR PLAN 9
Pt had stroke August 2024 with residual L weakness. w/ residual dysphagia.   c/w  aspirin 81 and atorvastatin 40  Seen by SLP  s/p MBS  - SLP recs; puree and moderately thick liquids ; appears to be tolerating Pt had stroke August 2024 with residual L weakness. w/ residual dysphagia.   c/w  aspirin 81 and atorvastatin 40  Seen by SLP  s/p MBS  - SLP recs; minced and moist with moderately thick liquids ; per RN some coughing noted on eating ; appears to be tolerating for the most part

## 2024-10-23 NOTE — PROGRESS NOTE ADULT - PROBLEM SELECTOR PLAN 2
- Likely multifactorial. 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

## 2024-10-23 NOTE — PROGRESS NOTE ADULT - SUBJECTIVE AND OBJECTIVE BOX
SURGERY DAILY PROGRESS NOTE:       Subjective / Overnight events:    Swallow study completed;  puree and moderately thick as pt exhibited overt s/s of laryngeal penetration/aspiration on mildly thick liquids and edentulous.       Objective:      MEDICATIONS  (STANDING):  albuterol/ipratropium for Nebulization 3 milliLiter(s) Nebulizer every 12 hours  apixaban 5 milliGRAM(s) Oral two times a day  dextrose 5%. 1000 milliLiter(s) (100 mL/Hr) IV Continuous <Continuous>  dextrose 5%. 1000 milliLiter(s) (50 mL/Hr) IV Continuous <Continuous>  dextrose 50% Injectable 25 Gram(s) IV Push once  dextrose 50% Injectable 25 Gram(s) IV Push once  dextrose 50% Injectable 12.5 Gram(s) IV Push once  finasteride 5 milliGRAM(s) Oral daily  folic acid Injectable 1 milliGRAM(s) IV Push daily  glucagon  Injectable 1 milliGRAM(s) IntraMuscular once  insulin lispro (ADMELOG) corrective regimen sliding scale   SubCutaneous at bedtime  insulin lispro (ADMELOG) corrective regimen sliding scale   SubCutaneous three times a day before meals  mycophenolate mofetil 500 milliGRAM(s) Oral daily  pantoprazole  Injectable 40 milliGRAM(s) IV Push every 24 hours  predniSONE   Tablet 5 milliGRAM(s) Oral daily  tamsulosin 0.8 milliGRAM(s) Oral at bedtime    MEDICATIONS  (PRN):  dextrose Oral Gel 15 Gram(s) Oral once PRN Blood Glucose LESS THAN 70 milliGRAM(s)/deciliter      Vital Signs Last 24 Hrs  T(C): 36.7 (23 Oct 2024 05:05), Max: 37.3 (23 Oct 2024 00:56)  T(F): 98.1 (23 Oct 2024 05:05), Max: 99.1 (23 Oct 2024 00:56)  HR: 58 (23 Oct 2024 05:05) (57 - 62)  BP: 151/63 (23 Oct 2024 05:05) (130/62 - 165/57)  BP(mean): --  RR: 18 (23 Oct 2024 05:05) (18 - 18)  SpO2: 96% (23 Oct 2024 05:05) (91% - 99%)    Parameters below as of 23 Oct 2024 05:05  Patient On (Oxygen Delivery Method): nasal cannula  O2 Flow (L/min): 2      I&O's Detail    22 Oct 2024 07:01  -  23 Oct 2024 07:00  --------------------------------------------------------  IN:  Total IN: 0 mL    OUT:    Colostomy (mL): 275 mL    Indwelling Catheter - Urethral (mL): 850 mL  Total OUT: 1125 mL    Total NET: -1125 mL          Daily     Daily     LABS:                        9.5    5.30  )-----------( 116      ( 22 Oct 2024 07:26 )             31.0     10-22    138  |  106  |  22  ----------------------------<  133[H]  4.0   |  24  |  0.41[L]    Ca    7.6[L]      22 Oct 2024 07:32  Phos  2.1     10-22  Mg     2.2     10-22        Urinalysis Basic - ( 22 Oct 2024 07:32 )    Color: x / Appearance: x / SG: x / pH: x  Gluc: 133 mg/dL / Ketone: x  / Bili: x / Urobili: x   Blood: x / Protein: x / Nitrite: x   Leuk Esterase: x / RBC: x / WBC x   Sq Epi: x / Non Sq Epi: x / Bacteria: x          PHYSICAL EXAM  GEN: resting in bed comfortably in NAD  RESP: no increased WOB, no tachypnea  ABD: soft, non-distended, non-tender to palpation. Stoma pink, patent and productive with gas and stool in ostomy bag.   EXTR: warm, well-perfused without gross deformities; spontaneous movement in b/l U/L extrem  NEURO: awake, alert

## 2024-10-23 NOTE — PROGRESS NOTE ADULT - PROBLEM SELECTOR PLAN 11
- Per pervious [facility] note (8/2024), pt not candidate for TAVR - Per previous Cardiology note (8/2024), pt not candidate for TAVR

## 2024-10-23 NOTE — RAPID RESPONSE TEAM SUMMARY - NSSITUATIONBACKGROUNDRRT_GEN_ALL_CORE
Mr. Clark is an 84M with PMH of recently diagnosed bilateral renal cell carcinoma, myasthenia gravis on prednisone and mycophenolate, afib on apixaban s/p PPM for slow ventricular response, HFpEF, severe aortic stenosis as well as aortic insufficiency, and recent stroke with dysphagia and left sided weakness presenting to [facility] on 10/14 for severe anemia requiring blood transfusion. Patient was found to have pneumoperitoneum on a study obtained on 10/16 to identify the progression of his disease and specifically look for metastases. 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 Patient was taken to OR on 10/17 for Alec's procedure with end colostomy. Transferred to SICU due to hypotension requiring vasopressors during the case.  Patient was transferred out of the SICU and recovering on the floor.  RRT was called on the floor for hypotension with SBP 76/37, HR 60 (noted with PPM), RR 20, sat well on 2L NC FS 88. No bleeding noted per nursing staff, pt able to interact but not answering questions which was a change from baseline.

## 2024-10-23 NOTE — PROGRESS NOTE ADULT - ASSESSMENT
85 y/o M PmHx recently diagnosed bilateral renal cell carcinoma, myasthenia gravis on prednisone and mycophenolate, atrial fibrillation on apixaban status post recent pacemaker placement for slow ventricular response, heart failure with preserved ejection fraction, severe aortic stenosis, and recent stroke with dysphagia and left sided weakness presenting to [facility] for severe anemia requiring blood transfusion. Patient was found to have pneumoperitoneum on a study obtained today 10/16 to identify the progression of his disease and specifically look for metastases. 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 Patient was taken to OR for Chopra's procedure with end colostomy. Transferred to SICU d/t hypotension requiring vasopressors during the case. Now downgraded to the floor. Swallow study completed. He was restarted on his eliquis and will be discharged with a butt catheter.  Prednisone and cellcept restarted.     PLAN:  - SLP recs; puree and moderately thick liquids   - pain control as needed   - monitor ostomy output  - dispo planning     ACS/Trauma surgery  p [id number]

## 2024-10-23 NOTE — RAPID RESPONSE TEAM SUMMARY - NSADDTLFINDINGSRRT_GEN_ALL_CORE
Rectal temp was 102.6, for which Ofirmev was given, cultures/VBG drawn, empiric antibiotics started (zosyn and then per SICU vancomycin added). Pt noted to have significant edema in all extremities, coupled with HFpEF, gentle fluids were started with 250 cc's, with no significant improvement. Pt also noted with wheezing on exam for which 1 duonebs treatment was givenn. EKG without changes. SICU team, including attending joined, initially increased fluid bolus, but patient noted with some desaturation so fluids transitioned to levophed via 20G IV placed during rapid and oxygen increased to 5L NC. Patient was then transferred to SICU.

## 2024-10-24 NOTE — PROGRESS NOTE ADULT - SUBJECTIVE AND OBJECTIVE BOX
SURGERY DAILY PROGRESS NOTE:     24h events:   - readmitted to SICU after rrt for hypotesnion   - Cultures drawn, vanc x1 and zosyn started   - R IJ CVL placed for Norepinephrine.    SUBJECTIVE/ROS: Patient seen and examined. Patient feels well  Denies nausea, vomiting, chest pain, shortness of breath     MEDICATIONS  (STANDING):  albuterol/ipratropium for Nebulization 3 milliLiter(s) Nebulizer every 6 hours  apixaban 5 milliGRAM(s) Oral two times a day  chlorhexidine 2% Cloths 1 Application(s) Topical daily  chlorhexidine 4% Liquid 1 Application(s) Topical <User Schedule>  folic acid Injectable 1 milliGRAM(s) IV Push daily  hydrocortisone sodium succinate Injectable 50 milliGRAM(s) IV Push every 6 hours  lactated ringers. 1000 milliLiter(s) (100 mL/Hr) IV Continuous <Continuous>  mycophenolate mofetil 500 milliGRAM(s) Oral daily  pantoprazole  Injectable 40 milliGRAM(s) IV Push every 24 hours  piperacillin/tazobactam IVPB.. 3.375 Gram(s) IV Intermittent every 8 hours    MEDICATIONS  (PRN):  acetaminophen     Tablet .. 650 milliGRAM(s) Oral every 6 hours PRN Mild Pain (1 - 3)      OBJECTIVE:  Vital Signs Last 24 Hrs  T(C): 36.6 (24 Oct 2024 03:00), Max: 37.7 (23 Oct 2024 13:25)  T(F): 97.8 (24 Oct 2024 03:00), Max: 99.8 (23 Oct 2024 13:25)  HR: 60 (24 Oct 2024 07:00) (60 - 62)  BP: 133/62 (24 Oct 2024 07:00) (83/39 - 135/63)  BP(mean): 89 (24 Oct 2024 07:00) (57 - 90)  RR: 37 (24 Oct 2024 07:00) (18 - 43)  SpO2: 99% (24 Oct 2024 07:00) (90% - 100%)    Parameters below as of 24 Oct 2024 03:00  Patient On (Oxygen Delivery Method): nasal cannula  O2 Flow (L/min): 3    I&O's Detail    23 Oct 2024 07:01  -  24 Oct 2024 07:00  --------------------------------------------------------  IN:    IV PiggyBack: 350 mL    IV PiggyBack: 175 mL    Lactated Ringers: 1700 mL    Lactated Ringers Bolus: 1000 mL    Norepinephrine: 19.7 mL    Oral Fluid: 180 mL  Total IN: 3424.7 mL    OUT:    Colostomy (mL): 400 mL    Indwelling Catheter - Urethral (mL): 50 mL  Total OUT: 450 mL    Total NET: 2974.7 mL        Daily     Daily Weight in k.8 (24 Oct 2024 05:00)    LABS:                        10.2   7.99  )-----------( 114      ( 23 Oct 2024 21:14 )             34.0     10-    138  |  107  |  27[H]  ----------------------------<  94  4.5   |  21[L]  |  0.90    Ca    7.7[L]      24 Oct 2024 03:56  Phos  4.3     10-24  Mg     2.0     10-24    TPro  4.5[L]  /  Alb  1.9[L]  /  TBili  0.9  /  DBili  x   /  AST  20  /  ALT  9[L]  /  AlkPhos  79  10-24    PT/INR - ( 23 Oct 2024 22:26 )   PT: 22.9 sec;   INR: 2.01 ratio    PTT - ( 23 Oct 2024 22:26 )  PTT:34.5 sec    Urinalysis Basic - ( 24 Oct 2024 03:56 )  Color: x / Appearance: x / SG: x / pH: x  Gluc: 94 mg/dL / Ketone: x  / Bili: x / Urobili: x   Blood: x / Protein: x / Nitrite: x   Leuk Esterase: x / RBC: x / WBC x   Sq Epi: x / Non Sq Epi: x / Bacteria: x      PHYSICAL EXAM  GEN: resting in bed comfortably in NAD  RESP: no increased WOB, no tachypnea  ABD: soft, non-distended, non-tender to palpation. Stoma pink, patent and productive with gas and stool in ostomy bag.   EXTR: warm, well-perfused without gross deformities; spontaneous movement in b/l U/L extrem  NEURO: awake, alert

## 2024-10-24 NOTE — PROGRESS NOTE ADULT - ASSESSMENT
PLAN:  NEURO: Hx myasthenia gravis, CVA   - Pain control: Tylenol, PRN Dilaudid   - Hx Myasthenia gravis- on stress dose steroids (on home Prednisone/Cellcept)     RESPIRATORY  - Extubated to aerosol mask 10/17  - Monitor CXR, blood gas   - CXR with large left pleural effusion  - Duonebs q6    CARDIOVASCULAR: Hx AF (on home Eliquis) with leadless pacemaker, HFpEF with severe AS/AR  - Hemodynamic monitoring  - Norepinephrine for MAP>65  - Bolused 500mL x2 of LR after appearing hypovolemic on ultrasound   - TTE: EF 60-65%, severe AS, MR, TR and LVH  - central line placed    GI: s/p Alec's on 10/17   - NPO  - s/p MBS study 10/22  - cleared for minced-moist and mildly thick liquids via single small sips  - protonix daily    /RENAL  - LR @100cc/hr  - Hx chronic Elliott for urinary retention > urology exchanged Coude catheter > urology to preform cystoscopy today d/t concern for false tract  - Monitor SCr, replete electrolytes as needed     HEMATOLOGIC:  - s/p 2U PRBC 10/14 and 3U PRBC 10/17  - Monitor H/H  - eliquis BID  - Mechanical DVT ppx; SCDs    INFECTIOUS DISEASE  - s/p Vanco and Zosyn x 1, continue empiric zosyn  - f/u blood cultures sent from RRT    ENDOCRINE  - Start stress dose steroids q6    DISPO  -SICU     Lines   - R IJ CVL 84M PmHx recently diagnosed bilateral renal cell carcinoma, myasthenia gravis on prednisone and mycophenolate, atrial fibrillation on apixaban s/p recent pacemaker placement for slow ventricular response, heart failure with preserved ejection fraction, severe aortic stenosis, and recent stroke with dysphagia and left sided weakness presenting to [facility] for severe anemia requiring blood transfusion. Patient was found to have pneumoperitoneum on a study obtained 10/16 to identify the progression of his disease and specifically look for metastases. 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 Patient was taken to OR for Chopra's procedure with end colostomy 10/17. Transferred to SICU d/t hypotension requiring vasopressors during the case.   Pt transferred out of SICU 10/18 but SICU reconsulted 10/23 after RRT was called on the floor for hypotension.  Patient transferred to SICU for further management.  After discussion with family, patient made DNR/DNI with trial of non-invasive ventilation.      PLAN:  NEURO: Hx myasthenia gravis, CVA   - Pain control: Tylenol, PRN Dilaudid   - Hx Myasthenia gravis- on stress dose steroids (on home Prednisone/Cellcept)     RESPIRATORY  - Extubated to aerosol mask 10/17  - Monitor CXR, blood gas   - CXR with large left pleural effusion  - Duonebs q6    CARDIOVASCULAR: Hx AF (on home Eliquis) with leadless pacemaker, HFpEF with severe AS/AR  - Hemodynamic monitoring  - Norepinephrine for MAP>65  - Bolused 500mL x2 of LR after appearing hypovolemic on ultrasound   - TTE: EF 60-65%, severe AS, MR, TR and LVH  - central line placed    GI: s/p Alec's on 10/17   - NPO  - s/p MBS study 10/22  - cleared for minced-moist and mildly thick liquids via single small sips  - protonix daily    /RENAL  - LR @100cc/hr  - Hx chronic Elliott for urinary retention > urology exchanged Coude catheter > urology to preform cystoscopy today d/t concern for false tract  - Monitor SCr, replete electrolytes as needed     HEMATOLOGIC:  - s/p 2U PRBC 10/14 and 3U PRBC 10/17  - Monitor H/H  - eliquis BID  - Mechanical DVT ppx; SCDs    INFECTIOUS DISEASE  - s/p Vanco and Zosyn x 1, continue empiric zosyn  - f/u blood cultures sent from RRT    ENDOCRINE  - Start stress dose steroids q6    DISPO  -SICU     Lines   - R IJ CVL Statement Selected

## 2024-10-24 NOTE — PROGRESS NOTE ADULT - PROBLEM SELECTOR PLAN 2
- Likely multifactorial. 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

## 2024-10-24 NOTE — PROGRESS NOTE ADULT - SUBJECTIVE AND OBJECTIVE BOX
24 HOUR EVENTS:  - readmitted to SICU after rrt for hypotesnion   - Cultures drawn, vanc x1 and zosyn started   - R IJ CVL placed for Norepinephrine.        NEURO  Exam: awake, alert, oriented  Meds: acetaminophen     Tablet .. 650 milliGRAM(s) Oral every 6 hours PRN Mild Pain (1 - 3)    [x] Adequacy of sedation and pain control has been assessed and adjusted      RESPIRATORY  RR: 22 (10-23-24 @ 23:00) (18 - 43)  SpO2: 97% (10-23-24 @ 23:00) (90% - 100%)  Wt(kg): --  Exam: unlabored, clear to auscultation bilaterally  Mechanical Ventilation:   ABG - ( 23 Oct 2024 12:56 )  pH: 7.40  /  pCO2: 33    /  pO2: 139   / HCO3: 20    / Base Excess: -3.7  /  SaO2: 98.0    Lactate: x                [N/A] Extubation Readiness Assessed  Meds: albuterol/ipratropium for Nebulization 3 milliLiter(s) Nebulizer every 6 hours        CARDIOVASCULAR  HR: 60 (10-23-24 @ 23:00) (58 - 62)  BP: 115/56 (10-23-24 @ 23:00) (83/39 - 151/63)  BP(mean): 81 (10-23-24 @ 23:00) (57 - 90)  ABP: --  ABP(mean): --  Wt(kg): --  CVP(cm H2O): --  VBG - ( 23 Oct 2024 20:54 )  pH: 7.33  /  pCO2: 45    /  pO2: 37    / HCO3: 24    / Base Excess: -2.3  /  SaO2: 62.3   Lactate: 3.0                Exam: regular rate and rhythm  Cardiac Rhythm: sinus  Perfusion     [x]Adequate   [ ]Inadequate  Mentation   [x]Normal       [ ]Reduced  Extremities  [x]Warm         [ ]Cool  Volume Status [ ]Hypervolemic [x]Euvolemic [ ]Hypovolemic  Meds: norepinephrine Infusion 0.05 MICROgram(s)/kG/Min IV Continuous <Continuous>        GI/NUTRITION  Exam: soft, nontender, nondistended, incision C/D/I  Diet:  Meds: pantoprazole  Injectable 40 milliGRAM(s) IV Push every 24 hours      GENITOURINARY  I&O's Detail    10-22 @ 07:01  -  10-23 @ 07:00  --------------------------------------------------------  IN:  Total IN: 0 mL    OUT:    Colostomy (mL): 275 mL    Indwelling Catheter - Urethral (mL): 850 mL  Total OUT: 1125 mL    Total NET: -1125 mL      10-23 @ 07:01  -  10-24 @ 00:33  --------------------------------------------------------  IN:    IV PiggyBack: 350 mL    IV PiggyBack: 75 mL    Lactated Ringers: 1000 mL    Lactated Ringers Bolus: 1000 mL    Norepinephrine: 19.7 mL    Oral Fluid: 180 mL  Total IN: 2624.7 mL    OUT:    Colostomy (mL): 300 mL    Indwelling Catheter - Urethral (mL): 25 mL  Total OUT: 325 mL    Total NET: 2299.7 mL          10-23    136  |  105  |  24[H]  ----------------------------<  93  4.3   |  20[L]  |  0.82    Ca    7.2[L]      23 Oct 2024 21:14  Phos  3.9     10-23  Mg     2.0     10-23    TPro  4.3[L]  /  Alb  1.8[L]  /  TBili  1.1  /  DBili  x   /  AST  22  /  ALT  9[L]  /  AlkPhos  80  10-23    [ ] Elliott catheter, indication: N/A  Meds: folic acid Injectable 1 milliGRAM(s) IV Push daily  lactated ringers. 1000 milliLiter(s) IV Continuous <Continuous>        HEMATOLOGIC  Meds: apixaban 5 milliGRAM(s) Oral two times a day    [x] VTE Prophylaxis                        10.2   7.99  )-----------( 114      ( 23 Oct 2024 21:14 )             34.0     PT/INR - ( 23 Oct 2024 22:26 )   PT: 22.9 sec;   INR: 2.01 ratio         PTT - ( 23 Oct 2024 22:26 )  PTT:34.5 sec      INFECTIOUS DISEASES  WBC Count: 7.99 K/uL (10-23 @ 21:14)  WBC Count: 3.53 K/uL (10-23 @ 14:45)  WBC Count: 3.48 K/uL (10-23 @ 07:32)    RECENT CULTURES:    Meds: mycophenolate mofetil 500 milliGRAM(s) Oral daily  piperacillin/tazobactam IVPB.. 3.375 Gram(s) IV Intermittent every 8 hours        ENDOCRINE  CAPILLARY BLOOD GLUCOSE      POCT Blood Glucose.: 89 mg/dL (23 Oct 2024 12:53)  POCT Blood Glucose.: 88 mg/dL (23 Oct 2024 12:23)  POCT Blood Glucose.: 66 mg/dL (23 Oct 2024 12:17)  POCT Blood Glucose.: 85 mg/dL (23 Oct 2024 09:07)    Meds: hydrocortisone sodium succinate Injectable 50 milliGRAM(s) IV Push every 6 hours        CODE STATUS:  DNI: Trial NIV

## 2024-10-24 NOTE — PROGRESS NOTE ADULT - PROBLEM SELECTOR PLAN 9
[id number]  Chief Complaint   Patient presents with   • Routine Prenatal Visit     No COmplaints/concerns, Good fetal movement         HPI  Nelsy is a  currently at 26w6d who today reports the following:     Doing well - no c/o   Good FM   Not taking labetalol - never took it.  Reports BP's are good home 120'/70's      EXAM  /74   Wt 101 kg (223 lb)   LMP 2021   BMI 35.99 kg/m²  -See Prenatal Assessment  General Appearance:  Pleasant  Lungs: Breathing unlabored  Abdomen:  See flow sheet for Fundal ht, FM, FHT's  LE: Neg edema  V/E: Not performed     Social History     Tobacco Use   • Smoking status: Never Smoker   • Smokeless tobacco: Never Used   Vaping Use   • Vaping Use: Never used   Substance Use Topics   • Alcohol use: No   • Drug use: No         Lab Results   Component Value Date    ABO O 2021    RH Positive 2021    ABSCRN Negative 2021       MDM  Impression: Supervision of normal pregnancy   Previous C/S x 3 - plans permanent sterilization / certain    Tests done today: none   Topics discussed: continue to note good FM  Flu vaccination  T-dap   Nutrition reviewed   encouraged questions - call prn   Written info optional/provided:  -T-DAP   Tests next visit: none            - not currently on any home meds, previously on Entresto but stopped  - hypotensive overnight requiring pressor support

## 2024-10-24 NOTE — PROGRESS NOTE ADULT - SUBJECTIVE AND OBJECTIVE BOX
Patient is a 84y old  Male who presents with a chief complaint of Anemia (24 Oct 2024 12:50)      SUBJECTIVE / OVERNIGHT EVENTS:  Pt seen and examined. Noted to be hypotensive yesterday; s/p RRT  and transferred to SICU for pressor support. Limited ROS on account of somnolence. Denies CP and  SOB.   Pt is now DNR/DNI.    MEDICATIONS  (STANDING):  acetaminophen   IVPB .. 1000 milliGRAM(s) IV Intermittent once  albumin human  5% IVPB 250 milliLiter(s) IV Intermittent once  albuterol/ipratropium for Nebulization 3 milliLiter(s) Nebulizer every 6 hours  apixaban 5 milliGRAM(s) Oral two times a day  chlorhexidine 2% Cloths 1 Application(s) Topical daily  chlorhexidine 4% Liquid 1 Application(s) Topical <User Schedule>  folic acid Injectable 1 milliGRAM(s) IV Push daily  hydrocortisone sodium succinate Injectable 50 milliGRAM(s) IV Push every 6 hours  lactated ringers. 1000 milliLiter(s) (100 mL/Hr) IV Continuous <Continuous>  mycophenolate mofetil 500 milliGRAM(s) Oral daily  pantoprazole  Injectable 40 milliGRAM(s) IV Push every 24 hours  piperacillin/tazobactam IVPB.. 3.375 Gram(s) IV Intermittent every 8 hours    MEDICATIONS  (PRN):  acetaminophen     Tablet .. 650 milliGRAM(s) Oral every 6 hours PRN Mild Pain (1 - 3)      Vital Signs Last 24 Hrs  T(C): 36.7 (24 Oct 2024 12:00), Max: 36.7 (24 Oct 2024 12:00)  T(F): 98.1 (24 Oct 2024 12:00), Max: 98.1 (24 Oct 2024 12:00)  HR: 60 (24 Oct 2024 14:00) (60 - 62)  BP: 133/61 (24 Oct 2024 14:00) (90/45 - 138/63)  BP(mean): 88 (24 Oct 2024 14:00) (65 - 90)  RR: 15 (24 Oct 2024 14:00) (15 - 43)  SpO2: 98% (24 Oct 2024 14:00) (92% - 100%)    Parameters below as of 24 Oct 2024 11:32  Patient On (Oxygen Delivery Method): nasal cannula      CAPILLARY BLOOD GLUCOSE        I&O's Summary    23 Oct 2024 07:01  -  24 Oct 2024 07:00  --------------------------------------------------------  IN: 3424.7 mL / OUT: 455 mL / NET: 2969.7 mL    24 Oct 2024 07:01  -  24 Oct 2024 15:50  --------------------------------------------------------  IN: 750 mL / OUT: 60 mL / NET: 690 mL        PHYSICAL EXAM:  GENERAL: NAD, frail  HEAD:  Atraumatic, Normocephalic  EYES: conjunctiva and sclera clear  NECK: Supple, No JVD  CHEST/LUNG: Clear to auscultation bilaterally; No wheeze; +transmitted sounds  HEART: Regular rate and rhythm; No murmurs, rubs, or gallops  ABDOMEN: Soft, Nontender, Nondistended; Bowel sounds present; +ostomy with formed stool  EXTREMITIES:  left UE swelling , nontender  PSYCH: somnolent but arousable, oriented x 1-2  NEUROLOGY: non-focal  SKIN: No rashes or lesions    LABS:                        9.9    8.75  )-----------( 118      ( 24 Oct 2024 09:18 )             32.7     10-24    138  |  105  |  30[H]  ----------------------------<  97  4.5   |  21[L]  |  1.08    Ca    7.9[L]      24 Oct 2024 14:28  Phos  4.9     10-24  Mg     2.0     10-24    TPro  4.6[L]  /  Alb  2.0[L]  /  TBili  0.9  /  DBili  x   /  AST  20  /  ALT  11  /  AlkPhos  82  10-24    PT/INR - ( 24 Oct 2024 09:18 )   PT: 25.3 sec;   INR: 2.24 ratio         PTT - ( 24 Oct 2024 09:18 )  PTT:37.3 sec      Urinalysis Basic - ( 24 Oct 2024 14:28 )    Color: x / Appearance: x / SG: x / pH: x  Gluc: 97 mg/dL / Ketone: x  / Bili: x / Urobili: x   Blood: x / Protein: x / Nitrite: x   Leuk Esterase: x / RBC: x / WBC x   Sq Epi: x / Non Sq Epi: x / Bacteria: x    CXR 10/23  Right-sided central line terminates in the SVC. No pneumothorax. Moderate   left-sided pleural effusion.

## 2024-10-24 NOTE — PROGRESS NOTE ADULT - PROBLEM SELECTOR PLAN 11
Pt with MG diagnosis. Not in MG crisis at this time  - c/w Cellcept 500mg  QD  - on prednisone 5mg QD at home  - started on stress dose steroids in SICU

## 2024-10-24 NOTE — PROGRESS NOTE ADULT - ASSESSMENT
83 y/o M PmHx recently diagnosed bilateral renal cell carcinoma, myasthenia gravis on prednisone and mycophenolate, atrial fibrillation on apixaban status post recent pacemaker placement for slow ventricular response, heart failure with preserved ejection fraction, severe aortic stenosis, and recent stroke with dysphagia and left sided weakness presenting to [facility] for severe anemia requiring blood transfusion. Patient was found to have pneumoperitoneum on a study obtained today 10/16 to identify the progression of his disease and specifically look for metastases. 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 Patient was taken to OR for Chopra's procedure with end colostomy. Transferred to SICU d/t hypotension requiring vasopressors during the case. Now downgraded to the floor. Swallow study completed. He was restarted on his eliquis and will be discharged with a butt catheter.  Prednisone and cellcept restarted.     PLAN:  - Continue eliquis  - SLP recs; puree and moderately thick liquids   - pain control as needed   - monitor ostomy output  - dispo planning     ACS/Trauma surgery  p [id number]

## 2024-10-25 NOTE — PROGRESS NOTE ADULT - PROBLEM SELECTOR PLAN 1
s/p Chopra's procedure with end colostomy. 10/17  mgt per primary team.
s/p Chopra's procedure with end colostomy. 10/17  mgt per primary team.
Patient noted to have Hgb 5.8, Hct 19.9, MCV 91.7 on admission. Pt endorses hx of loose stools this week. On exam has black stool in diaper  - Reticulocyte index 0.45 (10/14)  - Transfusion count (10/14): 2u pRBC    Plan:  - Likely multifactorial. 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
s/p Chopra's procedure with end colostomy. 10/17  mgt per primary team.
Patient noted to have Hgb 5.8, Hct 19.9, MCV 91.7 on admission. Pt endorses hx of loose stools this week. On exam has black stool in diaper  - Reticulocyte index 0.45 (10/14)  - Transfusion count (10/14): 2u pRBC    Plan:  - Likely multifactorial. GIB (Heyde's syndrome iso AS vs. UGIB given melena) + ACD given hypoproliferative RI  - Hold home Eliquis   - GI consult for possible GI bleed, pt declines scope at this time, CT abd pel with IV contrast pending   - clear liquid diet advanced to soft diet   - Active T&S, Transfuse for goal > 7  - PPI 80 mg IV loading dose, 40 mg IV BID

## 2024-10-25 NOTE — PROGRESS NOTE ADULT - SUBJECTIVE AND OBJECTIVE BOX
Subjective:  Patient seen at bedside this AM.    24h Events:   - Overnight, no acute events    Objective:  Vital Signs  T(C): 36.6 (10-25 @ 11:00), Max: 36.7 (10-24 @ 12:00)  HR: 60 (10-25 @ 11:12) (60 - 60)  BP: 128/59 (10-25 @ 11:00) (113/54 - 147/64)  RR: 22 (10-25 @ 11:00) (15 - 41)  SpO2: 100% (10-25 @ 11:12) (93% - 100%)  10-24-24 @ 07:01  -  10-25-24 @ 07:00  --------------------------------------------------------  IN:  Total IN: 0 mL    OUT:    Colostomy (mL): 450 mL    Indwelling Catheter - Urethral (mL): 665 mL  Total OUT: 1115 mL    Total NET: -1115 mL          Physical Exam:  GEN: resting in bed comfortably in NAD  RESP: no increased WOB, no tachypnea  ABD: soft, non-distended, non-tender to palpation. Stoma pink, patent and productive with gas and stool in ostomy bag.   EXTR: warm, edema in upper extremities; spontaneous movement in b/l U/L extrem  NEURO: awake, alert    Labs:                        8.0    8.53  )-----------( 109      ( 25 Oct 2024 02:42 )             26.2   10-25    136  |  104  |  33[H]  ----------------------------<  118[H]  4.2   |  22  |  1.06    Ca    7.6[L]      25 Oct 2024 02:42  Phos  4.7     10-25  Mg     2.1     10-25    TPro  4.6[L]  /  Alb  2.0[L]  /  TBili  0.8  /  DBili  x   /  AST  19  /  ALT  13  /  AlkPhos  79  10-25    CAPILLARY BLOOD GLUCOSE          Medications:  MEDICATIONS  (STANDING):  albuterol/ipratropium for Nebulization 3 milliLiter(s) Nebulizer every 6 hours  chlorhexidine 2% Cloths 1 Application(s) Topical <User Schedule>  folic acid Injectable 1 milliGRAM(s) IV Push daily  hydrocortisone sodium succinate Injectable 50 milliGRAM(s) IV Push every 6 hours  lactated ringers. 1000 milliLiter(s) (50 mL/Hr) IV Continuous <Continuous>  mycophenolate mofetil IVPB 500 milliGRAM(s) IV Intermittent daily  piperacillin/tazobactam IVPB.. 3.375 Gram(s) IV Intermittent every 8 hours  sodium chloride 3%  Inhalation 4 milliLiter(s) Inhalation every 6 hours    MEDICATIONS  (PRN):  acetaminophen   IVPB .. 1000 milliGRAM(s) IV Intermittent every 6 hours PRN Mild Pain (1 - 3)      Imaging:

## 2024-10-25 NOTE — PROGRESS NOTE ADULT - PROBLEM SELECTOR PLAN 9
- not currently on any home meds, previously on Entresto but stopped  - hypotensive 10/23 requiring pressor support ; now off

## 2024-10-25 NOTE — SWALLOW BEDSIDE ASSESSMENT ADULT - ADDITIONAL RECOMMENDATIONS
GOAL: Patient will obtain safe and adequate nutrition/hydration/medications via least restrictive means.
GOAL: Patient will obtain safe and adequate nutrition/hydration/medications via least restrictive means.

## 2024-10-25 NOTE — SWALLOW FEES ASSESSMENT ADULT - DIAGNOSTIC IMPRESSIONS
83 yo M presents on FEES with an oropharyngeal dysphagia. Premature spillage and delayed pharyngeal trigger result in laryngeal penetration of thin and mildly thick liquids. Material is retrieved spontaneously on mildly thick liquids. Incomplete retrieval from the laryngeal vestibule on thin liquids. There is min pharyngeal residue across consistencies which is cleared on liquid wash and/or spontaneous repeat swallow. Patient is edentulous, does not have dentures, and refuses minced moist textures during this exam.

## 2024-10-25 NOTE — PROGRESS NOTE ADULT - PROBLEM SELECTOR PLAN 3
- unclear source ; concern for aspiration  - s/p RRT for hypotension ; requiring pressor support  -s/p Vanco and Zosyn x 1, continue empiric zosyn  - f/up bld cx  - stress dose steroids
Patient previously diagnosed with afib, taking eliquis at home. Received leadless pacemaker 10 days ago.     Plan:  - CAHDVASc 7  - hold home Eliquis ISO possible GI bleed
Patient previously diagnosed with afib, taking eliquis at home. Received leadless pacemaker 10 days ago.     Plan:  - CAHDVASc 7  - hold home eliquis ISO possible GI bleed
Patient dx with b/l renal cell carcinoma earlier this year.   - per daughter (HCP), patient unaware of this diagnosis, does not want to discuss treatment at this time
- unclear source ; concern for aspiration  - s/p RRT on 10/23 for hypotension ; requiring pressor support ; now d/salvatore  - on empiric zosyn  - bld cx ngtd  - stress dose steroids

## 2024-10-25 NOTE — SWALLOW BEDSIDE ASSESSMENT ADULT - SLP PERTINENT HISTORY OF CURRENT PROBLEM
84 year old male with PMH of b/l renal cell carcinoma (newly dx, pt unaware of dx per daughter), HTN, DM, AF on Eliquis c/b slow ventricular response s/p leadless pacemaker 10 days ago, HFpEF (EF 60-65%), severe AS/AR, myasthenia gravis, stroke in 8/24 with residual L weakness, dysphagia and urinary retention with chronic butt presenting from rehab after labs today showed Hgb 5.5. Patient is accompanied at bedside by his daughter who provided majority of patient's history due to patient being [place] and without his hearing aids. She notes that patient has been having "looser stools" since the beginning of this week but is unsure if the stool was unusually dark or with occult blood. Daughter also notes that patient has had decreased appetite and significant weight loss (~40lbs) since his stroke in August. He has not experienced recent n/v or abdominal pain/distension, and he does not use NSAIDs regularly. He has a chronic butt but reports no dysuria or hematuria.
83 yo M PMH b/l renal cell carcinoma (newly dx, pt unaware of dx per daughter), HTN, DM, AF on Eliquis c/b slow ventricular response s/p leadless pacemaker 10 days ago, HFpEF (EF 60-65%), severe AS/AR, myasthenia gravis, stroke in 8/24 with residual L weakness, dysphagia and urinary retention with chronic butt presenting from rehab after labs today showed Hgb 5.5. [place] and without his hearing aids. Patient having "looser stools" since the beginning of this week; decreased appetite and significant weight loss (~40lbs) since his stroke in August. He has a chronic butt but reports no dysuria or hematuria.
84 year old male with PMH of b/l renal cell carcinoma (newly dx, pt unaware of dx per daughter), HTN, DM, AF on Eliquis c/b slow ventricular response s/p leadless pacemaker 10 days ago, HFpEF (EF 60-65%), severe AS/AR, myasthenia gravis, stroke in 8/24 with residual L weakness, dysphagia and urinary retention with chronic butt presenting from rehab after labs today showed Hgb 5.5. Patient is accompanied at bedside by his daughter who provided majority of patient's history due to patient being [place] and without his hearing aids. She notes that patient has been having "looser stools" since the beginning of this week but is unsure if the stool was unusually dark or with occult blood. Daughter also notes that patient has had decreased appetite and significant weight loss (~40lbs) since his stroke in August. He has not experienced recent n/v or abdominal pain/distension, and he does not use NSAIDs regularly. He has a chronic butt but reports no dysuria or hematuria.

## 2024-10-25 NOTE — CHART NOTE - NSCHARTNOTEFT_GEN_A_CORE
Spoke with SICU ACP. Palliative consult on hold. Please reconsult as needed. Can be reached by TEAMS M-F [phone] Марина Hickman Any other time please page [phone] if needed

## 2024-10-25 NOTE — PROGRESS NOTE ADULT - PROBLEM SELECTOR PROBLEM 10
T2DM (type 2 diabetes mellitus)
Myasthenia gravis
Aortic stenosis
Aortic stenosis
T2DM (type 2 diabetes mellitus)

## 2024-10-25 NOTE — PROGRESS NOTE ADULT - PROBLEM SELECTOR PROBLEM 5
(HFpEF) heart failure with preserved ejection fraction
Urinary retention
Embolic stroke
Embolic stroke
Urinary retention

## 2024-10-25 NOTE — SWALLOW BEDSIDE ASSESSMENT ADULT - SWALLOW EVAL: RECOMMENDED DIET
From oropharyngeal standpoint, puree/moderately thick liquids; will defer to MD re: GI clearance to initiate oral intake
NPO, with non-oral nutrition/hydration/medications.

## 2024-10-25 NOTE — PROGRESS NOTE ADULT - PROBLEM SELECTOR PROBLEM 7
T2DM (type 2 diabetes mellitus)
T2DM (type 2 diabetes mellitus)
HTN (hypertension)
(HFpEF) heart failure with preserved ejection fraction
(HFpEF) heart failure with preserved ejection fraction

## 2024-10-25 NOTE — PROGRESS NOTE ADULT - SUBJECTIVE AND OBJECTIVE BOX
24 HOUR EVENTS:  - 250 albumin during day  - S&S consult for possible aspiration    NEURO  RASS (if intubated): 		[facility] ICU (if concern for delirium):  Exam: AOx4  Meds: acetaminophen   IVPB .. 1000 milliGRAM(s) IV Intermittent once      RESPIRATORY  RR: 21 (10-25-24 @ 01:00) (15 - 41)  SpO2: 97% (10-25-24 @ 01:00) (93% - 100%)  Wt(kg): --  Exam: Lungs CTA b/l  Mechanical Ventilation:   ABG - ( 23 Oct 2024 12:56 )  pH: 7.40  /  pCO2: 33    /  pO2: 139   / HCO3: 20    / Base Excess: -3.7  /  SaO2: 98.0    Lactate: x                Meds: albuterol/ipratropium for Nebulization 3 milliLiter(s) Nebulizer every 6 hours      CARDIOVASCULAR  HR: 60 (10-25-24 @ 01:00) (60 - 60)  BP: 118/57 (10-25-24 @ 01:00) (103/51 - 142/66)  BP(mean): 82 (10-25-24 @ 01:00) (73 - 95)  ABP: --  ABP(mean): --  Wt(kg): --  CVP(cm H2O): --  VBG - ( 24 Oct 2024 14:16 )  pH: 7.33  /  pCO2: 46    /  pO2: 36    / HCO3: 24    / Base Excess: -1.7  /  SaO2: 61.3   Lactate: 1.3                Exam: Normal S1/S2 w/o murmurs or rubs  Cardiac Rhythm: sinus  Perfusion     [x ]Adequate   [ ]Inadequate  Mentation   [x ]Normal       [ ]Reduced  Extremities  [x ]Warm         [ ]Cool  Volume Status [ ]Hypervolemic [x ]Euvolemic [ ]Hypovolemic  Meds:     GI/NUTRITION  Exam: abd non distended, non TTP  Diet: NPO  Last Bowel Movement: 24-Oct-2024 (10-24-24 @ 19:45)  Last Bowel Movement: 23-Oct-2024 (10-23-24 @ 19:00)  Last Bowel Movement: 23-Oct-2024 (10-23-24 @ 13:25)  Last Bowel Movement: 21-Oct-2024 (10-21-24 @ 16:00)  Last Bowel Movement: 17-Oct-2024 (10-17-24 @ 03:55)  Last Bowel Movement: 16-Oct-2024 (10-16-24 @ 20:00)  Last Bowel Movement: 16-Oct-2024 (10-16-24 @ 08:10)  Last Bowel Movement: 16-Oct-2024 (10-15-24 @ 20:00)  Last Bowel Movement: 14-Oct-2024 (10-15-24 @ 07:45)    Meds:     GENITOURINARY  I&O's Detail    10-23 @ 07:01  -  10-24 @ 07:00  --------------------------------------------------------  IN:    IV PiggyBack: 350 mL    IV PiggyBack: 175 mL    Lactated Ringers: 1700 mL    Lactated Ringers Bolus: 1000 mL    Norepinephrine: 19.7 mL    Oral Fluid: 180 mL  Total IN: 3424.7 mL    OUT:    Colostomy (mL): 400 mL    Indwelling Catheter - Urethral (mL): 55 mL  Total OUT: 455 mL    Total NET: 2969.7 mL      10-24 @ 07:01  -  10-25 @ 01:52  --------------------------------------------------------  IN:    Albumin 5%  - 250 mL: 250 mL    IV PiggyBack: 50 mL    IV PiggyBack: 208.4 mL    Lactated Ringers: 1400 mL  Total IN: 1908.4 mL    OUT:    Colostomy (mL): 100 mL    Indwelling Catheter - Urethral (mL): 390 mL  Total OUT: 490 mL    Total NET: 1418.4 mL          10-24    138  |  105  |  30[H]  ----------------------------<  97  4.5   |  21[L]  |  1.08    Ca    7.9[L]      24 Oct 2024 14:28  Phos  4.9     10-24  Mg     2.0     10-24    TPro  4.6[L]  /  Alb  2.0[L]  /  TBili  0.9  /  DBili  x   /  AST  20  /  ALT  11  /  AlkPhos  82  10-24    Meds: folic acid Injectable 1 milliGRAM(s) IV Push daily  lactated ringers. 1000 milliLiter(s) IV Continuous <Continuous>      HEMATOLOGIC  Meds: apixaban 5 milliGRAM(s) Oral two times a day                          9.9    8.75  )-----------( 118      ( 24 Oct 2024 09:18 )             32.7     PT/INR - ( 24 Oct 2024 09:18 )   PT: 25.3 sec;   INR: 2.24 ratio         PTT - ( 24 Oct 2024 09:18 )  PTT:37.3 sec    INFECTIOUS DISEASES  T(C): 36.5 (10-24-24 @ 23:00), Max: 36.7 (10-24-24 @ 12:00)  Wt(kg): --  WBC Count: 8.75 K/uL (10-24 @ 09:18)    Recent Cultures:  Specimen Source: .Blood BLOOD, 10-23 @ 13:00; Results   No growth at 24 hours; Gram Stain: --; Organism: --  Specimen Source: .Blood BLOOD, 10-23 @ 12:45; Results   No growth at 24 hours; Gram Stain: --; Organism: --    Meds: mycophenolate mofetil IVPB 500 milliGRAM(s) IV Intermittent daily  piperacillin/tazobactam IVPB.. 3.375 Gram(s) IV Intermittent every 8 hours      ENDOCRINE  Capillary Blood Glucose    Meds: hydrocortisone sodium succinate Injectable 50 milliGRAM(s) IV Push every 6 hours      ACCESS DEVICES:  [ ] Peripheral IV  [ ] Central Venous Line		[ ] R	[ ] L	[ ] IJ	[ ] Fem	[ ] SC	Placed:   [ ] Arterial Line			[ ] R	[ ] L	[ ] Fem	[ ] Rad	[ ] Ax	Placed:   [ ] PICC:					[ ] Mediport  [ ] Urinary Catheter, Date Placed:   [ ] Necessity of urinary, arterial, and venous catheters discussed    OTHER MEDICATIONS:  chlorhexidine 2% Cloths 1 Application(s) Topical daily      IMAGING: Arrived to the Infusion [facility].  FOLFIRINOX completed. Patient tolerated without difficulty.   Any issues or concerns during appointment: pt c/o mild abdominal pain at completion of chemo prior to pump being attached.  Pt called spouse who acted as , as pt didn't want a regular .  Pain diminished and pt/wife instructed to call MD if pain returns.  Patient aware of next infusion appointment on 02/02 (date) at 1700 (time).  Patient instructed to call provider with temperature of 100.4 or greater or nausea/vomiting/ diarrhea or pain not controlled by medications  Discharged ambulatory.

## 2024-10-25 NOTE — PROGRESS NOTE ADULT - PROBLEM SELECTOR PLAN 5
Pt had stroke August 2024 with residual L weakness and residual dysphagia.   c/w aspirin 81 and atorvastatin 40  Seen by SLP  - s/p MBS study 10/22  - was cleared for minced-moist and mildly thick liquids via single small sips  -  now with concern for aspiration ; currently NPO  - CXR 10/23 with moderate left-sided pleural effusion.
Patient noted to have chronic urinary retention with chronic Elliott in rehab center. Not endorsing dysuria or hematuria at this time, UA negative on admission.     Plan:  - C/w home finasteride and tamsulosin  - Elliott in place
Patient diagnosed with HEpEF, last echo in August 2024 showing EF 60-65%.   - Low suspicion of HFpEF exacerbation for now, clinically euvolemic, saturating 98-99% on RA   - Farxiga on hold
Patient noted to have chronic urinary retention with chronic Elliott in rehab center. Not endorsing dysuria or hematuria at this time, UA negative on admission.     Plan:  - C/w home finasteride and tamsulosin  - Elliott in place
Pt had stroke August 2024 with residual L weakness and residual dysphagia.   c/w aspirin 81 and atorvastatin 40  Seen by SLP  - s/p MBS study 10/22  - was cleared for minced-moist and mildly thick liquids via single small sips  -  now with concern for aspiration and NPO  - CXR 10/23 with moderate left-sided pleural effusion ; s/p IVP Lasix 20mg , 40mg  - repeat CXR with decreased pleural effusion  - for repeat SLP eval today

## 2024-10-25 NOTE — PROGRESS NOTE ADULT - PROBLEM SELECTOR PLAN 2
- Likely multifactorial. 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

## 2024-10-25 NOTE — PROGRESS NOTE ADULT - TIME BILLING
- Ordering, reviewing, and interpreting labs  - Independently obtaining a review of systems and performing a physical exam  - Reviewing consultant documentation  - Counselling and educating patient and family regarding interpretation of aforementioned items and plan of care.
I have personally seen and examined the patient.  I fully participated in the care of this patient.  I have made amendments to the documentation where necessary, and agree with the history, physical exam and plan as documented by the resident.  reviewed chart  reviewed laboratory data  reviewed plan of care with resident house staff  resting comfortably on my exam  requires supplemental oxygen for hypoxia  is receiving myesthesia related medications  continues on stress steroids   is DNR / DNI status
- Ordering, reviewing, and interpreting labs  - Independently obtaining a review of systems and performing a physical exam  - Reviewing consultant documentation  - Counselling and educating patient and family regarding interpretation of aforementioned items and plan of care.

## 2024-10-25 NOTE — SWALLOW BEDSIDE ASSESSMENT ADULT - COMMENTS
10/16: Pneumoperitoneum found 10/16; +descending colitis and possible rectal pneumatosis-->OR for Chopra's procedure with end colostomy. Transferred to SICU d/t hypotension requiring vasopressors.  10/17: transferred to the floor  10/24: RRT was called on the floor for hypotension with SBP 76/37, HR 60 (noted with PPM), RR 20, sat well on 2L NC FS 88. No bleeding noted per nursing staff, pt able to interact but not answering questions which was a change from baseline.     Readmitted to SICU after RRT for hypotesnion- Cultures drawn, vanc x1 and zosyn started - R IJ CVL placed for Norepinephrine.      Swallow Hx:   8/21/24: Embolic stroke August 2024 with residual L weakness. w/ residual dysphagia. MBS 08/21/24 at [facility] found oropharyngeal dysphagia; aspiration of thin liquids; penetration of mildly thick. Recommended Regular/moderately thick.  10/18/24: Pt seen for initial bedside swallow evaluation with mabel-pharyngeal dysphagia. RX for puree and moderately thick as pt exhibited overt s/s of laryngeal penetration/aspiration on mildly thick liquids and edentulous. Did not have dentures.  10/22/24: MBS finding oropharyngeal dysphagia; silent aspiration noted; minced moist/mildly thick via single cup sips recommended

## 2024-10-25 NOTE — SWALLOW FEES ASSESSMENT ADULT - COMMENTS
mild c/w reflux  +min tracheal secretions  +min stranding secretions within hypopharynx 10/16: Pneumoperitoneum found 10/16; +descending colitis and possible rectal pneumatosis-->OR for Chopra's procedure with end colostomy. Transferred to SICU d/t hypotension requiring vasopressors.  10/17: transferred to the floor  10/24: RRT was called on the floor for hypotension with SBP 76/37, HR 60 (noted with PPM), RR 20, sat well on 2L NC FS 88. No bleeding noted per nursing staff, pt able to interact but not answering questions which was a change from baseline.     Readmitted to SICU after RRT for hypotesnion- Cultures drawn, vanc x1 and zosyn started - R IJ CVL placed for Norepinephrine.      Swallow Hx:   8/21/24: Embolic stroke August 2024 with residual L weakness. w/ residual dysphagia. MBS 08/21/24 at [facility] found oropharyngeal dysphagia; aspiration of thin liquids; penetration of mildly thick. Recommended Regular/moderately thick.  10/18/24: Pt seen for initial bedside swallow evaluation with mabel-pharyngeal dysphagia. RX for puree and moderately thick as pt exhibited overt s/s of laryngeal penetration/aspiration on mildly thick liquids and edentulous. Did not have dentures.  10/22/24: MBS finding oropharyngeal dysphagia; silent aspiration noted; minced moist/mildly thick via single cup sips recommended

## 2024-10-25 NOTE — SWALLOW FEES ASSESSMENT ADULT - NS SWALLOW FEES REC ASPIR MON
Monitor for s/s aspiration/laryngeal penetration. If noted:  D/C p.o. intake, provide non-oral nutrition/hydration/meds, and contact this service @ [phone]/change of breathing pattern/cough/gurgly voice/fever/pneumonia/throat clearing/upper respiratory infection

## 2024-10-25 NOTE — PROGRESS NOTE ADULT - PROBLEM SELECTOR PLAN 10
Pt with blowing holosystolic murmur best heard R upper sternal border and prior echo confirming AS.     Plan:  - Per pervious cards note (8/2024), pt not candidate for TAVR
Pt with MG diagnosis. Not in MG crisis at this time  - c/w Cellcept 500mg  QD  - c/w prednisone 5mg QD
- hold home metformin and Farxiga  - ISS  - FS QAC QHS
- hold home metformin and Farxiga  - ISS  - FS QAC QHS
Pt with blowing holosystolic murmur best heard R upper sternal border and prior echo confirming AS.     Plan:  - Per pervious cards note (8/2024), pt not candidate for TAVR

## 2024-10-25 NOTE — SWALLOW FEES ASSESSMENT ADULT - ROSENBEK'S PENETRATION ASPIRATION SCALE
(3) material remains above the vocal cords, visible residue remains (penetration) (2) material enters airway, remains above the vocal cords, no residue remains (penetration) (1) no aspiration, material does not enter airway

## 2024-10-25 NOTE — SWALLOW BEDSIDE ASSESSMENT ADULT - SWALLOW EVAL: DIAGNOSIS
Order received for bedside swallow evaluation. Chart reviewed. Attempted to see patient however remains intubated. As per d/w ALISTAIR Blevins, plan is for extubation today; Pt with h/o dysphagia; This service will follow up tomorrow as appropriate.
85 yo M known to this service. Reconsulted in SICU for repeat swallow evaluation. RRT 10/25 for hypotension, desat, and concern for aspiration. Pt is AA+Ox2. VSS on NC. PO trials deferred to FEES given risk for silent aspiration. FEES planned for today. d/w VICTOR MANUEL Maradiaga; ALISTAIR Sanders
83 yo M; h/o CVA with residual left weakness; h/o dysphagia; s/p Chopra's procedure w/ end colostomy. Extubated 10/17. Patient presents on bedside swallow evaluation with an oropharyngeal dysphagia. Mildly prolonged yet functional oral management of purees and thickened liquids; edentulous (family bringing in dentures); wet vocal quality on mildly thick liquids suggestive of laryngeal penetration/aspiration. No overt signs on purees and moderately thick liquids.

## 2024-10-25 NOTE — PROGRESS NOTE ADULT - PROBLEM SELECTOR PLAN 6
Patient dx with b/l renal cell carcinoma earlier this year.   - per daughter (HCP), patient unaware of this diagnosis, does not want to discuss treatment at this time
Patient dx with b/l renal cell carcinoma earlier this year.   - per daughter (HCP), patient unaware of this diagnosis, does not want to discuss treatment at this time  - Pt is DNR/DNI
Patient noted to have chronic urinary retention with chronic Elliott in rehab center. Not endorsing dysuria or hematuria at this time, UA negative on admission.   - C/w home finasteride and tamsulosin  - Elliott in place
Patient with hx of hypertension. BP on [id number].     Plan:  - not currently on any home meds, previously on Entresto but stopped  - continue to monitor BP
Patient with hx of hypertension. BP on [id number].     Plan:  - not currently on any home meds, previously on Entresto but stopped  - continue to monitor BP

## 2024-10-25 NOTE — SWALLOW BEDSIDE ASSESSMENT ADULT - SPECIFY REASON(S)
to assess the swallow mechanism; r/o dysphagia.
to assess the swallow mechanism; r/o dysphagia.
to re-assess the swallow mechanism; r/o dysphagia; r/o aspiration

## 2024-10-25 NOTE — PROGRESS NOTE ADULT - PROBLEM SELECTOR PLAN 8
Patient noted to have chronic urinary retention with chronic Elliott in rehab center. Not endorsing dysuria or hematuria at this time, UA negative on admission.   - C/w home finasteride and tamsulosin  - Elliott in place
- hold home metformin and Farxiga  - ISS  - FS QAC QHS
Patient noted to have chronic urinary retention with chronic Elliott in rehab center. Not endorsing dysuria or hematuria at this time, UA negative on admission.   - C/w home finasteride and tamsulosin  - Elliott in place
Pt had stroke August 2024 with residual L weakness. w/ residual dysphagia. Patient on aspirin 81 and atorvastatin 40.   Per daughter, patient at baseline mental status and memory have remained intact. Daughter states that the patient had been independent with his ADLs and living at home prior to his stroke in August    Plan:  - c/w atorvastatin 40  - hold aspirin 81 for now
Pt had stroke August 2024 with residual L weakness. w/ residual dysphagia. Patient on aspirin 81 and atorvastatin 40.   Per daughter, patient at baseline mental status and memory have remained intact. Daughter states that the patient had been independent with his ADLs and living at home prior to his stroke in August    Plan:  - c/w atorvastatin 40  - hold aspirin 81 for now

## 2024-10-25 NOTE — PROGRESS NOTE ADULT - PROBLEM SELECTOR PLAN 7
Pt with hx of DM2     Plan:  - hold home metformin  - hold Farxiga in anticipation of scope  - ISS
- not currently on any home meds, previously on Entresto but stopped  - Low BP noted   - would give IV fluid bolus
Pt with hx of DM2     Plan:  - hold home metformin  - hold Farxiga in anticipation of scope  - ISS
Patient diagnosed with HEpEF, last echo in August 2024 showing EF 60-65%.   - Low suspicion of HFpEF exacerbation for now, clinically euvolemic, saturating 98-99% on RA   - Farxiga on hold
(4) no impairment
Patient diagnosed with HEpEF, last echo in August 2024 showing EF 60-65%.   - Low suspicion of HFpEF exacerbation for now, clinically euvolemic, saturating 98-99% on RA   - Farxiga on hold

## 2024-10-25 NOTE — PROGRESS NOTE ADULT - PROBLEM SELECTOR PROBLEM 1
Anemia
Perforation of sigmoid colon
Anemia
Perforation of sigmoid colon
Perforation of sigmoid colon

## 2024-10-25 NOTE — PROGRESS NOTE ADULT - PROBLEM SELECTOR PLAN 4
Patient previously diagnosed with afib, on Eliquis at home.   Received leadless pacemaker 10 days prior to presentation.   - CHADVASc 7  - c/w  Eliquis 5mg po bid
Patient previously diagnosed with afib, taking eliquis at home. Received leadless pacemaker 10 days prior to presentation.   - CHADVASc 7  - on  Eliquis 5mg po bid
Patient diagnosed with HEpEF, last echo in August 2024 showing EF 60-65%. Patient not endorsing HF symptoms at this time    Plan:  - hold Farxiga in anticipation, can resume on DC  - Low suspicion of HFpEF exacerbation for now, clinically euvolemic, saturating 98-99% on RA (on 2L for comfort)
Patient previously diagnosed with afib, taking eliquis at home. Received leadless pacemaker 10 days prior to presentation.   - CHADVASc 7  - on  Eliquis 5mg po bid
Patient diagnosed with HEpEF, last echo in August 2024 showing EF 60-65%. Patient not endorsing HF symptoms at this time    Plan:  - hold Farxiga in anticipation of scope, can resume after  - Low suspicion of HFpEF exacerbation for now, clinically euvolemic, saturating 98-99% on RA (on 2L for comfort)

## 2024-10-25 NOTE — SWALLOW FEES ASSESSMENT ADULT - SLP PERTINENT HISTORY OF CURRENT PROBLEM
83 yo M PMH b/l renal cell carcinoma (newly dx, pt unaware of dx per daughter), HTN, DM, AF on Eliquis c/b slow ventricular response s/p leadless pacemaker 10 days ago, HFpEF (EF 60-65%), severe AS/AR, myasthenia gravis, stroke in 8/24 with residual L weakness, dysphagia and urinary retention with chronic butt presenting from rehab after labs today showed Hgb 5.5. [place] and without his hearing aids. Patient having "looser stools" since the beginning of this week; decreased appetite and significant weight loss (~40lbs) since his stroke in August. He has a chronic butt but reports no dysuria or hematuria.

## 2024-10-25 NOTE — PROGRESS NOTE ADULT - SUBJECTIVE AND OBJECTIVE BOX
Patient is a 84y old  Male who presents with a chief complaint of Anemia (25 Oct 2024 11:24)      SUBJECTIVE / OVERNIGHT EVENTS: Pt seen and examined. No acute events overnight. He c/o SOB.     MEDICATIONS  (STANDING):  albuterol/ipratropium for Nebulization 3 milliLiter(s) Nebulizer every 6 hours  chlorhexidine 2% Cloths 1 Application(s) Topical <User Schedule>  folic acid Injectable 1 milliGRAM(s) IV Push daily  hydrocortisone sodium succinate Injectable 50 milliGRAM(s) IV Push every 6 hours  lactated ringers. 1000 milliLiter(s) (50 mL/Hr) IV Continuous <Continuous>  mycophenolate mofetil IVPB 500 milliGRAM(s) IV Intermittent daily  piperacillin/tazobactam IVPB.. 3.375 Gram(s) IV Intermittent every 8 hours  sodium chloride 3%  Inhalation 4 milliLiter(s) Inhalation every 6 hours    MEDICATIONS  (PRN):  acetaminophen   IVPB .. 1000 milliGRAM(s) IV Intermittent every 6 hours PRN Mild Pain (1 - 3)      Vital Signs Last 24 Hrs  T(C): 36.6 (25 Oct 2024 11:00), Max: 36.6 (25 Oct 2024 07:00)  T(F): 97.9 (25 Oct 2024 11:00), Max: 97.9 (25 Oct 2024 07:00)  HR: 60 (25 Oct 2024 11:12) (60 - 60)  BP: 128/59 (25 Oct 2024 11:00) (113/54 - 147/64)  BP(mean): 85 (25 Oct 2024 11:00) (78 - 95)  RR: 22 (25 Oct 2024 11:00) (15 - 41)  SpO2: 100% (25 Oct 2024 11:12) (93% - 100%)    Parameters below as of 25 Oct 2024 11:12  Patient On (Oxygen Delivery Method): nasal cannula      CAPILLARY BLOOD GLUCOSE        I&O's Summary    24 Oct 2024 07:01  -  25 Oct 2024 07:00  --------------------------------------------------------  IN: 2358.4 mL / OUT: 1115 mL / NET: 1243.4 mL        PHYSICAL EXAM:  GENERAL: NAD, frail  HEAD:  Atraumatic, Normocephalic  EYES: conjunctiva and sclera clear  NECK: Supple, No JVD  CHEST/LUNG: Clear to auscultation bilaterally; No wheeze; +transmitted sounds  HEART: Regular rate and rhythm; No murmurs, rubs, or gallops  ABDOMEN: Soft, Nontender, Nondistended; Bowel sounds present; +ostomy with formed stool  EXTREMITIES:  b/l  UE and LE swelling , nontender  PSYCH: somnolent but arousable, oriented x 1-2  NEUROLOGY: non-focal  SKIN: No rashes or lesions    LABS:                        8.2    9.97  )-----------( 131      ( 25 Oct 2024 11:42 )             26.8     10-25    136  |  104  |  33[H]  ----------------------------<  118[H]  4.2   |  22  |  1.06    Ca    7.6[L]      25 Oct 2024 02:42  Phos  4.7     10-25  Mg     2.1     10-25    TPro  4.6[L]  /  Alb  2.0[L]  /  TBili  0.8  /  DBili  x   /  AST  19  /  ALT  13  /  AlkPhos  79  10-25    PT/INR - ( 25 Oct 2024 02:42 )   PT: 20.4 sec;   INR: 1.80 ratio         PTT - ( 25 Oct 2024 02:42 )  PTT:34.2 sec      Urinalysis Basic - ( 25 Oct 2024 02:42 )    Color: x / Appearance: x / SG: x / pH: x  Gluc: 118 mg/dL / Ketone: x  / Bili: x / Urobili: x   Blood: x / Protein: x / Nitrite: x   Leuk Esterase: x / RBC: x / WBC x   Sq Epi: x / Non Sq Epi: x / Bacteria: x        RADIOLOGY & ADDITIONAL TESTS:    Imaging Personally Reviewed:  CXR 10/25  left sided pleural effusion is decreased

## 2024-10-25 NOTE — PROGRESS NOTE ADULT - PROBLEM SELECTOR PROBLEM 4
(HFpEF) heart failure with preserved ejection fraction
Atrial fibrillation
Atrial fibrillation
(HFpEF) heart failure with preserved ejection fraction
Atrial fibrillation

## 2024-10-25 NOTE — PROGRESS NOTE ADULT - ASSESSMENT
84M PmHx recently diagnosed bilateral renal cell carcinoma, myasthenia gravis on prednisone and mycophenolate, atrial fibrillation on apixaban s/p recent pacemaker placement for slow ventricular response, heart failure with preserved ejection fraction, severe aortic stenosis, and recent stroke with dysphagia and left sided weakness presenting to [facility] for severe anemia requiring blood transfusion. Patient was found to have pneumoperitoneum on a study obtained 10/16 to identify the progression of his disease and specifically look for metastases. 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 Patient was taken to OR for Chopra's procedure with end colostomy 10/17. Transferred to SICU d/t hypotension requiring vasopressors during the case.   Pt transferred out of SICU 10/18 but SICU reconsulted 10/23 after RRT was called on the floor for hypotension.  Patient transferred to SICU for further management.  After discussion with family, patient made DNR/DNI with trial of non-invasive ventilation.      PLAN:  NEURO: Hx myasthenia gravis, CVA   - Pain control: Tylenol, PRN Dilaudid   - Hx Myasthenia gravis- on stress dose steroids (on home Prednisone/Cellcept)     RESPIRATORY  - Monitor CXR, blood gas   - CXR with left pleural effusion  - Duonebs q6    CARDIOVASCULAR: Hx AF (on home Eliquis) with leadless pacemaker, HFpEF with severe AS/AR  - Hemodynamic monitoring  - TTE: EF 60-65%, severe AS, MR, TR and LVH  - central line placed  - 250 albumin during day    GI: s/p Alec's on 10/17   - NPO  - s/p MBS study 10/22  - cleared for minced-moist and mildly thick liquids via single small sips, likely aspirated  - S&S pending    /RENAL  - LR @50cc/hr  - Hx chronic Elliott for urinary retention  - Monitor SCr, replete electrolytes as needed     HEMATOLOGIC:  - s/p 2U PRBC 10/14 and 3U PRBC 10/17  - Monitor H/H  - eliquis BID when able  - Mechanical DVT ppx; SCDs    INFECTIOUS DISEASE  - s/p Vanco, continue empiric zosyn  - f/u blood cultures    ENDOCRINE  - Start stress dose steroids q6    DISPO  -SICU     Lines   - R IJ CVL    Ruben Taylor PA-C  Surgical Intensive Care Unit  [id number]

## 2024-10-25 NOTE — PROGRESS NOTE ADULT - NSPROGADDITIONALINFOA_GEN_ALL_CORE
time spent reviewing prior charts, meds, discussing plan with patient= 54 minutes    d/w Trauma team
time spent reviewing prior charts, meds, discussing plan with patient= 54 minutes    d/w Trauma team.
time spent reviewing prior charts, meds, discussing plan with patient's wife = 51 minutes

## 2024-10-25 NOTE — CHART NOTE - NSCHARTNOTEFT_GEN_A_CORE
NUTRITION FOLLOW UP NOTE    PATIENT SEEN FOR: Malnutrition Follow Up    SOURCE: [] Patient  [x] Current Medical Record  [X] RN  [] Family/support person at bedside  [] Patient unavailable/inappropriate  [] Other:    CHART REVIEWED/EVENTS NOTED.  [X] No changes to nutrition care plan to note  [X] Nutrition Status:    DIET ORDER:   Diet, NPO (10-25-24)    CURRENT DIET ORDER IS:  [] Appropriate:  [X] Inadequate: See nutrition care plan below for further recommendations   [] Other:    NUTRITION INTAKE/PROVISION:  [X] PO: NPO since 10/23/24, was previously on mechanically altered  PO diet and thickened clear liquids, per flow sheets pt consuming 25-75% of meals   [] Enteral Nutrition:  [] Parenteral Nutrition:    ANTHROPOMETRICS:  Drug Dosing Weight  Height (cm): 152.4 (14 Oct 2024 16:13)  Weight (kg): 77.1 (14 Oct 2024 16:13)  BMI (kg/m2): 33.2 (14 Oct 2024 16:13)  Weights:   Daily Weight in k.8 (10-24)   - Weight fluctuations in setting of fluid shifts with edema. Will continue to monitor weight trends as available/able.     NUTRITIONALLY PERTINENT MEDICATIONS  (STANDING):  albuterol/ipratropium for Nebulization 3 milliLiter(s) Nebulizer every 6 hours  folic acid Injectable 1 milliGRAM(s) IV Push daily  hydrocortisone sodium succinate Injectable 50 milliGRAM(s) IV Push every 6 hours  lactated ringers. 1000 milliLiter(s) (50 mL/Hr) IV Continuous <Continuous>  mycophenolate mofetil IVPB 500 milliGRAM(s) IV Intermittent daily  piperacillin/tazobactam IVPB.. 3.375 Gram(s) IV Intermittent every 8 hours  sodium chloride 3%  Inhalation 4 milliLiter(s) Inhalation every 6 hours    MEDICATIONS  (PRN):  acetaminophen   IVPB .. 1000 milliGRAM(s) IV Intermittent every 6 hours PRN Mild Pain (1 - 3)      NUTRITIONALLY PERTINENT LABS:  10-25 Na136 mmol/L Glu 118 mg/dL[H] K+ 4.2 mmol/L Cr  1.06 mg/dL BUN 33 mg/dL[H] 10-25 Phos 4.7 mg/dL[H] 10-25 Alb 2.0 g/dL[L]10-25 ALT 13 U/L AST 19 U/L Alkaline Phosphatase 79 U/L  10-22-24 @ 07:28 a1c 5.2    A1C with Estimated Average Glucose Result: 5.2 % (10-22-24 @ 07:28)  A1C with Estimated Average Glucose Result: 5.2 % (24 @ 05:10)      Finger Sticks:      NUTRITIONALLY PERTINENT MEDICATIONS/LABS:  [x] Reviewed  [] Relevant notes on medications/labs:    EDEMA:  [x] Reviewed  [] Relevant notes:    GI/ I&O:  [x] Reviewed  [X] Relevant notes: Last bowel movement on 10/24/24    SKIN:   [] No pressure injuries documented, per nursing flowsheet  [X] Pressure injury previously noted  [] Change in pressure injury documentation:  [] Other:    ESTIMATED NEEDS:  [] No change:  [X] Updated:  Energy:   kcal/day (xx-xx kcal/kg)  Protein:   g/day (xx-xx g/kg)  Fluid:   ml/day or [] defer to team  Based on:    NUTRITION DIAGNOSIS:  [X] Prior Dx:   [] New Dx:    EDUCATION:  [] Yes:  [] Not appropriate/warranted    NUTRITION CARE PLAN:  1. Diet:  2. Supplements:  3. Multivitamin/mineral supplementation:  4:     [] Achieved - Continue current nutrition intervention(s)  [] Current medical condition precludes nutrition intervention at this time.    MONITORING AND EVALUATION:   RD remains available upon request and will follow up per protocol.    Name  Available on [facility] TEAMS NUTRITION FOLLOW UP NOTE    PATIENT SEEN FOR: Malnutrition Follow Up    SOURCE: [] Patient  [x] Current Medical Record  [X] RN  [] Family/support person at bedside  [] Patient unavailable/inappropriate  [] Other:    CHART REVIEWED/EVENTS NOTED.  [X] No changes to nutrition care plan to note  [X] Nutrition Status:  - Transferred to [facility] from SICU on 10/18  - S/P RRT for Acute mental status changes, Hypotension   - Transferred back to SICU on 10/23  - Assessed by SLP on 10/25 with recommendations for NPO, with non-oral nutrition/hydration/medications.    DIET ORDER:   Diet, NPO (10-25-24)    CURRENT DIET ORDER IS:  [] Appropriate:  [X] Inadequate: See nutrition care plan below for further recommendations   [] Other:    NUTRITION INTAKE/PROVISION:  [X] PO: NPO since 10/23/24, was previously on mechanically altered  PO diet and thickened clear liquids, per flow sheets pt consuming 25-75% of meals   [] Enteral Nutrition:  [] Parenteral Nutrition:    ANTHROPOMETRICS:  Drug Dosing Weight  Height (cm): 152.4 (14 Oct 2024 16:13)  Weight (kg): 77.1 (14 Oct 2024 16:13)  BMI (kg/m2): 33.2 (14 Oct 2024 16:13)  Weights:   Daily Weight in k.8 (10-24)   - Weight fluctuations in setting of fluid shifts with edema. Will continue to monitor weight trends as available/able.     NUTRITIONALLY PERTINENT MEDICATIONS  (STANDING):  albuterol/ipratropium for Nebulization 3 milliLiter(s) Nebulizer every 6 hours  folic acid Injectable 1 milliGRAM(s) IV Push daily  hydrocortisone sodium succinate Injectable 50 milliGRAM(s) IV Push every 6 hours  lactated ringers. 1000 milliLiter(s) (50 mL/Hr) IV Continuous <Continuous>  mycophenolate mofetil IVPB 500 milliGRAM(s) IV Intermittent daily  piperacillin/tazobactam IVPB.. 3.375 Gram(s) IV Intermittent every 8 hours  sodium chloride 3%  Inhalation 4 milliLiter(s) Inhalation every 6 hours    MEDICATIONS  (PRN):  acetaminophen   IVPB .. 1000 milliGRAM(s) IV Intermittent every 6 hours PRN Mild Pain (1 - 3)      NUTRITIONALLY PERTINENT LABS:  10-25 Na136 mmol/L Glu 118 mg/dL[H] K+ 4.2 mmol/L Cr  1.06 mg/dL BUN 33 mg/dL[H] 10-25 Phos 4.7 mg/dL[H] 10-25 Alb 2.0 g/dL[L]10-25 ALT 13 U/L AST 19 U/L Alkaline Phosphatase 79 U/L  10-22-24 @ 07:28 a1c 5.2    A1C with Estimated Average Glucose Result: 5.2 % (10-22-24 @ 07:28)  A1C with Estimated Average Glucose Result: 5.2 % (24 @ 05:10)    Finger Sticks: N/A      NUTRITIONALLY PERTINENT MEDICATIONS/LABS:  [x] Reviewed  [X] Relevant notes on medications/labs:    Hx Myasthenia gravis- ordered for Solu-cortef and Cellcept     EDEMA:  [x] Reviewed  [X] Relevant notes: +3 generalized, dependent and bilateral legs and arms edema     GI/ I&O:  [x] Reviewed  [X] Relevant notes: Colostomy  output per flow sheets 0 ml thus far (10/25), 450 ml (10/24),  400 ml (10/23)     SKIN:   [] No pressure injuries documented, per nursing flowsheet  [X] Pressure injury previously noted in nursing wound care note 10/15/24:  bilateral sacrum/coccyx /buttocks, unstageable pressure injury, present on admission.  left lateral calf unstageable pressure injury, present on admission.   thoracic spine deep tissue injury, present on admission.   left malleolus deep tissue injury, present on admission    left lateral heel deep tissue injury, present on admission   right heel deep tissue injury, present on admission     [] Change in pressure injury documentation:  [] Other:    ESTIMATED NEEDS:  [] No change:  [X] Updated:  Energy: 2077-5350  kcal/day (28-33 kcal/kg)  Protein:  88.6-110.7 g/day (1.2-1.5 g/kg)  Fluid:   ml/day or [X] defer to team  Based on: Current Weight of 73.8 kg (10-24)     NUTRITION DIAGNOSIS:  [X] Prior Dx:   1. Underweight( no resolved due to updated Height in pt's medical record)  2. Severe Chronic Malnutrition  3. Increased Nutrient Needs    [] New Dx:    EDUCATION:  [] Yes:  [X] Not appropriate/warranted    NUTRITION CARE PLAN:  1. Patient is at  risk for refeeding syndrome, as tolerated, recommend Vital 1.5 @ 10 mL/hr and advance by 10mL q12H until goal rate of 60 mL/hr x24 hrs is reached. Regimen at goal provides 1440 mL total volume, 1100 mL free water, 2160 kcal/d and 97g pro/day (29 kcal/kg and 1.3 g/kg) based on current weight 73.8 kg  -- Recommending hydrolyzed peptide based enteral formula due to colostomy    -- Defer free water flushes to medical team  2. Trend electrolytes closely and replete as indicated   3. Add Multivitamin and Thiamine daily in setting of refeeding risk; Consider adding  vitamin C supplement if no medical contraindications to aid in wound healing.   4. Defer diet/texture advancement to medical team/SLP as indicated   5. RD remains available to adjust EN formulary, volume/rate    [] Achieved - Continue current nutrition intervention(s)  [] Current medical condition precludes nutrition intervention at this time.    MONITORING AND EVALUATION:   RD remains available upon request and will follow up per protocol.    Mini Barone, MS,RDN,CDN AVAILABLE ON TEAMS   Available on MS TEAMS

## 2024-10-25 NOTE — PROGRESS NOTE ADULT - ASSESSMENT
83 y/o M PmHx recently diagnosed bilateral renal cell carcinoma, myasthenia gravis on prednisone and mycophenolate, atrial fibrillation on apixaban status post recent pacemaker placement for slow ventricular response, heart failure with preserved ejection fraction, severe aortic stenosis, and recent stroke with dysphagia and left sided weakness presenting to [facility] for severe anemia requiring blood transfusion. Patient was found to have pneumoperitoneum on a study obtained today 10/16 to identify the progression of his disease and specifically look for metastases. 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 Patient was taken to OR for Chopra's procedure with end colostomy. Transferred to SICU d/t hypotension requiring vasopressors during the case. Now downgraded to the floor. Patient was recently readmitted to SICU due to hypotension secondary to presumed aspiration.     PLAN:  - Continue eliquis  - SLP recs; NPO, with non-oral nutrition/hydration/medications.  - pain control as needed   - monitor ostomy output  - Appreciate care per SICU    Armaan Cartagena, PGY1  General Surgery  ACS/Trauma surgery  p [phone]

## 2024-10-26 NOTE — PROGRESS NOTE ADULT - SUBJECTIVE AND OBJECTIVE BOX
24 HOUR EVENTS:  - s/p FEES puree'd diet with mildly thickened liquids   - 20 lasix  - resume home meds, including eliquis             NEURO  Exam: awake, alert, oriented  Meds: acetaminophen     Tablet .. 650 milliGRAM(s) Oral every 6 hours PRN Mild Pain (1 - 3)    [x] Adequacy of sedation and pain control has been assessed and adjusted      RESPIRATORY  RR: 22 (10-26-24 @ 00:23) (15 - 35)  SpO2: 98% (10-26-24 @ 00:23) (95% - 100%)  Wt(kg): --  Exam: unlabored, clear to auscultation bilaterally  Mechanical Ventilation:     [N/A] Extubation Readiness Assessed  Meds: albuterol/ipratropium for Nebulization 3 milliLiter(s) Nebulizer every 6 hours  sodium chloride 3%  Inhalation 4 milliLiter(s) Inhalation every 6 hours        CARDIOVASCULAR  HR: 60 (10-26-24 @ 00:23) (60 - 60)  BP: 136/63 (10-26-24 @ 00:23) (106/53 - 147/64)  BP(mean): 90 (10-26-24 @ 00:23) (76 - 92)  ABP: --  ABP(mean): --  Wt(kg): --  CVP(cm H2O): --  VBG - ( 25 Oct 2024 02:35 )  pH: 7.34  /  pCO2: 47    /  pO2: 43    / HCO3: 25    / Base Excess: -0.5  /  SaO2: 74.3   Lactate: 1.1                Exam: regular rate and rhythm  Cardiac Rhythm: sinus  Perfusion     [x]Adequate   [ ]Inadequate  Mentation   [x]Normal       [ ]Reduced  Extremities  [x]Warm         [ ]Cool  Volume Status [ ]Hypervolemic [x]Euvolemic [ ]Hypovolemic  Meds:       GI/NUTRITION  Exam: soft, nontender, nondistended, incision C/D/I  Diet:  Meds:     GENITOURINARY  I&O's Detail    10-24 @ 07:01  -  10-25 @ 07:00  --------------------------------------------------------  IN:    Albumin 5%  - 250 mL: 250 mL    IV PiggyBack: 100 mL    IV PiggyBack: 258.4 mL    Lactated Ringers: 1750 mL  Total IN: 2358.4 mL    OUT:    Colostomy (mL): 450 mL    Indwelling Catheter - Urethral (mL): 665 mL  Total OUT: 1115 mL    Total NET: 1243.4 mL      10-25 @ 07:01  -  10-26 @ 00:29  --------------------------------------------------------  IN:    IV PiggyBack: 75 mL    Lactated Ringers: 250 mL  Total IN: 325 mL    OUT:    Colostomy (mL): 0 mL    Indwelling Catheter - Urethral (mL): 1215 mL  Total OUT: 1215 mL    Total NET: -890 mL          10-25    136  |  104  |  33[H]  ----------------------------<  118[H]  4.2   |  22  |  1.06    Ca    7.6[L]      25 Oct 2024 02:42  Phos  4.7     10-25  Mg     2.1     10-25    TPro  4.6[L]  /  Alb  2.0[L]  /  TBili  0.8  /  DBili  x   /  AST  19  /  ALT  13  /  AlkPhos  79  10-25    [ ] Elliott catheter, indication: N/A  Meds: ascorbic acid 500 milliGRAM(s) Oral daily  cholecalciferol 2000 Unit(s) Oral daily  cyanocobalamin 1000 MICROGram(s) Oral daily  folic acid 1 milliGRAM(s) Oral daily  multivitamin 1 Tablet(s) Oral daily  tamsulosin 0.8 milliGRAM(s) Oral at bedtime        HEMATOLOGIC  Meds: apixaban 5 milliGRAM(s) Oral two times a day  aspirin  chewable 81 milliGRAM(s) Oral daily    [x] VTE Prophylaxis                        8.2    9.97  )-----------( 131      ( 25 Oct 2024 11:42 )             26.8     PT/INR - ( 25 Oct 2024 02:42 )   PT: 20.4 sec;   INR: 1.80 ratio         PTT - ( 25 Oct 2024 02:42 )  PTT:34.2 sec      INFECTIOUS DISEASES  WBC Count: 9.97 K/uL (10-25 @ 11:42)  WBC Count: 8.53 K/uL (10-25 @ 02:42)    RECENT CULTURES:  Specimen Source: .Blood BLOOD  Date/Time: 10-23 @ 13:00  Culture Results:   No growth at 48 Hours  Gram Stain: --  Organism: --  Specimen Source: .Blood BLOOD  Date/Time: 10-23 @ 12:45  Culture Results:   No growth at 48 Hours  Gram Stain: --  Organism: --    Meds: mycophenolate mofetil 500 milliGRAM(s) Oral daily  piperacillin/tazobactam IVPB.. 3.375 Gram(s) IV Intermittent every 8 hours        ENDOCRINE  CAPILLARY BLOOD GLUCOSE      POCT Blood Glucose.: 198 mg/dL (25 Oct 2024 22:15)  POCT Blood Glucose.: 200 mg/dL (25 Oct 2024 16:33)    Meds: atorvastatin 40 milliGRAM(s) Oral at bedtime  finasteride 5 milliGRAM(s) Oral daily  insulin lispro (ADMELOG) corrective regimen sliding scale   SubCutaneous three times a day before meals  insulin lispro (ADMELOG) corrective regimen sliding scale   SubCutaneous at bedtime  predniSONE   Tablet 5 milliGRAM(s) Oral daily        CODE STATUS:  DNI: Trial NIV

## 2024-10-26 NOTE — PROGRESS NOTE ADULT - ASSESSMENT
83 y/o M PmHx recently diagnosed bilateral renal cell carcinoma, myasthenia gravis on prednisone and mycophenolate, atrial fibrillation on apixaban status post recent pacemaker placement for slow ventricular response, heart failure with preserved ejection fraction, severe aortic stenosis, and recent stroke with dysphagia and left sided weakness presenting to [facility] for severe anemia requiring blood transfusion. Patient was found to have pneumoperitoneum on a study obtained today 10/16 to identify the progression of his disease and specifically look for metastases. 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 Patient was taken to OR for Chopra's procedure with end colostomy. Transferred to SICU d/t hypotension requiring vasopressors during the case. Now downgraded to the floor. Patient was recently readmitted to SICU due to hypotension secondary to presumed aspiration.     PLAN:  - eliquis  - SLP recs; puree'd diet with mildly thickened liquids   - pain control as needed   - monitor ostomy output  - Appreciate care per SICU  - Patient likely discharge to floors today/tomorrow    Armaan Cartagena, PGY1  General Surgery  ACS/Trauma surgery  p [phone]

## 2024-10-26 NOTE — PROGRESS NOTE ADULT - ASSESSMENT
84M PmHx recently diagnosed bilateral renal cell carcinoma, myasthenia gravis on prednisone and mycophenolate, atrial fibrillation on apixaban s/p recent pacemaker placement for slow ventricular response, heart failure with preserved ejection fraction, severe aortic stenosis, and recent stroke with dysphagia and left sided weakness presenting to [facility] for severe anemia requiring blood transfusion. Patient was found to have pneumoperitoneum on a study obtained 10/16 to identify the progression of his disease and specifically look for metastases. 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 Patient was taken to OR for Chopra's procedure with end colostomy 10/17. Transferred to SICU d/t hypotension requiring vasopressors during the case.   Pt transferred out of SICU 10/18 but SICU reconsulted 10/23 after RRT was called on the floor for hypotension.  Patient transferred to SICU for further management.  After discussion with family, patient made DNR/DNI with trial of non-invasive ventilation.  Pt now off pressors and sating well on NC     PLAN:  NEURO: Hx myasthenia gravis, CVA   - Pain control: Tylenol, PRN Dilaudid   - Hx Myasthenia gravis- s/p stress dose steroids (now on home Prednisone/Cellcept)     RESPIRATORY  - Extubated to aerosol mask 10/17  - Monitor CXR, blood gas   - CXR with large left pleural effusion  - Duonebs q6 and hypertonic saline     CARDIOVASCULAR: Hx AF (on home Eliquis) with leadless pacemaker, HFpEF with severe AS/AR  - Hemodynamic monitoring  - TTE: EF 60-65%, severe AS, MR, TR and LVH  - central line placed  - off pressors   - on home eliquis and and atorvastatin     GI: s/p Alec's on 10/17   - s/p MBS study 10/22  - cleared for minced-moist and mildly thick liquids via single small sips  - protonix daily    /RENAL  - LR @100cc/hr  - Hx chronic Elliott for urinary retention > urology exchanged Coude catheter > urology to preform cystoscopy today d/t concern for false tract  - Monitor SCr, replete electrolytes as needed     HEMATOLOGIC:  - s/p 2U PRBC 10/14 and 3U PRBC 10/17  - Monitor H/H  - on ASA and eliquis BID  - Mechanical DVT ppx; SCDs    INFECTIOUS DISEASE  - s/p Vanco and Zosyn x 1, continue empiric zosyn  - f/u blood cultures sent from RRT    ENDOCRINE  - Start stress dose steroids q6    DISPO  -SICU     Lines   - R IJ CVL

## 2024-10-26 NOTE — PROGRESS NOTE ADULT - SUBJECTIVE AND OBJECTIVE BOX
Subjective:  Patient seen at bedside this AM. Reports feeling well, without complaints. Denies chest pain, SOB. Tolerating diet without N/V.     24h Events:   - s/p FEES puree'd diet with mildly thickened liquids   - 20 lasix  - resume home meds, including eliquis     Objective:  Vital Signs  T(C): 36.6 (10-26 @ 07:00), Max: 37.1 (10-25 @ 19:00)  HR: 60 (10-26 @ 13:00) (60 - 64)  BP: 135/60 (10-26 @ 13:00) (118/58 - 163/68)  RR: 53 (10-26 @ 13:00) (20 - 53)  SpO2: 97% (10-26 @ 13:00) (94% - 100%)  10-25-24 @ 07:01  -  10-26-24 @ 07:00  --------------------------------------------------------  IN:  Total IN: 0 mL    OUT:    Colostomy (mL): 350 mL    Indwelling Catheter - Urethral (mL): 1705 mL  Total OUT: 2055 mL    Total NET: -2055 mL      10-26-24 @ 07:01  -  10-26-24 @ 16:42  --------------------------------------------------------  IN:  Total IN: 0 mL    OUT:    Indwelling Catheter - Urethral (mL): 360 mL  Total OUT: 360 mL    Total NET: -360 mL          Physical Exam:  GEN: resting in bed comfortably in NAD  RESP: no increased WOB, no tachypnea, on RA  ABD: soft, non-distended, non-tender to palpation. Stoma pink, patent and productive with gas and stool in ostomy bag.   EXTR: warm, edema in upper extremities; spontaneous movement in b/l U/L extrem  NEURO: awake, more alert than previous exams, at baseline mental status     Labs:                        7.7    10.64 )-----------( 132      ( 26 Oct 2024 05:52 )             25.6   10-26    138  |  103  |  33[H]  ----------------------------<  147[H]  3.4[L]   |  25  |  0.93    Ca    7.7[L]      26 Oct 2024 05:53  Phos  3.9     10-26  Mg     2.1     10-26    TPro  5.0[L]  /  Alb  2.1[L]  /  TBili  0.6  /  DBili  x   /  AST  17  /  ALT  14  /  AlkPhos  106  10-26    CAPILLARY BLOOD GLUCOSE      POCT Blood Glucose.: 139 mg/dL (26 Oct 2024 11:42)  POCT Blood Glucose.: 198 mg/dL (25 Oct 2024 22:15)      Medications:  MEDICATIONS  (STANDING):  albuterol/ipratropium for Nebulization 3 milliLiter(s) Nebulizer every 6 hours  apixaban 5 milliGRAM(s) Oral two times a day  ascorbic acid 500 milliGRAM(s) Oral daily  aspirin  chewable 81 milliGRAM(s) Oral daily  atorvastatin 40 milliGRAM(s) Oral at bedtime  chlorhexidine 2% Cloths 1 Application(s) Topical <User Schedule>  cholecalciferol 2000 Unit(s) Oral daily  cyanocobalamin 1000 MICROGram(s) Oral daily  finasteride 5 milliGRAM(s) Oral daily  folic acid 1 milliGRAM(s) Oral daily  insulin lispro (ADMELOG) corrective regimen sliding scale   SubCutaneous three times a day before meals  insulin lispro (ADMELOG) corrective regimen sliding scale   SubCutaneous at bedtime  multivitamin 1 Tablet(s) Oral daily  mycophenolate mofetil 500 milliGRAM(s) Oral daily  pantoprazole    Tablet 40 milliGRAM(s) Oral before breakfast  piperacillin/tazobactam IVPB.. 3.375 Gram(s) IV Intermittent every 8 hours  predniSONE   Tablet 5 milliGRAM(s) Oral daily  sodium chloride 3%  Inhalation 4 milliLiter(s) Inhalation every 6 hours  tamsulosin 0.8 milliGRAM(s) Oral at bedtime  thiamine 100 milliGRAM(s) Oral daily    MEDICATIONS  (PRN):  acetaminophen     Tablet .. 650 milliGRAM(s) Oral every 6 hours PRN Mild Pain (1 - 3)      Imaging:

## 2024-10-27 NOTE — PROGRESS NOTE ADULT - ASSESSMENT
85 y/o M PmHx recently diagnosed bilateral renal cell carcinoma, myasthenia gravis on prednisone and mycophenolate, atrial fibrillation on apixaban status post recent pacemaker placement for slow ventricular response, heart failure with preserved ejection fraction, severe aortic stenosis, and recent stroke with dysphagia and left sided weakness presenting to [facility] for severe anemia requiring blood transfusion. Patient was found to have pneumoperitoneum on a study obtained today 10/16 to identify the progression of his disease and specifically look for metastases. 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 Patient was taken to OR for Chopra's procedure with end colostomy. Transferred to SICU d/t hypotension requiring vasopressors during the case. Now downgraded to the floor. Patient was recently readmitted to SICU due to hypotension secondary to presumed aspiration.     PLAN:  - SLP recs; puree'd diet with mildly thickened liquids   - pain control as needed   - monitor ostomy output  - Appreciate care per SICU    Armaan Cartagena, PGY1  General Surgery  ACS/Trauma surgery  p [phone]

## 2024-10-27 NOTE — PROGRESS NOTE ADULT - ASSESSMENT
ASSESSMENT: 83 y/o M PmHx recently diagnosed bilateral renal cell carcinoma, myasthenia gravis on prednisone and mycophenolate, atrial fibrillation on apixaban status post recent pacemaker placement for slow ventricular response, heart failure with preserved ejection fraction, severe aortic stenosis, and recent stroke with dysphagia and left sided weakness presenting to [facility] for severe anemia requiring blood transfusion. Patient was found to have pneumoperitoneum on a study obtained today 10/16 to identify the progression of his disease and specifically look for metastases. 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 Patient was taken to OR for Chopar's procedure with end colostomy. Transferred to SICU d/t hypotension requiring vasopressors during the case. Now downgraded to the floor. Patient was recently readmitted to SICU due to hypotension secondary to presumed aspiration.     PLAN:    NEURO: Hx myasthenia gravis, CVA   - Pain control: Tylenol, PRN Dilaudid   - Hx Myasthenia gravis- s/p stress dose steroids (now on home Prednisone/Cellcept)     RESPIRATORY  - Extubated to aerosol mask 10/17  - CXR with large left pleural effusion  - Duonebs q6 and hypertonic saline     CARDIOVASCULAR: Hx AF (on home Eliquis) with leadless pacemaker, HFpEF with severe AS/AR  - TTE: EF 60-65%, severe AS, MR, TR and LVH  - off pressors   - on home eliquis and and atorvastatin     GI: s/p Alec's on 10/17   - s/p MBS study 10/22  - cleared for minced-moist and mildly thick liquids via single small sips  - protonix daily    /RENAL  - butt placed by urology w/ cytoscopy 10/18   - flomax, finesteride   - Monitor SCr, replete electrolytes as needed     HEMATOLOGIC:  - s/p 2U PRBC 10/14 and 3U PRBC 10/17  - Monitor H/H  - on ASA and eliquis BID  - Mechanical DVT ppx; SCDs    INFECTIOUS DISEASE  - empiric zosyn  - cultures negative    ENDOCRINE  - home prednisone    Code Status: Full code    Disposition: SICU    Tia Fatima PA-C     [id number] ASSESSMENT: 85 y/o M PmHx recently diagnosed bilateral renal cell carcinoma, myasthenia gravis on prednisone and mycophenolate, atrial fibrillation on apixaban status post recent pacemaker placement for slow ventricular response, heart failure with preserved ejection fraction, severe aortic stenosis, and recent stroke with dysphagia and left sided weakness presenting to [facility] for severe anemia requiring blood transfusion. Patient was found to have pneumoperitoneum on a study obtained today 10/16 to identify the progression of his disease and specifically look for metastases. 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 Patient was taken to OR for Chopra's procedure with end colostomy. Transferred to SICU d/t hypotension requiring vasopressors during the case. Now downgraded to the floor. Patient was recently readmitted to SICU due to hypotension secondary to presumed aspiration.     PLAN:    NEURO: Hx myasthenia gravis, CVA   - Pain control: Tylenol, PRN Dilaudid   - Hx Myasthenia gravis- s/p stress dose steroids (now on home Prednisone/Cellcept)     RESPIRATORY  - Extubated to aerosol mask 10/17  - CXR with large left pleural effusion  - Duonebs q6 and hypertonic saline     CARDIOVASCULAR: Hx AF (on home Eliquis) with leadless pacemaker, HFpEF with severe AS/AR  - TTE: EF 60-65%, severe AS, MR, TR and LVH  - off pressors   - on home eliquis and and atorvastatin     GI: s/p Alec's on 10/17   - s/p MBS study 10/22  - cleared for minced-moist and mildly thick liquids via single small sips  - protonix daily    /RENAL  - butt placed by urology w/ cytoscopy 10/18   - flomax, finesteride   - Monitor SCr, replete electrolytes as needed     HEMATOLOGIC:  - s/p 2U PRBC 10/14 and 3U PRBC 10/17  - Monitor H/H  - on ASA and eliquis BID  - Mechanical DVT ppx; SCDs    INFECTIOUS DISEASE  - empiric zosyn  - cultures negative    ENDOCRINE  - home prednisone    Code Status: DNR/DNI w/ trial of NIV    Disposition: SICU    Tia Fatima PA-C     [id number] ASSESSMENT: 85 y/o M PmHx recently diagnosed bilateral renal cell carcinoma, myasthenia gravis on prednisone and mycophenolate, atrial fibrillation on apixaban status post recent pacemaker placement for slow ventricular response, heart failure with preserved ejection fraction, severe aortic stenosis, and recent stroke with dysphagia and left sided weakness presenting to [facility] for severe anemia requiring blood transfusion. Patient was found to have pneumoperitoneum on a study obtained today 10/16 to identify the progression of his disease and specifically look for metastases. 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 Patient was taken to OR for Chopra's procedure with end colostomy. Transferred to SICU d/t hypotension requiring vasopressors during the case. Now downgraded to the floor. Patient was recently readmitted to SICU due to hypotension secondary to presumed aspiration.     PLAN:    NEURO: Hx myasthenia gravis, CVA   - Pain control: Tylenol, PRN Dilaudid   - Hx Myasthenia gravis- s/p stress dose steroids (now on home Prednisone/Cellcept)     RESPIRATORY  - Extubated to aerosol mask 10/17  - CXR with large left pleural effusion  - Duonebs q6 and hypertonic saline   - High flow 50/50    CARDIOVASCULAR: Hx AF (on home Eliquis) with leadless pacemaker, HFpEF with severe AS/AR  - TTE: EF 60-65%, severe AS, MR, TR and LVH  - off pressors   - on home eliquis and and atorvastatin     GI: s/p Alec's on 10/17   - s/p MBS study 10/22  - cleared for minced-moist and mildly thick liquids via single small sips  - protonix daily    /RENAL  - butt placed by urology w/ cytoscopy 10/18   - flomax, finesteride   - Monitor SCr, replete electrolytes as needed     HEMATOLOGIC:  - s/p 2U PRBC 10/14 and 3U PRBC 10/17  - Monitor H/H  - on ASA and eliquis BID  - Mechanical DVT ppx; SCDs    INFECTIOUS DISEASE  - empiric zosyn  - cultures negative    ENDOCRINE  - home prednisone    Code Status: DNR/DNI w/ trial of NIV    Disposition: SICU    Tia Fatima PA-C     [id number]

## 2024-10-27 NOTE — PROGRESS NOTE ADULT - SUBJECTIVE AND OBJECTIVE BOX
Subjective:  Patient seen at bedside this AM.     Objective:  Vital Signs  T(C): 36.6 (10-27 @ 11:00), Max: 36.6 (10-27 @ 11:00)  HR: 60 (10-27 @ 13:00) (60 - 64)  BP: 147/65 (10-27 @ 13:00) (120/59 - 167/74)  RR: 28 (10-27 @ 13:00) (15 - 48)  SpO2: 95% (10-27 @ 13:00) (80% - 100%)  10-26-24 @ 07:01  -  10-27-24 @ 07:00  --------------------------------------------------------  IN:  Total IN: 0 mL    OUT:    Colostomy (mL): 150 mL    Indwelling Catheter - Urethral (mL): 1905 mL  Total OUT: 2055 mL    Total NET: -2055 mL      10-27-24 @ 07:01  -  10-27-24 @ 13:52  --------------------------------------------------------  IN:  Total IN: 0 mL    OUT:    Indwelling Catheter - Urethral (mL): 755 mL  Total OUT: 755 mL    Total NET: -755 mL          Physical Exam:  GEN: resting in bed comfortably in NAD  RESP: no increased WOB, no tachypnea, on RA  ABD: soft, non-distended, non-tender to palpation. Stoma pink, patent and productive with gas and stool in ostomy bag.   EXTR: warm, edema in upper extremities; spontaneous movement in b/l U/L extrem  NEURO: awake, at baseline mental status     Labs:                        8.4    8.82  )-----------( 121      ( 27 Oct 2024 05:18 )             27.9   10-27    138  |  104  |  30[H]  ----------------------------<  107[H]  3.4[L]   |  24  |  0.75    Ca    7.9[L]      27 Oct 2024 05:18  Phos  2.8     10-27  Mg     2.0     10-27    TPro  5.2[L]  /  Alb  2.1[L]  /  TBili  0.6  /  DBili  x   /  AST  30  /  ALT  13  /  AlkPhos  118  10-27    CAPILLARY BLOOD GLUCOSE      POCT Blood Glucose.: 142 mg/dL (27 Oct 2024 11:44)  POCT Blood Glucose.: 98 mg/dL (27 Oct 2024 07:50)  POCT Blood Glucose.: 139 mg/dL (26 Oct 2024 23:19)  POCT Blood Glucose.: 154 mg/dL (26 Oct 2024 16:58)      Medications:  MEDICATIONS  (STANDING):  albuterol/ipratropium for Nebulization 3 milliLiter(s) Nebulizer every 6 hours  apixaban 5 milliGRAM(s) Oral two times a day  ascorbic acid 500 milliGRAM(s) Oral daily  aspirin  chewable 81 milliGRAM(s) Oral daily  atorvastatin 40 milliGRAM(s) Oral at bedtime  chlorhexidine 2% Cloths 1 Application(s) Topical <User Schedule>  cholecalciferol 2000 Unit(s) Oral daily  cyanocobalamin 1000 MICROGram(s) Oral daily  finasteride 5 milliGRAM(s) Oral daily  folic acid 1 milliGRAM(s) Oral daily  insulin lispro (ADMELOG) corrective regimen sliding scale   SubCutaneous three times a day before meals  insulin lispro (ADMELOG) corrective regimen sliding scale   SubCutaneous at bedtime  multivitamin 1 Tablet(s) Oral daily  mycophenolate mofetil 500 milliGRAM(s) Oral daily  pantoprazole    Tablet 40 milliGRAM(s) Oral before breakfast  piperacillin/tazobactam IVPB.. 3.375 Gram(s) IV Intermittent every 8 hours  predniSONE   Tablet 5 milliGRAM(s) Oral daily  sodium chloride 3%  Inhalation 4 milliLiter(s) Inhalation every 6 hours  tamsulosin 0.8 milliGRAM(s) Oral at bedtime  thiamine 100 milliGRAM(s) Oral daily    MEDICATIONS  (PRN):  acetaminophen     Tablet .. 650 milliGRAM(s) Oral every 6 hours PRN Mild Pain (1 - 3)      Imaging:

## 2024-10-27 NOTE — PROVIDER CONTACT NOTE (OTHER) - ASSESSMENT
Pt desating to 85% on 6L NC. Pt sounds congested - Deep suctioning via nasal passage with minimal secretions minimal improvement in oxygen saturations. Non-rebreather applied. All other vitals stable.

## 2024-10-27 NOTE — PROGRESS NOTE ADULT - SUBJECTIVE AND OBJECTIVE BOX
24 HOUR EVENTS:  - s/p IV lasix 20  - de-listed for increased secretions causing episodes of desaturations     NEURO  Exam: A&O x 4  Meds: acetaminophen     Tablet .. 650 milliGRAM(s) Oral every 6 hours PRN Mild Pain (1 - 3)      RESPIRATORY  RR: 15 (10-27-24 @ 00:00) (15 - 53)  SpO2: 100% (10-27-24 @ 00:00) (80% - 100%)  Exam: 2L NC    Meds: albuterol/ipratropium for Nebulization 3 milliLiter(s) Nebulizer every 6 hours  sodium chloride 3%  Inhalation 4 milliLiter(s) Inhalation every 6 hours      CARDIOVASCULAR  HR: 60 (10-27-24 @ 00:00) (60 - 64)  BP: 120/59 (10-27-24 @ 00:00) (118/58 - 163/68)  BP(mean): 85 (10-27-24 @ 00:00) (83 - 101)  VBG - ( 25 Oct 2024 02:35 )  pH: 7.34  /  pCO2: 47    /  pO2: 43    / HCO3: 25    / Base Excess: -0.5  /  SaO2: 74.3   Lactate: 1.1        Exam:   Cardiac Rhythm:   Perfusion     [ x]Adequate   [ ]Inadequate  Mentation   [x ]Normal       [ ]Reduced  Extremities  [ x]Warm         [ ]Cool  Volume Status [ ]Hypervolemic [x ]Euvolemic [ ]Hypovolemic    GI/NUTRITION  Exam: soft, NT/ND  Diet: minced-moist and mildly thick liquids via single small sips  Meds: pantoprazole    Tablet 40 milliGRAM(s) Oral before breakfast      GENITOURINARY  I&O's Detail    10-25 @ 07:01  -  10-26 @ 07:00  --------------------------------------------------------  IN:    IV PiggyBack: 175 mL    Lactated Ringers: 250 mL  Total IN: 425 mL    OUT:    Colostomy (mL): 350 mL    Indwelling Catheter - Urethral (mL): 1705 mL  Total OUT: 2055 mL    Total NET: -1630 mL      10-26 @ 07:01  -  10-27 @ 00:46  --------------------------------------------------------  IN:  Total IN: 0 mL    OUT:    Indwelling Catheter - Urethral (mL): 1390 mL  Total OUT: 1390 mL    Total NET: -1390 mL          10-26    138  |  103  |  33[H]  ----------------------------<  147[H]  3.4[L]   |  25  |  0.93    Ca    7.7[L]      26 Oct 2024 05:53  Phos  3.9     10-26  Mg     2.1     10-26    TPro  5.0[L]  /  Alb  2.1[L]  /  TBili  0.6  /  DBili  x   /  AST  17  /  ALT  14  /  AlkPhos  106  10-26    Meds: ascorbic acid 500 milliGRAM(s) Oral daily  cholecalciferol 2000 Unit(s) Oral daily  cyanocobalamin 1000 MICROGram(s) Oral daily  folic acid 1 milliGRAM(s) Oral daily  multivitamin 1 Tablet(s) Oral daily  tamsulosin 0.8 milliGRAM(s) Oral at bedtime  thiamine 100 milliGRAM(s) Oral daily      HEMATOLOGIC  Meds: apixaban 5 milliGRAM(s) Oral two times a day  aspirin  chewable 81 milliGRAM(s) Oral daily                          7.7    10.64 )-----------( 132      ( 26 Oct 2024 05:52 )             25.6     PT/INR - ( 26 Oct 2024 05:52 )   PT: 18.4 sec;   INR: 1.61 ratio         PTT - ( 26 Oct 2024 05:52 )  PTT:35.4 sec    INFECTIOUS DISEASES  T(C): 36.3 (10-26-24 @ 22:00), Max: 36.6 (10-26-24 @ 05:00)  Wt(kg): --  WBC Count: 10.64 K/uL (10-26 @ 05:52)    Recent Cultures:  Specimen Source: .Blood BLOOD, 10-23 @ 13:00; Results   No growth at 72 Hours; Gram Stain: --; Organism: --  Specimen Source: .Blood BLOOD, 10-23 @ 12:45; Results   No growth at 72 Hours; Gram Stain: --; Organism: --    Meds: mycophenolate mofetil 500 milliGRAM(s) Oral daily  piperacillin/tazobactam IVPB.. 3.375 Gram(s) IV Intermittent every 8 hours      ENDOCRINE  Capillary Blood Glucose    Meds: atorvastatin 40 milliGRAM(s) Oral at bedtime  finasteride 5 milliGRAM(s) Oral daily  insulin lispro (ADMELOG) corrective regimen sliding scale   SubCutaneous three times a day before meals  insulin lispro (ADMELOG) corrective regimen sliding scale   SubCutaneous at bedtime  predniSONE   Tablet 5 milliGRAM(s) Oral daily      ACCESS DEVICES:  [x ] Peripheral IV  [ ] Central Venous Line		[ ] R	[ ] L	[ ] IJ	[ ] Fem	[ ] SC	Placed:   [ ] Arterial Line			[ ] R	[ ] L	[ ] Fem	[ ] Rad	[ ] Ax	Placed:   [ ] PICC:					[ ] Mediport  [ ] Urinary Catheter, Date Placed:   [ ] Necessity of urinary, arterial, and venous catheters discussed    OTHER MEDICATIONS:  chlorhexidine 2% Cloths 1 Application(s) Topical <User Schedule>      IMAGING: 24 HOUR EVENTS:  - s/p IV lasix 20  - de-listed for increased secretions causing episodes of desaturations     NEURO  Exam: A&O x 4  Meds: acetaminophen     Tablet .. 650 milliGRAM(s) Oral every 6 hours PRN Mild Pain (1 - 3)      RESPIRATORY  RR: 15 (10-27-24 @ 00:00) (15 - 53)  SpO2: 100% (10-27-24 @ 00:00) (80% - 100%)  Exam: 2L NC    Meds: albuterol/ipratropium for Nebulization 3 milliLiter(s) Nebulizer every 6 hours  sodium chloride 3%  Inhalation 4 milliLiter(s) Inhalation every 6 hours      CARDIOVASCULAR  HR: 60 (10-27-24 @ 00:00) (60 - 64)  BP: 120/59 (10-27-24 @ 00:00) (118/58 - 163/68)  BP(mean): 85 (10-27-24 @ 00:00) (83 - 101)  VBG - ( 25 Oct 2024 02:35 )  pH: 7.34  /  pCO2: 47    /  pO2: 43    / HCO3: 25    / Base Excess: -0.5  /  SaO2: 74.3   Lactate: 1.1        Exam:   Cardiac Rhythm: RRR  Perfusion     [ x]Adequate   [ ]Inadequate  Mentation   [x ]Normal       [ ]Reduced  Extremities  [ x]Warm         [ ]Cool  Volume Status [ ]Hypervolemic [x ]Euvolemic [ ]Hypovolemic    GI/NUTRITION  Exam: soft, NT/ND  Diet: minced-moist and mildly thick liquids via single small sips  Meds: pantoprazole    Tablet 40 milliGRAM(s) Oral before breakfast      GENITOURINARY  I&O's Detail    10-25 @ 07:01  -  10-26 @ 07:00  --------------------------------------------------------  IN:    IV PiggyBack: 175 mL    Lactated Ringers: 250 mL  Total IN: 425 mL    OUT:    Colostomy (mL): 350 mL    Indwelling Catheter - Urethral (mL): 1705 mL  Total OUT: 2055 mL    Total NET: -1630 mL      10-26 @ 07:01  -  10-27 @ 00:46  --------------------------------------------------------  IN:  Total IN: 0 mL    OUT:    Indwelling Catheter - Urethral (mL): 1390 mL  Total OUT: 1390 mL    Total NET: -1390 mL          10-26    138  |  103  |  33[H]  ----------------------------<  147[H]  3.4[L]   |  25  |  0.93    Ca    7.7[L]      26 Oct 2024 05:53  Phos  3.9     10-26  Mg     2.1     10-26    TPro  5.0[L]  /  Alb  2.1[L]  /  TBili  0.6  /  DBili  x   /  AST  17  /  ALT  14  /  AlkPhos  106  10-26    Meds: ascorbic acid 500 milliGRAM(s) Oral daily  cholecalciferol 2000 Unit(s) Oral daily  cyanocobalamin 1000 MICROGram(s) Oral daily  folic acid 1 milliGRAM(s) Oral daily  multivitamin 1 Tablet(s) Oral daily  tamsulosin 0.8 milliGRAM(s) Oral at bedtime  thiamine 100 milliGRAM(s) Oral daily      HEMATOLOGIC  Meds: apixaban 5 milliGRAM(s) Oral two times a day  aspirin  chewable 81 milliGRAM(s) Oral daily                          7.7    10.64 )-----------( 132      ( 26 Oct 2024 05:52 )             25.6     PT/INR - ( 26 Oct 2024 05:52 )   PT: 18.4 sec;   INR: 1.61 ratio         PTT - ( 26 Oct 2024 05:52 )  PTT:35.4 sec    INFECTIOUS DISEASES  T(C): 36.3 (10-26-24 @ 22:00), Max: 36.6 (10-26-24 @ 05:00)  Wt(kg): --  WBC Count: 10.64 K/uL (10-26 @ 05:52)    Recent Cultures:  Specimen Source: .Blood BLOOD, 10-23 @ 13:00; Results   No growth at 72 Hours; Gram Stain: --; Organism: --  Specimen Source: .Blood BLOOD, 10-23 @ 12:45; Results   No growth at 72 Hours; Gram Stain: --; Organism: --    Meds: mycophenolate mofetil 500 milliGRAM(s) Oral daily  piperacillin/tazobactam IVPB.. 3.375 Gram(s) IV Intermittent every 8 hours      ENDOCRINE  Capillary Blood Glucose    Meds: atorvastatin 40 milliGRAM(s) Oral at bedtime  finasteride 5 milliGRAM(s) Oral daily  insulin lispro (ADMELOG) corrective regimen sliding scale   SubCutaneous three times a day before meals  insulin lispro (ADMELOG) corrective regimen sliding scale   SubCutaneous at bedtime  predniSONE   Tablet 5 milliGRAM(s) Oral daily      ACCESS DEVICES:  [x ] Peripheral IV  [ ] Central Venous Line		[ ] R	[ ] L	[ ] IJ	[ ] Fem	[ ] SC	Placed:   [ ] Arterial Line			[ ] R	[ ] L	[ ] Fem	[ ] Rad	[ ] Ax	Placed:   [ ] PICC:					[ ] Mediport  [ ] Urinary Catheter, Date Placed:   [ ] Necessity of urinary, arterial, and venous catheters discussed    OTHER MEDICATIONS:  chlorhexidine 2% Cloths 1 Application(s) Topical <User Schedule>      IMAGING:

## 2024-10-27 NOTE — PROVIDER CONTACT NOTE (OTHER) - ASSESSMENT
Pt desating on 6L NC to low 80s. Pt states he feels like he's choking. Deep suctioning via nasal trumpet performed multiple times with small amount of thick secretions and no change in O2 saturation

## 2024-10-28 NOTE — PROGRESS NOTE ADULT - ASSESSMENT
83 y/o M PmHx recently diagnosed bilateral renal cell carcinoma, myasthenia gravis on prednisone and mycophenolate, atrial fibrillation on apixaban status post recent pacemaker placement for slow ventricular response, heart failure with preserved ejection fraction, severe aortic stenosis, and recent stroke with dysphagia and left sided weakness presenting to [facility] for severe anemia requiring blood transfusion. Patient was found to have pneumoperitoneum on a study obtained today 10/16 to identify the progression of his disease and specifically look for metastases. 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 Patient was taken to OR for Chopra's procedure with end colostomy. Transferred to SICU d/t hypotension requiring vasopressors during the case. Now downgraded to the floor. Patient was recently readmitted to SICU due to hypotension secondary to presumed aspiration.     PLAN:  - SLP recs; puree'd diet with mildly thickened liquids   - pain control as needed   - monitor ostomy output  - POCUS left lower lobe consolidation to evaluate for thoracocentesis   - Pending palliative care consult  - Appreciate care per SICU    Armaan Cartagena, PGY1  General Surgery  ACS/Trauma surgery  p [phone]

## 2024-10-28 NOTE — PROGRESS NOTE ADULT - SUBJECTIVE AND OBJECTIVE BOX
Subjective:  Patient seen at bedside this AM with surgical team.     24h Events:   - Delisted for secretions  - Sputum gram stain + gram negative diplococci + rods   - Palliative consult (thoracentesis, diet, GOC)    Objective:  Vital Signs  T(C): 37.2 (10-28 @ 07:00), Max: 37.2 (10-28 @ 05:00)  HR: 60 (10-28 @ 09:11) (60 - 95)  BP: 126/59 (10-28 @ 09:00) (108/55 - 177/74)  RR: 33 (10-28 @ 09:11) (18 - 44)  SpO2: 99% (10-28 @ 09:11) (90% - 100%)  10-27-24 @ 07:01  -  10-28-24 @ 07:00  --------------------------------------------------------  IN:  Total IN: 0 mL    OUT:    Colostomy (mL): 700 mL    Indwelling Catheter - Urethral (mL): 2095 mL  Total OUT: 2795 mL    Total NET: -2795 mL      10-28-24 @ 07:01  -  10-28-24 @ 10:09  --------------------------------------------------------  IN:  Total IN: 0 mL    OUT:    Indwelling Catheter - Urethral (mL): 100 mL  Total OUT: 100 mL    Total NET: -100 mL          Physical Exam:  GEN: resting in bed, in NAD  RESP: HFNC in place (50/50), shallow breathing  ABD: soft, non-distended abdomen. Midline wound healing well. Ostomy pink, patent and productive of stool and gas.   EXTR: warm, edema in upper extremities; spontaneous movement in b/l U/L extrem    Labs:                        8.9    9.44  )-----------( 105      ( 28 Oct 2024 01:29 )             29.0   10-28    138  |  102  |  26[H]  ----------------------------<  108[H]  2.9[LL]   |  25  |  0.66    Ca    7.3[L]      28 Oct 2024 08:52  Phos  3.5     10-28  Mg     1.7     10-28    TPro  4.7[L]  /  Alb  2.0[L]  /  TBili  0.8  /  DBili  x   /  AST  17  /  ALT  15  /  AlkPhos  122[H]  10-28    CAPILLARY BLOOD GLUCOSE      POCT Blood Glucose.: 93 mg/dL (27 Oct 2024 23:43)  POCT Blood Glucose.: 134 mg/dL (27 Oct 2024 17:03)  POCT Blood Glucose.: 142 mg/dL (27 Oct 2024 11:44)      Medications:  MEDICATIONS  (STANDING):  albuterol/ipratropium for Nebulization 3 milliLiter(s) Nebulizer every 6 hours  apixaban 5 milliGRAM(s) Oral two times a day  ascorbic acid 500 milliGRAM(s) Oral daily  aspirin  chewable 81 milliGRAM(s) Oral daily  atorvastatin 40 milliGRAM(s) Oral at bedtime  chlorhexidine 2% Cloths 1 Application(s) Topical <User Schedule>  cholecalciferol 2000 Unit(s) Oral daily  cyanocobalamin 1000 MICROGram(s) Oral daily  finasteride 5 milliGRAM(s) Oral daily  fluticasone propionate 50 MICROgram(s)/spray Nasal Spray 1 Spray(s) Both Nostrils two times a day  folic acid 1 milliGRAM(s) Oral daily  magnesium sulfate  IVPB 2 Gram(s) IV Intermittent once  magnesium sulfate  IVPB 2 Gram(s) IV Intermittent once  multivitamin 1 Tablet(s) Oral daily  mycophenolate mofetil 500 milliGRAM(s) Oral daily  pantoprazole    Tablet 40 milliGRAM(s) Oral before breakfast  piperacillin/tazobactam IVPB.. 3.375 Gram(s) IV Intermittent every 8 hours  potassium chloride   Solution 40 milliEquivalent(s) Oral every 4 hours  potassium chloride  10 mEq/100 mL IVPB 10 milliEquivalent(s) IV Intermittent every 1 hour  predniSONE   Tablet 5 milliGRAM(s) Oral daily  sodium chloride 3%  Inhalation 4 milliLiter(s) Inhalation every 6 hours  tamsulosin 0.8 milliGRAM(s) Oral at bedtime  thiamine 100 milliGRAM(s) Oral daily    MEDICATIONS  (PRN):  acetaminophen     Tablet .. 650 milliGRAM(s) Oral every 6 hours PRN Mild Pain (1 - 3)  oxyCODONE    Solution 2.5 milliGRAM(s) Oral every 3 hours PRN Moderate Pain (4 - 6)      Imaging:

## 2024-10-28 NOTE — PROGRESS NOTE ADULT - ASSESSMENT
ASSESSMENT: 85 y/o M PmHx recently diagnosed bilateral renal cell carcinoma, myasthenia gravis on prednisone and mycophenolate, atrial fibrillation on apixaban status post recent pacemaker placement for slow ventricular response, heart failure with preserved ejection fraction, severe aortic stenosis, and recent stroke with dysphagia and left sided weakness presenting to [facility] for severe anemia requiring blood transfusion. Patient was found to have pneumoperitoneum on a study obtained today 10/16 to identify the progression of his disease and specifically look for metastases. 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 Patient was taken to OR for Chopra's procedure with end colostomy. Transferred to SICU d/t hypotension requiring vasopressors during the case. Now downgraded to the floor. Patient was recently readmitted to SICU due to hypotension secondary to presumed aspiration.     PLAN:    NEURO: Hx myasthenia gravis, CVA   - Pain control: Tylenol, PRN Dilaudid   - Hx Myasthenia gravis- s/p stress dose steroids (now on home Prednisone/Cellcept)     RESPIRATORY  - Extubated to aerosol mask 10/17  - CXR with large left pleural effusion  - Duonebs q6 and hypertonic saline     CARDIOVASCULAR: Hx AF (on home Eliquis) with leadless pacemaker, HFpEF with severe AS/AR  - TTE: EF 60-65%, severe AS, MR, TR and LVH  - off pressors   - on home eliquis and and atorvastatin     GI: s/p Alec's on 10/17   - s/p MBS study 10/22  - cleared for minced-moist and mildly thick liquids via single small sips  - protonix daily    /RENAL  - butt placed by urology w/ cytoscopy 10/18   - flomax, finesteride   - Monitor SCr, replete electrolytes as needed     HEMATOLOGIC:  - s/p 2U PRBC 10/14 and 3U PRBC 10/17  - Monitor H/H  - on ASA and eliquis BID  - Mechanical DVT ppx; SCDs    INFECTIOUS DISEASE  - empiric zosyn  - cultures negative    ENDOCRINE  - home prednisone    Code Status: DNR/DNI w/ trial of NIV    Disposition: SICU    Tia Fatima PA-C     [id number] ASSESSMENT: 85 y/o M PmHx recently diagnosed bilateral renal cell carcinoma, myasthenia gravis on prednisone and mycophenolate, atrial fibrillation on apixaban status post recent pacemaker placement for slow ventricular response, heart failure with preserved ejection fraction, severe aortic stenosis, and recent stroke with dysphagia and left sided weakness presenting to [facility] for severe anemia requiring blood transfusion. Patient was found to have pneumoperitoneum on a study obtained today 10/16 to identify the progression of his disease and specifically look for metastases. 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 Patient was taken to OR for Chopra's procedure with end colostomy. Transferred to SICU d/t hypotension requiring vasopressors during the case. Now downgraded to the floor. Patient was recently readmitted to SICU due to hypotension secondary to presumed aspiration.     PLAN:    NEURO: Hx myasthenia gravis, CVA   - Pain control: Tylenol, PRN Dilaudid   - Hx Myasthenia gravis- s/p stress dose steroids (now on home Prednisone/Cellcept)     RESPIRATORY  - Extubated to aerosol mask 10/17  - CXR with large left pleural effusion  - Duonebs q6 and hypertonic saline   - HFNC 50/50    CARDIOVASCULAR: Hx AF (on home Eliquis) with leadless pacemaker, HFpEF with severe AS/AR  - TTE: EF 60-65%, severe AS, MR, TR and LVH  - off pressors   - on home eliquis and and atorvastatin     GI: s/p Alec's on 10/17   - s/p MBS study 10/22  - cleared for minced-moist and mildly thick liquids via single small sips  - protonix daily    /RENAL  - butt placed by urology w/ cytoscopy 10/18   - flomax, finesteride   - Monitor SCr, replete electrolytes as needed     HEMATOLOGIC:  - s/p 2U PRBC 10/14 and 3U PRBC 10/17  - Monitor H/H  - on ASA and eliquis BID  - Mechanical DVT ppx; SCDs    INFECTIOUS DISEASE  - empiric zosyn  - cultures negative    ENDOCRINE  - home prednisone    Code Status: DNR/DNI w/ trial of NIV    Disposition: SICU    Tia Fatima PA-C     [id number]

## 2024-10-28 NOTE — PROGRESS NOTE ADULT - SUBJECTIVE AND OBJECTIVE BOX
24 HOUR EVENTS:  - Delisted for secretions  - Sputum gram stain + gram negative diplococci + rods   - Palliative consult (thoracentesis, diet, GOC)    NEURO  Exam: AOx3, weak   Meds: acetaminophen     Tablet .. 650 milliGRAM(s) Oral every 6 hours PRN Mild Pain (1 - 3)    RESPIRATORY  RR: 44 (10-28-24 @ 01:00) (18 - 46)  SpO2: 95% (10-28-24 @ 01:00) (90% - 100%)  Wt(kg): --  Exam: Coarse lung sounds BL  ABG - ( 28 Oct 2024 01:17 )  pH: 7.42  /  pCO2: 42    /  pO2: 76    / HCO3: 27    / Base Excess: 2.5   /  SaO2: 96.3    Lactate: x          Meds: albuterol/ipratropium for Nebulization 3 milliLiter(s) Nebulizer every 6 hours  sodium chloride 3%  Inhalation 4 milliLiter(s) Inhalation every 6 hours    CARDIOVASCULAR  HR: 60 (10-28-24 @ 01:00) (60 - 95)  BP: 120/59 (10-28-24 @ 01:00) (119/56 - 177/74)  BP(mean): 85 (10-28-24 @ 01:00) (80 - 107)  ABP: --  ABP(mean): --  Wt(kg): --  CVP(cm H2O): --    Cardiac Rhythm: RRR    GI/NUTRITION  Exam: Soft, non-tender, non-distended   Diet: Pureed   Meds: pantoprazole    Tablet 40 milliGRAM(s) Oral before breakfast      GENITOURINARY  I&O's Detail    10-26 @ 07:01  -  10-27 @ 07:00  --------------------------------------------------------  IN:    IV PiggyBack: 100 mL    IV PiggyBack: 125 mL  Total IN: 225 mL    OUT:    Colostomy (mL): 150 mL    Indwelling Catheter - Urethral (mL): 1905 mL  Total OUT: 2055 mL    Total NET: -1830 mL      10-27 @ 07:01  -  10-28 @ 01:49  --------------------------------------------------------  IN:    IV PiggyBack: 800 mL    IV PiggyBack: 275 mL  Total IN: 1075 mL    OUT:    Colostomy (mL): 300 mL    Indwelling Catheter - Urethral (mL): 1525 mL  Total OUT: 1825 mL    Total NET: -750 mL          10-27    138  |  104  |  30[H]  ----------------------------<  107[H]  3.4[L]   |  24  |  0.75    Ca    7.9[L]      27 Oct 2024 05:18  Phos  2.8     10-27  Mg     2.0     10-27    TPro  5.2[L]  /  Alb  2.1[L]  /  TBili  0.6  /  DBili  x   /  AST  30  /  ALT  13  /  AlkPhos  118  10-27    Meds: ascorbic acid 500 milliGRAM(s) Oral daily  cholecalciferol 2000 Unit(s) Oral daily  cyanocobalamin 1000 MICROGram(s) Oral daily  folic acid 1 milliGRAM(s) Oral daily  multivitamin 1 Tablet(s) Oral daily  potassium chloride  10 mEq/100 mL IVPB 10 milliEquivalent(s) IV Intermittent every 1 hour  tamsulosin 0.8 milliGRAM(s) Oral at bedtime  thiamine 100 milliGRAM(s) Oral daily      HEMATOLOGIC  Meds: apixaban 5 milliGRAM(s) Oral two times a day  aspirin  chewable 81 milliGRAM(s) Oral daily                          8.9    9.44  )-----------( 105      ( 28 Oct 2024 01:29 )             29.0     PT/INR - ( 27 Oct 2024 05:18 )   PT: 17.2 sec;   INR: 1.51 ratio         PTT - ( 27 Oct 2024 05:18 )  PTT:32.1 sec    INFECTIOUS DISEASES  T(C): 37.1 (10-27-24 @ 22:00), Max: 37.1 (10-27-24 @ 22:00)  Wt(kg): --  WBC Count: 9.44 K/uL (10-28 @ 01:29)  WBC Count: 8.82 K/uL (10-27 @ 05:18)    Recent Cultures:  Specimen Source: .Sputum Sputum, 10-27 @ 03:27; Results --; Gram Stain:   Few polymorphonuclear leukocytes seen per oil power field  Few Squamous epithelial cells seen per oil power field  Numerous Gram Negative Rods seen per oil power field  Moderate Gram Negative Diplococci seen per oil power field  Few Yeast seen per oil power field[!]; Organism: --  Specimen Source: .Blood BLOOD, 10-23 @ 13:00; Results   No growth at 4 days; Gram Stain: --; Organism: --  Specimen Source: .Blood BLOOD, 10-23 @ 12:45; Results   No growth at 4 days; Gram Stain: --; Organism: --    Meds: mycophenolate mofetil 500 milliGRAM(s) Oral daily  piperacillin/tazobactam IVPB.. 3.375 Gram(s) IV Intermittent every 8 hours      ENDOCRINE  Capillary Blood Glucose    Meds: atorvastatin 40 milliGRAM(s) Oral at bedtime  finasteride 5 milliGRAM(s) Oral daily  insulin lispro (ADMELOG) corrective regimen sliding scale   SubCutaneous three times a day before meals  insulin lispro (ADMELOG) corrective regimen sliding scale   SubCutaneous at bedtime  predniSONE   Tablet 5 milliGRAM(s) Oral daily      ACCESS DEVICES:  [ ] Peripheral IV  [ ] Central Venous Line		[ ] R	[ ] L	[ ] IJ	[ ] Fem	[ ] SC	Placed:   [ ] Arterial Line			[ ] R	[ ] L	[ ] Fem	[ ] Rad	[ ] Ax	Placed:   [ ] PICC:					[ ] Mediport  [ ] Urinary Catheter, Date Placed:   [ ] Necessity of urinary, arterial, and venous catheters discussed    OTHER MEDICATIONS:  chlorhexidine 2% Cloths 1 Application(s) Topical <User Schedule>  fluticasone propionate 50 MICROgram(s)/spray Nasal Spray 1 Spray(s) Both Nostrils two times a day      IMAGING: 24 HOUR EVENTS:  - Delisted for secretions  - Sputum gram stain + gram negative diplococci + rods   - Palliative consult (thoracentesis, diet, GOC)    NEURO  Exam: AOx3, weak   Meds: acetaminophen     Tablet .. 650 milliGRAM(s) Oral every 6 hours PRN Mild Pain (1 - 3)    RESPIRATORY  RR: 44 (10-28-24 @ 01:00) (18 - 46)  SpO2: 95% (10-28-24 @ 01:00) (90% - 100%)  Wt(kg): --  Exam: Coarse lung sounds BL, on HFNC 50/50  ABG - ( 28 Oct 2024 01:17 )  pH: 7.42  /  pCO2: 42    /  pO2: 76    / HCO3: 27    / Base Excess: 2.5   /  SaO2: 96.3    Lactate: x          Meds: albuterol/ipratropium for Nebulization 3 milliLiter(s) Nebulizer every 6 hours  sodium chloride 3%  Inhalation 4 milliLiter(s) Inhalation every 6 hours    CARDIOVASCULAR  HR: 60 (10-28-24 @ 01:00) (60 - 95)  BP: 120/59 (10-28-24 @ 01:00) (119/56 - 177/74)  BP(mean): 85 (10-28-24 @ 01:00) (80 - 107)  ABP: --  ABP(mean): --  Wt(kg): --  CVP(cm H2O): --    Cardiac Rhythm: RRR    GI/NUTRITION  Exam: Soft, non-tender, non-distended   Diet: Pureed   Meds: pantoprazole    Tablet 40 milliGRAM(s) Oral before breakfast      GENITOURINARY  I&O's Detail    10-26 @ 07:01  -  10-27 @ 07:00  --------------------------------------------------------  IN:    IV PiggyBack: 100 mL    IV PiggyBack: 125 mL  Total IN: 225 mL    OUT:    Colostomy (mL): 150 mL    Indwelling Catheter - Urethral (mL): 1905 mL  Total OUT: 2055 mL    Total NET: -1830 mL      10-27 @ 07:01  -  10-28 @ 01:49  --------------------------------------------------------  IN:    IV PiggyBack: 800 mL    IV PiggyBack: 275 mL  Total IN: 1075 mL    OUT:    Colostomy (mL): 300 mL    Indwelling Catheter - Urethral (mL): 1525 mL  Total OUT: 1825 mL    Total NET: -750 mL          10-27    138  |  104  |  30[H]  ----------------------------<  107[H]  3.4[L]   |  24  |  0.75    Ca    7.9[L]      27 Oct 2024 05:18  Phos  2.8     10-27  Mg     2.0     10-27    TPro  5.2[L]  /  Alb  2.1[L]  /  TBili  0.6  /  DBili  x   /  AST  30  /  ALT  13  /  AlkPhos  118  10-27    Meds: ascorbic acid 500 milliGRAM(s) Oral daily  cholecalciferol 2000 Unit(s) Oral daily  cyanocobalamin 1000 MICROGram(s) Oral daily  folic acid 1 milliGRAM(s) Oral daily  multivitamin 1 Tablet(s) Oral daily  potassium chloride  10 mEq/100 mL IVPB 10 milliEquivalent(s) IV Intermittent every 1 hour  tamsulosin 0.8 milliGRAM(s) Oral at bedtime  thiamine 100 milliGRAM(s) Oral daily      HEMATOLOGIC  Meds: apixaban 5 milliGRAM(s) Oral two times a day  aspirin  chewable 81 milliGRAM(s) Oral daily                          8.9    9.44  )-----------( 105      ( 28 Oct 2024 01:29 )             29.0     PT/INR - ( 27 Oct 2024 05:18 )   PT: 17.2 sec;   INR: 1.51 ratio         PTT - ( 27 Oct 2024 05:18 )  PTT:32.1 sec    INFECTIOUS DISEASES  T(C): 37.1 (10-27-24 @ 22:00), Max: 37.1 (10-27-24 @ 22:00)  Wt(kg): --  WBC Count: 9.44 K/uL (10-28 @ 01:29)  WBC Count: 8.82 K/uL (10-27 @ 05:18)    Recent Cultures:  Specimen Source: .Sputum Sputum, 10-27 @ 03:27; Results --; Gram Stain:   Few polymorphonuclear leukocytes seen per oil power field  Few Squamous epithelial cells seen per oil power field  Numerous Gram Negative Rods seen per oil power field  Moderate Gram Negative Diplococci seen per oil power field  Few Yeast seen per oil power field[!]; Organism: --  Specimen Source: .Blood BLOOD, 10-23 @ 13:00; Results   No growth at 4 days; Gram Stain: --; Organism: --  Specimen Source: .Blood BLOOD, 10-23 @ 12:45; Results   No growth at 4 days; Gram Stain: --; Organism: --    Meds: mycophenolate mofetil 500 milliGRAM(s) Oral daily  piperacillin/tazobactam IVPB.. 3.375 Gram(s) IV Intermittent every 8 hours      ENDOCRINE  Capillary Blood Glucose    Meds: atorvastatin 40 milliGRAM(s) Oral at bedtime  finasteride 5 milliGRAM(s) Oral daily  insulin lispro (ADMELOG) corrective regimen sliding scale   SubCutaneous three times a day before meals  insulin lispro (ADMELOG) corrective regimen sliding scale   SubCutaneous at bedtime  predniSONE   Tablet 5 milliGRAM(s) Oral daily      ACCESS DEVICES:  [ ] Peripheral IV  [ ] Central Venous Line		[ ] R	[ ] L	[ ] IJ	[ ] Fem	[ ] SC	Placed:   [ ] Arterial Line			[ ] R	[ ] L	[ ] Fem	[ ] Rad	[ ] Ax	Placed:   [ ] PICC:					[ ] Mediport  [ ] Urinary Catheter, Date Placed:   [ ] Necessity of urinary, arterial, and venous catheters discussed    OTHER MEDICATIONS:  chlorhexidine 2% Cloths 1 Application(s) Topical <User Schedule>  fluticasone propionate 50 MICROgram(s)/spray Nasal Spray 1 Spray(s) Both Nostrils two times a day      IMAGING:

## 2024-10-29 NOTE — PROGRESS NOTE ADULT - ASSESSMENT
83 y/o M PmHx recently diagnosed bilateral renal cell carcinoma, myasthenia gravis on prednisone and mycophenolate, atrial fibrillation on apixaban status post recent pacemaker placement for slow ventricular response, heart failure with preserved ejection fraction, severe aortic stenosis, and recent stroke with dysphagia and left sided weakness presenting to [facility] for severe anemia requiring blood transfusion. Patient was found to have pneumoperitoneum on a study obtained today 10/16 to identify the progression of his disease and specifically look for metastases. 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 Patient was taken to OR for Chopra's procedure with end colostomy. Transferred to SICU d/t hypotension requiring vasopressors during the case. Now downgraded to the floor. Patient was recently readmitted to SICU due to hypotension secondary to presumed aspiration.     Plan:  -Pain control  -Continue parmjit tijerina today  -Continue home eliquis   -Diet:  minced-moist and mildly thick liquids via single small sips  -Antibx: Zosyn

## 2024-10-29 NOTE — CHART NOTE - NSCHARTNOTEFT_GEN_A_CORE
NUTRITION FOLLOW UP NOTE    PATIENT SEEN FOR: malnutrition follow up     SOURCE: [x] Patient  [x] Current Medical Record  [x] RN  [] Family/support person at bedside  [] Patient unavailable/inappropriate  [x] Other: Team     CHART REVIEWED/EVENTS NOTED.  [x] Nutrition Status:  -Rapid response: AMS, hypotension (10/23)   -S/p FEES: pureed with mildly thickened liquids (10/25)     DIET ORDER:   Diet, Pureed:   Consistent Carbohydrate {Evening Snack} (CSTCHOSN)  Mildly Thick Liquids (MILDTHICKLIQS) (10-25-24)      CURRENT DIET ORDER IS:  [x] Appropriate:    NUTRITION INTAKE/PROVISION:  [x] PO: per RN, 75% intake of breakfast this morning   -Declining PO nutrition supplements; RD offered a variety of kinds     ANTHROPOMETRICS:  Drug Dosing Weight  Height (cm): 152.4 (14 Oct 2024 16:13) --> ? height accuracy   Weight (kg): 77.1 (14 Oct 2024 16:13)  BMI (kg/m2): 33.2 (14 Oct 2024 16:13)  Daily Weight in k.2 (10-), Weight in k.8 (10-)     NUTRITIONALLY PERTINENT MEDICATIONS:  MEDICATIONS  (STANDING):  ascorbic acid  atorvastatin  cholecalciferol  cyanocobalamin  finasteride  folic acid  furosemide   Injectable  multivitamin  pantoprazole    Tablet  piperacillin/tazobactam IVPB..  potassium chloride    Tablet ER  predniSONE   Tablet  thiamine       NUTRITIONALLY PERTINENT LABS:  10-28 Na138 mmol/L Glu 106 mg/dL[H] K+ 3.3 mmol/L[L] Cr  0.61 mg/dL BUN 25 mg/dL[H] 10-28 Phos 2.4 mg/dL[L] 10-28 Alb 2.0 g/dL[L]10-28 ALT 13 U/L AST 16 U/L Alkaline Phosphatase 109 U/L  10-22-24 @ 07:28 a1c 5.2    A1C with Estimated Average Glucose Result: 5.2 % (10-22-24 @ 07:28)  A1C with Estimated Average Glucose Result: 5.2 % (24 @ 05:10)    NUTRITIONALLY PERTINENT MEDICATIONS/LABS:  [x] Reviewed  [x] Relevant notes on medications/labs:  -Hypokalemic --> KCl ordered for repletion   -Hypophosphatemic   -Lasix  -Cellcept  -Prednisone --> no insulin coverage     EDEMA:  [x] Reviewed  [x] Relevant notes:  -3+left hand; right hand; left wrist; right wrist  -4+ left leg; right leg; left knee; right knee; left ankle; right ankle; left foot; right foot; b/l elbow, penis; scrotum    GI/ I&O:  [x] Reviewed  [] Relevant notes:  [] Other:    SKIN:   [x] Pressure injury previously noted      ESTIMATED NEEDS:  [x]Change:  Energy: 3343-9219  kcal/day (28-33 kcal/kg)  Protein:  75-94g/day (1.2-1.5 g/kg)  Fluid:   ml/day or [X] defer to team  Based on:  62.5kg --> likely Pt's dry weight     NUTRITION DIAGNOSIS:  [X] Prior Dx:  1. Underweight   2. Severe malnutrition   3. Increased protein-energy needs     NUTRITION CARE PLAN:  1. Diet: Consistent carbohydrate, pureed with mildly thick liquids   2. Supplements: continues to decline PO supplements   3. Multivitamin/mineral supplementation: B12, B1, folic acid, multivitamin   4. Recommend nocturnal feeds to provider ~50% of estimated needs and supplement PO intake: Glucerna 1.5 @ 60mL x 12hrs providing 720mL of formula, 1080kcal/day (17kcal/kg), 59g protein/day (0.9g/kg), 546mL free water - based on 62.5kg   5. REFEEDING RISK: replete lytes prior to titrating to goal     MONITORING AND EVALUATION:   RD remains available upon request and will follow up per protocol.    Bruna Stewart MS, RDN, CDN (Teams)   Available on MS TEAMS

## 2024-10-29 NOTE — PROGRESS NOTE ADULT - ASSESSMENT
ASSESSMENT: 83 y/o M PmHx recently diagnosed bilateral renal cell carcinoma, myasthenia gravis on prednisone and mycophenolate, atrial fibrillation on apixaban status post recent pacemaker placement for slow ventricular response, heart failure with preserved ejection fraction, severe aortic stenosis, and recent stroke with dysphagia and left sided weakness presenting to [facility] for severe anemia requiring blood transfusion. Patient was found to have pneumoperitoneum on a study obtained today 10/16 to identify the progression of his disease and specifically look for metastases. 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 Patient was taken to OR for Chopra's procedure with end colostomy. Transferred to SICU d/t hypotension requiring vasopressors during the case. Now downgraded to the floor. Patient was recently readmitted to SICU due to hypotension secondary to presumed aspiration.     PLAN:    Neuro: Hx myasthenia gravis, CVA   - Pain control: Tylenol, PRN oxy  - Hx Myasthenia gravis- on home Prednisone/Cellcept    Resp:  - Extubated to aerosol mask 10/17  - 5L NC  - CXR with large left pleural effusion, plan for pigtail 10/29  - Duonebs q6 and hypertonic saline     CV: : Hx AF (on home Eliquis) with leadless pacemaker, HFpEF with severe AS/AR  - TTE: EF 60-65%, severe AS, MR, TR and LVH  - off pressors   - on home eliquis and and atorvastatin     GI : s/p Alec's on 10/17   - s/p MBS study 10/22  - cleared for minced-moist and mildly thick liquids via single small sips  - protonix daily    Renal:  - butt placed by urology w/ cytoscopy 10/18   - flomax, finesteride   - Monitor SCr, replete electrolytes as needed     Heme:  - Monitor H/H  - on ASA and eliquis BID  - Mechanical DVT ppx; SCDs    ID:   - Monitor for clinical evidence of active infection  - Monitor WBC, temperature, and procalcitonin  - Empiric antibiotics w/ zosyn  - sputum cx positive for e.coli     Endo:   - Monitor glucose    Code Status: DNR/DNI w/ trial of NIV    Disposition: SICU    Tia Fatima PA-C     [id number]

## 2024-10-29 NOTE — PROGRESS NOTE ADULT - SUBJECTIVE AND OBJECTIVE BOX
SURGERY DAILY PROGRESS NOTE:     24h events:   palliative consult ordered   - duoneb Q6, hypertoninc saline Q6  - plan for pigtail 10/29  - Mg, KCl  - 20 lasix x1  - zosyn for 2 more days    SUBJECTIVE/ROS: Patient seen and examined. Sitting out of bed to chair. Patient feels well  Denies nausea, vomiting, chest pain, shortness of breath     MEDICATIONS  (STANDING):  albuterol/ipratropium for Nebulization 3 milliLiter(s) Nebulizer every 6 hours  apixaban 5 milliGRAM(s) Oral two times a day  ascorbic acid 500 milliGRAM(s) Oral daily  aspirin  chewable 81 milliGRAM(s) Oral daily  atorvastatin 40 milliGRAM(s) Oral at bedtime  chlorhexidine 2% Cloths 1 Application(s) Topical <User Schedule>  cholecalciferol 2000 Unit(s) Oral daily  cyanocobalamin 1000 MICROGram(s) Oral daily  finasteride 5 milliGRAM(s) Oral daily  fluticasone propionate 50 MICROgram(s)/spray Nasal Spray 1 Spray(s) Both Nostrils two times a day  folic acid 1 milliGRAM(s) Oral daily  furosemide   Injectable 20 milliGRAM(s) IV Push every 8 hours  multivitamin 1 Tablet(s) Oral daily  mycophenolate mofetil 500 milliGRAM(s) Oral daily  pantoprazole    Tablet 40 milliGRAM(s) Oral before breakfast  piperacillin/tazobactam IVPB.. 3.375 Gram(s) IV Intermittent every 8 hours  potassium chloride    Tablet ER 40 milliEquivalent(s) Oral once  predniSONE   Tablet 5 milliGRAM(s) Oral daily  sodium chloride 3%  Inhalation 4 milliLiter(s) Inhalation every 6 hours  tamsulosin 0.8 milliGRAM(s) Oral at bedtime  thiamine 100 milliGRAM(s) Oral daily    MEDICATIONS  (PRN):  acetaminophen     Tablet .. 650 milliGRAM(s) Oral every 6 hours PRN Mild Pain (1 - 3)  oxyCODONE    IR 2.5 milliGRAM(s) Oral every 4 hours PRN Moderate Pain (4 - 6)      OBJECTIVE:  Vital Signs Last 24 Hrs  T(C): 36.9 (29 Oct 2024 05:00), Max: 37.1 (28 Oct 2024 11:00)  T(F): 98.4 (29 Oct 2024 05:00), Max: 98.8 (28 Oct 2024 15:00)  HR: 60 (29 Oct 2024 07:00) (60 - 60)  BP: 113/57 (29 Oct 2024 07:00) (97/47 - 133/60)  BP(mean): 82 (29 Oct 2024 07:00) (68 - 87)  RR: 13 (29 Oct 2024 07:00) (13 - 44)  SpO2: 100% (29 Oct 2024 07:00) (95% - 100%)    Parameters below as of 29 Oct 2024 05:00  Patient On (Oxygen Delivery Method): nasal cannula  O2 Flow (L/min): 5    I&O's Detail    28 Oct 2024 07:01  -  29 Oct 2024 07:00  --------------------------------------------------------  IN:    IV PiggyBack: 650 mL    IV PiggyBack: 500 mL    Oral Fluid: 470 mL  Total IN: 1620 mL    OUT:    Colostomy (mL): 200 mL    Indwelling Catheter - Urethral (mL): 1925 mL  Total OUT: 2125 mL  Total NET: -505 mL      LABS:                        7.6    5.76  )-----------( 101      ( 28 Oct 2024 22:33 )             24.8     10-28    138  |  103  |  25[H]  ----------------------------<  106[H]  3.3[L]   |  26  |  0.61    Ca    7.5[L]      28 Oct 2024 22:33  Phos  2.4     10-28  Mg     2.2     10-28    TPro  4.8[L]  /  Alb  2.0[L]  /  TBili  0.7  /  DBili  x   /  AST  16  /  ALT  13  /  AlkPhos  109  10-28    PT/INR - ( 28 Oct 2024 22:33 )   PT: 22.0 sec;   INR: 1.94 ratio    PTT - ( 28 Oct 2024 22:33 )  PTT:34.2 sec    Urinalysis Basic - ( 28 Oct 2024 22:33 )  Color: x / Appearance: x / SG: x / pH: x  Gluc: 106 mg/dL / Ketone: x  / Bili: x / Urobili: x   Blood: x / Protein: x / Nitrite: x   Leuk Esterase: x / RBC: x / WBC x   Sq Epi: x / Non Sq Epi: x / Bacteria: x      GEN: resting in bed, in NAD  RESP: HFNC in place (50/50), shallow breathing  ABD: soft, non-distended abdomen. Midline wound healing well. Ostomy pink, patent and productive of stool and gas.   EXTR: warm, edema in upper extremities; spontaneous movement in b/l U/L extrem

## 2024-10-29 NOTE — PROGRESS NOTE ADULT - SUBJECTIVE AND OBJECTIVE BOX
24 HOUR EVENTS:  - palliative consult ordered   - duoneb Q6, hypertoninc saline Q6  - plan for pigtail 10/29  - Mg, KCl  - 20 lasix x1  - zosyn for 2 more days      NEURO  Exam: A&O x 4  Meds: acetaminophen     Tablet .. 650 milliGRAM(s) Oral every 6 hours PRN Mild Pain (1 - 3)  oxyCODONE    Solution 2.5 milliGRAM(s) Oral every 3 hours PRN Moderate Pain (4 - 6)      RESPIRATORY  RR: 20 (10-29-24 @ 01:00) (16 - 44)  SpO2: 98% (10-29-24 @ 01:00) (93% - 100%)  Exam: 5L NC  ABG - ( 28 Oct 2024 01:17 )  pH: 7.42  /  pCO2: 42    /  pO2: 76    / HCO3: 27    / Base Excess: 2.5   /  SaO2: 96.3        Meds: albuterol/ipratropium for Nebulization 3 milliLiter(s) Nebulizer every 6 hours  sodium chloride 3%  Inhalation 4 milliLiter(s) Inhalation every 6 hours      CARDIOVASCULAR  HR: 60 (10-29-24 @ 01:00) (60 - 67)  BP: 118/57 (10-29-24 @ 01:00) (97/49 - 133/63)  BP(mean): 82 (10-29-24 @ 01:00) (68 - 90)      Exam:   Cardiac Rhythm:   Perfusion     [x ]Adequate   [ ]Inadequate  Mentation   [x ]Normal       [ ]Reduced  Extremities  [ x]Warm         [ ]Cool  Volume Status [ ]Hypervolemic [ x]Euvolemic [ ]Hypovolemic    GI/NUTRITION  Exam: soft, NT/ND  Diet: minced/moist & mildly thickened liquids   Meds: pantoprazole    Tablet 40 milliGRAM(s) Oral before breakfast      GENITOURINARY  I&O's Detail    10-27 @ 07:01  -  10-28 @ 07:00  --------------------------------------------------------  IN:    IV PiggyBack: 1500 mL    IV PiggyBack: 325 mL  Total IN: 1825 mL    OUT:    Colostomy (mL): 700 mL    Indwelling Catheter - Urethral (mL): 2095 mL  Total OUT: 2795 mL    Total NET: -970 mL      10-28 @ 07:01  -  10-29 @ 01:29  --------------------------------------------------------  IN:    IV PiggyBack: 400 mL    IV PiggyBack: 500 mL    Oral Fluid: 350 mL  Total IN: 1250 mL    OUT:    Colostomy (mL): 150 mL    Indwelling Catheter - Urethral (mL): 1500 mL  Total OUT: 1650 mL    Total NET: -400 mL          10-28    138  |  103  |  25[H]  ----------------------------<  106[H]  3.3[L]   |  26  |  0.61    Ca    7.5[L]      28 Oct 2024 22:33  Phos  2.4     10-28  Mg     2.2     10-28    TPro  4.8[L]  /  Alb  2.0[L]  /  TBili  0.7  /  DBili  x   /  AST  16  /  ALT  13  /  AlkPhos  109  10-28    Meds: ascorbic acid 500 milliGRAM(s) Oral daily  cholecalciferol 2000 Unit(s) Oral daily  cyanocobalamin 1000 MICROGram(s) Oral daily  folic acid 1 milliGRAM(s) Oral daily  magnesium sulfate  IVPB 2 Gram(s) IV Intermittent once  multivitamin 1 Tablet(s) Oral daily  potassium chloride   Solution 40 milliEquivalent(s) Oral every 4 hours  tamsulosin 0.8 milliGRAM(s) Oral at bedtime  thiamine 100 milliGRAM(s) Oral daily      HEMATOLOGIC  Meds: apixaban 5 milliGRAM(s) Oral two times a day  aspirin  chewable 81 milliGRAM(s) Oral daily                          7.6    5.76  )-----------( 101      ( 28 Oct 2024 22:33 )             24.8     PT/INR - ( 28 Oct 2024 22:33 )   PT: 22.0 sec;   INR: 1.94 ratio         PTT - ( 28 Oct 2024 22:33 )  PTT:34.2 sec    INFECTIOUS DISEASES  T(C): 36.7 (10-28-24 @ 22:00), Max: 37.2 (10-28-24 @ 05:00)  Wt(kg): --  WBC Count: 5.76 K/uL (10-28 @ 22:33)    Recent Cultures:  Specimen Source: .Sputum Sputum, 10-27 @ 03:27; Results   Numerous Escherichia coli  Commensal leia consistent with body site[!]; Gram Stain:   Few polymorphonuclear leukocytes seen per oil power field  Few Squamous epithelial cells seen per oil power field  Numerous Gram Negative Rods seen per oil power field  Moderate Gram Negative Diplococci seen per oil power field  Few Yeast seen per oil power field[!]; Organism: --  Specimen Source: .Blood BLOOD, 10-23 @ 13:00; Results   No growth at 5 days; Gram Stain: --; Organism: --  Specimen Source: .Blood BLOOD, 10-23 @ 12:45; Results   No growth at 5 days; Gram Stain: --; Organism: --    Meds: mycophenolate mofetil 500 milliGRAM(s) Oral daily  piperacillin/tazobactam IVPB.. 3.375 Gram(s) IV Intermittent every 8 hours      ENDOCRINE  Capillary Blood Glucose    Meds: atorvastatin 40 milliGRAM(s) Oral at bedtime  finasteride 5 milliGRAM(s) Oral daily  predniSONE   Tablet 5 milliGRAM(s) Oral daily      ACCESS DEVICES:  [ ] Peripheral IV  [ ] Central Venous Line		[ ] R	[ ] L	[ ] IJ	[ ] Fem	[ ] SC	Placed:   [ ] Arterial Line			[ ] R	[ ] L	[ ] Fem	[ ] Rad	[ ] Ax	Placed:   [ ] PICC:					[ ] Mediport  [ ] Urinary Catheter, Date Placed:   [ ] Necessity of urinary, arterial, and venous catheters discussed    OTHER MEDICATIONS:  chlorhexidine 2% Cloths 1 Application(s) Topical <User Schedule>  fluticasone propionate 50 MICROgram(s)/spray Nasal Spray 1 Spray(s) Both Nostrils two times a day      IMAGING:

## 2024-10-30 NOTE — PROGRESS NOTE ADULT - SUBJECTIVE AND OBJECTIVE BOX
SURGERY DAILY PROGRESS NOTE    24 HOUR EVENTS:  - no acute events  - effusion improving on cxr on 4L nasal cannula   - lasix 40x1 overnight     SUBJECTIVE: Patient seen and evaluated on AM rounds. He is feeling well and breathing well with lasix. CXR looks improved also    ------------------------------------------------------------------------------------------------------------  OBJECTIVE:  Vital Signs Last 24 Hrs  T(C): 36.7 (30 Oct 2024 07:00), Max: 36.8 (30 Oct 2024 05:00)  T(F): 98 (30 Oct 2024 07:00), Max: 98.2 (30 Oct 2024 05:00)  HR: 60 (30 Oct 2024 11:16) (59 - 60)  BP: 105/52 (30 Oct 2024 10:00) (102/51 - 153/66)  BP(mean): 75 (30 Oct 2024 10:00) (73 - 95)  RR: 15 (30 Oct 2024 10:00) (14 - 40)  SpO2: 100% (30 Oct 2024 11:16) (94% - 100%)    Parameters below as of 30 Oct 2024 11:16  Patient On (Oxygen Delivery Method): room air      I&O's Detail    29 Oct 2024 07:01  -  30 Oct 2024 07:00  --------------------------------------------------------  IN:    IV PiggyBack: 100 mL    IV PiggyBack: 425 mL    Oral Fluid: 500 mL  Total IN: 1025 mL  OUT:    Colostomy (mL): 250 mL    Indwelling Catheter - Urethral (mL): 2490 mL  Total OUT: 2740 mL  Total NET: -1715 mL      30 Oct 2024 07:01  -  30 Oct 2024 12:15  --------------------------------------------------------  IN:    IV PiggyBack: 50 mL  Total IN: 50 mL    OUT:    Indwelling Catheter - Urethral (mL): 350 mL  Total OUT: 350 mL  Total NET: -300 mL    LABS:                        7.5    4.85  )-----------( 104      ( 30 Oct 2024 09:52 )             25.1     10-30    141  |  103  |  24[H]  ----------------------------<  108[H]  3.5   |  27  |  0.59    Ca    7.7[L]      30 Oct 2024 05:21  Phos  2.7     10-30  Mg     2.0     10-30    TPro  4.9[L]  /  Alb  2.0[L]  /  TBili  0.8  /  DBili  x   /  AST  16  /  ALT  13  /  AlkPhos  105  10-30    LIVER FUNCTIONS - ( 30 Oct 2024 05:21 )  Alb: 2.0 g/dL / Pro: 4.9 g/dL / ALK PHOS: 105 U/L / ALT: 13 U/L / AST: 16 U/L / GGT: x           PT/INR - ( 30 Oct 2024 05:21 )   PT: 19.0 sec;   INR: 1.66 ratio         PTT - ( 30 Oct 2024 05:21 )  PTT:32.8 sec  Urinalysis Basic - ( 30 Oct 2024 05:21 )    Color: x / Appearance: x / SG: x / pH: x  Gluc: 108 mg/dL / Ketone: x  / Bili: x / Urobili: x   Blood: x / Protein: x / Nitrite: x   Leuk Esterase: x / RBC: x / WBC x   Sq Epi: x / Non Sq Epi: x / Bacteria: x        PHYSICAL EXAM:  GEN: resting in bed, in NAD  RESP: HFNC in place (50/50), shallow breathing  ABD: soft, non-distended abdomen. Midline wound healing well. Ostomy pink, patent and productive of stool and gas.   EXTR: warm, edema in upper extremities; spontaneous movement in b/l U/L extrem      Assessment  83 y/o M PmHx recently diagnosed bilateral renal cell carcinoma, myasthenia gravis on prednisone and mycophenolate, atrial fibrillation on apixaban status post recent pacemaker placement for slow ventricular response, heart failure with preserved ejection fraction, severe aortic stenosis, and recent stroke with dysphagia and left sided weakness presenting to [facility] for severe anemia requiring blood transfusion. Patient was found to have pneumoperitoneum on a study obtained today 10/16 to identify the progression of his disease and specifically look for metastases. 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 Patient was taken to OR for Chopra's procedure with end colostomy. Transferred to SICU d/t hypotension requiring vasopressors during the case. Now downgraded to the floor. Patient was recently readmitted to SICU due to hypotension secondary to presumed aspiration.     Plan:  -listed for flood  -Pain control  -Cont lasix     - no pigtail planned for now   -cont zosyn  -Continue duonebs  -Continue home eliquis   -Diet: pureed  -begin dispo planning, likely to [facility]ID

## 2024-10-30 NOTE — PROGRESS NOTE ADULT - ATTENDING COMMENTS
I have seen and examined this patient on rounds this morning with the surgery team. I have reviewed all new labs, imaging and reports. I have participated in formulating the plan for the day, and have read and agree with the history, ROS, exam, assessment and plan as stated above.       Alec's 10/17. Doing well, now out of SICU on floor. He is awaiting MBS with SLP to assess for appropriate diet. Ok to resume eliquis.      Total time spent in the care of this patient today (excluding critical care, teaching & procedures): 25 minutes                    Over 50% of the total time was spent in discussion and coordination of care with consulting services, dietary and rehab services.
83 y/o M PmHx recently diagnosed bilateral renal cell carcinoma, myasthenia gravis on prednisone and mycophenolate, atrial fibrillation on apixaban S/P PPM placement, severe AS, recent stroke with dysphagia and left sided weakness presenting to [facility] for severe anemia requiring blood transfusion. 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 Transferred to SICU d/t hypotension requiring vasopressors during the case, transferred to floor readmitted to SICU due to hypotension secondary to presumed aspiration.    Awake  Resolving acute hypoxemic resp failure sec to possible asp pna and pul edema with pl effusion, off HFNC,  pul toileting with duoneb hypertonic saline  Lung US shows mode lt effusion, will repeat US to reassess  Continue diurese as tolerated  Puree diet with asp precaution  Abx Zosyn course, afebrile  On meds for MG Cellcept and systemic steroid  Full dose AC with Apixaban, Hb Pl stable
Myasthenia well controlled  Afib, AS, AI - off vasopressors today, holding AC  Sigmoid perforation s/p Alec's start thickened clears  DC LR, IV lock UO 10/15 mL/hr Cr 0.62  stable for tx to floor  Zosyn through 10/20  hydrocortisone 50 q 8 will taper to 25 q12
83 y/o M PmHx recently diagnosed bilateral renal cell carcinoma, myasthenia gravis on prednisone and mycophenolate, atrial fibrillation on apixaban S/P PPM placement, severe AS, recent stroke with dysphagia and left sided weakness presenting to [facility] for severe anemia requiring blood transfusion. 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 Transferred to SICU d/t hypotension requiring vasopressors during the case, transferred to floor readmitted to SICU due to hypotension secondary to presumed aspiration.    Awake  In acute hypoxemic resp failure sec to possible asp pna and pul edema with pl effusion, Titrate HF, pul toileting with duoneb hypertonic saline  Lung US to assess lt effusion  Will try to diurese as tolerated  Puree diet with asp precaution  Abx Zosyn course, afebrile  On meds for MG Cellcept and systemic steroid  Full dose AC with Apixaban, Hb Pl stable
85 y/o M PmHx recently diagnosed bilateral renal cell carcinoma, myasthenia gravis on prednisone and mycophenolate, atrial fibrillation on apixaban S/P PPM placement, severe AS, recent stroke with dysphagia and left sided weakness presenting to [facility] for severe anemia requiring blood transfusion. 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 Transferred to SICU d/t hypotension requiring vasopressors during the case, transferred to floor readmitted to SICU due to hypotension secondary to presumed aspiration.    Awake  Resolving acute hypoxemic resp failure sec to possible asp pna and pul edema with pl effusion, off HFNC, O2 requirement 1L now  CXR decreasing lt effusion  Continue diurese as tolerated  Puree diet with asp precaution  Abx Zosyn course, afebrile  On meds for MG Cellcept and systemic steroid  Full dose AC with Apixaban, monitor low Hb, Fe and FA supplement
ATTENDING ATTESTATION     I have seen and examined this patient on rounds this morning with the surgery team. I have reviewed all new labs, imaging and reports. I have participated in formulating the plan for the day, and have read and agree with the history, ROS, exam, assessment and plan as stated above.      84 year old man with AFib on Eliquis, PPM, severe aortic stenosis, CVA with dysphagia, myasthenia gravis, newly diagnosed bilateral renal cell carcinoma presenting with pneumoperitoneum and found to have descending colitis, s/p ex-lap with Alec's on 10/17.     Colostomy functioning, awaiting modified barium swallow evaluation for dysphagia. Patient complaining of being NPO and wanted to drink and eat something.     Continue NPO for now, will follow up results of swallow study and advance diet accordingly.   Incision healing well. Monitor colostomy output.      Total time spent in the care of this patient today (excluding critical care, teaching & procedures): 35 minutes                    Over 50% of the total time was spent in discussion and coordination of care with consulting services, dietary and rehab services.      Margarita Martin M.D.  Department of Trauma, Acute Care Surgery and Surgical Critical Care

## 2024-10-30 NOTE — PROGRESS NOTE ADULT - ASSESSMENT
ASSESSMENT: 83 y/o M PmHx recently diagnosed bilateral renal cell carcinoma, myasthenia gravis on prednisone and mycophenolate, atrial fibrillation on apixaban status post recent pacemaker placement for slow ventricular response, heart failure with preserved ejection fraction, severe aortic stenosis, and recent stroke with dysphagia and left sided weakness presenting to [facility] for severe anemia requiring blood transfusion. Patient was found to have pneumoperitoneum on a study obtained today 10/16 to identify the progression of his disease and specifically look for metastases. 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 Patient was taken to OR for Chopra's procedure with end colostomy. Transferred to SICU d/t hypotension requiring vasopressors during the case. Now downgraded to the floor. Patient was recently readmitted to SICU due to hypotension secondary to presumed aspiration.     PLAN:    Neuro: Hx myasthenia gravis, CVA   - Pain control: Tylenol, PRN oxy  - Hx Myasthenia gravis- on home Prednisone/Cellcept    Resp:  - Extubated to aerosol mask 10/17  - 4L NC  - CXR with large left pleural effusion, plan for pigtail 10/29  - Duonebs q6 and hypertonic saline     CV: : Hx AF (on home Eliquis) with leadless pacemaker, HFpEF with severe AS/AR  - TTE: EF 60-65%, severe AS, MR, TR and LVH  - off pressors   - on home eliquis and and atorvastatin     GI : s/p Alec's on 10/17   - s/p MBS study 10/22  - cleared for minced-moist and mildly thick liquids via single small sips  - protonix daily    Renal:  - butt placed by urology w/ cytoscopy 10/18   - flomax, finesteride   - Monitor SCr, replete electrolytes as needed     Heme:  - Monitor H/H  - on ASA and eliquis BID  - Mechanical DVT ppx; SCDs    ID:   - Monitor for clinical evidence of active infection  - Monitor WBC, temperature, and procalcitonin  - Empiric antibiotics w/ zosyn  - sputum cx positive for e.coli     Endo:   - Monitor glucose    Code Status: DNR/DNI w/ trial of NIV    Disposition: SICU    Tia Fatima PA-C     [id number]

## 2024-10-30 NOTE — PROGRESS NOTE ADULT - NUTRITIONAL ASSESSMENT
This patient has been assessed with a concern for Malnutrition and has been determined to have a diagnosis/diagnoses of Severe protein-calorie malnutrition and Underweight (BMI < 19).    This patient is being managed with:   Diet Clear Liquid-  Moderately Thick Liquids (MODTHICKLIQS)  Entered: Oct 18 2024 11:16AM  
This patient has been assessed with a concern for Malnutrition and has been determined to have a diagnosis/diagnoses of Severe protein-calorie malnutrition and Underweight (BMI < 19).    This patient is being managed with:   Diet NPO-  Entered: Oct 17 2024  5:18PM  
This patient has been assessed with a concern for Malnutrition and has been determined to have a diagnosis/diagnoses of Severe protein-calorie malnutrition and Underweight (BMI < 19).    This patient is being managed with:   Diet NPO-  Entered: Oct 23 2024  1:35PM  
This patient has been assessed with a concern for Malnutrition and has been determined to have a diagnosis/diagnoses of Severe protein-calorie malnutrition and Underweight (BMI < 19).    This patient is being managed with:   Diet NPO-  Entered: Oct 25 2024  7:42AM  
This patient has been assessed with a concern for Malnutrition and has been determined to have a diagnosis/diagnoses of Severe protein-calorie malnutrition and Underweight (BMI < 19).    This patient is being managed with:   Diet NPO-  Except Medications  Entered: Oct 23 2024  8:20PM  
This patient has been assessed with a concern for Malnutrition and has been determined to have a diagnosis/diagnoses of Severe protein-calorie malnutrition and Underweight (BMI < 19).    This patient is being managed with:   Diet Pureed-  Consistent Carbohydrate {Evening Snack} (CSTCHOSN)  Mildly Thick Liquids (MILDTHICKLIQS)  Entered: Oct 25 2024 12:16PM  
This patient has been assessed with a concern for Malnutrition and has been determined to have a diagnosis/diagnoses of Severe protein-calorie malnutrition and Underweight (BMI < 19).    This patient is being managed with:   Diet Regular-  Minced and Moist (MINCEDMOIST)  Mildly Thick Liquids (MILDTHICKLIQS)  Entered: Oct 22 2024 12:50PM  
This patient has been assessed with a concern for Malnutrition and has been determined to have a diagnosis/diagnoses of Underweight (BMI < 19) and Severe protein-calorie malnutrition.    This patient is being managed with:   Diet Clear Liquid-  Consistent Carbohydrate {Evening Snack} (CSTCHOSN)  Moderately Thick Liquids (MODTHICKLIQS)  Entered: Oct 19 2024  5:19PM  
This patient has been assessed with a concern for Malnutrition and has been determined to have a diagnosis/diagnoses of Severe protein-calorie malnutrition and Underweight (BMI < 19).    This patient is being managed with:   Diet Clear Liquid-  Consistent Carbohydrate {Evening Snack} (CSTCHOSN)  Moderately Thick Liquids (MODTHICKLIQS)  Entered: Oct 19 2024  5:19PM  
This patient has been assessed with a concern for Malnutrition and has been determined to have a diagnosis/diagnoses of Severe protein-calorie malnutrition and Underweight (BMI < 19).    This patient is being managed with:   Diet NPO-  Except Medications  Entered: Oct 23 2024  8:20PM  
This patient has been assessed with a concern for Malnutrition and has been determined to have a diagnosis/diagnoses of Severe protein-calorie malnutrition and Underweight (BMI < 19).    This patient is being managed with:   Diet Pureed-  Consistent Carbohydrate {Evening Snack} (CSTCHOSN)  Mildly Thick Liquids (MILDTHICKLIQS)  Entered: Oct 25 2024 12:16PM  
This patient has been assessed with a concern for Malnutrition and has been determined to have a diagnosis/diagnoses of Severe protein-calorie malnutrition and Underweight (BMI < 19).    This patient is being managed with:   Diet Clear Liquid-  Moderately Thick Liquids (MODTHICKLIQS)  Entered: Oct 18 2024 11:16AM  
This patient has been assessed with a concern for Malnutrition and has been determined to have a diagnosis/diagnoses of Severe protein-calorie malnutrition and Underweight (BMI < 19).    This patient is being managed with:   Diet NPO-  Except Medications  Entered: Oct 23 2024  8:20PM  
This patient has been assessed with a concern for Malnutrition and has been determined to have a diagnosis/diagnoses of Severe protein-calorie malnutrition and Underweight (BMI < 19).    This patient is being managed with:   Diet NPO-  Except Medications  Entered: Oct 23 2024  8:20PM  
This patient has been assessed with a concern for Malnutrition and has been determined to have a diagnosis/diagnoses of Severe protein-calorie malnutrition and Underweight (BMI < 19).    This patient is being managed with:   Diet Pureed-  Consistent Carbohydrate {Evening Snack} (CSTCHOSN)  Mildly Thick Liquids (MILDTHICKLIQS)  Entered: Oct 25 2024 12:16PM  
This patient has been assessed with a concern for Malnutrition and has been determined to have a diagnosis/diagnoses of Severe protein-calorie malnutrition and Underweight (BMI < 19).    This patient is being managed with:   Diet Clear Liquid-  Consistent Carbohydrate {Evening Snack} (CSTCHOSN)  Moderately Thick Liquids (MODTHICKLIQS)  Entered: Oct 19 2024  5:19PM  
This patient has been assessed with a concern for Malnutrition and has been determined to have a diagnosis/diagnoses of Severe protein-calorie malnutrition and Underweight (BMI < 19).    This patient is being managed with:   Diet NPO-  Entered: Oct 25 2024  7:42AM

## 2024-10-30 NOTE — PROGRESS NOTE ADULT - SUBJECTIVE AND OBJECTIVE BOX
24 HOUR EVENTS:  - xray effusion looks better, on 4L nasal cannula all day  - lasix 20 x2    NEURO  RASS (if intubated): 		CAM ICU (if concern for delirium):  Exam:   Meds: acetaminophen     Tablet .. 650 milliGRAM(s) Oral every 6 hours PRN Mild Pain (1 - 3)  oxyCODONE    IR 2.5 milliGRAM(s) Oral every 4 hours PRN Moderate Pain (4 - 6)      RESPIRATORY  RR: 22 (10-30-24 @ 03:00) (13 - 40)  SpO2: 100% (10-30-24 @ 03:00) (94% - 100%)  Wt(kg): --  Exam:  Mechanical Ventilation:     Meds: albuterol/ipratropium for Nebulization 3 milliLiter(s) Nebulizer every 6 hours  sodium chloride 3%  Inhalation 4 milliLiter(s) Inhalation every 6 hours      CARDIOVASCULAR  HR: 60 (10-30-24 @ 03:00) (60 - 60)  BP: 102/51 (10-30-24 @ 03:00) (102/51 - 153/66)  BP(mean): 73 (10-30-24 @ 03:00) (71 - 95)  ABP: --  ABP(mean): --  Wt(kg): --  CVP(cm H2O): --      Exam:   Cardiac Rhythm:   Perfusion     [ ]Adequate   [ ]Inadequate  Mentation   [ ]Normal       [ ]Reduced  Extremities  [ ]Warm         [ ]Cool  Volume Status [ ]Hypervolemic [ ]Euvolemic [ ]Hypovolemic  Meds:     GI/NUTRITION  Exam:   Diet:   Meds: pantoprazole    Tablet 40 milliGRAM(s) Oral before breakfast      GENITOURINARY  I&O's Detail    10-28 @ 07:01  -  10-29 @ 07:00  --------------------------------------------------------  IN:    IV PiggyBack: 650 mL    IV PiggyBack: 500 mL    Oral Fluid: 470 mL  Total IN: 1620 mL    OUT:    Colostomy (mL): 200 mL    Indwelling Catheter - Urethral (mL): 1975 mL  Total OUT: 2175 mL    Total NET: -555 mL      10-29 @ 07:01  -  10-30 @ 03:11  --------------------------------------------------------  IN:    IV PiggyBack: 100 mL    IV PiggyBack: 400 mL    Oral Fluid: 500 mL  Total IN: 1000 mL    OUT:    Colostomy (mL): 200 mL    Indwelling Catheter - Urethral (mL): 1790 mL  Total OUT: 1990 mL    Total NET: -990 mL          10-29    139  |  105  |  24[H]  ----------------------------<  126[H]  4.8   |  25  |  0.54    Ca    7.6[L]      29 Oct 2024 13:07  Phos  2.2     10-29  Mg     2.3     10-29    TPro  5.2[L]  /  Alb  2.1[L]  /  TBili  0.9  /  DBili  x   /  AST  19  /  ALT  14  /  AlkPhos  107  10-29    Meds: ascorbic acid 500 milliGRAM(s) Oral daily  cholecalciferol 2000 Unit(s) Oral daily  cyanocobalamin 1000 MICROGram(s) Oral daily  folic acid 1 milliGRAM(s) Oral daily  multivitamin 1 Tablet(s) Oral daily  tamsulosin 0.8 milliGRAM(s) Oral at bedtime  thiamine 100 milliGRAM(s) Oral daily      HEMATOLOGIC  Meds: apixaban 5 milliGRAM(s) Oral two times a day  aspirin  chewable 81 milliGRAM(s) Oral daily                          8.1    5.74  )-----------( 113      ( 29 Oct 2024 13:07 )             26.9     PT/INR - ( 29 Oct 2024 13:07 )   PT: 21.6 sec;   INR: 1.89 ratio         PTT - ( 29 Oct 2024 13:07 )  PTT:34.6 sec    INFECTIOUS DISEASES  T(C): 36.7 (10-29-24 @ 22:00), Max: 36.9 (10-29-24 @ 05:00)  Wt(kg): --  WBC Count: 5.74 K/uL (10-29 @ 13:07)    Recent Cultures:  Specimen Source: .Sputum Sputum, 10-27 @ 03:27; Results   Numerous Escherichia coli  Commensal leia consistent with body site[!]; Gram Stain:   Few polymorphonuclear leukocytes seen per oil power field  Few Squamous epithelial cells seen per oil power field  Numerous Gram Negative Rods seen per oil power field  Moderate Gram Negative Diplococci seen per oil power field  Few Yeast seen per oil power field[!]; Organism: Escherichia coli[!]  Specimen Source: .Blood BLOOD, 10-23 @ 13:00; Results   No growth at 5 days; Gram Stain: --; Organism: --  Specimen Source: .Blood BLOOD, 10-23 @ 12:45; Results   No growth at 5 days; Gram Stain: --; Organism: --    Meds: mycophenolate mofetil 500 milliGRAM(s) Oral daily  piperacillin/tazobactam IVPB.. 3.375 Gram(s) IV Intermittent every 8 hours      ENDOCRINE  Capillary Blood Glucose    Meds: atorvastatin 40 milliGRAM(s) Oral at bedtime  finasteride 5 milliGRAM(s) Oral daily  predniSONE   Tablet 5 milliGRAM(s) Oral daily      ACCESS DEVICES:  [ ] Peripheral IV  [ ] Central Venous Line		[ ] R	[ ] L	[ ] IJ	[ ] Fem	[ ] SC	Placed:   [ ] Arterial Line			[ ] R	[ ] L	[ ] Fem	[ ] Rad	[ ] Ax	Placed:   [ ] PICC:					[ ] Mediport  [ ] Urinary Catheter, Date Placed:   [ ] Necessity of urinary, arterial, and venous catheters discussed    OTHER MEDICATIONS:  chlorhexidine 2% Cloths 1 Application(s) Topical <User Schedule>  fluticasone propionate 50 MICROgram(s)/spray Nasal Spray 1 Spray(s) Both Nostrils two times a day      IMAGING: 24 HOUR EVENTS:  - effusion improving on cxr on 4L nasal cannula   - lasix 40x1 overnight     NEURO  Exam: AOx3  Meds: acetaminophen     Tablet .. 650 milliGRAM(s) Oral every 6 hours PRN Mild Pain (1 - 3)  oxyCODONE    IR 2.5 milliGRAM(s) Oral every 4 hours PRN Moderate Pain (4 - 6)      RESPIRATORY  RR: 22 (10-30-24 @ 03:00) (13 - 40)  SpO2: 100% (10-30-24 @ 03:00) (94% - 100%)  Wt(kg): --  Exam: comfortable on 4L nasal cannula   Mechanical Ventilation:     Meds: albuterol/ipratropium for Nebulization 3 milliLiter(s) Nebulizer every 6 hours  sodium chloride 3%  Inhalation 4 milliLiter(s) Inhalation every 6 hours      CARDIOVASCULAR  HR: 60 (10-30-24 @ 03:00) (60 - 60)  BP: 102/51 (10-30-24 @ 03:00) (102/51 - 153/66)  BP(mean): 73 (10-30-24 @ 03:00) (71 - 95)  ABP: --  ABP(mean): --  Wt(kg): --  CVP(cm H2O): --    Cardiac Rhythm: RRR  Perfusion     [ ]Adequate   [ ]Inadequate  Mentation   [ ]Normal       [ ]Reduced  Extremities  [ ]Warm         [ ]Cool  Volume Status [ ]Hypervolemic [ ]Euvolemic [ ]Hypovolemic  Meds:     GI/NUTRITION  Exam: soft, non-tender  Diet: Minced, moist   Meds: pantoprazole    Tablet 40 milliGRAM(s) Oral before breakfast      GENITOURINARY  I&O's Detail    10-28 @ 07:01  -  10-29 @ 07:00  --------------------------------------------------------  IN:    IV PiggyBack: 650 mL    IV PiggyBack: 500 mL    Oral Fluid: 470 mL  Total IN: 1620 mL    OUT:    Colostomy (mL): 200 mL    Indwelling Catheter - Urethral (mL): 1975 mL  Total OUT: 2175 mL    Total NET: -555 mL      10-29 @ 07:01  -  10-30 @ 03:11  --------------------------------------------------------  IN:    IV PiggyBack: 100 mL    IV PiggyBack: 400 mL    Oral Fluid: 500 mL  Total IN: 1000 mL    OUT:    Colostomy (mL): 200 mL    Indwelling Catheter - Urethral (mL): 1790 mL  Total OUT: 1990 mL    Total NET: -990 mL          10-29    139  |  105  |  24[H]  ----------------------------<  126[H]  4.8   |  25  |  0.54    Ca    7.6[L]      29 Oct 2024 13:07  Phos  2.2     10-29  Mg     2.3     10-29    TPro  5.2[L]  /  Alb  2.1[L]  /  TBili  0.9  /  DBili  x   /  AST  19  /  ALT  14  /  AlkPhos  107  10-29    Meds: ascorbic acid 500 milliGRAM(s) Oral daily  cholecalciferol 2000 Unit(s) Oral daily  cyanocobalamin 1000 MICROGram(s) Oral daily  folic acid 1 milliGRAM(s) Oral daily  multivitamin 1 Tablet(s) Oral daily  tamsulosin 0.8 milliGRAM(s) Oral at bedtime  thiamine 100 milliGRAM(s) Oral daily      HEMATOLOGIC  Meds: apixaban 5 milliGRAM(s) Oral two times a day  aspirin  chewable 81 milliGRAM(s) Oral daily                          8.1    5.74  )-----------( 113      ( 29 Oct 2024 13:07 )             26.9     PT/INR - ( 29 Oct 2024 13:07 )   PT: 21.6 sec;   INR: 1.89 ratio         PTT - ( 29 Oct 2024 13:07 )  PTT:34.6 sec    INFECTIOUS DISEASES  T(C): 36.7 (10-29-24 @ 22:00), Max: 36.9 (10-29-24 @ 05:00)  Wt(kg): --  WBC Count: 5.74 K/uL (10-29 @ 13:07)    Recent Cultures:  Specimen Source: .Sputum Sputum, 10-27 @ 03:27; Results   Numerous Escherichia coli  Commensal leia consistent with body site[!]; Gram Stain:   Few polymorphonuclear leukocytes seen per oil power field  Few Squamous epithelial cells seen per oil power field  Numerous Gram Negative Rods seen per oil power field  Moderate Gram Negative Diplococci seen per oil power field  Few Yeast seen per oil power field[!]; Organism: Escherichia coli[!]  Specimen Source: .Blood BLOOD, 10-23 @ 13:00; Results   No growth at 5 days; Gram Stain: --; Organism: --  Specimen Source: .Blood BLOOD, 10-23 @ 12:45; Results   No growth at 5 days; Gram Stain: --; Organism: --    Meds: mycophenolate mofetil 500 milliGRAM(s) Oral daily  piperacillin/tazobactam IVPB.. 3.375 Gram(s) IV Intermittent every 8 hours      ENDOCRINE  Capillary Blood Glucose    Meds: atorvastatin 40 milliGRAM(s) Oral at bedtime  finasteride 5 milliGRAM(s) Oral daily  predniSONE   Tablet 5 milliGRAM(s) Oral daily      ACCESS DEVICES:  [ ] Peripheral IV  [ ] Central Venous Line		[ ] R	[ ] L	[ ] IJ	[ ] Fem	[ ] SC	Placed:   [ ] Arterial Line			[ ] R	[ ] L	[ ] Fem	[ ] Rad	[ ] Ax	Placed:   [ ] PICC:					[ ] Mediport  [ ] Urinary Catheter, Date Placed:   [ ] Necessity of urinary, arterial, and venous catheters discussed    OTHER MEDICATIONS:  chlorhexidine 2% Cloths 1 Application(s) Topical <User Schedule>  fluticasone propionate 50 MICROgram(s)/spray Nasal Spray 1 Spray(s) Both Nostrils two times a day      IMAGING:

## 2024-10-31 NOTE — RAPID RESPONSE TEAM SUMMARY - NSSITUATIONBACKGROUNDRRT_GEN_ALL_CORE
83 y/o M PmHx recently diagnosed bilateral renal cell carcinoma, myasthenia gravis on prednisone and mycophenolate, atrial fibrillation on apixaban S/P PPM placement, severe AS, recent stroke with dysphagia and left sided weakness presenting to [facility] for severe anemia requiring blood transfusion. 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 Transferred to SICU d/t hypotension requiring vasopressors during the case, transferred to floor readmitted to SICU due to hypotension secondary to presumed aspiration. He was subsequently able to be downgraded to the floor after being treated for his pulmonary edema, coming off of pressors, and being downgraded from aerosol mask to NC.    RRT called 10/31 at !7:30 for hypotension. Per primary RN, BP reportedly 94/44. Patient DNR/DNI trial NIV. On arrival VS: T97.6F, /45, HR 60 (V-paced), RR 16, satting 95% on RA. FS BG 90. On exam, thin appearing male, AOx4, CTABL, RRR, 2+ b/l LE edema to ankles, and b/l upper extremity hand edema. Prior TTE from 8/19/24 showing severe concentric LVH and severe aortic regurgitation, TTE 60-65%. BP on multiple repeats 110s/30-40s. ECG with v-paced HR 60. No labs from earlier today, Hgb prior in 7s. Sent BMP, CBC, VBG, and BNP. Recommend BP goal be based on systolic BP as the patient has a widened pulse pressure with reduced diastolic BP as a result of their severe AR. RRT ended as VS stable.    Recommendations/follow up  1) F/u VBG, CBC, BMP, and BNP  2) Base BP goal on systolics, consider SBP goal >90  3) Consider cards consult if BNP elevated iso LE edema with known history of HFpEF  4) Patient thin appearing with minimal PO intake and hypoalbuminemia. Consider nutrition consult. Consider tube feeding if within patient's GOC.

## 2024-10-31 NOTE — ADVANCED PRACTICE NURSE CONSULT - RECOMMEDATIONS
Will recommend	  1. Monitor output  2. Empty pouch when 1/3-1/2 full, encourage self care.  3. Change pouching system every 3-4 days & prn leakage  4. Reinforce ostomy teaching w/patient  5. Contact CWOCNs if questions/issues arise.  6. Supplies: Debbie 2 1/4" Ceraplus soft convex skin barrier (#16348), [place] 2 1/4" drainable pouch (#61716); Accessory products:  stoma paste #[id number], stoma powder (#0878) & Cavilon No sting barrier film wipe (#2989)    
1. sacrum: cavilon to periwound skin, Medihoney paste to the wound cover with foam and change daily and prn for soiling, drainage  2. continue with T&P  3. continue with boots  4. seat cushion when oob to chair  5. nutrition support as pt condition allows  Tx plan discussed with pt/RN
Impression  fecal incontinence   helen rectal incontinence assocated dermatitis   bilateral sacrum/coccyx /buttocks, unstageable pressure injury, present on admission.  left lateral calf unstageable pressure injury, present on admission.   thoracic spine deep tissue injury, present on admission.   left malleolus deep tissue injury, present on admission    left lateral heel deep tissue injury, present on admission   right heel deep tissue injury, present on admission     Recommendations   1. bilateral sacrum/coccyx /buttocks, unstageable pressure injury      helen rectal incontinence dermatitis  Topical therapy- - cleanse w/incontinent cleanser, pat dry & apply Cavilon to helen wound - to eschar apply  Medihoney -cover  with Aquacel - secure with Tegaderm  daily & prn soiling . Monitor for changes .  2. Right and left heel /feet deep tissue injury  - consider podiatry for further management    - cleans heels with normal saline pat dry and apply no sting barrier film ( Cavilon ) daily and monitor for changes .  - Elevate heels; apply Complete Cair air fluidized boots; ensure that the soles of the feet are not resting on the foot board of the bed.  3  Incontinent management - incontinent cleanser, pads,  helen care  BID  4. Maintain on an alternating air with low air loss surface   5. Turn & reposition every 2 hr; Use positioning pillow to turn and reposition, soft pillow between bony prominences; continue measures to decrease friction/shear/pressure.  6. Nutrition optimization.  7.  Place waffle cushion when out of bed to chair .   8. left lateral calf - cleans normal saline pat dry apply Cavilon  to helen wound to eschar apply Medihoney cover with Allevyn foam, change daily and/or PRN soiling.  9. thoracic spine deep tissue injury- cleans area normal saline pat dry apply Cavilon cover with Allevyn foam, change daily and/or PRN soiling.  will not follow , please recall for new issues  Upon discharge f/u as outpatient [facility] [address] [facility] [phone]  plan of care reviewed with covering staff Yaritza Delaney (VICTOR MANUEL)
Will recommend	  1. Monitor output  2. Empty pouch when 1/3-1/2 full, encourage self care.  3. Change pouching system every 3-4 days & prn leakage  4. Reinforce ostomy teaching w/patient  5. Contact CWOCNs if questions/issues arise.  6. Supplies: Debbie 2 1/4" Ceraplus soft convex skin barrier (#02892), [place] 2 1/4" drainable pouch (#24284); Accessory products:  stoma paste #[id number], stoma powder (#0610) & Cavilon No sting barrier film wipe (#9747)

## 2024-10-31 NOTE — ADVANCED PRACTICE NURSE CONSULT - ASSESSMENT
Chart reviewed and events noted. Pt was transferred from SICU to floor.  In to see pt to f/u & assess ostomy care needs & readiness to re-learn ostomy teaching. Re introduced self and the role of the ostomy RN.  Patient awake & alert somewhat  lethargic. Patient with known h/o CVA with residual weakness. Complete pouching system changed. Stoma 1 1/4" pink & viable retracted, stoma os at 8 o'clock flushed below the skin level. Peristomal skin intact, multiple creases noted.. Mucocutaneous junction w/ superficial separation from 10-2 o'clock, sutures noted, gray slough noted from 2-3 o'clock Peristomal skin and mucocutaneous junction intact. Colostomy functioning for pasty stool, no gas noted. Re pouched with [place] 2 1/4" soft convex skin barrier.  Peristomal skin dusted with stoma powder. Sealed the site with no sting barrier liquid film wipe to heal and protect.  Stoma paste applied to the back of the skin barrier and creases/ indent @ 3 and  6-9 o'clock to surface and to caulk.  Repouched  with Debbie 2 1/4" skin barrier,& drainable pouch. Patient remains quiet observed some steps for change with some recollection of how to close pouch as pt nods head in understanding. Re demonstration of pouch opening/closure reviewed with pt.  Ostomy RN demonstrated the opening and closing the drainable pouch. Supplies with pattern left at the bedside. Emotional support provided.  Safety maintained.  Will cont follow up.

## 2024-10-31 NOTE — ADVANCED PRACTICE NURSE CONSULT - REASON FOR CONSULT
Requested by staff to re-evaluate skin status: sacrum. PMH is noted:  83 y/o M PmHx recently diagnosed bilateral renal cell carcinoma, myasthenia gravis on prednisone and mycophenolate, atrial fibrillation on apixaban status post recent pacemaker placement for slow ventricular response, heart failure with preserved ejection fraction, severe aortic stenosis, and recent stroke with dysphagia and left sided weakness presenting to [facility] for severe anemia requiring blood transfusion. Patient was found to have pneumoperitoneum on a study obtained today 10/16 to identify the progression of his disease and specifically look for metastases. 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 Patient was taken to OR for Chopra's procedure with end colostomy. Transferred to SICU d/t hypotension requiring vasopressors during the case. Now downgraded to the floor.  The pt was last seen by the Wound Care Team on 10/15  
S/P Chopra's procedure secondary to perforated diverticulitis - 10/17/2024.      85 y/o M PmHx recently diagnosed bilateral renal cell carcinoma, myasthenia gravis on prednisone and mycophenolate, atrial fibrillation on apixaban status post recent pacemaker placement for slow ventricular response, heart failure with preserved ejection fraction, severe aortic stenosis, and recent stroke with dysphagia and left sided weakness presenting to [facility] for severe anemia requiring blood transfusion. Patient was found to have pneumoperitoneum on a study obtained today 10/16 to identify the progression of his disease and specifically look for metastases. 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 Patient was taken to OR for Chopra's procedure with end colostomy. Transferred to SICU d/t hypotension requiring vasopressors during the case. Now downgraded to the floor. Patient was recently readmitted to SICU due to hypotension secondary to presumed aspiration.         
Consult to assess patient skin initiated by RN for wound care assessment.    History of Present Illness:  Reason for Admission: Anemia  History of Present Illness:   84 year old male with PMH of b/l renal cell carcinoma (newly dx, pt unaware of dx per daughter), HTN, DM, AF on Eliquis c/b slow ventricular response s/p leadless pacemaker 10 days ago, HFpEF (EF 60-65%), severe AS/AR, myasthenia gravis, stroke in 8/24 with residual L weakness, dysphagia and urinary retention with chronic butt presenting from rehab after labs today showed Hgb 5.5. Patient is accompanied at bedside by his daughter who provided majority of patient's history due to patient being [place] and without his hearing aids. She notes that patient has been having "looser stools" since the beginning of this week but is unsure if the stool was unusually dark or with occult blood. Daughter also notes that patient has had decreased appetite and significant weight loss (~40lbs) since his stroke in August. He has not experienced recent n/v or abdominal pain/distension, and he does not use NSAIDs regularly. He has a chronic butt but reports no dysuria or hematuria.     In ED, patient noted to be hemodynamically stable and receiving RBC transfusion.  
Initial ostomy care education and teaching.  S/P Chopra's procedure secondary to perforated diverticulitis - 10/17/2024.

## 2024-10-31 NOTE — CHART NOTE - NSCHARTNOTEFT_GEN_A_CORE
SICU Transfer Note    Transfer from: SICU  Transfer to: [facility]  Accepting physician: Jon      SICU COURSE: 85 y/o M PmHx recently diagnosed bilateral renal cell carcinoma, myasthenia gravis on prednisone and mycophenolate, atrial fibrillation on apixaban status post recent pacemaker placement for slow ventricular response, heart failure with preserved ejection fraction, severe aortic stenosis, and recent stroke with dysphagia and left sided weakness presenting to [facility] for severe anemia requiring blood transfusion. Patient was found to have pneumoperitoneum on a study obtained today 10/16 to identify the progression of his disease and specifically look for metastases. 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 Patient was taken to OR for Chopra's procedure with end colostomy. Transferred to SICU d/t hypotension requiring vasopressors during the case. Was downgraded to the floor and  then readmitted to SICU due to hypotension due to presumed aspiration. He was found to have a large left sided pleural effusion.       ASSESSMENT AND PLAN:         For Follow-Up:  Lasix on for pleural effusion: will fall off in 5 days  On duo nebs  on regular diet  on home eliquis  on home prednisone        Vital Signs Last 24 Hrs  T(C): 36.6 (31 Oct 2024 00:22), Max: 36.8 (30 Oct 2024 05:00)  T(F): 97.8 (31 Oct 2024 00:22), Max: 98.3 (30 Oct 2024 19:00)  HR: 60 (31 Oct 2024 00:22) (55 - 60)  BP: 123/51 (31 Oct 2024 00:22) (102/51 - 167/71)  BP(mean): 90 (30 Oct 2024 21:00) (73 - 102)  RR: 18 (31 Oct 2024 00:22) (14 - 40)  SpO2: 100% (31 Oct 2024 00:22) (94% - 100%)    Parameters below as of 31 Oct 2024 00:22  Patient On (Oxygen Delivery Method): nasal cannula  O2 Flow (L/min): 0.2    I&O's Summary    29 Oct 2024 07:01  -  30 Oct 2024 07:00  --------------------------------------------------------  IN: 1025 mL / OUT: 2740 mL / NET: -1715 mL    30 Oct 2024 07:01  -  31 Oct 2024 01:20  --------------------------------------------------------  IN: 180 mL / OUT: 1460 mL / NET: -1280 mL          MEDICATIONS  (STANDING):  albuterol/ipratropium for Nebulization 3 milliLiter(s) Nebulizer every 6 hours  apixaban 5 milliGRAM(s) Oral two times a day  ascorbic acid 500 milliGRAM(s) Oral daily  ascorbic acid 500 milliGRAM(s) Oral daily  aspirin  chewable 81 milliGRAM(s) Oral daily  atorvastatin 40 milliGRAM(s) Oral at bedtime  chlorhexidine 2% Cloths 1 Application(s) Topical <User Schedule>  cholecalciferol 2000 Unit(s) Oral daily  cyanocobalamin 1000 MICROGram(s) Oral daily  ferrous    sulfate 325 milliGRAM(s) Oral daily  finasteride 5 milliGRAM(s) Oral daily  fluticasone propionate 50 MICROgram(s)/spray Nasal Spray 1 Spray(s) Both Nostrils two times a day  folic acid 1 milliGRAM(s) Oral daily  furosemide    Tablet 20 milliGRAM(s) Oral every 12 hours  multivitamin 1 Tablet(s) Oral daily  mycophenolate mofetil 500 milliGRAM(s) Oral daily  pantoprazole    Tablet 40 milliGRAM(s) Oral before breakfast  predniSONE   Tablet 5 milliGRAM(s) Oral daily  sodium chloride 3%  Inhalation 4 milliLiter(s) Inhalation every 6 hours  tamsulosin 0.8 milliGRAM(s) Oral at bedtime  thiamine 100 milliGRAM(s) Oral daily    MEDICATIONS  (PRN):  acetaminophen     Tablet .. 650 milliGRAM(s) Oral every 6 hours PRN Mild Pain (1 - 3)  oxyCODONE    IR 2.5 milliGRAM(s) Oral every 4 hours PRN Moderate Pain (4 - 6)        LABS                                            7.5                   Neurophils% (auto):   x      (10-30 @ 09:52):    4.85 )-----------(104          Lymphocytes% (auto):  x                                             25.1                   Eosinphils% (auto):   x        Manual%: Neutrophils x    ; Lymphocytes x    ; Eosinophils x    ; Bands%: x    ; Blasts x                                    141    |  103    |  24                  Calcium: 7.7   / iCa: x      (10-30 @ 05:21)    ----------------------------<  108       Magnesium: 2.0                              3.5     |  27     |  0.59             Phosphorous: 2.7      TPro  4.9    /  Alb  2.0    /  TBili  0.8    /  DBili  x      /  AST  16     /  ALT  13     /  AlkPhos  105    30 Oct 2024 05:21    ( 10-30 @ 05:21 )   PT: 19.0 sec;   INR: 1.66 ratio  aPTT: 32.8 sec SICU Transfer Note    Transfer from: SICU  Transfer to: [facility]  Accepting physician: Jon      SICU COURSE: 85 y/o M PmHx recently diagnosed bilateral renal cell carcinoma, myasthenia gravis on prednisone and mycophenolate, atrial fibrillation on apixaban status post recent pacemaker placement for slow ventricular response, heart failure with preserved ejection fraction, severe aortic stenosis, and recent stroke with dysphagia and left sided weakness presenting to [facility] for severe anemia requiring blood transfusion. Patient was found to have pneumoperitoneum on a study obtained today 10/16 to identify the progression of his disease and specifically look for metastases. 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 Patient was taken to OR for Chopra's procedure with end colostomy. Transferred to SICU d/t hypotension requiring vasopressors during the case. Was downgraded to the floor and  then readmitted to SICU due to hypotension due to presumed aspiration. He was found to have a large left sided pleural effusion and was subsequently placed on non invasive ventilation (aerosol mask) (the patient was made DNR/DNI after discussion with the family). He was started on pressors, and then was eventually able to be weened off. He was diuresed and was eventually downgraded to NC, which he tolerated well. He was also evaluated by the speech and swallow team for his aspiration event and found to have oropharyngeal dysphasia and cleared for minced-moist/mildly thickened liquids.      ASSESSMENT AND PLAN:   85 y/o M PmHx recently diagnosed bilateral renal cell carcinoma, myasthenia gravis on prednisone and mycophenolate, atrial fibrillation on apixaban S/P PPM placement, severe AS, recent stroke with dysphagia and left sided weakness presenting to [facility] for severe anemia requiring blood transfusion. 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 Transferred to SICU d/t hypotension requiring vasopressors during the case, transferred to floor readmitted to SICU due to hypotension secondary to presumed aspiration. He was subsequently able to be downgraded to the floor after being treated for his pulmonary edema, coming off of pressors, and being downgraded from aerosol mask to NC.      For Follow-Up:  Lasix on for pleural effusion: will fall off in 5 days  On duo nebs  On 2L NC  on minced-moist diet and mildly thick liquids  on home eliquis  on home prednisone  sacral ulcer, unable to stage        Vital Signs Last 24 Hrs  T(C): 36.6 (31 Oct 2024 00:22), Max: 36.8 (30 Oct 2024 05:00)  T(F): 97.8 (31 Oct 2024 00:22), Max: 98.3 (30 Oct 2024 19:00)  HR: 60 (31 Oct 2024 00:22) (55 - 60)  BP: 123/51 (31 Oct 2024 00:22) (102/51 - 167/71)  BP(mean): 90 (30 Oct 2024 21:00) (73 - 102)  RR: 18 (31 Oct 2024 00:22) (14 - 40)  SpO2: 100% (31 Oct 2024 00:22) (94% - 100%)    Parameters below as of 31 Oct 2024 00:22  Patient On (Oxygen Delivery Method): nasal cannula  O2 Flow (L/min): 0.2    I&O's Summary    29 Oct 2024 07:01  -  30 Oct 2024 07:00  --------------------------------------------------------  IN: 1025 mL / OUT: 2740 mL / NET: -1715 mL    30 Oct 2024 07:01  -  31 Oct 2024 01:20  --------------------------------------------------------  IN: 180 mL / OUT: 1460 mL / NET: -1280 mL          MEDICATIONS  (STANDING):  albuterol/ipratropium for Nebulization 3 milliLiter(s) Nebulizer every 6 hours  apixaban 5 milliGRAM(s) Oral two times a day  ascorbic acid 500 milliGRAM(s) Oral daily  ascorbic acid 500 milliGRAM(s) Oral daily  aspirin  chewable 81 milliGRAM(s) Oral daily  atorvastatin 40 milliGRAM(s) Oral at bedtime  chlorhexidine 2% Cloths 1 Application(s) Topical <User Schedule>  cholecalciferol 2000 Unit(s) Oral daily  cyanocobalamin 1000 MICROGram(s) Oral daily  ferrous    sulfate 325 milliGRAM(s) Oral daily  finasteride 5 milliGRAM(s) Oral daily  fluticasone propionate 50 MICROgram(s)/spray Nasal Spray 1 Spray(s) Both Nostrils two times a day  folic acid 1 milliGRAM(s) Oral daily  furosemide    Tablet 20 milliGRAM(s) Oral every 12 hours  multivitamin 1 Tablet(s) Oral daily  mycophenolate mofetil 500 milliGRAM(s) Oral daily  pantoprazole    Tablet 40 milliGRAM(s) Oral before breakfast  predniSONE   Tablet 5 milliGRAM(s) Oral daily  sodium chloride 3%  Inhalation 4 milliLiter(s) Inhalation every 6 hours  tamsulosin 0.8 milliGRAM(s) Oral at bedtime  thiamine 100 milliGRAM(s) Oral daily    MEDICATIONS  (PRN):  acetaminophen     Tablet .. 650 milliGRAM(s) Oral every 6 hours PRN Mild Pain (1 - 3)  oxyCODONE    IR 2.5 milliGRAM(s) Oral every 4 hours PRN Moderate Pain (4 - 6)        LABS                                            7.5                   Neurophils% (auto):   x      (10-30 @ 09:52):    4.85 )-----------(104          Lymphocytes% (auto):  x                                             25.1                   Eosinphils% (auto):   x        Manual%: Neutrophils x    ; Lymphocytes x    ; Eosinophils x    ; Bands%: x    ; Blasts x                                    141    |  103    |  24                  Calcium: 7.7   / iCa: x      (10-30 @ 05:21)    ----------------------------<  108       Magnesium: 2.0                              3.5     |  27     |  0.59             Phosphorous: 2.7      TPro  4.9    /  Alb  2.0    /  TBili  0.8    /  DBili  x      /  AST  16     /  ALT  13     /  AlkPhos  105    30 Oct 2024 05:21    ( 10-30 @ 05:21 )   PT: 19.0 sec;   INR: 1.66 ratio  aPTT: 32.8 sec

## 2024-10-31 NOTE — PROVIDER CONTACT NOTE (OTHER) - ASSESSMENT
Pt A&Ox4, all other VSS. Denies lightheadedness/dizziness. Pt in no s/s of distress. Pt A&Ox4, all other VSS. Denies lightheadedness/dizziness, chest pain & SOB. No distress noted.

## 2024-10-31 NOTE — PROGRESS NOTE ADULT - SUBJECTIVE AND OBJECTIVE BOX
SURGERY DAILY PROGRESS NOTE    24 Hour/Overnight Events: Downgraded from SICU    SUBJECTIVE: Pt feeling well this morning. No acute complaints, denies abdominal pain. Tolerating diet, no n/v    ------------------------------------------------------------------------------------------------------------  OBJECTIVE:  Vital Signs Last 24 Hrs  T(C): 36.7 (31 Oct 2024 09:05), Max: 36.8 (30 Oct 2024 19:00)  T(F): 98 (31 Oct 2024 09:05), Max: 98.3 (30 Oct 2024 19:00)  HR: 60 (31 Oct 2024 09:05) (55 - 87)  BP: 110/47 (31 Oct 2024 09:05) (110/47 - 167/71)  BP(mean): 90 (30 Oct 2024 21:00) (81 - 102)  RR: 18 (31 Oct 2024 09:05) (14 - 28)  SpO2: 94% (31 Oct 2024 09:05) (94% - 100%)    Parameters below as of 31 Oct 2024 09:05  Patient On (Oxygen Delivery Method): room air      I&O's Detail    30 Oct 2024 07:01  -  31 Oct 2024 07:00  --------------------------------------------------------  IN:    IV PiggyBack: 150 mL    Oral Fluid: 110 mL  Total IN: 260 mL    OUT:    Colostomy (mL): 350 mL    Indwelling Catheter - Urethral (mL): 1810 mL  Total OUT: 2160 mL    Total NET: -1900 mL          LABS:                        7.5    4.85  )-----------( 104      ( 30 Oct 2024 09:52 )             25.1     10-30    141  |  103  |  24[H]  ----------------------------<  108[H]  3.5   |  27  |  0.59    Ca    7.7[L]      30 Oct 2024 05:21  Phos  2.7     10-30  Mg     2.0     10-30    TPro  4.9[L]  /  Alb  2.0[L]  /  TBili  0.8  /  DBili  x   /  AST  16  /  ALT  13  /  AlkPhos  105  10-30    LIVER FUNCTIONS - ( 30 Oct 2024 05:21 )  Alb: 2.0 g/dL / Pro: 4.9 g/dL / ALK PHOS: 105 U/L / ALT: 13 U/L / AST: 16 U/L / GGT: x           PT/INR - ( 30 Oct 2024 05:21 )   PT: 19.0 sec;   INR: 1.66 ratio         PTT - ( 30 Oct 2024 05:21 )  PTT:32.8 sec  Urinalysis Basic - ( 30 Oct 2024 05:21 )    Color: x / Appearance: x / SG: x / pH: x  Gluc: 108 mg/dL / Ketone: x  / Bili: x / Urobili: x   Blood: x / Protein: x / Nitrite: x   Leuk Esterase: x / RBC: x / WBC x   Sq Epi: x / Non Sq Epi: x / Bacteria: x        PHYSICAL EXAM:  GEN: resting in bed, in NAD  RESP: no increased WOB  ABD: soft, NT, ND, abdomen. Midline wound healing well with staples in place, dressing removed and not replaced. Ostomy pink, patent and productive of stool and gas.   EXTR: WWP      Assessment:  83 y/o M PmHx recently diagnosed bilateral renal cell carcinoma, myasthenia gravis on prednisone and mycophenolate, atrial fibrillation on apixaban status post recent pacemaker placement for slow ventricular response, heart failure with preserved ejection fraction, severe aortic stenosis, and recent stroke with dysphagia and left sided weakness presenting to [facility] for severe anemia requiring blood transfusion. Patient was found to have pneumoperitoneum on a study obtained today 10/16 to identify the progression of his disease and specifically look for metastases. 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 Patient was taken to OR for Chopra's procedure with end colostomy. Transferred to SICU d/t hypotension requiring vasopressors during the case. Now downgraded to the floor. Patient was recently readmitted to SICU due to hypotension secondary to presumed aspiration.     Plan:  - downgraded to floor  - incision staples to stay throughout OP follow up as pt is on cellcept  - dressing removed, no dressing on now  - Pain control  - Cont lasix - off in 5 days     - no pigtail needed  - cont zosyn  - Continue duonebs  - Continue home eliquis   - Diet: pureed  - aspiration precautions  - dispo planning, likely to [facility]    Trauma/ACS [id number]

## 2024-10-31 NOTE — CONSULT NOTE ADULT - SUBJECTIVE AND OBJECTIVE BOX
Wound SURGERY CONSULT NOTE    HPI:  84 year old male with PMH of b/l renal cell carcinoma (newly dx, pt unaware of dx per daughter), HTN, DM, AF on Eliquis c/b slow ventricular response s/p leadless pacemaker 10 days ago, HFpEF (EF 60-65%), severe AS/AR, myasthenia gravis, stroke in 8/24 with residual L weakness, dysphagia and urinary retention with chronic btut presenting from rehab after labs today showed Hgb 5.5. Patient is accompanied at bedside by his daughter who provided majority of patient's history due to patient being [place] and without his hearing aids. She notes that patient has been having "looser stools" since the beginning of this week but is unsure if the stool was unusually dark or with occult blood. Daughter also notes that patient has had decreased appetite and significant weight loss (~40lbs) since his stroke in August. He has not experienced recent n/v or abdominal pain/distension, and he does not use NSAIDs regularly. He has a chronic butt but reports no dysuria or hematuria.     In ED, patient noted to be hemodynamically stable and receiving RBC transfusion.   (14 Oct 2024 20:23)        N/V/D,  BM/ Flatus,   NGT,     palp/ sob/dyspnea/ cp,       F/C/S  Wound consult requested by team to assist w/ management of      wound/ pressure injury.   Pt (unable to)  c/o pain, drainage, odor, color change,  or worsening swelling. Offloading and pericare initiated upon admission as pt Increasingly sedentary 2/2 to illness. Pt is Incontinent of urine & stool. (+)butt/ ostomy.   No h/o bites, scratches, falls, trauma.  Pt seen by Wound RN  CAVILON Advance/  Brett,TRIAD/ Cheryleell/ medihoney/ Allevyn foam/ dakins/ Adaptic/ DSD recommended used at home/ while awaiting consult.  Appetite good/ decreased.  weight loss.  S&S / RD consult appreciated All questions asked and answered to pt's and family's expressed understanding and satisfaction.    Current Diet: Diet, Pureed:   Consistent Carbohydrate Evening Snack (CSTCHOSN)  Mildly Thick Liquids (MILDTHICKLIQS) (10-25-24 @ 12:15)      PAST MEDICAL & SURGICAL HISTORY:  Atrial fibrillation      Embolic stroke      Myasthenia gravis      (HFpEF) heart failure with preserved ejection fraction      HTN (hypertension)      T2DM (type 2 diabetes mellitus)      Renal cell carcinoma      Urinary retention      Pressure ulcer      Aortic stenosis      [place] (hard of hearing)      History of thrombolytic therapy      Pacemaker          REVIEW OF SYSTEMS: Pt unable to offer  General/ Breast/ Skin/Vasc/ Neuro/ MSK: see HPI  All other systems negative    MEDICATIONS  (STANDING):  albuterol/ipratropium for Nebulization 3 milliLiter(s) Nebulizer every 6 hours  apixaban 5 milliGRAM(s) Oral two times a day  ascorbic acid 500 milliGRAM(s) Oral daily  ascorbic acid 500 milliGRAM(s) Oral daily  aspirin  chewable 81 milliGRAM(s) Oral daily  atorvastatin 40 milliGRAM(s) Oral at bedtime  chlorhexidine 2% Cloths 1 Application(s) Topical <User Schedule>  cholecalciferol 2000 Unit(s) Oral daily  cyanocobalamin 1000 MICROGram(s) Oral daily  ferrous    sulfate 325 milliGRAM(s) Oral daily  finasteride 5 milliGRAM(s) Oral daily  fluticasone propionate 50 MICROgram(s)/spray Nasal Spray 1 Spray(s) Both Nostrils two times a day  folic acid 1 milliGRAM(s) Oral daily  furosemide    Tablet 20 milliGRAM(s) Oral every 12 hours  multivitamin 1 Tablet(s) Oral daily  mycophenolate mofetil 500 milliGRAM(s) Oral daily  pantoprazole    Tablet 40 milliGRAM(s) Oral before breakfast  predniSONE   Tablet 5 milliGRAM(s) Oral daily  sodium chloride 3%  Inhalation 4 milliLiter(s) Inhalation every 6 hours  tamsulosin 0.8 milliGRAM(s) Oral at bedtime  thiamine 100 milliGRAM(s) Oral daily    MEDICATIONS  (PRN):  acetaminophen     Tablet .. 650 milliGRAM(s) Oral every 6 hours PRN Mild Pain (1 - 3)  oxyCODONE    IR 2.5 milliGRAM(s) Oral every 4 hours PRN Moderate Pain (4 - 6)      Allergies    No Known Allergies    Intolerances        SOCIAL HISTORY:  / /single/ ; (+)HHA/ lives in SNF; Former smoker, No current/ Denies smoking, ETOH, drugs    FAMILY HISTORY:  Family history of cerebrovascular accident (CVA) in mother (Mother)    Family history of cerebrovascular accident (CVA) in father (Father)     no h/o PVD or wound healing or skin/ significant problems    PHYSICAL EXAM:  Vital Signs Last 24 Hrs  T(C): 36.7 (31 Oct 2024 14:40), Max: 36.8 (30 Oct 2024 19:00)  T(F): 98.1 (31 Oct 2024 14:40), Max: 98.3 (30 Oct 2024 19:00)  HR: 60 (31 Oct 2024 14:40) (55 - 87)  BP: 110/38 (31 Oct 2024 15:36) (106/39 - 167/71)  BP(mean): 90 (30 Oct 2024 21:00) (84 - 102)  RR: 18 (31 Oct 2024 14:40) (15 - 26)  SpO2: 99% (31 Oct 2024 14:40) (94% - 100%)    Parameters below as of 31 Oct 2024 14:40  Patient On (Oxygen Delivery Method): nasal cannula  O2 Flow (L/min): 1      NAD, Guarded but stable,  A&Ox3/ Alert/ Confused  cachectic/ thin, MO/ Obese, frail,  WD/ WN/ WG,  Disheveled  Total Care Sport/ Versa Care P500 / Envella Progressa bed     HEENT:  NC/AT, PERRL, EOMI, sclera clear, mucosa moist, throat clear, trachea midline, neck supple, trach  Respiratory: nonlabored w/ equal chest rise  Gastrointestinal: soft NT/ND (+)BS  (+)PEG (+)ostomy (+)NGT  : (+)butt/ purewick/ condom cath  Neurology:  weakened strength & sensation grossly intact, paraesthesia  nonverbal, no follow commands, paraplegic  Psych: calm/ appropriate/ flat affect/ easily agitated/ restless/ anxious/ difficult to assess  Musculoskeletal:  limited stiff / p/FROM, no deformities/ contractures  Vascular: BLE equally warm/ cool,  no cyanosis, clubbing, edema nor acute ischemia           >LE //BLE edema equal           BLE DP/PT pulses palpable          BLE hemosiderin staining/ varicose veins  Skin:  moist w/ good turgor  thin, dry, pale, frail,  ecchymosis w/o hematoma  blistering  or serosanguinous drainage  No odor, erythema, increased warmth, tenderness, induration, fluctuance, nor crepitus    LABS/ CULTURES/ RADIOLOGY:                        7.5    4.85  )-----------( 104      ( 30 Oct 2024 09:52 )             25.1       141  |  103  |  24  ----------------------------<  108      [10-30-24 @ 05:21]  3.5   |  27  |  0.59        Ca     7.7     [10-30-24 @ 05:21]      Mg     2.0     [10-30-24 @ 05:21]      Phos  2.7     [10-30-24 @ 05:21]    TPro  4.9  /  Alb  2.0  /  TBili  0.8  /  DBili  x   /  AST  16  /  ALT  13  /  AlkPhos  105  [10-30-24 @ 05:21]    PT/INR: PT 19.0 , INR 1.66       [10-30-24 @ 05:21]  PTT: 32.8       [10-30-24 @ 05:21]        A1C with Estimated Average Glucose Result: 5.2 % (10-22-24 @ 07:28)  A1C with Estimated Average Glucose Result: 5.2 % (08-19-24 @ 05:10)                        A/P:    Wound Consult requested to assist w/ management of    BLE elevation & Compression  Consider HILARY/PVR, Duplex, Xray, A/P BLE CT or MRI  Abx per Medicine/ ID  Moisturize intact skin w/ SWEEN cream BID  Nutrition Consult for optimization in pt w/ Severe Protein Calorie Malnutrition,        Inadequate PO intake, & Increased nutritional needs            encourage high quality protein, jessie/ prosource, MVI & Vit C to promote wound healing  Hyperglycemia - improving w/ ADA diet and Lantus/ NPH & FS w/ ISS, consider Endo Consult, consider HgA1c  Anemia- improving w/ transfusions, Fe studies, protonix  Continue turning and positioning w/ offloading assistive devices as per protocol  Buttocks/ Sacrum Brett/ TRIAD BID /CAVILON ADVANCE TIW and prn soiling        Continue w/ attends under pads and Pericare w/ butt/ condom cath maintenance / purewick care as per protocol  Waffle Cushion to chair when oob to chair  Continue w/ low air loss pressure redistribution bed surface   Pt will need Group 2 mattress on hospital bed and ROHO cushion for wheel chair upon discharge home  Care as per medicine, will follow w/ you/ remain available as requested  Upon discharge f/u as outpatient at Wound [facility] [address] [facility] [phone]  Seen w/ attng & RN and D/w team & RN  Thank you for this consult  Cherise Tao PA-C [id number]  Nights/ Weekends/ Holidays please call:  General Surgery Consult pager ([phone]) for emergencies  Wound PT for multilayer leg wrapping or VAC issues (x [phone])      Wound SURGERY CONSULT NOTE    HPI:  83 y/o M PmHx recently diagnosed bilateral renal cell carcinoma, myasthenia gravis on prednisone and mycophenolate, atrial fibrillation on apixaban status post recent pacemaker placement for slow ventricular response, heart failure with preserved ejection fraction, severe aortic stenosis, and recent stroke with dysphagia and left sided weakness presenting to [facility] for severe anemia requiring blood transfusion. Patient was found to have pneumoperitoneum on a study obtained today 10/16 to identify the progression of his disease and specifically look for metastases. 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 Patient was taken to OR for Chopra's procedure with end colostomy. Transferred to SICU d/t hypotension requiring vasopressors during the case. Was downgraded to the floor and  then readmitted to SICU due to hypotension due to presumed aspiration. He was found to have a large left sided pleural effusion and was subsequently placed on non invasive ventilation (aerosol mask) (the patient was made DNR/DNI after discussion with the family). He was started on pressors, and then was eventually able to be weened off. He was diuresed and was eventually downgraded to NC, which he tolerated well. He was also evaluated by the speech and swallow team for his aspiration event and found to have oropharyngeal dysphasia and cleared for minced-moist/mildly thickened liquids.    Wound consult requested by team to assist w/ management of sacral pressure injury. Pt unable to c/o pain, drainage, odor, color change,  or worsening swelling. Offloading and pericare initiated upon admission as pt Increasingly sedentary 2/2 to illness. Pt now w/ butt and  ostomy. No h/o bites, scratches, falls, trauma.  Pt seen by Wound RN.  Appetite poor w/ weight loss.  S&S and RD consults appreciated.      Current Diet: Diet, Pureed:   Consistent Carbohydrate Evening Snack (CSTCHOSN)  Mildly Thick Liquids (MILDTHICKLIQS) (10-25-24 @ 12:15)      PAST MEDICAL & SURGICAL HISTORY:  Atrial fibrillation    Embolic stroke    Myasthenia gravis    (HFpEF) heart failure with preserved ejection fraction    HTN (hypertension)    T2DM (type 2 diabetes mellitus)    Renal cell carcinoma    Urinary retention    Pressure ulcer    Aortic stenosis    [place] (hard of hearing)    Thrombolytic therapy    s/p Pacemaker      REVIEW OF SYSTEMS: Pt unable to offer      MEDICATIONS  (STANDING):  albuterol/ipratropium for Nebulization 3 milliLiter(s) Nebulizer every 6 hours  apixaban 5 milliGRAM(s) Oral two times a day  ascorbic acid 500 milliGRAM(s) Oral daily  ascorbic acid 500 milliGRAM(s) Oral daily  aspirin  chewable 81 milliGRAM(s) Oral daily  atorvastatin 40 milliGRAM(s) Oral at bedtime  chlorhexidine 2% Cloths 1 Application(s) Topical <User Schedule>  cholecalciferol 2000 Unit(s) Oral daily  cyanocobalamin 1000 MICROGram(s) Oral daily  ferrous    sulfate 325 milliGRAM(s) Oral daily  finasteride 5 milliGRAM(s) Oral daily  fluticasone propionate 50 MICROgram(s)/spray Nasal Spray 1 Spray(s) Both Nostrils two times a day  folic acid 1 milliGRAM(s) Oral daily  furosemide Tablet 20 milliGRAM(s) Oral every 12 hours  multivitamin 1 Tablet(s) Oral daily  mycophenolate mofetil 500 milliGRAM(s) Oral daily  pantoprazole Tablet 40 milliGRAM(s) Oral before breakfast  predniSONE  Tablet 5 milliGRAM(s) Oral daily  sodium chloride 3%  Inhalation 4 milliLiter(s) Inhalation every 6 hours  tamsulosin 0.8 milliGRAM(s) Oral at bedtime  thiamine 100 milliGRAM(s) Oral daily    MEDICATIONS  (PRN):  acetaminophen Tablet 650 milliGRAM(s) Oral every 6 hours PRN Mild Pain (1 - 3)  oxyCODONE IR 2.5 milliGRAM(s) Oral every 4 hours PRN Moderate Pain (4 - 6)      No Known Allergies    SOCIAL HISTORY:  ; currently from [facility]; Former smoker, Denies current smoking, ETOH, drugs    FAMILY HISTORY: cerebrovascular accident (CVA) in mother & father    no h/o PVD or wound healing or skin problems    PHYSICAL EXAM:  Vital Signs Last 24 Hrs  T(C): 36.7 (31 Oct 2024 14:40), Max: 36.8 (30 Oct 2024 19:00)  T(F): 98.1 (31 Oct 2024 14:40), Max: 98.3 (30 Oct 2024 19:00)  HR: 60 (31 Oct 2024 14:40) (55 - 87)  BP: 110/38 (31 Oct 2024 15:36) (106/39 - 167/71)  BP(mean): 90 (30 Oct 2024 21:00) (84 - 102)  RR: 18 (31 Oct 2024 14:40) (15 - 26)  SpO2: 99% (31 Oct 2024 14:40) (94% - 100%)    Parameters below as of 31 Oct 2024 14:40  Patient On (Oxygen Delivery Method): nasal cannula  O2 Flow (L/min): 1    NAD, Alert, thin, frail,  Versa Care P500 bed  HEENT:  NC/AT, EOMI, sclera clear, mucosa moist, throat clear, trachea midline, neck suppl  Respiratory: nonlabored w/ equal chest rise  Gastrointestinal: soft NT/ND (+)ostomy   : (+)foleycath  Neurology: weakened strength & sensation grossly intact  Psych: calm/ appropriate  Musculoskeletal: pROM, no deformities/ contractures  Vascular: BLE equally warm,  no cyanosis, clubbing, edema nor acute ischemia  Skin: thin, dry, pale, frail,  ecchymosis w/o hematoma  Lt lateral knee w/ unstageable pressure injury     yellow slough     1cm x 1cm x 0.2cm     no blistering     scant serosanguinous drainage  No odor, erythema, increased warmth, tenderness, induration, fluctuance, nor crepitus  Sacral Unstageable pressure injury  soft adherent dry black eschar and increased granular tissue   9.5cm x 11cmx 0.2cm  no blistering  or active drainage  No odor, erythema, increased warmth, tenderness, induration, fluctuance, nor crepitus    LABS/ CULTURES/ RADIOLOGY:                        7.5    4.85  )-----------( 104      ( 30 Oct 2024 09:52 )             25.1       141  |  103  |  24  ----------------------------<  108      [10-30-24 @ 05:21]  3.5   |  27  |  0.59        Ca     7.7     [10-30-24 @ 05:21]      Mg     2.0     [10-30-24 @ 05:21]      Phos  2.7     [10-30-24 @ 05:21]    TPro  4.9  /  Alb  2.0  /  TBili  0.8  /  DBili  x   /  AST  16  /  ALT  13  /  AlkPhos  105  [10-30-24 @ 05:21]    PT/INR: PT 19.0 , INR 1.66       [10-30-24 @ 05:21]  PTT: 32.8       [10-30-24 @ 05:21]      A1C with Estimated Average Glucose Result: 5.2 % (10-22-24 @ 07:28)  A1C with Estimated Average Glucose Result: 5.2 % (08-19-24 @ 05:10)    < from: CT Abdomen and Pelvis w/ IV Cont (10.16.24 @ 15:33) >  ACC: [id number] EXAM:  CT ABDOMEN AND PELVIS IC   ORDERED BY:  DIMPLE MARTINES     PROCEDURE DATE:  10/16/2024        INTERPRETATION:  CLINICAL INFORMATION: Renal cell carcinoma, evaluate for   metastatic disease. Presented with anemia.    COMPARISON: CT abdomen pelvis 8/24/2024, renal ultrasound 8/24/2024    CONTRAST/COMPLICATIONS:  IV Contrast: Omnipaque 350  90 cc administered   10 cc discarded  Oral Contrast: NONE  Complications: None reported at time of study completion    PROCEDURE:  CT of the Abdomen and Pelvis was performed.  Sagittal and coronal reformats were performed.    FINDINGS:  Imaging is motion degraded and there is streak artifact overlying the   abdomen from the patient's right upper extremity.    LOWER CHEST: Small leftgreater than right pleural effusions. Right   pleural calcifications. Bibasilar atelectasis. Some motion limits   evaluation of lung bases, though there is suggestion of small patchy   opacities in the right lower lobe. Cardiomegaly. Aortic valve, mitral   annular, and coronary artery calcifications. Leadless pacemaker.    LIVER: Scattered subcentimeter hypodense foci that are too small to   characterize, and appears similar to the previous exam of 8/24/2024.  BILE DUCTS: Normal caliber.  GALLBLADDER: Distended.  SPLEEN: Within normal limits.  PANCREAS: Within normal limits.  ADRENALS: Within normal limits.  KIDNEYS/URETERS: Heterogeneous left upper pole renal mass measuring 7.3 x   4.9 x 5.0 cm, previously 6.5 x 4.6 x 4.9 cm. Mixed cystic and solid   appearing right upper pole renal mass measuring 2.4 x 2.3 x 2.6 cm,   previously 2.3 x 1.8 x 2.4 cm. Subcentimeter hypodense foci in both   kidneys that are too small to characterize. Left renal cyst. No   hydronephrosis.    BLADDER: Decompressed around a Butt catheter.  REPRODUCTIVE ORGANS: Prostate within normal limits. Scrotal edema.    BOWEL: Stool filled distended rectum measuring 9.0 cm in diameter. Mild   rectal wall thickening. Question rectal wall pneumatosis versus gas   trapped between the wall and stool. No significant upstream bowel   distention to suggest obstruction. Additional wall thickening noted in   the descending and sigmoid colon. Duodenal diverticula and colonic   diverticulosis.  PERITONEUM/RETROPERITONEUM: Small amount of pneumoperitoneum mostly   accumulating in the anterior upper abdomen. Diffuse peritoneal edema and   small volume of peritoneal fluid.  VESSELS: Atherosclerotic changes. Again seen is a saccular aneurysm or   pseudoaneurysm in the rightgroin overlying the distal right common   femoral artery, the patent portion currently measuring 1.4 x 1.2 x 2.3   cm, previously 2.1 x 1.6 x 3.2 cm on 8/24/2024.  LYMPH NODES: No lymphadenopathy.  ABDOMINAL WALL: Diffuse soft tissue edema.  BONES: Degenerative changes. Mild loss of height from the superior   endplate of L3, possibly due to degenerative Schmorl's node or mild   compression fracture, unchanged.    IMPRESSION:  *  Stool-filled distended rectum. Mild rectal wall thickening may reflect   stercoral proctitis. Question rectal pneumatosis, raising the possibility   for ischemia, versus intraluminal gas entrapped between the wall and   stool.  *  Additional wall thickening noted in the descending and sigmoid colon,   which may reflect colitis.  *  New small amount of pneumoperitoneum mostly in the anterior upper   abdomen, concerning for hollow viscus perforation. Small volume of   peritoneal fluid and diffuse peritoneal edema. The source of   pneumoperitoneum is not certain, potentially related to proctocolitis or   perforated diverticular disease, with other etiologies not excluded.   Consider CT imaging with oral contrast for further evaluation.  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  *  Bilateral renal lesions, previously characterized as solid masses and   compatible with renal cell carcinoma. Lesions are similar to mildly   increased in size 8/24/2024.  *  Scattered subcentimeter hypodense foci in the liver that are too small   to characterize and remain indeterminate, but appear similar to the   previous exam.  *  Distended gallbladder.  *  Motion degraded lung bases. Suggestion of small patchy opacities in   the rightlower lobe, potentially infectious/inflammatory in etiology.  *  Small bilateral pleural effusions.  *  Anasarca.    Preliminary findings including pneumoperitoneum were discussed with MEHDI Martines by Dr. Ayala on 10/16/2024 at 4:18 PM.    Final findings were discussed with MEHDI Maritnes by Dr. Segura on   10/16/2024 at 5:19 PM.   Wound SURGERY CONSULT NOTE    HPI:  83 y/o M PmHx recently diagnosed bilateral renal cell carcinoma, myasthenia gravis on prednisone and mycophenolate, atrial fibrillation on apixaban status post recent pacemaker placement for slow ventricular response, heart failure with preserved ejection fraction, severe aortic stenosis, and recent stroke with dysphagia and left sided weakness presenting to [facility] for severe anemia requiring blood transfusion. Patient was found to have pneumoperitoneum on a study obtained today 10/16 to identify the progression of his disease and specifically look for metastases. 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 Patient was taken to OR for Chopra's procedure with end colostomy. Transferred to SICU d/t hypotension requiring vasopressors during the case. Was downgraded to the floor and  then readmitted to SICU due to hypotension due to presumed aspiration. He was found to have a large left sided pleural effusion and was subsequently placed on non invasive ventilation (aerosol mask) (the patient was made DNR/DNI after discussion with the family). He was started on pressors, and then was eventually able to be weened off. He was diuresed and was eventually downgraded to NC, which he tolerated well. He was also evaluated by the speech and swallow team for his aspiration event and found to have oropharyngeal dysphasia and cleared for minced-moist/mildly thickened liquids.    Wound consult requested by team to assist w/ management of sacral pressure injury. Pt unable to c/o pain, drainage, odor, color change,  or worsening swelling. Offloading and pericare initiated upon admission as pt Increasingly sedentary 2/2 to illness. Pt now w/ butt and  ostomy. No h/o bites, scratches, falls, trauma.  Pt seen by Wound RN.  Appetite poor w/ weight loss.  S&S and RD consults appreciated.      Current Diet: Diet, Pureed:   Consistent Carbohydrate Evening Snack (CSTCHOSN)  Mildly Thick Liquids (MILDTHICKLIQS) (10-25-24 @ 12:15)      PAST MEDICAL & SURGICAL HISTORY:  Atrial fibrillation    Embolic stroke    Myasthenia gravis    (HFpEF) heart failure with preserved ejection fraction    HTN (hypertension)    T2DM (type 2 diabetes mellitus)    Renal cell carcinoma    Urinary retention    Pressure ulcer    Aortic stenosis    [place] (hard of hearing)    Thrombolytic therapy    s/p Pacemaker      REVIEW OF SYSTEMS: Pt unable to offer      MEDICATIONS  (STANDING):  albuterol/ipratropium for Nebulization 3 milliLiter(s) Nebulizer every 6 hours  apixaban 5 milliGRAM(s) Oral two times a day  ascorbic acid 500 milliGRAM(s) Oral daily  ascorbic acid 500 milliGRAM(s) Oral daily  aspirin  chewable 81 milliGRAM(s) Oral daily  atorvastatin 40 milliGRAM(s) Oral at bedtime  chlorhexidine 2% Cloths 1 Application(s) Topical <User Schedule>  cholecalciferol 2000 Unit(s) Oral daily  cyanocobalamin 1000 MICROGram(s) Oral daily  ferrous    sulfate 325 milliGRAM(s) Oral daily  finasteride 5 milliGRAM(s) Oral daily  fluticasone propionate 50 MICROgram(s)/spray Nasal Spray 1 Spray(s) Both Nostrils two times a day  folic acid 1 milliGRAM(s) Oral daily  furosemide Tablet 20 milliGRAM(s) Oral every 12 hours  multivitamin 1 Tablet(s) Oral daily  mycophenolate mofetil 500 milliGRAM(s) Oral daily  pantoprazole Tablet 40 milliGRAM(s) Oral before breakfast  predniSONE  Tablet 5 milliGRAM(s) Oral daily  sodium chloride 3%  Inhalation 4 milliLiter(s) Inhalation every 6 hours  tamsulosin 0.8 milliGRAM(s) Oral at bedtime  thiamine 100 milliGRAM(s) Oral daily    MEDICATIONS  (PRN):  acetaminophen Tablet 650 milliGRAM(s) Oral every 6 hours PRN Mild Pain (1 - 3)  oxyCODONE IR 2.5 milliGRAM(s) Oral every 4 hours PRN Moderate Pain (4 - 6)      No Known Allergies    SOCIAL HISTORY:  ; currently from [facility]; Former smoker, Denies current smoking, ETOH, drugs    FAMILY HISTORY: cerebrovascular accident (CVA) in mother & father    no h/o PVD or wound healing or skin problems    PHYSICAL EXAM:  Vital Signs Last 24 Hrs  T(C): 36.7 (31 Oct 2024 14:40), Max: 36.8 (30 Oct 2024 19:00)  T(F): 98.1 (31 Oct 2024 14:40), Max: 98.3 (30 Oct 2024 19:00)  HR: 60 (31 Oct 2024 14:40) (55 - 87)  BP: 110/38 (31 Oct 2024 15:36) (106/39 - 167/71)  BP(mean): 90 (30 Oct 2024 21:00) (84 - 102)  RR: 18 (31 Oct 2024 14:40) (15 - 26)  SpO2: 99% (31 Oct 2024 14:40) (94% - 100%)    Parameters below as of 31 Oct 2024 14:40  Patient On (Oxygen Delivery Method): nasal cannula  O2 Flow (L/min): 1    NAD, Alert, thin, frail,  Versa Care P500 bed  HEENT:  NC/AT, EOMI, sclera clear, mucosa moist, throat clear, trachea midline, neck supple  Respiratory: nonlabored w/ equal chest rise  Gastrointestinal: soft NT/ND (+)ostomy   : (+)foleycath  Neurology: weakened strength & sensation grossly intact  Psych: calm/ appropriate  Musculoskeletal: pROM, no deformities/ contractures  Vascular: BLE equally warm,  no cyanosis, clubbing, edema nor acute ischemia  BL DP pulses non palpable  Skin: thin, dry, pale, frail,  ecchymosis w/o hematoma  Lt lateral knee w/ unstageable pressure injury     yellow slough     1cm x 1cm x 0.2cm     no blistering     scant serosanguinous drainage  No odor, erythema, increased warmth, tenderness, induration, fluctuance, nor crepitus  Sacral Unstageable pressure injury  soft adherent dry black eschar and increased granular tissue   9.5cm x 11cmx 0.2cm  no blistering  or active drainage  No odor, erythema, increased warmth, tenderness, induration, fluctuance, nor crepitus  BL bunions with deep purple maroon discoloration 0.5x0.5cm  no blistering  or active drainage  No odor, erythema, increased warmth, tenderness, induration, fluctuance, nor crepitus    LABS/ CULTURES/ RADIOLOGY:                        7.5    4.85  )-----------( 104      ( 30 Oct 2024 09:52 )             25.1       141  |  103  |  24  ----------------------------<  108      [10-30-24 @ 05:21]  3.5   |  27  |  0.59        Ca     7.7     [10-30-24 @ 05:21]      Mg     2.0     [10-30-24 @ 05:21]      Phos  2.7     [10-30-24 @ 05:21]    TPro  4.9  /  Alb  2.0  /  TBili  0.8  /  DBili  x   /  AST  16  /  ALT  13  /  AlkPhos  105  [10-30-24 @ 05:21]    PT/INR: PT 19.0 , INR 1.66       [10-30-24 @ 05:21]  PTT: 32.8       [10-30-24 @ 05:21]      A1C with Estimated Average Glucose Result: 5.2 % (10-22-24 @ 07:28)  A1C with Estimated Average Glucose Result: 5.2 % (08-19-24 @ 05:10)    < from: CT Abdomen and Pelvis w/ IV Cont (10.16.24 @ 15:33) >  ACC: [id number] EXAM:  CT ABDOMEN AND PELVIS IC   ORDERED BY:  DIMPLE MARTINES     PROCEDURE DATE:  10/16/2024        INTERPRETATION:  CLINICAL INFORMATION: Renal cell carcinoma, evaluate for   metastatic disease. Presented with anemia.    COMPARISON: CT abdomen pelvis 8/24/2024, renal ultrasound 8/24/2024    CONTRAST/COMPLICATIONS:  IV Contrast: Omnipaque 350  90 cc administered   10 cc discarded  Oral Contrast: NONE  Complications: None reported at time of study completion    PROCEDURE:  CT of the Abdomen and Pelvis was performed.  Sagittal and coronal reformats were performed.    FINDINGS:  Imaging is motion degraded and there is streak artifact overlying the   abdomen from the patient's right upper extremity.    LOWER CHEST: Small leftgreater than right pleural effusions. Right   pleural calcifications. Bibasilar atelectasis. Some motion limits   evaluation of lung bases, though there is suggestion of small patchy   opacities in the right lower lobe. Cardiomegaly. Aortic valve, mitral   annular, and coronary artery calcifications. Leadless pacemaker.    LIVER: Scattered subcentimeter hypodense foci that are too small to   characterize, and appears similar to the previous exam of 8/24/2024.  BILE DUCTS: Normal caliber.  GALLBLADDER: Distended.  SPLEEN: Within normal limits.  PANCREAS: Within normal limits.  ADRENALS: Within normal limits.  KIDNEYS/URETERS: Heterogeneous left upper pole renal mass measuring 7.3 x   4.9 x 5.0 cm, previously 6.5 x 4.6 x 4.9 cm. Mixed cystic and solid   appearing right upper pole renal mass measuring 2.4 x 2.3 x 2.6 cm,   previously 2.3 x 1.8 x 2.4 cm. Subcentimeter hypodense foci in both   kidneys that are too small to characterize. Left renal cyst. No   hydronephrosis.    BLADDER: Decompressed around a Butt catheter.  REPRODUCTIVE ORGANS: Prostate within normal limits. Scrotal edema.    BOWEL: Stool filled distended rectum measuring 9.0 cm in diameter. Mild   rectal wall thickening. Question rectal wall pneumatosis versus gas   trapped between the wall and stool. No significant upstream bowel   distention to suggest obstruction. Additional wall thickening noted in   the descending and sigmoid colon. Duodenal diverticula and colonic   diverticulosis.  PERITONEUM/RETROPERITONEUM: Small amount of pneumoperitoneum mostly   accumulating in the anterior upper abdomen. Diffuse peritoneal edema and   small volume of peritoneal fluid.  VESSELS: Atherosclerotic changes. Again seen is a saccular aneurysm or   pseudoaneurysm in the rightgroin overlying the distal right common   femoral artery, the patent portion currently measuring 1.4 x 1.2 x 2.3   cm, previously 2.1 x 1.6 x 3.2 cm on 8/24/2024.  LYMPH NODES: No lymphadenopathy.  ABDOMINAL WALL: Diffuse soft tissue edema.  BONES: Degenerative changes. Mild loss of height from the superior   endplate of L3, possibly due to degenerative Schmorl's node or mild   compression fracture, unchanged.    IMPRESSION:  *  Stool-filled distended rectum. Mild rectal wall thickening may reflect   stercoral proctitis. Question rectal pneumatosis, raising the possibility   for ischemia, versus intraluminal gas entrapped between the wall and   stool.  *  Additional wall thickening noted in the descending and sigmoid colon,   which may reflect colitis.  *  New small amount of pneumoperitoneum mostly in the anterior upper   abdomen, concerning for hollow viscus perforation. Small volume of   peritoneal fluid and diffuse peritoneal edema. The source of   pneumoperitoneum is not certain, potentially related to proctocolitis or   perforated diverticular disease, with other etiologies not excluded.   Consider CT imaging with oral contrast for further evaluation.  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  *  Bilateral renal lesions, previously characterized as solid masses and   compatible with renal cell carcinoma. Lesions are similar to mildly   increased in size 8/24/2024.  *  Scattered subcentimeter hypodense foci in the liver that are too small   to characterize and remain indeterminate, but appear similar to the   previous exam.  *  Distended gallbladder.  *  Motion degraded lung bases. Suggestion of small patchy opacities in   the rightlower lobe, potentially infectious/inflammatory in etiology.  *  Small bilateral pleural effusions.  *  Anasarca.    Preliminary findings including pneumoperitoneum were discussed with NP   uvjessicar by Dr. Ayala on 10/16/2024 at 4:18 PM.    Final findings were discussed with NP Sunil by Dr. Segura on   10/16/2024 at 5:19 PM.

## 2024-10-31 NOTE — CONSULT NOTE ADULT - CONSULT REASON
Anemia, Melena
s/p Hartmans w/ end colostomy SICU c/s for HD monitoring
wound
Pneumoperitoneum
medical comanagement
show

## 2024-10-31 NOTE — CONSULT NOTE ADULT - ASSESSMENT
85 y/o M PmHx recently diagnosed bilateral renal cell carcinoma, myasthenia gravis on prednisone and mycophenolate, atrial fibrillation on apixaban status post recent pacemaker placement for slow ventricular response, heart failure with preserved ejection fraction, severe aortic stenosis, and recent stroke with dysphagia and left sided weakness presenting to [facility] for severe anemia requiring blood transfusion. Patient was found to have pneumoperitoneum on a study obtained today 10/16 to identify the progression of his disease and specifically look for metastases. 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 Patient was taken to OR for Chopra's procedure with end colostomy. Transferred to SICU d/t hypotension requiring vasopressors during the case. Now downgraded to the floor. Patient was recently readmitted to SICU due to hypotension secondary to presumed aspiration.       Wound Consult requested to assist w/ management of Sacrum Stage unstageable pressure injury  Lt knee wound    Buttocks/ Sacrum TRIAD BID and prn soiling        Continue w/ attends under pads and Pericare w/ butt & ostomy care as per protocol  Lt knee medihoney dressing QD  A/P CT as above  Abx per Medicine/ ID  Moisturize intact skin w/ SWEEN cream BID  Nutritional optimization in pt w/ Severe Protein Calorie Malnutrition,        encourage high quality protein, jessie/ prosource, MVI & Vit C to promote wound healing  Continue turning and positioning w/ offloading assistive devices as per protocol  Waffle Cushion to chair when oob to chair  Continue w/ low air loss pressure redistribution bed surface   Pt will need Group 2 mattress on hospital bed and ROHO cushion for wheel chair upon discharge home  Care as per medicine, will follow w/ you  Upon discharge f/u as outpatient at Wound [facility] [address] [facility] [phone]  Seen w/ attng and D/w team & RN  Thank you for this consult  Cherise Tao PA-C CWS [id number]  Nights/ Weekends/ Holidays please call:  General Surgery Consult pager ([phone]) for emergencies  Wound PT for multilayer leg wrapping or VAC issues ([phone])   I spent 55minutes face to face w/ this pt of which more than 50% of the time was spent counseling & coordinating care of this pt.  85 y/o M PmHx recently diagnosed bilateral renal cell carcinoma, myasthenia gravis on prednisone and mycophenolate, atrial fibrillation on apixaban status post recent pacemaker placement for slow ventricular response, heart failure with preserved ejection fraction, severe aortic stenosis, and recent stroke with dysphagia and left sided weakness presenting to [facility] for severe anemia requiring blood transfusion. Patient was found to have pneumoperitoneum on a study obtained today 10/16 to identify the progression of his disease and specifically look for metastases. 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 Patient was taken to OR for Chopra's procedure with end colostomy. Transferred to SICU d/t hypotension requiring vasopressors during the case. Now downgraded to the floor. Patient was recently readmitted to SICU due to hypotension secondary to presumed aspiration.       Wound Consult requested to assist w/ management of:  Sacrum Stage unstageable pressure injury  Lt knee unstageable pressure injury  BL bunions (feet) DTI    Buttocks/ Sacrum TRIAD BID and prn soiling        Continue w/ attends under pads and Pericare w/ butt & ostomy care as per protocol  Lt knee medihoney dressing QD  BL bunions cavilon  consider HILARY/PVR if in line with GOC 9non palpable pulses)  A/P CT as above  Abx per Medicine/ ID  Moisturize intact skin w/ SWEEN cream BID  Nutritional optimization in pt w/ Severe Protein Calorie Malnutrition,        encourage high quality protein, jessie/ prosource, MVI & Vit C to promote wound healing  Continue turning and positioning w/ offloading assistive devices as per protocol  Waffle Cushion to chair when oob to chair  Continue w/ low air loss pressure redistribution bed surface   Pt will need Group 2 mattress on hospital bed and ROHO cushion for wheel chair upon discharge home  Care as per medicine, will follow w/ you  Upon discharge f/u as outpatient at Wound Center [address] [facility] [phone]  Seen w/ attng and D/w team & RN  Thank you for this consult  Cherise Tao PA-C CWS [id number]  Nights/ Weekends/ Holidays please call:  General Surgery Consult pager ([phone]) for emergencies  Wound PT for multilayer leg wrapping or VAC issues (x [phone])

## 2024-11-01 NOTE — PROGRESS NOTE ADULT - SUBJECTIVE AND OBJECTIVE BOX
SURGERY DAILY PROGRESS NOTE    24 Hour/Overnight Events: rapid yesterday for aysmptomatic low diastolic blood pressure, no changes in care and will continue to monitor systolics    SUBJECTIVE: Patient doing well this morning and is having good ostomy output. He denies any SOB or chest pain currently     ------------------------------------------------------------------------------------------------------------  OBJECTIVE:  Vital Signs Last 24 Hrs  T(C): 36.8 (01 Nov 2024 07:56), Max: 37.1 (31 Oct 2024 21:07)  T(F): 98.3 (01 Nov 2024 07:56), Max: 98.8 (31 Oct 2024 21:07)  HR: 59 (01 Nov 2024 07:56) (59 - 60)  BP: 127/54 (01 Nov 2024 07:56) (94/44 - 127/54)  BP(mean): --  RR: 18 (01 Nov 2024 07:56) (18 - 18)  SpO2: 92% (01 Nov 2024 07:56) (92% - 100%)    Parameters below as of 01 Nov 2024 08:00  Patient On (Oxygen Delivery Method): room air      I&O's Detail    31 Oct 2024 07:01  -  01 Nov 2024 07:00  --------------------------------------------------------  IN:    Oral Fluid: 560 mL  Total IN: 560 mL    OUT:    Colostomy (mL): 150 mL    Indwelling Catheter - Urethral (mL): 1000 mL  Total OUT: 1150 mL  Total NET: -590 mL        LABS:                        7.6    7.69  )-----------( 97       ( 01 Nov 2024 10:09 )             24.9     11-01    140  |  102  |  25[H]  ----------------------------<  57[L]  3.4[L]   |  27  |  0.49[L]    Ca    7.8[L]      01 Nov 2024 10:09  Phos  2.1     10-31  Mg     1.8     10-31    TPro  4.9[L]  /  Alb  1.9[L]  /  TBili  0.9  /  DBili  x   /  AST  17  /  ALT  11  /  AlkPhos  90  11-01    LIVER FUNCTIONS - ( 01 Nov 2024 10:09 )  Alb: 1.9 g/dL / Pro: 4.9 g/dL / ALK PHOS: 90 U/L / ALT: 11 U/L / AST: 17 U/L / GGT: x           PT/INR - ( 01 Nov 2024 10:09 )   PT: 25.1 sec;   INR: 2.20 ratio         PTT - ( 01 Nov 2024 10:09 )  PTT:34.8 sec  Urinalysis Basic - ( 01 Nov 2024 10:09 )    Color: x / Appearance: x / SG: x / pH: x  Gluc: 57 mg/dL / Ketone: x  / Bili: x / Urobili: x   Blood: x / Protein: x / Nitrite: x   Leuk Esterase: x / RBC: x / WBC x   Sq Epi: x / Non Sq Epi: x / Bacteria: x        PHYSICAL EXAM:  GEN: resting in bed, in NAD  RESP: no increased WOB  ABD: soft, NT, ND, abdomen. Midline wound healing well with staples in place, dressing removed and not replaced. Ostomy pink, patent and productive of stool and gas.   EXTR: WWP      Assessment:  85 y/o M PmHx recently diagnosed bilateral renal cell carcinoma, myasthenia gravis on prednisone and mycophenolate, atrial fibrillation on apixaban status post recent pacemaker placement for slow ventricular response, heart failure with preserved ejection fraction, severe aortic stenosis, and recent stroke with dysphagia and left sided weakness presenting to [facility] for severe anemia requiring blood transfusion. Patient was found to have pneumoperitoneum on a study obtained today 10/16 to identify the progression of his disease and specifically look for metastases. 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 Patient was taken to OR for Chopra's procedure with end colostomy. Transferred to SICU d/t hypotension requiring vasopressors during the case. Now downgraded to the floor. Patient was recently readmitted to SICU due to hypotension secondary to presumed aspiration, now doing well on room air on floor.     Plan:  - incision staples to stay throughout OP follow up as pt is on cellcept  - dressing removed, no dressing on now  - Pain control  - Cont lasix - off in 5 days     - no pigtail needed  - cont zosyn  - Continue duonebs  - Continue home eliquis   - Diet: pureed  - aspiration precautions  - dispo planning, likely to DAVID    Trauma/ACS [id number]

## 2024-11-02 NOTE — PROGRESS NOTE ADULT - SUBJECTIVE AND OBJECTIVE BOX
SURGERY DAILY PROGRESS NOTE    24 Hour/Overnight Events: No acute events overnight    SUBJECTIVE: Patient seen and evaluated on AM rounds. Feeling well, declines pain or SOB    ------------------------------------------------------------------------------------------------------------  OBJECTIVE:  Vital Signs Last 24 Hrs  T(C): 36.6 (02 Nov 2024 11:34), Max: 37.1 (01 Nov 2024 20:16)  T(F): 97.9 (02 Nov 2024 11:34), Max: 98.8 (01 Nov 2024 20:16)  HR: 60 (02 Nov 2024 11:34) (59 - 60)  BP: 111/62 (02 Nov 2024 11:34) (107/47 - 118/45)  BP(mean): --  RR: 18 (02 Nov 2024 11:34) (18 - 18)  SpO2: 94% (02 Nov 2024 11:34) (93% - 98%)    Parameters below as of 02 Nov 2024 11:34  Patient On (Oxygen Delivery Method): room air      I&O's Detail    01 Nov 2024 07:01  -  02 Nov 2024 07:00  --------------------------------------------------------  IN:  Total IN: 0 mL    OUT:    Colostomy (mL): 100 mL    Indwelling Catheter - Urethral (mL): 500 mL  Total OUT: 600 mL    Total NET: -600 mL      02 Nov 2024 08:01  -  02 Nov 2024 12:22  --------------------------------------------------------  IN:    Oral Fluid: 200 mL  Total IN: 200 mL    OUT:    Colostomy (mL): 0 mL    Indwelling Catheter - Urethral (mL): 650 mL  Total OUT: 650 mL    Total NET: -450 mL          LABS:                        7.1    7.64  )-----------( 88       ( 02 Nov 2024 06:32 )             23.4     11-02    140  |  102  |  31[H]  ----------------------------<  62[L]  3.7   |  27  |  0.60    Ca    7.8[L]      02 Nov 2024 06:32  Phos  2.8     11-02  Mg     1.8     11-02    TPro  4.9[L]  /  Alb  1.9[L]  /  TBili  0.9  /  DBili  x   /  AST  17  /  ALT  11  /  AlkPhos  90  11-01    LIVER FUNCTIONS - ( 01 Nov 2024 10:09 )  Alb: 1.9 g/dL / Pro: 4.9 g/dL / ALK PHOS: 90 U/L / ALT: 11 U/L / AST: 17 U/L / GGT: x           PT/INR - ( 01 Nov 2024 10:09 )   PT: 25.1 sec;   INR: 2.20 ratio         PTT - ( 01 Nov 2024 10:09 )  PTT:34.8 sec  Urinalysis Basic - ( 02 Nov 2024 06:32 )    Color: x / Appearance: x / SG: x / pH: x  Gluc: 62 mg/dL / Ketone: x  / Bili: x / Urobili: x   Blood: x / Protein: x / Nitrite: x   Leuk Esterase: x / RBC: x / WBC x   Sq Epi: x / Non Sq Epi: x / Bacteria: x        PHYSICAL EXAM:  GEN: resting in bed, in NAD  RESP: no increased WOB  ABD: soft, NT, ND, abdomen. Midline wound healing well with staples in place, dressing removed and not replaced. Ostomy pink, patent and productive of stool and gas.   EXTR: WWP, pitting edema BL UE and LE      Assessment:  85 y/o M PmHx recently diagnosed bilateral renal cell carcinoma, myasthenia gravis on prednisone and mycophenolate, atrial fibrillation on apixaban status post recent pacemaker placement for slow ventricular response, heart failure with preserved ejection fraction, severe aortic stenosis, and recent stroke with dysphagia and left sided weakness presenting to [facility] for severe anemia requiring blood transfusion. Patient was found to have pneumoperitoneum on a study obtained today 10/16 to identify the progression of his disease and specifically look for metastases. 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 Patient was taken to OR for Chopra's procedure with end colostomy. Transferred to SICU d/t hypotension requiring vasopressors during the case. Now downgraded to the floor. Patient was recently readmitted to SICU due to hypotension secondary to presumed aspiration.     Plan:  - downgraded to floor  - incision staples to stay throughout OP follow up as pt is on cellcept  - dressing removed, no dressing on now  - Pain control  - Cont lasix - off 11/4     - no pigtail needed  - cont zosyn  - Continue duonebs  - Continue home eliquis   - ASA  - Diet: pureed  - aspiration precautions  - dispo planning, likely to DAVID    Trauma/ACS [id number]

## 2024-11-02 NOTE — CHART NOTE - NSCHARTNOTEFT_GEN_A_CORE
Patient with complicated hospital course, recently found to have b/l renal masses c/f RCC, however per family patient not yet aware of diagnosis. Patient currently very deconditioned and fatigued, and unclear if patient has capacity to understand ongoing medical diagnoses and treatment plans. As such, b/l renal masses discussed with patient's wife and daughter who are aware of b/l renal masses w/c/f RCC and have established Urology follow up for Mr. Santos.     Discussed with Dr. Guerra.    Yulissa Anderson MD, PGY4  Trauma/ACS [id number]

## 2024-11-02 NOTE — CHART NOTE - NSCHARTNOTESELECT_GEN_ALL_CORE
Event Note
Event Note
Incidental Findings
Nutrition Services
Event Note
Nutrition Services
Pneumoperitoneum/Event Note
SICU downgrade/Event Note

## 2024-11-03 NOTE — PROGRESS NOTE ADULT - PROVIDER SPECIALTY LIST ADULT
Hospitalist
Internal Medicine
Podiatry
SICU
Surgery
Trauma Surgery
Hospitalist
SICU
Surgery
Surgery
Trauma Surgery
SICU
SICU
Surgery
Trauma Surgery
Trauma Surgery
SICU
SICU
Trauma Surgery
SICU
SICU
Surgery
Surgery
Internal Medicine
Hospitalist

## 2024-11-03 NOTE — PROVIDER CONTACT NOTE (OTHER) - BACKGROUND
Pt came in for hem of 5.5 and loose stools. CT confirmed bowel perf.
pneumoperitoneum s/p Chopra's procedure with end colostomy 10/17
see h&p
Dx Anemia
s/p ferro's procedure & end colostomy
see H&P
see h&p

## 2024-11-03 NOTE — PROGRESS NOTE ADULT - REASON FOR ADMISSION
Anemia

## 2024-11-03 NOTE — PROVIDER CONTACT NOTE (OTHER) - REASON
Pt /39
Pt repeat /38
Pt R arm pulsating at brachial
Pt desating to 85% on 6L NC
Pt. c/o chills rectal temp 101.7F
Pt desating on 6L NC to low 80s
Pt. hypotensive 95/35

## 2024-11-03 NOTE — PROGRESS NOTE ADULT - SUBJECTIVE AND OBJECTIVE BOX
SURGERY DAILY PROGRESS NOTE    24 Hour/Overnight Events: put on NC yesterday. found to breath through mouth so switched to mask with O2 sats improving to 95%    SUBJECTIVE: Patient feeling well, still not having much appetite or PO intake     ------------------------------------------------------------------------------------------------------------  OBJECTIVE:  Vital Signs Last 24 Hrs  T(C): 36.9 (03 Nov 2024 00:00), Max: 37 (02 Nov 2024 16:00)  T(F): 98.4 (03 Nov 2024 00:00), Max: 98.6 (02 Nov 2024 16:00)  HR: 60 (03 Nov 2024 00:25) (58 - 65)  BP: 105/50 (03 Nov 2024 01:10) (95/35 - 120/55)  BP(mean): --  RR: 18 (03 Nov 2024 00:25) (18 - 18)  SpO2: 97% (03 Nov 2024 00:25) (89% - 98%)    Parameters below as of 03 Nov 2024 00:25  Patient On (Oxygen Delivery Method): nasal cannula  O2 Flow (L/min): 4    I&O's Detail    01 Nov 2024 07:01  -  02 Nov 2024 07:00  --------------------------------------------------------  IN:  Total IN: 0 mL    OUT:    Colostomy (mL): 100 mL    Indwelling Catheter - Urethral (mL): 500 mL  Total OUT: 600 mL    Total NET: -600 mL      02 Nov 2024 08:01  -  03 Nov 2024 03:24  --------------------------------------------------------  IN:    Oral Fluid: 200 mL  Total IN: 200 mL    OUT:    Colostomy (mL): 0 mL    Indwelling Catheter - Urethral (mL): 650 mL  Total OUT: 650 mL    Total NET: -450 mL          LABS:                        7.1    7.64  )-----------( 88       ( 02 Nov 2024 06:32 )             23.4     11-02    140  |  102  |  31[H]  ----------------------------<  62[L]  3.7   |  27  |  0.60    Ca    7.8[L]      02 Nov 2024 06:32  Phos  2.8     11-02  Mg     1.8     11-02    TPro  4.9[L]  /  Alb  1.9[L]  /  TBili  0.9  /  DBili  x   /  AST  17  /  ALT  11  /  AlkPhos  90  11-01    LIVER FUNCTIONS - ( 01 Nov 2024 10:09 )  Alb: 1.9 g/dL / Pro: 4.9 g/dL / ALK PHOS: 90 U/L / ALT: 11 U/L / AST: 17 U/L / GGT: x           PT/INR - ( 01 Nov 2024 10:09 )   PT: 25.1 sec;   INR: 2.20 ratio         PTT - ( 01 Nov 2024 10:09 )  PTT:34.8 sec  Urinalysis Basic - ( 02 Nov 2024 06:32 )    Color: x / Appearance: x / SG: x / pH: x  Gluc: 62 mg/dL / Ketone: x  / Bili: x / Urobili: x   Blood: x / Protein: x / Nitrite: x   Leuk Esterase: x / RBC: x / WBC x   Sq Epi: x / Non Sq Epi: x / Bacteria: x        PHYSICAL EXAM:  GEN: resting in bed, in NAD  RESP: no increased WOB  ABD: soft, NT, ND, abdomen. Midline wound healing well with staples in place, dressing removed and not replaced. Ostomy pink, patent and productive of stool and gas.   EXTR: WWP, pitting edema BL UE and LE      Assessment:  83 y/o M PmHx recently diagnosed bilateral renal cell carcinoma, myasthenia gravis on prednisone and mycophenolate, atrial fibrillation on apixaban status post recent pacemaker placement for slow ventricular response, heart failure with preserved ejection fraction, severe aortic stenosis, and recent stroke with dysphagia and left sided weakness presenting to [facility] for severe anemia requiring blood transfusion. Patient was found to have pneumoperitoneum on a study obtained today 10/16 to identify the progression of his disease and specifically look for metastases. 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 Patient was taken to OR for Chopra's procedure with end colostomy. Transferred to SICU d/t hypotension requiring vasopressors during the case. Now downgraded to the floor. Patient was recently readmitted to SICU due to hypotension secondary to presumed aspiration.     Plan:  - downgraded to floor  - incision staples to stay throughout OP follow up as pt is on cellcept  - dressing removed, no dressing on now  - Pain control  - Cont lasix - off 11/4     - no pigtail needed  - cont zosyn  - Continue duonebs  - Continue home eliquis   - ASA  - Diet: pureed  - monitor blood sugar  - aspiration precautions  - dispo planning, likely to [facility]ID    Trauma/ACS [id number]

## 2024-11-03 NOTE — DISCHARGE NOTE FOR THE EXPIRED PATIENT - HOSPITAL COURSE
83 y/o M PmHx recently diagnosed bilateral renal cell carcinoma, myasthenia gravis on prednisone and mycophenolate, atrial fibrillation on apixaban status post recent pacemaker placement for slow ventricular response, heart failure with preserved ejection fraction, severe aortic stenosis, and recent stroke with dysphagia and left sided weakness presenting to [facility] for severe anemia requiring blood transfusion. Patient was found to have pneumoperitoneum on a study obtained today 10/16 to identify the progression of his disease and specifically look for metastases. 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 Patient was taken to OR for Chopra's procedure with end colostomy. Transferred to SICU d/t hypotension requiring vasopressors during the case. Now downgraded to the floor. Patient was recently readmitted to SICU due to hypotension secondary to presumed aspiration.     On 11/3/2024 at approximately 10AM was noted to be unresponsive by RN staff while turning patient. Surgery team and MAR team assessed patient and pronounced patient death at approximately 10:42AM.

## 2024-11-03 NOTE — PROVIDER CONTACT NOTE (OTHER) - RECOMMENDATIONS
contact provider.
notify provider
provider contacted
contact provider
Notified the provider
Assess at bedside, non-rebreather, respiratory to bedside
Assess pt at bedside, High flow nasal cannula?

## 2024-11-03 NOTE — PROVIDER CONTACT NOTE (OTHER) - ACTION/TREATMENT ORDERED:
ALISTAIR Fatima at bedside, non-rebreather applied with some improvement in oxygen saturation to 95%. High flow nasal cannula ordered. Will continue to monitor
SICU team at bedside. Non-rebreather applied. Respiratory called to bedside - deep nasal suctioning performed with more secretions suctioned out. Pt now sating 100%. Weaning down O2 as tolerated
Provider assessed pt at bedside. no further orders at this time.
Per MD Mendez we do not treat diastolic BP. No need to bolus him, repeat BP at next VS check.
ACP Maya made aware and order Tylenol 1000 mg ivpb. Safety maintained
MD at bedside. To reassess in 1 hr & see if pt would benefit from a bolus.
Provider made aware, clinical impact team to bedside, provider to bedside to assess. IV pulled and new one placed in Left arm, plan of care continues

## 2024-11-03 NOTE — PROVIDER CONTACT NOTE (OTHER) - SITUATION
Pt desating on 6L NC to low 80s
Pt desating to 85% on 6L NC
Pt right arm pulsating in his antecubital space reaching up arm, next to IV.
Pt. c/o chills rectal temp 101.7F. No s/s of distress or discomfort. Pt. remains NPO
Pt /39
Pt repeat /38
Pt. hypotensive 95/35

## 2024-11-03 NOTE — PROVIDER CONTACT NOTE (OTHER) - DATE AND TIME:
16-Oct-2024 18:24
31-Oct-2024 14:44
03-Nov-2024 00:30
19-Oct-2024 21:15
26-Oct-2024 23:00
27-Oct-2024 23:15
31-Oct-2024 15:40

## 2024-11-03 NOTE — PROVIDER CONTACT NOTE (OTHER) - NAME OF MD/NP/PA/DO NOTIFIED:
ALISTAIR Fatima
ALISTAIR Fatima
MD Juan Carlos Tobias
MD Tobias & Vanessa
Juan Carlos Tobias MD
Nam Nevarez
Maya Sauveur

## 2024-11-04 NOTE — DISCHARGE NOTE FOR THE EXPIRED PATIENT - ATTENDING PHYSICIAN NOTIFIED OF DEATH/AUTOPSY/CONSENT STATUS (NAME OF ATTENDING)
Discharge home when Vital signs are stable and back to pre procedure status  Keep injection site covered until next day  May shower today but no bath tub or pool until next day, keep lead insertion site dry until tomorrow. If dressing fall, please follow the instruction given to you in that regard.    Please record the level of pain starting today until your follow up.  Call office immediately with any increase in pain or redness or bleeding at injection site.   Please not note that some skin flushing mainly on the face can occur for few days after injection. You may experience some increase in the pain you the injection for. But, if that happens, it should last only a day or two  Call office if you start having any unusual symptoms with headaches, Nausa, Vomitting Diarrhea confusion inability to focus etc...  Call office for appointment in one week     
Jez Guerra MD

## 2024-11-07 ENCOUNTER — APPOINTMENT (OUTPATIENT)
Dept: UROLOGY | Facility: CLINIC | Age: 84
End: 2024-11-07

## 2025-01-17 ENCOUNTER — APPOINTMENT (OUTPATIENT)
Dept: ELECTROPHYSIOLOGY | Facility: CLINIC | Age: 85
End: 2025-01-17

## 2025-05-28 NOTE — SWALLOW BEDSIDE ASSESSMENT ADULT - ASR SWALLOW RECOMMEND DIAG
Already addressed on e-visit.  
consider MBS for potential upgrade once out of ICU and when dentures are obtained
FEES

## 2025-06-11 NOTE — ED ADULT NURSE NOTE - NS ED PATIENT SAFETY CONCERN
No
Products Recommended: Mineral Based 50 SPF Broad Sprectrum year round. Recommended UPF clothing and hats.
Detail Level: Generalized
General Sunscreen Counseling: I recommended a broad spectrum sunscreen with a SPF of 30 or higher.  I explained that SPF 30 sunscreens block approximately 97 percent of the sun's harmful rays.  Sunscreens should be applied at least 15 minutes prior to expected sun exposure and then every 2 hours after that as long as sun exposure continues. If swimming or exercising sunscreen should be reapplied every 45 minutes to an hour after getting wet or sweating.  One ounce, or the equivalent of a shot glass full of sunscreen, is adequate to protect the skin not covered by a bathing suit. I also recommended a lip balm with a sunscreen as well. Sun protective clothing can be used in lieu of sunscreen but must be worn the entire time you are exposed to the sun's rays.

## (undated) DEVICE — VESSEL LOOP MAXI-BLUE 0.120" X 16"

## (undated) DEVICE — DRSG STERISTRIPS 0.5 X 4"

## (undated) DEVICE — GLV 6.5 PROTEXIS (WHITE)

## (undated) DEVICE — MEDICATION LABELS W MARKER

## (undated) DEVICE — OSTOMY KIT 2-PIECE 2.25" NS (RED)

## (undated) DEVICE — MARKING PEN W RULER

## (undated) DEVICE — DRAPE MAYO STAND 30"

## (undated) DEVICE — SUT SOFSILK 2-0 18" V-20 (POP-OFF)

## (undated) DEVICE — DRSG TAPE UMBILICAL COTTON 2" X 30 X 1/8"

## (undated) DEVICE — STRYKER INTERPULSE HANDPIECE W IRR SUCTION TUBE

## (undated) DEVICE — TUBING SUCTION 20FT

## (undated) DEVICE — SOL IRR BAG NS 0.9% 3000ML

## (undated) DEVICE — CATH NG SALEM SUMP 16FR

## (undated) DEVICE — SPECIMEN CONTAINER 100ML

## (undated) DEVICE — LIGASURE IMPACT

## (undated) DEVICE — CANISTER W/GEL INFOVAC 1000ML

## (undated) DEVICE — SUCTION YANKAUER NO CONTROL VENT

## (undated) DEVICE — PREP BETADINE KIT

## (undated) DEVICE — POSITIONER FOAM EGG CRATE ULNAR 2PCS (PINK)

## (undated) DEVICE — DRSG VAC GRANUFOAM SMALL (BLACK)

## (undated) DEVICE — DRAIN JACKSON PRATT 10MM FLAT FULL NO TROCAR

## (undated) DEVICE — DRAPE IOBAN 23" X 23"

## (undated) DEVICE — GLV 7 PROTEXIS (WHITE)

## (undated) DEVICE — WARMING BLANKET FULL UNDERBODY

## (undated) DEVICE — STAPLER COVIDIEN ENDO GIA SHORT HANDLE

## (undated) DEVICE — DRSG VAC GRANUFOAM MEDIUM (BLACK)

## (undated) DEVICE — DRAPE 3/4 SHEET W REINFORCEMENT 56X77"

## (undated) DEVICE — BLADE SCALPEL SAFETYLOCK #10

## (undated) DEVICE — DRSG TEGADERM 6"X8"

## (undated) DEVICE — DRSG XEROFORM 5 X 9"

## (undated) DEVICE — DRAPE 1/2 SHEET 40X57"

## (undated) DEVICE — SUT SOFSILK 3-0 18" V-20 (POP-OFF)

## (undated) DEVICE — DRSG XEROFORM 1 X 8"

## (undated) DEVICE — DRAPE TOWEL BLUE 17" X 24"

## (undated) DEVICE — GLV 8.5 PROTEXIS (WHITE)

## (undated) DEVICE — PACK MAJOR ABDOMINAL SUPINE

## (undated) DEVICE — FOLEY TRAY 16FR 5CC LTX UMETER CLOSED

## (undated) DEVICE — VENODYNE/SCD SLEEVE CALF MEDIUM

## (undated) DEVICE — DRAIN RESERVOIR FOR JACKSON PRATT 100CC CARDINAL

## (undated) DEVICE — DRSG VAC ABTHERS

## (undated) DEVICE — SUT PROLENE 4-0 36" SH

## (undated) DEVICE — PREP CHLORAPREP HI-LITE ORANGE 26ML

## (undated) DEVICE — DRSG CURITY GAUZE SPONGE 4 X 4" 12-PLY

## (undated) DEVICE — DRSG VAC GRANUFOAM LARGE (BLACK)

## (undated) DEVICE — CANISTER W/GEL INFOVAC 500ML

## (undated) DEVICE — GLV 7.5 PROTEXIS (WHITE)

## (undated) DEVICE — STAPLER SKIN VISI-STAT 35 WIDE

## (undated) DEVICE — DRAPE INSTRUMENT POUCH 6.75" X 11"

## (undated) DEVICE — GLV 8 PROTEXIS (WHITE)

## (undated) DEVICE — BLADE SCALPEL SAFETYLOCK #15

## (undated) DEVICE — SOL IRR POUR NS 0.9% 500ML

## (undated) DEVICE — WARMING BLANKET UPPER ADULT

## (undated) DEVICE — WARMING BLANKET LOWER ADULT

## (undated) DEVICE — SUT MAXON 1 96" T-60

## (undated) DEVICE — DRSG OPSITE 13.75 X 4"

## (undated) DEVICE — ELCTR BOVIE PENCIL SMOKE EVACUATION

## (undated) DEVICE — CANISTER KCI 500ML GEL SENSA TRAC

## (undated) DEVICE — GOWN TRIMAX LG

## (undated) DEVICE — CANISTER W/O GEL INFOVAC 500ML

## (undated) DEVICE — SOL IRR POUR H2O 250ML